# Patient Record
Sex: FEMALE | Race: WHITE | Employment: OTHER | URBAN - METROPOLITAN AREA
[De-identification: names, ages, dates, MRNs, and addresses within clinical notes are randomized per-mention and may not be internally consistent; named-entity substitution may affect disease eponyms.]

---

## 2017-01-06 ENCOUNTER — OFFICE VISIT (OUTPATIENT)
Dept: FAMILY MEDICINE CLINIC | Age: 82
End: 2017-01-06

## 2017-01-06 VITALS
RESPIRATION RATE: 16 BRPM | OXYGEN SATURATION: 95 % | HEART RATE: 55 BPM | TEMPERATURE: 98 F | SYSTOLIC BLOOD PRESSURE: 122 MMHG | DIASTOLIC BLOOD PRESSURE: 67 MMHG | WEIGHT: 176 LBS | HEIGHT: 60 IN | BODY MASS INDEX: 34.55 KG/M2

## 2017-01-06 DIAGNOSIS — M54.6 ACUTE LEFT-SIDED THORACIC BACK PAIN: ICD-10-CM

## 2017-01-06 DIAGNOSIS — R07.81 RIB PAIN: ICD-10-CM

## 2017-01-06 DIAGNOSIS — M41.9 SCOLIOSIS, UNSPECIFIED SCOLIOSIS TYPE, UNSPECIFIED SPINAL REGION: Primary | ICD-10-CM

## 2017-01-06 RX ORDER — HYDROCODONE BITARTRATE AND ACETAMINOPHEN 5; 325 MG/1; MG/1
1 TABLET ORAL
Qty: 30 TAB | Refills: 0 | Status: SHIPPED | OUTPATIENT
Start: 2017-01-06 | End: 2017-02-03 | Stop reason: ALTCHOICE

## 2017-01-06 NOTE — PROGRESS NOTES
.  Chief Complaint   Patient presents with    Medication Refill    Back Pain    Arthritis     . Britta Acuña

## 2017-01-06 NOTE — MR AVS SNAPSHOT
Visit Information Date & Time Provider Department Dept. Phone Encounter #  
 1/6/2017 11:00 AM Griselda Michele MD [address] [facility] [phone] [id number] Upcoming Health Maintenance Date Due DTaP/Tdap/Td series (1 - Tdap) 7/3/1954 ZOSTER VACCINE AGE 60> 7/3/1993 GLAUCOMA SCREENING Q2Y 7/3/1998 MEDICARE YEARLY EXAM 8/3/2017 Allergies as of 1/6/2017  Review Complete On: 11/21/2016 By: Corinne Fabry Severity Noted Reaction Type Reactions Codeine  09/29/2009    Unknown (comments) Unsure of reaction. Morphine  09/29/2009    Hives, Itching Current Immunizations  Reviewed on 10/24/2016 Name Date Influenza High Dose Vaccine PF 10/24/2016, 9/8/2015, 11/11/2014, 11/25/2013 Influenza Vaccine 1/15/2013 Influenza Vaccine Split 1/13/2011 Pneumococcal Conjugate (PCV-13) 8/2/2016 Pneumococcal Vaccine (Unspecified Type) 10/10/2010 Not reviewed this visit You Were Diagnosed With   
  
 Codes Comments Scoliosis, unspecified scoliosis type, unspecified spinal region    -  Primary ICD-10-CM: M41.9 ICD-9-CM: 737.30 Acute left-sided thoracic back pain     ICD-10-CM: M54.6 ICD-9-CM: 724.1 Rib pain     ICD-10-CM: R07.81 ICD-9-CM: 786.50 Vitals BP Pulse Temp Resp Height(growth percentile) Weight(growth percentile) 122/67 (BP 1 Location: Left arm, BP Patient Position: Sitting) (!) 55 98 °F (36.7 °C) 16 5' (1.524 m) 176 lb (79.8 kg) SpO2 BMI OB Status Smoking Status 95% 34.37 kg/m2 Hysterectomy Never Smoker Vitals History BMI and BSA Data Body Mass Index Body Surface Area  
 34.37 kg/m 2 1.84 m 2 Preferred Pharmacy Pharmacy Name Phone [facility] [address], [address] [place] [address] Guilherme Jena [phone] Your Updated Medication List  
  
   
This list is accurate as of: 1/6/17 11:58 AM.  Always use your most recent med list.  
  
  
  
  
 ALPRAZolam 0.25 mg tablet Commonly known as:  [place] Fine Take 1 Tab by mouth three (3) times daily as needed for Sleep or Anxiety. amLODIPine 2.5 mg tablet Commonly known as:  Mehran Sheron TAKE ONE TABLET BY MOUTH DAILY **MUST CALL MD FOR APPOINTMENT  
  
 aspirin 81 mg tablet Take 81 mg by mouth daily. CHARCOCAPS PO Take  by mouth. CRESTOR 40 mg tablet Generic drug:  rosuvastatin TAKE ONE TABLET BY MOUTH DAILY HYDROcodone-acetaminophen 5-325 mg per tablet Commonly known as:  March Castles Take 1 Tab by mouth every six (6) hours as needed for Pain. Max Daily Amount: 4 Tabs. levothyroxine 75 mcg tablet Commonly known as:  SYNTHROID  
TAKE ONE TABLET BY MOUTH EVERY MORNING BEFORE BREAKFAST  
  
 lisinopril 5 mg tablet Commonly known as:  PRINIVIL, ZESTRIL  
TAKE ONE TABLET BY MOUTH DAILY  
  
 metoprolol tartrate 50 mg tablet Commonly known as:  LOPRESSOR  
TAKE ONE TABLET BY MOUTH TWICE A DAY  
  
 nitroglycerin 0.3 mg SL tablet Commonly known as:  NITROSTAT  
1 tablet by SubLINGual route every five (5) minutes as needed for Chest Pain.  
  
 potassium chloride 10 mEq tablet Commonly known as:  KLOR-CON M10  
TAKE TWO TABLETS BY MOUTH DAILY  
  
 ZANTAC 150 mg tablet Generic drug:  raNITIdine Take 150 mg by mouth two (2) times a day. Prescriptions Printed Refills HYDROcodone-acetaminophen (NORCO) 5-325 mg per tablet 0 Sig: Take 1 Tab by mouth every six (6) hours as needed for Pain. Max Daily Amount: 4 Tabs. Class: Print Route: Oral  
  
We Performed the Following REFERRAL TO PHYSICAL THERAPY [NAK00 Custom] Comments:  
 Please evaluate patient for back pain, scoliosis, left rib pain. To-Do List   
 01/06/2017 Imaging:  XR SPINE THORAC 3 V Referral Information Referral ID Referred By Referred To  
  
 [id number] Magali LAWLER Not Available Visits Status Start Date End Date 1 New Request 1/6/17 1/6/18 If your referral has a status of pending review or denied, additional information will be sent to support the outcome of this decision. Introducing [facility]! [employer] introduces [employer] patient portal. Now you can access parts of your medical record, email your doctor's office, and request medication refills online. 1. In your internet browser, go to [email or web address] 2. Click on the First Time User? Click Here link in the Sign In box. You will see the New Member Sign Up page. 3. Enter your [employer] Access Code exactly as it appears below. You will not need to use this code after youve completed the sign-up process. If you do not sign up before the expiration date, you must request a new code. · [employer] Access Code: [email or web address] Expires: 1/20/2017  1:55 PM 
 
4. Enter the last four digits of your Social Security Number (xxxx) and Date of Birth (mm/dd/yyyy) as indicated and click Submit. You will be taken to the next sign-up page. 5. Create a [employer] ID. This will be your [employer] login ID and cannot be changed, so think of one that is secure and easy to remember. 6. Create a [employer] password. You can change your password at any time. 7. Enter your Password Reset Question and Answer. This can be used at a later time if you forget your password. 8. Enter your e-mail address. You will receive e-mail notification when new information is available in [address] 9. Click Sign Up. You can now view and download portions of your medical record. 10. Click the Download Summary menu link to download a portable copy of your medical information. If you have questions, please visit the Frequently Asked Questions section of the [employer] website. Remember, [employer] is NOT to be used for urgent needs. For medical emergencies, dial 911. Now available from your iPhone and Android! Please provide this summary of care documentation to your next provider. Your primary care clinician is listed as Ping Naranjo. If you have any questions after today's visit, please call [phone].

## 2017-01-06 NOTE — PROGRESS NOTES
LORENA Vizcarra Seen is a 80 y.o. female who presents to follow-up on chronic medical issues and establish care with me. Primarily talked about her back pain. Has a long history of scoliosis with spine curvature to the left. Has been having trouble with this recently. October of this past year had some left-sided lower thoracic pain that prompted a urinalysis that showed protein and blood in the urine. She will be following up with a nephrologist later today. Went and saw her orthopedist for the lower thoracic pain and was diagnosed with rib pain and given a brace. She has been wearing the brace and finds that it is very helpful. In December she had an acute bout of pain upon standing. The pain was so severe that she did not want to lift her left leg because it would make the pain worse. This pain has subsided somewhat but it is still an usual pain for her. Notices that any amount of twisting will cause this pain. The pain is located in the same area that was bothering her back in October associated with 1 of the lower left ribs. She is taking pain medication for the back pain. Was taking tramadol but was prescribed [place] by the emergency room physician back in October. Finds the Ayleen Ore is more helpful and has been taking that once a day around 10 AM.  Very rarely needs it more than once a day. She has done physical therapy in the distant past but not recently    PMHx:  Past Medical History   Diagnosis Date    CAD (coronary artery disease)      stent placed on left 1 stent,in 2007    Cancer [facility])      BCCA    [facility]     GERD (gastroesophageal reflux disease)     Hypercholesterolemia     Hypertension     [facility]     Thyroid disease        Meds:   Current Outpatient Prescriptions   Medication Sig Dispense Refill    HYDROcodone-acetaminophen (NORCO) 5-325 mg per tablet Take 1 Tab by mouth every six (6) hours as needed for Pain. Max Daily Amount: 4 Tabs.  27 Tab 0    amLODIPine (NORVASC) 2.5 mg tablet TAKE ONE TABLET BY MOUTH DAILY **MUST CALL MD FOR APPOINTMENT 30 Tab 0    potassium chloride (KLOR-CON M10) 10 mEq tablet TAKE TWO TABLETS BY MOUTH DAILY 180 Tab 3    lisinopril (PRINIVIL, ZESTRIL) 5 mg tablet TAKE ONE TABLET BY MOUTH DAILY 30 Tab 1    levothyroxine (SYNTHROID) 75 mcg tablet TAKE ONE TABLET BY MOUTH EVERY MORNING BEFORE BREAKFAST 30 Tab 9    CRESTOR 40 mg tablet TAKE ONE TABLET BY MOUTH DAILY 30 Tab 4    metoprolol tartrate (LOPRESSOR) 50 mg tablet TAKE ONE TABLET BY MOUTH TWICE A DAY 60 Tab 4    ALPRAZolam (XANAX) 0.25 mg tablet Take 1 Tab by mouth three (3) times daily as needed for Sleep or Anxiety. 30 Tab 1    ACTIVATED CHARCOAL (CHARCOCAPS PO) Take  by mouth.  nitroglycerin (NITROSTAT) 0.3 mg SL tablet 1 tablet by SubLINGual route every five (5) minutes as needed for Chest Pain. 1 Bottle 1    ranitidine (ZANTAC) 150 mg tablet Take 150 mg by mouth two (2) times a day.  aspirin 81 mg Tab Take 81 mg by mouth daily. Allergies: Allergies   Allergen Reactions    Codeine Unknown (comments)     Unsure of reaction.  Morphine Hives and Itching       Smoker:  History   Smoking Status    Never Smoker   Smokeless Tobacco    Never Used       ETOH:   History   Alcohol Use No       FH:   Family History   Problem Relation Age of Onset    Heart Disease Mother     Dementia Father     Diabetes Son     Kidney Disease Son      autoimmune       ROS:  As listed in HPI. In addition:  Constitutional:   No headache, fever, fatigue, weight loss or weight gain      Cardiac:    No chest pain      Resp:   No cough or shortness of breath      Neuro   No loss of consciousness, dizziness, seizures      Physical Exam:  Blood pressure 122/67, pulse (!) 55, temperature 98 °F (36.7 °C), resp. rate 16, height 5' (1.524 m), weight 176 lb (79.8 kg), SpO2 95 %. GEN: No apparent distress. Alert and oriented and responds to all questions appropriately. LUNGS: Respirations unlabored; clear to auscultation bilaterally. There are rales in the bases with the first few deep breaths that cleared after deep breathing  CARDIOVASCULAR: Regular rate and rhythm without murmurs   NEUROLOGIC:  No focal neurologic deficits. Strength and sensation grossly intact. Coordination and gait grossly intact. Back: Palpationscoliosis noted, no midline pain. Pain on palpation of 10th? Rib on the left side that reproduces her pain complaint  SKIN: No obvious rashes. Assessment and Plan     Scoliosis, acute on chronic back pain  The pain that she developed in December upon standing is unlike any pain she has experienced before although it is in the exact same area as the pain she developed in October. X-ray from October did not show acute fracture. The acute nature of the pain that she has been experiencing since December and her inability to move her leg for a few days are concerning to me. I would like to repeat the x-ray to check for fragility fracture. This x-ray is unremarkable given her a referral to physical therapy as this may be helpful for her rib pain    Refill Norco, taking it once daily as needed. NSAIDs not appropriate in kidney disease    Has Xanax on her medication list but is taking it once a month at most.  Warned her about the dangers of mixing benzos and opiates    Blood in urine, seeing Dr. Donna Davey today      ICD-10-CM ICD-9-CM    1. Scoliosis, unspecified scoliosis type, unspecified spinal region M41.9 737.30 HYDROcodone-acetaminophen (NORCO) 5-325 mg per tablet      REFERRAL TO PHYSICAL THERAPY   2. Acute left-sided thoracic back pain M54.6 724.1 REFERRAL TO PHYSICAL THERAPY      XR SPINE THORAC 3 V   3. Rib pain R07.81 786.50 REFERRAL TO PHYSICAL THERAPY      XR SPINE THORAC 3 V       AVS given.  Pt expressed understanding of instructions

## 2017-01-11 ENCOUNTER — HOSPITAL ENCOUNTER (OUTPATIENT)
Dept: GENERAL RADIOLOGY | Age: 82
Discharge: HOME | End: 2017-01-11
Payer: MEDICARE

## 2017-01-11 DIAGNOSIS — M54.6 ACUTE LEFT-SIDED THORACIC BACK PAIN: ICD-10-CM

## 2017-01-11 DIAGNOSIS — R07.81 RIB PAIN: ICD-10-CM

## 2017-01-11 PROCEDURE — 72072 X-RAY EXAM THORAC SPINE 3VWS: CPT

## 2017-01-12 NOTE — PROGRESS NOTES
X-ray of the T-spine obtained looking for fragility fracture. There is no spinal fracture and the x-ray is unchanged from October.   Please notify patient of these reassuring results and encouraged her to see physical therapy as we talked about

## 2017-02-03 DIAGNOSIS — R10.9 LEFT FLANK PAIN: ICD-10-CM

## 2017-02-03 DIAGNOSIS — R31.29 HEMATURIA, MICROSCOPIC: ICD-10-CM

## 2017-02-03 RX ORDER — TRAMADOL HYDROCHLORIDE 50 MG/1
50 TABLET ORAL
Qty: 30 TAB | Refills: 2 | Status: SHIPPED | OUTPATIENT
Start: 2017-02-03 | End: 2017-05-22 | Stop reason: SDUPTHER

## 2017-02-03 NOTE — TELEPHONE ENCOUNTER
Chief Complaint   Patient presents with    Medication Refill     Last refill: 10/13/16  Last Ov: 1/6/17  : 10/13/16

## 2017-02-10 ENCOUNTER — OFFICE VISIT (OUTPATIENT)
Dept: FAMILY MEDICINE CLINIC | Age: 82
End: 2017-02-10

## 2017-02-10 VITALS
RESPIRATION RATE: 18 BRPM | BODY MASS INDEX: 34.63 KG/M2 | DIASTOLIC BLOOD PRESSURE: 79 MMHG | HEIGHT: 60 IN | SYSTOLIC BLOOD PRESSURE: 125 MMHG | TEMPERATURE: 97.9 F | OXYGEN SATURATION: 96 % | WEIGHT: 176.4 LBS | HEART RATE: 60 BPM

## 2017-02-10 DIAGNOSIS — M79.89 LEFT LEG SWELLING: ICD-10-CM

## 2017-02-10 DIAGNOSIS — S93.402A SPRAIN OF LEFT ANKLE, UNSPECIFIED LIGAMENT, INITIAL ENCOUNTER: ICD-10-CM

## 2017-02-10 DIAGNOSIS — S80.212A ABRASION, KNEE, LEFT, INITIAL ENCOUNTER: Primary | ICD-10-CM

## 2017-02-10 NOTE — PROGRESS NOTES
LORENA  Pham Panda is a 80 y.o. female who presents to follow-up on her knee abrasion after a fall. She was getting out of her car and stepped on uneven ground and ended up on the ground before she knew it. Scraped her left knee and left arm on the curb. No other injuries. Able to get up and walk away from the fall. She has been treating the left knee abrasion with daily washings of hydrogen peroxide and Polysporin. Keep it covered. She would like to ask about the drainage. Is also noticed that her left leg has been swollen since the injury. Some of her calf muscles are aching    PMHx:  Past Medical History   Diagnosis Date    CAD (coronary artery disease)      stent placed on left 1 stent,in 2007    [facility]      BCCA    DVT (deep venous thrombosis) ([facility])     GERD (gastroesophageal reflux disease)     Hypercholesterolemia     Hypertension     [facility]     Thyroid disease        Meds:   Current Outpatient Prescriptions   Medication Sig Dispense Refill    traMADol (ULTRAM) 50 mg tablet Take 1 Tab by mouth every six (6) hours as needed for Pain. Max Daily Amount: 200 mg. 30 Tab 2    lisinopril (PRINIVIL, ZESTRIL) 5 mg tablet TAKE ONE TABLET BY MOUTH DAILY 30 Tab 6    amLODIPine (NORVASC) 2.5 mg tablet TAKE ONE TABLET BY MOUTH DAILY **MUST CALL MD FOR APPOINTMENT 30 Tab 0    potassium chloride (KLOR-CON M10) 10 mEq tablet TAKE TWO TABLETS BY MOUTH DAILY 180 Tab 3    levothyroxine (SYNTHROID) 75 mcg tablet TAKE ONE TABLET BY MOUTH EVERY MORNING BEFORE BREAKFAST 30 Tab 9    CRESTOR 40 mg tablet TAKE ONE TABLET BY MOUTH DAILY 30 Tab 4    metoprolol tartrate (LOPRESSOR) 50 mg tablet TAKE ONE TABLET BY MOUTH TWICE A DAY 60 Tab 4    ALPRAZolam (XANAX) 0.25 mg tablet Take 1 Tab by mouth three (3) times daily as needed for Sleep or Anxiety. 30 Tab 1    ACTIVATED CHARCOAL (CHARCOCAPS PO) Take  by mouth.       ranitidine (ZANTAC) 150 mg tablet Take 150 mg by mouth two (2) times a day.        aspirin 81 mg Tab Take 81 mg by mouth daily.  nitroglycerin (NITROSTAT) 0.3 mg SL tablet 1 tablet by SubLINGual route every five (5) minutes as needed for Chest Pain. 1 Bottle 1       Allergies: Allergies   Allergen Reactions    Codeine Unknown (comments)     Unsure of reaction.  Morphine Hives and Itching       Smoker:  History   Smoking Status    Never Smoker   Smokeless Tobacco    Never Used       ETOH:   History   Alcohol Use No       FH:   Family History   Problem Relation Age of Onset    Heart Disease Mother     Dementia Father     Diabetes Son     Kidney Disease Son      autoimmune       ROS:  As listed in HPI. In addition:  Constitutional:   No headache, fever, fatigue, weight loss or weight gain      Cardiac:    No chest pain      Resp:   No cough or shortness of breath      Neuro   No loss of consciousness, dizziness, seizures      Physical Exam:  Blood pressure 125/79, pulse 60, temperature 97.9 °F (36.6 °C), resp. rate 18, height 5' (1.524 m), weight 176 lb 6.4 oz (80 kg), SpO2 96 %. GEN: No apparent distress. Alert and oriented and responds to all questions appropriately. NEUROLOGIC:  No focal neurologic deficits. Strength and sensation grossly intact. Coordination and gait grossly intact. EXT: 1+ edema on the left leg, slightly more edema on the left than the right. The left ankle was examined and there is tenderness to the anterior talofibular ligament. Ankle joint is otherwise stable and strength is intact. Negative Homans sign  SKIN: There is a 4 cm wide by 3 cm long abrasion of the left knee with good granulation tissue and no sign of cellulitis. The drainage that she referred to this serous fluid       Assessment and Plan     Left knee abrasion  Educated on proper wound care. Stop the hydrogen peroxide, continue the Polysporin, continue covering the injury. Would expect several weeks to heal injury of the size.   No apparent infection    Left leg edema due to a left ankle sprain. The sprain is very mild. She is able to bear weight on the ankle without pain and so does not require a brace. However for the edema I recommend compression stocking. Do not cover the abrasion with compression stockings. DVT was considered on the differential but discounted because of the presence of ankle injury likely causing swelling      ICD-10-CM ICD-9-CM    1. Abrasion, knee, left, initial encounter S80.212A 916.0    2. Sprain of left ankle, unspecified ligament, initial encounter S93.402A 845.00    3. Left leg swelling M79.89 729.81        AVS given.  Pt expressed understanding of instructions

## 2017-02-10 NOTE — PROGRESS NOTES
.  Chief Complaint   Patient presents with    Wound Check     left knee     Leg Swelling     . [facility]

## 2017-02-28 RX ORDER — METOPROLOL TARTRATE 50 MG/1
TABLET ORAL
Qty: 60 TAB | Refills: 4 | Status: SHIPPED | OUTPATIENT
Start: 2017-02-28 | End: 2017-08-18 | Stop reason: SDUPTHER

## 2017-02-28 NOTE — TELEPHONE ENCOUNTER
Chief Complaint   Patient presents with    Medication Refill     Last refill:   5 months ago (9/6/2016)        metoprolol tartrate (LOPRESSOR) 50 mg tablet       TAKE ONE TABLET BY MOUTH TWICE A DAY       Dispense: 60 Tab     Refills: 4     Start: 9/6/2016     By: Karyle Fairly, MD

## 2017-03-08 RX ORDER — ROSUVASTATIN CALCIUM 40 MG/1
40 TABLET, COATED ORAL
Qty: 90 TAB | Refills: 3 | Status: SHIPPED | OUTPATIENT
Start: 2017-03-08 | End: 2018-03-20 | Stop reason: SDUPTHER

## 2017-03-08 NOTE — TELEPHONE ENCOUNTER
Chief Complaint   Patient presents with    Medication Refill     Last refill:               5 months ago (9/21/2016)       CRESTOR 40 mg tablet       TAKE ONE TABLET BY MOUTH DAILY       Dispense: 30 Tab     Refills: 4     Start: 9/21/2016     By: Rose Marie Carbone MD

## 2017-03-10 DIAGNOSIS — F41.9 ANXIETY: ICD-10-CM

## 2017-03-10 RX ORDER — ALPRAZOLAM 0.25 MG/1
0.25 TABLET ORAL
Qty: 30 TAB | Refills: 1 | Status: SHIPPED | OUTPATIENT
Start: 2017-03-10 | End: 2017-08-07

## 2017-03-10 NOTE — TELEPHONE ENCOUNTER
Chief Complaint   Patient presents with    Medication Refill     Last refill:     9 months ago (6/1/2016)       ALPRAZolam (XANAX) 0.25 mg tablet       Take 1 Tab by mouth three (3) times daily as needed for Sleep or Anxiety.       Dispense: 30 Tab     Refills: 1     Start: 6/1/2016     By: Agus Ventura MD       : 6/1/16

## 2017-04-04 ENCOUNTER — OFFICE VISIT (OUTPATIENT)
Dept: FAMILY MEDICINE CLINIC | Age: 82
End: 2017-04-04

## 2017-04-04 VITALS
DIASTOLIC BLOOD PRESSURE: 76 MMHG | RESPIRATION RATE: 18 BRPM | HEART RATE: 70 BPM | OXYGEN SATURATION: 94 % | SYSTOLIC BLOOD PRESSURE: 115 MMHG | TEMPERATURE: 98 F | HEIGHT: 60 IN | BODY MASS INDEX: 34.55 KG/M2 | WEIGHT: 176 LBS

## 2017-04-04 DIAGNOSIS — R60.0 BILATERAL EDEMA OF LOWER EXTREMITY: ICD-10-CM

## 2017-04-04 DIAGNOSIS — I10 ESSENTIAL HYPERTENSION: Primary | ICD-10-CM

## 2017-04-04 DIAGNOSIS — Z23 ENCOUNTER FOR IMMUNIZATION: ICD-10-CM

## 2017-04-04 DIAGNOSIS — G89.29 CHRONIC LEFT-SIDED THORACIC BACK PAIN: ICD-10-CM

## 2017-04-04 DIAGNOSIS — M54.6 CHRONIC LEFT-SIDED THORACIC BACK PAIN: ICD-10-CM

## 2017-04-04 NOTE — PROGRESS NOTES
LORENA  Emmy Garvey is a 80 y.o. female who presents to follow-up on blood pressure. Also following up on thoracic back pain that I sent her to physical therapy for. She feels like there has not been much improvement with physical therapy. She has some exercises to do at home which she does when she remembers them which is more or less every day. Has seen an orthopedist for the thoracic back pain and was told to wear a brace or undergo surgery. She is wearing the brace which does seem to help. Unable to stay on her feet for very long. Uses a scooter when she goes to the store. Currently taking tramadol 50 mg every day which she is not sure whether or not it is helping but takes it anyway. Has a slight concern about mild lower extremity edema. Easily resolves with compression. Has not been using compression although she owns compression stockings. PMHx:  Past Medical History:   Diagnosis Date    CAD (coronary artery disease)     stent placed on left 1 stent,in 2007    Cancer [facility])     BCCA    [facility] ([facility])     GERD (gastroesophageal reflux disease)     Hypercholesterolemia     Hypertension     Thromboembolus ([address]     Thyroid disease        Meds:   Current Outpatient Prescriptions   Medication Sig Dispense Refill    diph,pertuss,acel,,tetanus vac,PF, (ADACEL) 2 Lf-(2.5-5-3-5 mcg)-5Lf/0.5 mL syrg vaccine 0.5 mL by IntraMUSCular route once for 1 dose. 0.5 mL 0    ALPRAZolam (XANAX) 0.25 mg tablet Take 1 Tab by mouth three (3) times daily as needed for Sleep or Anxiety. 30 Tab 1    rosuvastatin (CRESTOR) 40 mg tablet Take 1 Tab by mouth nightly. 90 Tab 3    metoprolol tartrate (LOPRESSOR) 50 mg tablet TAKE ONE TABLET BY MOUTH TWICE A DAY 60 Tab 4    traMADol (ULTRAM) 50 mg tablet Take 1 Tab by mouth every six (6) hours as needed for Pain.  Max Daily Amount: 200 mg. 30 Tab 2    lisinopril (PRINIVIL, ZESTRIL) 5 mg tablet TAKE ONE TABLET BY MOUTH DAILY 30 Tab 6    potassium chloride (KLOR-CON M10) 10 mEq tablet TAKE TWO TABLETS BY MOUTH DAILY 180 Tab 3    levothyroxine (SYNTHROID) 75 mcg tablet TAKE ONE TABLET BY MOUTH EVERY MORNING BEFORE BREAKFAST 30 Tab 9    ACTIVATED CHARCOAL (CHARCOCAPS PO) Take  by mouth.  nitroglycerin (NITROSTAT) 0.3 mg SL tablet 1 tablet by SubLINGual route every five (5) minutes as needed for Chest Pain. 1 Bottle 1    ranitidine (ZANTAC) 150 mg tablet Take 150 mg by mouth two (2) times a day.  aspirin 81 mg Tab Take 81 mg by mouth daily. Allergies: Allergies   Allergen Reactions    Codeine Unknown (comments)     Unsure of reaction.  Morphine Hives and Itching       Smoker:  History   Smoking Status    Never Smoker   Smokeless Tobacco    Never Used       ETOH:   History   Alcohol Use No       FH:   Family History   Problem Relation Age of Onset    Heart Disease Mother     Dementia Father     Diabetes Son     Kidney Disease Son      autoimmune       ROS:  As listed in HPI. In addition:  Constitutional:   No headache, fever, fatigue, weight loss or weight gain       Cardiac:    No chest pain      Resp:   No cough or shortness of breath      Neuro   No loss of consciousness, dizziness, seizures      Physical Exam:  Blood pressure 115/76, pulse 70, temperature 98 °F (36.7 °C), resp. rate 18, height 5' (1.524 m), weight 176 lb (79.8 kg), SpO2 94 %. GEN: No apparent distress. Alert and oriented and responds to all questions appropriately. LUNGS: Respirations unlabored; clear to auscultation bilaterally  CARDIOVASCULAR: Regular rate and rhythm without murmurs   NEUROLOGIC:  No focal neurologic deficits. Strength and sensation grossly intact. Coordination and gait grossly intact. EXT: Well perfused. 1+ edema. No chronic skin changes  SKIN: No obvious rashes. Assessment and Plan     Hypertension, well controlled  Is taking a small dose lisinopril, small dose of Norvasc and metoprolol.   In the setting lower extremity edema there is no reason to be on Norvasc 2.5 mg. Stop this to see if it has any benefit to the swelling. Also counseled on low-salt diet    Chronic left-sided thoracic pain  Brace as needed for comfort  Do not wear the brace all the time, make sure that you are doing the exercises he learned in physical therapy. Rather than doubling the dose of tramadol recommend you back tramadol off to as needed 23 days per week. Might notice more benefit on the days you take it when you do this. Tdap prescription    August for Medicare wellness, sooner if needed  Does she need potassium? ICD-10-CM ICD-9-CM    1. Essential hypertension I10 401.9    2. Chronic left-sided thoracic back pain M54.6 724.1     G89.29 338.29    3. Bilateral edema of lower extremity R60.0 782.3    4. Encounter for immunization Z23 V03.89 diph,pertuss,acel,,tetanus vac,PF, (ADACEL) 2 Lf-(2.5-5-3-5 mcg)-5Lf/0.5 mL syrg vaccine       AVS given.  Pt expressed understanding of instructions

## 2017-04-04 NOTE — MR AVS SNAPSHOT
Visit Information Date & Time Provider Department Dept. Phone Encounter #  
 4/4/2017 10:20 AM Anderson Macias MD [facility] [phone] [id number] Upcoming Health Maintenance Date Due DTaP/Tdap/Td series (1 - Tdap) 7/3/1954 ZOSTER VACCINE AGE 60> 7/3/1993 MEDICARE YEARLY EXAM 8/3/2017 GLAUCOMA SCREENING Q2Y 5/24/2018 Allergies as of 4/4/2017  Review Complete On: 4/4/2017 By: Anderson Macias MD  
  
 Severity Noted Reaction Type Reactions Codeine  09/29/2009    Unknown (comments) Unsure of reaction. Morphine  09/29/2009    Hives, Itching Current Immunizations  Reviewed on 10/24/2016 Name Date Influenza High Dose Vaccine PF 10/24/2016, 9/8/2015, 11/11/2014, 11/25/2013 Influenza Vaccine 1/15/2013 Influenza Vaccine Split 1/13/2011 Pneumococcal Conjugate (PCV-13) 8/2/2016 Pneumococcal Vaccine (Unspecified Type) 10/10/2010 Not reviewed this visit You Were Diagnosed With   
  
 Codes Comments Essential hypertension    -  Primary ICD-10-CM: I10 
ICD-9-CM: 401.9 Chronic left-sided thoracic back pain     ICD-10-CM: M54.6, G89.29 ICD-9-CM: 724.1, 338.29 Bilateral edema of lower extremity     ICD-10-CM: R60.0 ICD-9-CM: 782.3 Encounter for immunization     ICD-10-CM: S88 ICD-9-CM: V03.89 Vitals BP Pulse Temp Resp Height(growth percentile) Weight(growth percentile) 115/76 (BP 1 Location: Left arm, BP Patient Position: Sitting) 70 98 °F (36.7 °C) 18 5' (1.524 m) 176 lb (79.8 kg) SpO2 BMI OB Status Smoking Status 94% 34.37 kg/m2 Hysterectomy Never Smoker Vitals History BMI and BSA Data Body Mass Index Body Surface Area  
 34.37 kg/m 2 1.84 m 2 Preferred Pharmacy Pharmacy Name Phone [facility] [address] , [address] [place] [address] Deneise Cones [phone] Your Updated Medication List  
  
   
 This list is accurate as of: 17 11:16 AM.  Always use your most recent med list.  
  
  
  
  
 ALPRAZolam 0.25 mg tablet Commonly known as:  Vandana Lucero Take 1 Tab by mouth three (3) times daily as needed for Sleep or Anxiety. aspirin 81 mg tablet Take 81 mg by mouth daily. CHARCOCAPS PO Take  by mouth. diph,pertuss(acel),tetanus vac(PF) 2 Lf-(2.5-5-3-5 mcg)-5Lf/0.5 mL Syrg vaccine Commonly known as:  ADACEL  
0.5 mL by IntraMUSCular route once for 1 dose. levothyroxine 75 mcg tablet Commonly known as:  SYNTHROID  
TAKE ONE TABLET BY MOUTH EVERY MORNING BEFORE BREAKFAST  
  
 lisinopril 5 mg tablet Commonly known as:  PRINIVIL, ZESTRIL  
TAKE ONE TABLET BY MOUTH DAILY  
  
 metoprolol tartrate 50 mg tablet Commonly known as:  LOPRESSOR  
TAKE ONE TABLET BY MOUTH TWICE A DAY  
  
 nitroglycerin 0.3 mg SL tablet Commonly known as:  NITROSTAT  
1 tablet by SubLINGual route every five (5) minutes as needed for Chest Pain.  
  
 potassium chloride 10 mEq tablet Commonly known as:  KLOR-CON M10  
TAKE TWO TABLETS BY MOUTH DAILY  
  
 rosuvastatin 40 mg tablet Commonly known as:  CRESTOR Take 1 Tab by mouth nightly. traMADol 50 mg tablet Commonly known as:  ULTRAM  
Take 1 Tab by mouth every six (6) hours as needed for Pain. Max Daily Amount: 200 mg. ZANTAC 150 mg tablet Generic drug:  raNITIdine Take 150 mg by mouth two (2) times a day. Prescriptions Printed Refills diph,pertuss,acel,,tetanus vac,PF, (ADACEL) 2 Lf-(2.5-5-3-5 mcg)-5Lf/0.5 mL syrg vaccine 0 Si.5 mL by IntraMUSCular route once for 1 dose. Class: Print Route: IntraMUSCular Introducing [facility]! [facility] introduces [employer] patient portal. Now you can access parts of your medical record, email your doctor's office, and request medication refills online. 1. In your internet browser, go to [email or web address] 2. Click on the First Time User? Click Here link in the Sign In box. You will see the New Member Sign Up page. 3. Enter your [employer] Access Code exactly as it appears below. You will not need to use this code after youve completed the sign-up process. If you do not sign up before the expiration date, you must request a new code. · [employer] Access Code: [email or web address] Expires: 7/3/2017 11:16 AM 
 
4. Enter the last four digits of your Social Security Number (xxxx) and Date of Birth (mm/dd/yyyy) as indicated and click Submit. You will be taken to the next sign-up page. 5. Create a [employer] ID. This will be your [employer] login ID and cannot be changed, so think of one that is secure and easy to remember. 6. Create a [employer] password. You can change your password at any time. 7. Enter your Password Reset Question and Answer. This can be used at a later time if you forget your password. 8. Enter your e-mail address. You will receive e-mail notification when new information is available in [address] 9. Click Sign Up. You can now view and download portions of your medical record. 10. Click the Download Summary menu link to download a portable copy of your medical information. If you have questions, please visit the Frequently Asked Questions section of the [employer] website. Remember, [employer] is NOT to be used for urgent needs. For medical emergencies, dial 911. Now available from your iPhone and Android! Please provide this summary of care documentation to your next provider. Your primary care clinician is listed as Vikki Ryan. If you have any questions after today's visit, please call [phone].

## 2017-05-22 ENCOUNTER — OFFICE VISIT (OUTPATIENT)
Dept: CARDIOLOGY CLINIC | Age: 82
End: 2017-05-22

## 2017-05-22 ENCOUNTER — DOCUMENTATION ONLY (OUTPATIENT)
Dept: FAMILY MEDICINE CLINIC | Age: 82
End: 2017-05-22

## 2017-05-22 VITALS
HEIGHT: 60 IN | BODY MASS INDEX: 34.36 KG/M2 | DIASTOLIC BLOOD PRESSURE: 80 MMHG | SYSTOLIC BLOOD PRESSURE: 130 MMHG | HEART RATE: 73 BPM | OXYGEN SATURATION: 98 % | RESPIRATION RATE: 18 BRPM | WEIGHT: 175 LBS

## 2017-05-22 DIAGNOSIS — I25.10 ASHD (ARTERIOSCLEROTIC HEART DISEASE): Primary | ICD-10-CM

## 2017-05-22 DIAGNOSIS — I10 ESSENTIAL HYPERTENSION: ICD-10-CM

## 2017-05-22 DIAGNOSIS — R31.29 HEMATURIA, MICROSCOPIC: ICD-10-CM

## 2017-05-22 DIAGNOSIS — N18.3 CKD (CHRONIC KIDNEY DISEASE), STAGE 3 (MODERATE): ICD-10-CM

## 2017-05-22 DIAGNOSIS — R10.9 LEFT FLANK PAIN: ICD-10-CM

## 2017-05-22 DIAGNOSIS — E78.2 MIXED HYPERLIPIDEMIA: ICD-10-CM

## 2017-05-22 RX ORDER — ALENDRONATE SODIUM 10 MG/1
10 TABLET ORAL DAILY
COMMUNITY
Start: 2017-05-19 | End: 2017-08-17 | Stop reason: SDUPTHER

## 2017-05-22 RX ORDER — DOXYCYCLINE HYCLATE 100 MG
100 TABLET ORAL DAILY
COMMUNITY
Start: 2017-05-18 | End: 2017-08-07 | Stop reason: ALTCHOICE

## 2017-05-22 RX ORDER — TETANUS TOXOID, REDUCED DIPHTHERIA TOXOID AND ACELLULAR PERTUSSIS VACCINE, ADSORBED 5; 2.5; 8; 8; 2.5 [IU]/.5ML; [IU]/.5ML; UG/.5ML; UG/.5ML; UG/.5ML
SUSPENSION INTRAMUSCULAR
COMMUNITY
Start: 2017-04-04 | End: 2017-08-07 | Stop reason: ALTCHOICE

## 2017-05-22 RX ORDER — TRAMADOL HYDROCHLORIDE 50 MG/1
TABLET ORAL
Qty: 30 TAB | Refills: 1 | Status: SHIPPED | OUTPATIENT
Start: 2017-05-22 | End: 2017-07-20 | Stop reason: SDUPTHER

## 2017-05-22 NOTE — PROGRESS NOTES
5/22/2017 11:42 AM      Subjective:     Audrey Bar is here for f/u visit. Overall feels good. Per pt sometimes she has noticed some TOMLINSON. No chest pain or palpitations. Visit Vitals    /80    Pulse 73    Resp 18    Ht 5' (1.524 m)    Wt 175 lb (79.4 kg)    SpO2 98%    BMI 34.18 kg/m2     Current Outpatient Prescriptions   Medication Sig    alendronate (FOSAMAX) 10 mg tablet Take 10 mg by mouth daily.  BOOSTRIX TDAP 2.5-8-5 Lf-mcg-Lf/0.5mL syrg     doxycycline (VIBRA-TABS) 100 mg tablet Take 100 mg by mouth daily.  ALPRAZolam (XANAX) 0.25 mg tablet Take 1 Tab by mouth three (3) times daily as needed for Sleep or Anxiety.  rosuvastatin (CRESTOR) 40 mg tablet Take 1 Tab by mouth nightly.  metoprolol tartrate (LOPRESSOR) 50 mg tablet TAKE ONE TABLET BY MOUTH TWICE A DAY    traMADol (ULTRAM) 50 mg tablet Take 1 Tab by mouth every six (6) hours as needed for Pain. Max Daily Amount: 200 mg.    lisinopril (PRINIVIL, ZESTRIL) 5 mg tablet TAKE ONE TABLET BY MOUTH DAILY    potassium chloride (KLOR-CON M10) 10 mEq tablet TAKE TWO TABLETS BY MOUTH DAILY    levothyroxine (SYNTHROID) 75 mcg tablet TAKE ONE TABLET BY MOUTH EVERY MORNING BEFORE BREAKFAST    ACTIVATED CHARCOAL (CHARCOCAPS PO) Take  by mouth.  nitroglycerin (NITROSTAT) 0.3 mg SL tablet 1 tablet by SubLINGual route every five (5) minutes as needed for Chest Pain.  aspirin 81 mg Tab Take 81 mg by mouth daily.  ranitidine (ZANTAC) 150 mg tablet Take 150 mg by mouth two (2) times a day. No current facility-administered medications for this visit.           Objective:      Visit Vitals    /80    Pulse 73    Resp 18    Ht 5' (1.524 m)    Wt 175 lb (79.4 kg)    SpO2 98%    BMI 34.18 kg/m2       Data Review:     EKG: Normal sinus rhythm, non specific T wave changes    Reviewed and/or ordered active problem list, medication list tests    Past Medical History:   Diagnosis Date    CAD (coronary artery disease)     stent placed on left 1 stent,in 2007    Cancer [facility])     BCCA    DVT (deep venous thrombosis) ([facility])     GERD (gastroesophageal reflux disease)     Hypercholesterolemia     Hypertension     Thromboembolus ([address]     Thyroid disease       Past Surgical History:   Procedure Laterality Date    HX GI      esophageal hiatal hernia - 2004    HX GYN      partial hysterectomy - 1970    HX OTHER SURGICAL      BCCAs removed    HX UROLOGICAL      bladder repair - 1999     Allergies   Allergen Reactions    Codeine Unknown (comments)     Unsure of reaction.  Morphine Hives and Itching      Family History   Problem Relation Age of Onset    Heart Disease Mother     Dementia Father     Diabetes Son     Kidney Disease Son      autoimmune      Social History     Social History    Marital status:      Spouse name: N/A    Number of children: N/A    Years of education: N/A     Occupational History    Not on file. Social History Main Topics    Smoking status: Never Smoker    Smokeless tobacco: Never Used    Alcohol use No    Drug use: No    Sexual activity: Not on file     Other Topics Concern    Not on file     Social History Narrative         Review of Systems     General: Not Present- Anorexia, Chills, Dietary Changes, Fatigue, Fever, Medication Changes, Night Sweats, Weight Gain > 10lbs. and Weight Loss > 10lbs. .  Skin: Not Present- Bruising and Excessive Sweating. HEENT: Not Present- Headache, Visual Loss and Vertigo. Respiratory: Not Present- Cough, Decreased Exercise Tolerance, Snoring and Wheezing. Cardiovascular: Not Present- Abnormal Blood Pressure, Chest Pain, Claudications, Edema, Fainting / Blacking Out, Irregular Heart Beat, Night Cramps, Orthopnea, Palpitations, Paroxysmal Nocturnal Dyspnea, Rapid Heart Rate, and Swelling of Extremities.   Gastrointestinal: Not Present- Black, Tarry Stool, Bloody Stool, Diarrhea, Hematemesis, Rectal Bleeding and Vomiting. Musculoskeletal: Not Present- Muscle Pain and Muscle Weakness. Neurological: Not Present- Dizziness. Psychiatric: Not Present- Depression. Endocrine: Not Present- Cold Intolerance, Heat Intolerance and Thyroid Problems. Hematology: Not Present- Abnormal Bleeding, Anemia, Blood Clots and Easy Bruising.       Physical Exam   The physical exam findings are as follows:       General   Mental Status - Alert. General Appearance - Not in acute distress. Chest and Lung Exam   Inspection: Accessory muscles - No use of accessory muscles in breathing. Auscultation:   Breath sounds: - Normal.      Cardiovascular   Inspection: Jugular vein - Bilateral - Inspection Normal.  Palpation/Percussion:   Apical Impulse: - Normal.  Auscultation: Rhythm - Regular. Heart Sounds - S1 WNL and S2 WNL. No S3 or S4. Murmurs & Other Heart Sounds: Auscultation of the heart reveals - No Murmurs. Carotid arteries - No Carotid bruit. Peripheral Vascular   Upper Extremity: Inspection - Bilateral - No Cyanotic nailbeds or Digital clubbing. Lower Extremity:   Palpation: Edema - Bilateral - No edema. Assessment:       ICD-10-CM ICD-9-CM    1. ASHD (arteriosclerotic heart disease) I25.10 414.00 AMB POC EKG ROUTINE W/ 12 LEADS, INTER & REP   2. Mixed hyperlipidemia E78.2 272.2    3. Essential hypertension I10 401.9    4. CKD (chronic kidney disease), stage 3 (moderate) N18.3 585. 3        Plan:     1. CAD: h/o PCI in 2007. Due to subtle TOMLINSON, stress test recommended, however she wants to hold off any cardiac tests for now. If symptoms worsen she will call back.    2. HTN: Controlled, continue current meds. Amlodipine dc by PCP. 3. High cholesterol: On statin. Last labs reviewed.    4. Diet and exercise.

## 2017-05-22 NOTE — PROGRESS NOTES
Call placed to pt and to pt son to let them know that RX for Ultram was called into [address] [place] [address] in [place].

## 2017-06-29 RX ORDER — LISINOPRIL 5 MG/1
TABLET ORAL
Qty: 30 TAB | Refills: 6 | Status: SHIPPED | OUTPATIENT
Start: 2017-06-29 | End: 2018-01-19 | Stop reason: SDUPTHER

## 2017-07-20 DIAGNOSIS — R31.29 HEMATURIA, MICROSCOPIC: ICD-10-CM

## 2017-07-20 DIAGNOSIS — R10.9 LEFT FLANK PAIN: ICD-10-CM

## 2017-07-21 DIAGNOSIS — R10.9 LEFT FLANK PAIN: ICD-10-CM

## 2017-07-21 DIAGNOSIS — R31.29 HEMATURIA, MICROSCOPIC: ICD-10-CM

## 2017-07-21 RX ORDER — TRAMADOL HYDROCHLORIDE 50 MG/1
TABLET ORAL
Qty: 30 TAB | Refills: 1 | Status: CANCELLED | OUTPATIENT
Start: 2017-07-21

## 2017-07-21 RX ORDER — TRAMADOL HYDROCHLORIDE 50 MG/1
TABLET ORAL
Qty: 30 TAB | Refills: 1 | Status: SHIPPED | OUTPATIENT
Start: 2017-07-21 | End: 2017-09-15 | Stop reason: SDUPTHER

## 2017-07-21 NOTE — TELEPHONE ENCOUNTER
Tramadol has been approved by Dr. Reymundo Pepper. Waiting for pharmacy to open at 9 for medication to be called in.

## 2017-08-07 ENCOUNTER — HOSPITAL ENCOUNTER (OUTPATIENT)
Dept: CT IMAGING | Age: 82
Discharge: HOME | End: 2017-08-07
Attending: INTERNAL MEDICINE
Payer: MEDICARE

## 2017-08-07 ENCOUNTER — HOSPITAL ENCOUNTER (OUTPATIENT)
Dept: LAB | Age: 82
Discharge: HOME | End: 2017-08-07
Payer: MEDICARE

## 2017-08-07 ENCOUNTER — OFFICE VISIT (OUTPATIENT)
Dept: FAMILY MEDICINE CLINIC | Age: 82
End: 2017-08-07

## 2017-08-07 VITALS
WEIGHT: 175.2 LBS | BODY MASS INDEX: 34.4 KG/M2 | OXYGEN SATURATION: 92 % | SYSTOLIC BLOOD PRESSURE: 144 MMHG | TEMPERATURE: 98.1 F | RESPIRATION RATE: 16 BRPM | HEART RATE: 88 BPM | DIASTOLIC BLOOD PRESSURE: 79 MMHG | HEIGHT: 60 IN

## 2017-08-07 DIAGNOSIS — E78.2 MIXED HYPERLIPIDEMIA: ICD-10-CM

## 2017-08-07 DIAGNOSIS — N18.3 CKD (CHRONIC KIDNEY DISEASE), STAGE 3 (MODERATE): ICD-10-CM

## 2017-08-07 DIAGNOSIS — R22.0 HEAD SWELLING: ICD-10-CM

## 2017-08-07 DIAGNOSIS — Z13.31 SCREENING FOR DEPRESSION: ICD-10-CM
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
## 2017-08-07 DIAGNOSIS — I10 ESSENTIAL HYPERTENSION: ICD-10-CM

## 2017-08-07 DIAGNOSIS — I25.10 ATHEROSCLEROSIS OF NATIVE CORONARY ARTERY OF NATIVE HEART WITHOUT ANGINA PECTORIS: ICD-10-CM

## 2017-08-07 DIAGNOSIS — Z00.00 GENERAL MEDICAL EXAM: Primary | ICD-10-CM

## 2017-08-07 DIAGNOSIS — Z86.711 HISTORY OF PULMONARY EMBOLUS (PE): ICD-10-CM

## 2017-08-07 DIAGNOSIS — Z13.39 SCREENING FOR ALCOHOLISM: ICD-10-CM

## 2017-08-07 PROCEDURE — 85025 COMPLETE CBC W/AUTO DIFF WBC: CPT

## 2017-08-07 PROCEDURE — 80061 LIPID PANEL: CPT

## 2017-08-07 PROCEDURE — 70450 CT HEAD/BRAIN W/O DYE: CPT

## 2017-08-07 PROCEDURE — 84443 ASSAY THYROID STIM HORMONE: CPT

## 2017-08-07 PROCEDURE — 80053 COMPREHEN METABOLIC PANEL: CPT

## 2017-08-07 PROCEDURE — 82306 VITAMIN D 25 HYDROXY: CPT

## 2017-08-07 NOTE — MR AVS SNAPSHOT
Visit Information Date & Time Provider Department Dept. Phone Encounter #  
 8/7/2017 11:30 AM Jim Blair [place]97 [phone] [id number] Your Appointments 8/8/2017  9:30 AM  
Medicare Physical with Ama Larose MD  
Ana Prakash [place] [address] ([address]) Appt Note: medicare wellness $0cp [facility] [address], [address] [address] [place] [address] [facility] [address]  
[phone]  
  
   
 [address], [place] [address] [address] Upcoming Health Maintenance Date Due DTaP/Tdap/Td series (1 - Tdap) 7/3/1954 ZOSTER VACCINE AGE 60> 5/3/1993 INFLUENZA AGE 9 TO ADULT 8/1/2017 MEDICARE YEARLY EXAM 8/3/2017 GLAUCOMA SCREENING Q2Y 5/24/2018 Allergies as of 8/7/2017  Review Complete On: 8/7/2017 By: Ama Larose MD  
  
 Severity Noted Reaction Type Reactions Codeine  09/29/2009    Unknown (comments) Unsure of reaction. Morphine  09/29/2009    Hives, Itching Current Immunizations  Reviewed on 10/24/2016 Name Date Influenza High Dose Vaccine PF 10/24/2016, 9/8/2015, 11/11/2014, 11/25/2013 Influenza Vaccine 1/15/2013 Influenza Vaccine Split 1/13/2011 Pneumococcal Conjugate (PCV-13) 8/2/2016 ZZZ-RETIRED (DO NOT USE) Pneumococcal Vaccine (Unspecified Type) 10/10/2010 Not reviewed this visit You Were Diagnosed With   
  
 Codes Comments General medical exam    -  Primary ICD-10-CM: Z00.00 ICD-9-CM: V70.9 Fall, initial encounter     ICD-10-CM: W19. Genieelvira Rand ICD-9-CM: E888.9 Head swelling     ICD-10-CM: R22.0 ICD-9-CM: 784.2 Mixed hyperlipidemia     ICD-10-CM: E78.2 ICD-9-CM: 272.2 Essential hypertension     ICD-10-CM: I10 
ICD-9-CM: 401.9 CKD (chronic kidney disease), stage 3 (moderate)     ICD-10-CM: N18.3 ICD-9-CM: 618. 3 Atherosclerosis of native coronary artery of native heart without angina pectoris     ICD-10-CM: I25.10 ICD-9-CM: 414.01   
 Screening for depression     ICD-10-CM: Z13.89 ICD-9-CM: V79.0 Screening for alcoholism     ICD-10-CM: Z13.89 ICD-9-CM: V79.1 Vitals BP Pulse Temp Resp Height(growth percentile) Weight(growth percentile) 144/79 (BP 1 Location: Left arm, BP Patient Position: Sitting) 88 98.1 °F (36.7 °C) (Oral) 16 5' (1.524 m) 175 lb 3.2 oz (79.5 kg) SpO2 BMI OB Status Smoking Status 92% 34.22 kg/m2 Hysterectomy Never Smoker BMI and BSA Data Body Mass Index Body Surface Area  
 34.22 kg/m 2 1.83 m 2 Preferred Pharmacy Pharmacy Name Phone [facility] [address], [address] [place] [address] Albert Suarezelvira [phone] Your Updated Medication List  
  
   
This list is accurate as of: 8/7/17 12:15 PM.  Always use your most recent med list.  
  
  
  
  
 alendronate 10 mg tablet Commonly known as:  FOSAMAX Take 10 mg by mouth daily. aspirin 81 mg tablet Take 81 mg by mouth daily. CHARCOCAPS PO Take  by mouth.  
  
 levothyroxine 75 mcg tablet Commonly known as:  SYNTHROID  
TAKE ONE TABLET BY MOUTH EVERY MORNING BEFORE BREAKFAST  
  
 lisinopril 5 mg tablet Commonly known as:  PRINIVIL, ZESTRIL  
TAKE ONE TABLET BY MOUTH DAILY  
  
 metoprolol tartrate 50 mg tablet Commonly known as:  LOPRESSOR  
TAKE ONE TABLET BY MOUTH TWICE A DAY  
  
 nitroglycerin 0.3 mg SL tablet Commonly known as:  NITROSTAT  
1 tablet by SubLINGual route every five (5) minutes as needed for Chest Pain.  
  
 potassium chloride 10 mEq tablet Commonly known as:  KLOR-CON M10  
TAKE TWO TABLETS BY MOUTH DAILY  
  
 rosuvastatin 40 mg tablet Commonly known as:  CRESTOR Take 1 Tab by mouth nightly. traMADol 50 mg tablet Commonly known as:  ULTRAM  
TAKE ONE TABLET BY MOUTH EVERY 6 HOURS AS NEEDED FOR PAIN - MAX OF 4 TABLETS DAILY We Performed the Following CBC WITH AUTOMATED DIFF [96671 CPT(R)] LIPID PANEL [55163 CPT(R)] METABOLIC PANEL, COMPREHENSIVE [60553 CPT(R)] [place] ANNUAL ALCOHOL SCREEN 15 MIN [id number] [facility]] [address] [facility] [employer] [ [facility]] TSH 3RD GENERATION [74614 CPT(R)] VITAMIN D, 25 HYDROXY [id number] CPT(R)] To-Do List   
 08/07/2017 Imaging:  CT HEAD WO CONT   
  
 08/07/2017 2:00 PM  
  Appointment with [facility] CT 3 at [facility] ([phone]) NON-CONTRAST STUDY: 1. Bring any non [facility] facility films/images pertaining to the area of interest with you on the day of appointment. 2. Check in at registration at least 30 minutes before appt time unless you were instructed to do otherwise. 3. If you have to drink oral contrast please pick it up any weekday prior to your appointment, if you cannot please check in 2 hrs  before appt time. Patient Instructions Head CT scheduled today at 2:00. Please arrive at 1:30 to register. [facility] MOB 1 outpatient registration. No prep, may eat/drink as desired. Medicare Wellness Visit, Female The best way to live healthy is to have a healthy lifestyle by eating a well-balanced diet, exercising regularly, limiting alcohol and stopping smoking. Regular physical exams and screening tests are another way to keep healthy. Preventive exams provided by your health care provider can find health problems before they become diseases or illnesses. Preventive services including immunizations, screening tests, monitoring and exams can help you take care of your own health. All people over age 72 should have a pneumovax  and and a prevnar shot to prevent pneumonia. These are once in a lifetime unless you and your provider decide differently. All people over 65 should have a yearly flu shot and a tetanus vaccine every 10 years. A bone mass density to screen for osteoporosis or thinning of the bones should be done every 2 years after 65. Screening for diabetes mellitus with a blood sugar test should be done every year. Glaucoma is a disease of the eye due to increased ocular pressure that can lead to blindness and it should be done every year by an eye professional. 
 
Cardiovascular screening tests that check for elevated lipids (fatty part of blood) which can lead to heart disease and strokes should be done every 5 years. Colorectal screening that evaluates for blood or polyps in your colon should be done yearly as a stool test or every five years as a flexible sigmoidoscope or every 10 years as a colonoscopy up to age 76. Breast cancer screening with a mammogram is recommended biennially  for women age 54-69. Screening for cervical cancer with a pap smear and pelvic exam is recommended for women after age 72 years every 2 years up to age 79 or when the provider and patient decide to stop. If there is a history of cervical abnormalities or other increased risk for cancer then the test is recommended yearly. Hepatitis C screening is also recommended for anyone born between 80 through [address]. A shingles vaccine is also recommended once in a lifetime after age 61. Your Medicare Wellness Exam is recommended annually. Here is a list of your current Health Maintenance items with a due date: 
Health Maintenance Due Topic Date Due  Shingles Vaccine  05/03/1993  Flu Vaccine  08/01/2017 Preventing Falls: Care Instructions Your Care Instructions Getting around your home safely can be a challenge if you have injuries or health problems that make it easy for you to fall. Loose rugs and furniture in walkways are among the dangers for many older people who have problems walking or who have poor eyesight. People who have conditions such as arthritis, osteoporosis, or dementia also have to be careful not to fall. You can make your home safer with a few simple measures. Follow-up care is a key part of your treatment and safety.  Be sure to make and go to all appointments, and call your doctor if you are having problems. It's also a good idea to know your test results and keep a list of the medicines you take. How can you care for yourself at home? Taking care of yourself · You may get dizzy if you do not drink enough water. To prevent dehydration, drink plenty of fluids, enough so that your urine is light yellow or clear like water. Choose water and other caffeine-free clear liquids. If you have kidney, heart, or liver disease and have to limit fluids, talk with your doctor before you increase the amount of fluids you drink. · Exercise regularly to improve your strength, muscle tone, and balance. Walk if you can. Swimming may be a good choice if you cannot walk easily. · Have your vision and hearing checked each year or any time you notice a change. If you have trouble seeing and hearing, you might not be able to avoid objects and could lose your balance. · Know the side effects of the medicines you take. Ask your doctor or pharmacist whether the medicines you take can affect your balance. Sleeping pills or sedatives can affect your balance. · Limit the amount of alcohol you drink. Alcohol can impair your balance and other senses. · Ask your doctor whether calluses or corns on your feet need to be removed. If you wear loose-fitting shoes because of calluses or corns, you can lose your balance and fall. · Talk to your doctor if you have numbness in your feet. Preventing falls at home · Remove raised doorway thresholds, throw rugs, and clutter. Repair loose carpet or raised areas in the floor. · Move furniture and electrical cords to keep them out of walking paths. · Use nonskid floor wax, and wipe up spills right away, especially on ceramic tile floors. · If you use a walker or cane, put rubber tips on it. If you use crutches, clean the bottoms of them regularly with an abrasive pad, such as steel wool. · Keep your house well lit, especially [place], and outside walkways. Use night-lights in areas such as hallways and bathrooms. Add extra light switches or use remote switches (such as switches that go on or off when you clap your hands) to make it easier to turn lights on if you have to get up during the night. · Install sturdy handrails on stairways. · Move items in your cabinets so that the things you use a lot are on the lower shelves (about waist level). · Keep a cordless phone and a flashlight with new batteries by your bed. If possible, put a phone in each of the main rooms of your house, or carry a cell phone in case you fall and cannot reach a phone. Or, you can wear a device around your neck or wrist. You push a button that sends a signal for help. · Wear low-heeled shoes that fit well and give your feet good support. Use footwear with nonskid soles. Check the heels and soles of your shoes for wear. Repair or replace worn heels or soles. · Do not wear socks without shoes on wood floors. · Walk on the grass when the sidewalks are slippery. If you live in an area that gets snow and ice in the winter, sprinkle salt on slippery steps and sidewalks. Preventing falls in the bath · Install grab bars and nonskid mats inside and outside your shower or tub and near the toilet and sinks. · Use shower chairs and bath benches. · Use a hand-held shower head that will allow you to sit while showering. · Get into a tub or shower by putting the weaker leg in first. Get out of a tub or shower with your strong side first. 
· Repair loose toilet seats and consider installing a raised toilet seat to make getting on and off the toilet easier. · Keep your bathroom door unlocked while you are in the shower. Where can you learn more? Go to http://christianne-nain.info/. Enter [id number] [id number] [phone] in the search box to learn more about \"Preventing Falls: Care Instructions. \" Current as of: August 4, 2016 Content Version: 11.3 © 5452-5091 [employer], [employer]. Care instructions adapted under license by [employer] (which disclaims liability or warranty for this information). If you have questions about a medical condition or this instruction, always ask your healthcare professional. [address] any warranty or liability for your use of this information. Introducing \[facility]\"" & HEALTH SERVICES! [facility] introduces [employer] patient portal. Now you can access parts of your medical record, email your doctor's office, and request medication refills online. 1. In your internet browser, go to [email or web address] 2. Click on the First Time User? Click Here link in the Sign In box. You will see the New Member Sign Up page. 3. Enter your [employer] Access Code exactly as it appears below. You will not need to use this code after youve completed the sign-up process. If you do not sign up before the expiration date, you must request a new code. · [employer] Access Code: [email or web address] Expires: 11/5/2017 11:18 AM 
 
4. Enter the last four digits of your Social Security Number (xxxx) and Date of Birth (mm/dd/yyyy) as indicated and click Submit. You will be taken to the next sign-up page. 5. Create a [employer] ID. This will be your [employer] login ID and cannot be changed, so think of one that is secure and easy to remember. 6. Create a [employer] password. You can change your password at any time. 7. Enter your Password Reset Question and Answer. This can be used at a later time if you forget your password. 8. Enter your e-mail address. You will receive e-mail notification when new information is available in [address] 9. Click Sign Up. You can now view and download portions of your medical record. 10. Click the Download Summary menu link to download a portable copy of your medical information. If you have questions, please visit the Frequently Asked Questions section of the [employer] website. Remember, [employer] is NOT to be used for urgent needs. For medical emergencies, dial 911. Now available from your iPhone and Android! Please provide this summary of care documentation to your next provider. Your primary care clinician is listed as Grace Rosado. If you have any questions after today's visit, please call [phone].

## 2017-08-07 NOTE — PROGRESS NOTES
This is a Subsequent Medicare Annual Wellness Visit providing Personalized Prevention Plan Services (PPPS) (Performed 12 months after initial AWV and PPPS )    I have reviewed the patient's medical history in detail and updated the computerized patient record. History     Past Medical History:   Diagnosis Date    CAD (coronary artery disease)     stent placed on left 1 stent,in 2007    Cancer [facility])     BCCA    DVT (deep venous thrombosis) (HCC)     GERD (gastroesophageal reflux disease)     Hypercholesterolemia     Hypertension     Thromboembolus ([address]     DVT after hernia operation, PE spontaneous 10 years later    Thyroid disease       Past Surgical History:   Procedure Laterality Date    HX GI      esophageal hiatal hernia - 2004    HX GYN      partial hysterectomy - 1970    HX OTHER SURGICAL      BCCAs removed    HX UROLOGICAL      bladder repair - 1999     Social History   Substance Use Topics    Smoking status: Never Smoker    Smokeless tobacco: Never Used    Alcohol use No     Current Outpatient Prescriptions   Medication Sig Dispense Refill    traMADol (ULTRAM) 50 mg tablet TAKE ONE TABLET BY MOUTH EVERY 6 HOURS AS NEEDED FOR PAIN - MAX OF 4 TABLETS DAILY 30 Tab 1    lisinopril (PRINIVIL, ZESTRIL) 5 mg tablet TAKE ONE TABLET BY MOUTH DAILY 30 Tab 6    alendronate (FOSAMAX) 10 mg tablet Take 10 mg by mouth daily.  rosuvastatin (CRESTOR) 40 mg tablet Take 1 Tab by mouth nightly. 90 Tab 3    metoprolol tartrate (LOPRESSOR) 50 mg tablet TAKE ONE TABLET BY MOUTH TWICE A DAY 60 Tab 4    potassium chloride (KLOR-CON M10) 10 mEq tablet TAKE TWO TABLETS BY MOUTH DAILY 180 Tab 3    ACTIVATED CHARCOAL (CHARCOCAPS PO) Take  by mouth.  nitroglycerin (NITROSTAT) 0.3 mg SL tablet 1 tablet by SubLINGual route every five (5) minutes as needed for Chest Pain. 1 Bottle 1    aspirin 81 mg Tab Take 81 mg by mouth daily.       levothyroxine (SYNTHROID) 50 mcg tablet Take 1 Tab by mouth Daily (before breakfast). 90 Tab 3    cholecalciferol, vitamin D3, (VITAMIN D3) 2,000 unit tab Take 1 Tab by mouth daily. 30 Tab 0     Allergies   Allergen Reactions    Codeine Unknown (comments)     Unsure of reaction.  Morphine Hives and Itching     Family History   Problem Relation Age of Onset    Heart Disease Mother     Dementia Father     Diabetes Son     Kidney Disease Son      autoimmune       Patient Active Problem List    Diagnosis    History of pulmonary embolus (PE)     2016 - no longer on anticoagulation.      CKD (chronic kidney disease)    S/P cardiac cath     11/8/11 patent stent to LAD, normal LCX, RCA shows minimal disease. Normal LV.  Coronary artery spasm (HCC)    ASHD (arteriosclerotic heart disease)    Hyperlipidemia - Takes medication at night as directed, no muscle aches. Tries to watch diet.  Hypertension - No home monitoring. Compliant with medication. No shortness of breath, no chest pain, no vision change, no headache, no lower extremity edema.  Hypothyroidism - No hair loss, no constipation, no dry skin or brittle nails, no palpitations. Taking medication each morning on an empty stomach.  Other and unspecified hyperlipidemia - Takes medication at night as directed, no muscle aches. Tries to watch diet.  Unspecified vitamin D deficiency       81yo with fall on Saturday, stumbled getting out of car. Hit her head against door frame with resulting \"dent\" in the back of her head. Also scattered bruises. Did not seek medical attn at the time of the fall. No vomiting, no change in pupils reported by daughter.  No vision changes    Patient Care Team:  Soniya Collado MD as PCP - General (Internal Medicine)  Bowen Molina MD as Physician (Cardiology)  Bowen Molina MD as Physician (Cardiology)    Depression Risk Factor Screening:     PHQ over the last two weeks 8/7/2017   Little interest or pleasure in doing things Not at all   Feeling down, depressed or hopeless Not at all   Total Score PHQ 2 0     Alcohol Risk Factor Screening: On any occasion during the past 3 months, have you had more than 3 drinks containing alcohol? No    Do you average more than 7 drinks per week? No      Functional Ability and Level of Safety:     Fall Risk   Fall Risk Assessment, last 12 mths 8/7/2017   Able to walk? Yes   Fall in past 12 months? Yes   Fall with injury? Yes   Number of falls in past 12 months 1   Fall Risk Score 2     Multiple falls over past year - 1 in setting of PE. More recently stumbled over obstacle. Hearing Loss   mild    Activities of Daily Living   Self-care. Uses cane for ambulation  ADL Assessment 1/6/2017   Feeding yourself No Help Needed   Getting from bed to chair No Help Needed   Getting dressed No Help Needed   Bathing or showering No Help Needed   Walk across the room (includes cane/walker) No Help Needed   Using the telphone No Help Needed   Taking your medications No Help Needed   Preparing meals No Help Needed   Managing money (expenses/bills) No Help Needed   Moderately strenuous housework (laundry) No Help Needed   Shopping for personal items (toiletries/medicines) No Help Needed   Shopping for groceries No Help Needed   Driving No Help Needed   Climbing a flight of stairs No Help Needed   Getting to places beyond walking distances No Help Needed       Abuse Screen   Patient is not abused    Social History     Social History Narrative     Lives alone  Review of Systems   A comprehensive review of systems was negative except for that written in the HPI.     Physical Examination     Evaluation of Cognitive Function:  Mood/affect:  happy  Appearance: age appropriate  Family member/caregiver input: daughter    Visit Vitals    /79 (BP 1 Location: Left arm, BP Patient Position: Sitting)    Pulse 88    Temp 98.1 °F (36.7 °C) (Oral)    Resp 16    Ht 5' (1.524 m)    Wt 175 lb 3.2 oz (79.5 kg)    SpO2 92%    BMI 34.22 kg/m2     Gen: alert, oriented, no acute distress  Head: Palpable deformity right parieto-occipital area. Ears: external auditory canals clear, TMs without erythema or effusion  Eyes: pupils equal round reactive to light, sclera clear, conjunctiva clear  Oral: moist mucus membranes, no oral lesions, no pharyngeal inflammation or exudate  Neck: thyroid symmetric and not enlarged, no carotid bruits, no jugular vein distention  Resp: no increase work of breathing, lungs clear to ausculation bilaterally, no wheezing, rales or rhonchi  CV: S1, S2 normal. No murmurs, rubs, or gallops. Abd: soft, not tender, not distended. No hepatosplenomegaly. Normal bowel sounds. Neuro: cranial nerves intact, normal strength and movement in all extremities, sensation intact and symmetric. Skin: no lesion or rash  Extremities: no cyanosis or edema    Results for orders placed or performed in visit on 08/07/17   CBC WITH AUTOMATED DIFF   Result Value Ref Range    WBC 5.6 3.4 - 10.8 x10E3/uL    RBC 4.25 3.77 - 5.28 x10E6/uL    HGB 13.5 11.1 - 15.9 g/dL    HCT 41.8 34.0 - 46.6 %    MCV 98 (H) 79 - 97 fL    MCH 31.8 26.6 - 33.0 pg    MCHC 32.3 31.5 - 35.7 g/dL    RDW 13.2 12.3 - 15.4 %    PLATELET 026 212 - 650 x10E3/uL    NEUTROPHILS 80 %    Lymphocytes 13 %    MONOCYTES 7 %    EOSINOPHILS 0 %    BASOPHILS 0 %    ABS. NEUTROPHILS 4.5 1.4 - 7.0 x10E3/uL    Abs Lymphocytes 0.8 0.7 - 3.1 x10E3/uL    ABS. MONOCYTES 0.4 0.1 - 0.9 x10E3/uL    ABS. EOSINOPHILS 0.0 0.0 - 0.4 x10E3/uL    ABS. BASOPHILS 0.0 0.0 - 0.2 x10E3/uL    IMMATURE GRANULOCYTES 0 %    ABS. IMM.  GRANS. 0.0 0.0 - 0.1 F00V3/YN   METABOLIC PANEL, COMPREHENSIVE   Result Value Ref Range    Glucose 106 (H) 65 - 99 mg/dL    BUN 18 8 - 27 mg/dL    Creatinine 1.15 (H) 0.57 - 1.00 mg/dL    GFR est non-AA 44 (L) >59 mL/min/1.73    GFR est AA 50 (L) >59 mL/min/1.73    BUN/Creatinine ratio 16 12 - 28    Sodium 141 134 - 144 mmol/L    Potassium 4.9 3.5 - 5.2 mmol/L Chloride 101 96 - 106 mmol/L    CO2 24 18 - 29 mmol/L    Calcium 10.1 8.7 - 10.3 mg/dL    Protein, total 6.7 6.0 - 8.5 g/dL    Albumin 4.2 3.5 - 4.7 g/dL    GLOBULIN, TOTAL 2.5 1.5 - 4.5 g/dL    A-G Ratio 1.7 1.2 - 2.2    Bilirubin, total 0.7 0.0 - 1.2 mg/dL    Alk. phosphatase 85 39 - 117 IU/L    AST (SGOT) 20 0 - 40 IU/L    ALT (SGPT) 7 0 - 32 IU/L   LIPID PANEL   Result Value Ref Range    Cholesterol, total 168 100 - 199 mg/dL    Triglyceride 158 (H) 0 - 149 mg/dL    HDL Cholesterol 73 >39 mg/dL    VLDL, calculated 32 5 - 40 mg/dL    LDL, calculated 63 0 - 99 mg/dL   TSH 3RD GENERATION   Result Value Ref Range    TSH 0.159 (L) 0.450 - 4.500 uIU/mL   VITAMIN D, 25 HYDROXY   Result Value Ref Range    VITAMIN D, 25-HYDROXY 25.2 (L) 30.0 - 100.0 ng/mL   CKD REPORT   Result Value Ref Range    Interpretation Note    CVD REPORT   Result Value Ref Range    INTERPRETATION NTAP     PDF IMAGE Not applicable        Advice/Referrals/Counseling   Education and counseling provided:  Are appropriate based on today's review and evaluation    Pneumococcal Vaccine  Influenza Vaccine  Colorectal cancer screening tests      Assessment/Plan     1. General medical exam  Age appropriate health maintenance measures discussed with patient and ordered. 2. Fall, initial encounter  Head CT today to rule out intracranial bleeding or skull fracture. - CT HEAD WO CONT; Future    3. Head swelling  - CT HEAD WO CONT; Future    4. Mixed hyperlipidemia  No change pending labs  - METABOLIC PANEL, COMPREHENSIVE  - LIPID PANEL    5. Essential hypertension  At goal given age and recent falls  - CBC WITH AUTOMATED DIFF  - TSH 3RD GENERATION    6. CKD (chronic kidney disease), stage 3 (moderate)  No change pending labs  - VITAMIN D, 25 HYDROXY    7. Atherosclerosis of native coronary artery of native heart without angina pectoris  Asymptomatic on ACE, ASA, Statin and Bblocker. No signs of CHF.     8. Screening for depression  - [place] DEPRESSION SCREEN ANNUAL    9. Screening for alcoholism  - Annual  Alcohol Screen 15 min ()    10. History of pulmonary embolus (PE)  No longer on anticoagulation     Recommended healthy diet low in carbohydrates, fats, sodium and cholesterol. Recommended regular cardiovascular exercise 3-6 times per week for 30-60 minutes daily. Current Outpatient Prescriptions   Medication Sig Dispense Refill    traMADol (ULTRAM) 50 mg tablet TAKE ONE TABLET BY MOUTH EVERY 6 HOURS AS NEEDED FOR PAIN - MAX OF 4 TABLETS DAILY 30 Tab 1    lisinopril (PRINIVIL, ZESTRIL) 5 mg tablet TAKE ONE TABLET BY MOUTH DAILY 30 Tab 6    alendronate (FOSAMAX) 10 mg tablet Take 10 mg by mouth daily.  rosuvastatin (CRESTOR) 40 mg tablet Take 1 Tab by mouth nightly. 90 Tab 3    metoprolol tartrate (LOPRESSOR) 50 mg tablet TAKE ONE TABLET BY MOUTH TWICE A DAY 60 Tab 4    potassium chloride (KLOR-CON M10) 10 mEq tablet TAKE TWO TABLETS BY MOUTH DAILY 180 Tab 3    ACTIVATED CHARCOAL (CHARCOCAPS PO) Take  by mouth.  nitroglycerin (NITROSTAT) 0.3 mg SL tablet 1 tablet by SubLINGual route every five (5) minutes as needed for Chest Pain. 1 Bottle 1    aspirin 81 mg Tab Take 81 mg by mouth daily.  levothyroxine (SYNTHROID) 50 mcg tablet Take 1 Tab by mouth Daily (before breakfast). 90 Tab 3    cholecalciferol, vitamin D3, (VITAMIN D3) 2,000 unit tab Take 1 Tab by mouth daily. 30 Tab 0       Verbal and written instructions (see AVS) provided. Patient expresses understanding of diagnosis and treatment plan.     Shayy Dorantes MD

## 2017-08-07 NOTE — PATIENT INSTRUCTIONS
Head CT scheduled today at 2:00. Please arrive at 1:30 to register. [facility] outpatient registration. No prep, may eat/drink as desired. Medicare Wellness Visit, Female    The best way to live healthy is to have a healthy lifestyle by eating a well-balanced diet, exercising regularly, limiting alcohol and stopping smoking. Regular physical exams and screening tests are another way to keep healthy. Preventive exams provided by your health care provider can find health problems before they become diseases or illnesses. Preventive services including immunizations, screening tests, monitoring and exams can help you take care of your own health. All people over age 72 should have a pneumovax  and and a prevnar shot to prevent pneumonia. These are once in a lifetime unless you and your provider decide differently. All people over 65 should have a yearly flu shot and a tetanus vaccine every 10 years. A bone mass density to screen for osteoporosis or thinning of the bones should be done every 2 years after 65. Screening for diabetes mellitus with a blood sugar test should be done every year. Glaucoma is a disease of the eye due to increased ocular pressure that can lead to blindness and it should be done every year by an eye professional.    Cardiovascular screening tests that check for elevated lipids (fatty part of blood) which can lead to heart disease and strokes should be done every 5 years. Colorectal screening that evaluates for blood or polyps in your colon should be done yearly as a stool test or every five years as a flexible sigmoidoscope or every 10 years as a colonoscopy up to age 76. Breast cancer screening with a mammogram is recommended biennially  for women age 54-69. Screening for cervical cancer with a pap smear and pelvic exam is recommended for women after age 72 years every 2 years up to age 79 or when the provider and patient decide to stop. If there is a history of cervical abnormalities or other increased risk for cancer then the test is recommended yearly. Hepatitis C screening is also recommended for anyone born between [birth year] through [address]. A shingles vaccine is also recommended once in a lifetime after age 61. Your Medicare Wellness Exam is recommended annually. Here is a list of your current Health Maintenance items with a due date:  Health Maintenance Due   Topic Date Due         Shingles Vaccine  05/03/1993    Flu Vaccine  08/01/2017                 Preventing Falls: Care Instructions  Your Care Instructions  Getting around your home safely can be a challenge if you have injuries or health problems that make it easy for you to fall. Loose rugs and furniture in walkways are among the dangers for many older people who have problems walking or who have poor eyesight. People who have conditions such as arthritis, osteoporosis, or dementia also have to be careful not to fall. You can make your home safer with a few simple measures. Follow-up care is a key part of your treatment and safety. Be sure to make and go to all appointments, and call your doctor if you are having problems. It's also a good idea to know your test results and keep a list of the medicines you take. How can you care for yourself at home? Taking care of yourself  · You may get dizzy if you do not drink enough water. To prevent dehydration, drink plenty of fluids, enough so that your urine is light yellow or clear like water. Choose water and other caffeine-free clear liquids. If you have kidney, heart, or liver disease and have to limit fluids, talk with your doctor before you increase the amount of fluids you drink. · Exercise regularly to improve your strength, muscle tone, and balance. Walk if you can. Swimming may be a good choice if you cannot walk easily. · Have your vision and hearing checked each year or any time you notice a change.  If you have trouble seeing and hearing, you might not be able to avoid objects and could lose your balance. · Know the side effects of the medicines you take. Ask your doctor or pharmacist whether the medicines you take can affect your balance. Sleeping pills or sedatives can affect your balance. · Limit the amount of alcohol you drink. Alcohol can impair your balance and other senses. · Ask your doctor whether calluses or corns on your feet need to be removed. If you wear loose-fitting shoes because of calluses or corns, you can lose your balance and fall. · Talk to your doctor if you have numbness in your feet. Preventing falls at home  · Remove raised doorway thresholds, throw rugs, and clutter. Repair loose carpet or raised areas in the floor. · Move furniture and electrical cords to keep them out of walking paths. · Use nonskid floor wax, and wipe up spills right away, especially on ceramic tile floors. · If you use a walker or cane, put rubber tips on it. If you use crutches, clean the bottoms of them regularly with an abrasive pad, such as steel wool. · Keep your house well lit, especially Tonnie Tian, and outside walkways. Use night-lights in areas such as hallways and bathrooms. Add extra light switches or use remote switches (such as switches that go on or off when you clap your hands) to make it easier to turn lights on if you have to get up during the night. · Install sturdy handrails on stairways. · Move items in your cabinets so that the things you use a lot are on the lower shelves (about waist level). · Keep a cordless phone and a flashlight with new batteries by your bed. If possible, put a phone in each of the main rooms of your house, or carry a cell phone in case you fall and cannot reach a phone. Or, you can wear a device around your neck or wrist. You push a button that sends a signal for help. · Wear low-heeled shoes that fit well and give your feet good support. Use footwear with nonskid soles.  Check the heels and soles of your shoes for wear. Repair or replace worn heels or soles. · Do not wear socks without shoes on wood floors. · Walk on the grass when the sidewalks are slippery. If you live in an area that gets snow and ice in the winter, sprinkle salt on slippery steps and sidewalks. Preventing falls in the bath  · Install grab bars and nonskid mats inside and outside your shower or tub and near the toilet and sinks. · Use shower chairs and bath benches. · Use a hand-held shower head that will allow you to sit while showering. · Get into a tub or shower by putting the weaker leg in first. Get out of a tub or shower with your strong side first.  · Repair loose toilet seats and consider installing a raised toilet seat to make getting on and off the toilet easier. · Keep your bathroom door unlocked while you are in the shower. Where can you learn more? Go to http://christianne-nain.info/. Enter [phone] [id number] [phone] in the search box to learn more about \"Preventing Falls: Care Instructions. \"  Current as of: August 4, 2016  Content Version: 11.3  © 5996-1534 [employer], [facility]. Care instructions adapted under license by [employer] (which disclaims liability or warranty for this information). If you have questions about a medical condition or this instruction, always ask your healthcare professional. [place] [address] any warranty or liability for your use of this information.

## 2017-08-07 NOTE — PROGRESS NOTES
Chief Complaint   Patient presents with   [employer] Fall     on Saturday     Pts daughter states pt hit head, has hip pain. 1. Have you been to the ER, urgent care clinic since your last visit? Hospitalized since your last visit? No    2. Have you seen or consulted any other health care providers outside of the [address] [place] [address] since your last visit? Include any pap smears or colon screening.  No

## 2017-08-08 ENCOUNTER — TELEPHONE (OUTPATIENT)
Dept: FAMILY MEDICINE CLINIC | Age: 82
End: 2017-08-08

## 2017-08-08 LAB
25(OH)D3+25(OH)D2 SERPL-MCNC: 25.2 NG/ML (ref 30–100)
ALBUMIN SERPL-MCNC: 4.2 G/DL (ref 3.5–4.7)
ALBUMIN/GLOB SERPL: 1.7 {RATIO} (ref 1.2–2.2)
ALP SERPL-CCNC: 85 IU/L (ref 39–117)
ALT SERPL-CCNC: 7 IU/L (ref 0–32)
AST SERPL-CCNC: 20 IU/L (ref 0–40)
BASOPHILS # BLD AUTO: 0 X10E3/UL (ref 0–0.2)
BASOPHILS NFR BLD AUTO: 0 %
BILIRUB SERPL-MCNC: 0.7 MG/DL (ref 0–1.2)
BUN SERPL-MCNC: 18 MG/DL (ref 8–27)
BUN/CREAT SERPL: 16 (ref 12–28)
CALCIUM SERPL-MCNC: 10.1 MG/DL (ref 8.7–10.3)
CHLORIDE SERPL-SCNC: 101 MMOL/L (ref 96–106)
CHOLEST SERPL-MCNC: 168 MG/DL (ref 100–199)
CO2 SERPL-SCNC: 24 MMOL/L (ref 18–29)
CREAT SERPL-MCNC: 1.15 MG/DL (ref 0.57–1)
EOSINOPHIL # BLD AUTO: 0 X10E3/UL (ref 0–0.4)
EOSINOPHIL NFR BLD AUTO: 0 %
ERYTHROCYTE [DISTWIDTH] IN BLOOD BY AUTOMATED COUNT: 13.2 % (ref 12.3–15.4)
GLOBULIN SER CALC-MCNC: 2.5 G/DL (ref 1.5–4.5)
GLUCOSE SERPL-MCNC: 106 MG/DL (ref 65–99)
HCT VFR BLD AUTO: 41.8 % (ref 34–46.6)
HDLC SERPL-MCNC: 73 MG/DL
HGB BLD-MCNC: 13.5 G/DL (ref 11.1–15.9)
IMM GRANULOCYTES # BLD: 0 X10E3/UL (ref 0–0.1)
IMM GRANULOCYTES NFR BLD: 0 %
INTERPRETATION, 910389: NORMAL
INTERPRETATION: NORMAL
LDLC SERPL CALC-MCNC: 63 MG/DL (ref 0–99)
LYMPHOCYTES # BLD AUTO: 0.8 X10E3/UL (ref 0.7–3.1)
LYMPHOCYTES NFR BLD AUTO: 13 %
MCH RBC QN AUTO: 31.8 PG (ref 26.6–33)
MCHC RBC AUTO-ENTMCNC: 32.3 G/DL (ref 31.5–35.7)
MCV RBC AUTO: 98 FL (ref 79–97)
MONOCYTES # BLD AUTO: 0.4 X10E3/UL (ref 0.1–0.9)
MONOCYTES NFR BLD AUTO: 7 %
NEUTROPHILS # BLD AUTO: 4.5 X10E3/UL (ref 1.4–7)
NEUTROPHILS NFR BLD AUTO: 80 %
PDF IMAGE, 910387: NORMAL
PLATELET # BLD AUTO: 179 X10E3/UL (ref 150–379)
POTASSIUM SERPL-SCNC: 4.9 MMOL/L (ref 3.5–5.2)
PROT SERPL-MCNC: 6.7 G/DL (ref 6–8.5)
RBC # BLD AUTO: 4.25 X10E6/UL (ref 3.77–5.28)
SODIUM SERPL-SCNC: 141 MMOL/L (ref 134–144)
TRIGL SERPL-MCNC: 158 MG/DL (ref 0–149)
TSH SERPL DL<=0.005 MIU/L-ACNC: 0.16 UIU/ML (ref 0.45–4.5)
VLDLC SERPL CALC-MCNC: 32 MG/DL (ref 5–40)
WBC # BLD AUTO: 5.6 X10E3/UL (ref 3.4–10.8)

## 2017-08-08 RX ORDER — LEVOTHYROXINE SODIUM 50 UG/1
50 TABLET ORAL
Qty: 90 TAB | Refills: 3 | Status: SHIPPED | OUTPATIENT
Start: 2017-08-08 | End: 2018-04-23 | Stop reason: SDUPTHER

## 2017-08-08 RX ORDER — CHOLECALCIFEROL (VITAMIN D3) 125 MCG
2000 CAPSULE ORAL DAILY
Qty: 30 TAB | Refills: 0
Start: 2017-08-08

## 2017-08-08 NOTE — PROGRESS NOTES
Labs look good except your thyroid dose is a little too high. Confirm that she's on 75 and if so, will drop to 50. Also please increase vitamin D supplement - how much is she currently taking?

## 2017-08-08 NOTE — TELEPHONE ENCOUNTER
Pt notified of lab results and verbalized understanding. Pt confirmed that her current dose of Levothyroxine is 75mcg. She is only taking Centrum Silver with no additional Vit D supplement. Per Dr. Zhao Vera a new script for Levothyroxine 50mcg sent to [facility] in [place]. Also pt will  OTC Vit D 2000U.

## 2017-08-08 NOTE — TELEPHONE ENCOUNTER
----- Message from Kareem Morales MD sent at 8/8/2017  7:57 AM EDT -----  Labs look good except your thyroid dose is a little too high. Confirm that she's on 75 and if so, will drop to 50. Also please increase vitamin D supplement - how much is she currently taking?

## 2017-08-17 PROBLEM — Z86.711 HISTORY OF PULMONARY EMBOLUS (PE): Status: ACTIVE | Noted: 2017-08-17

## 2017-08-20 RX ORDER — METOPROLOL TARTRATE 50 MG/1
TABLET ORAL
Qty: 180 TAB | Refills: 3 | Status: SHIPPED | OUTPATIENT
Start: 2017-08-20 | End: 2018-09-12 | Stop reason: SDUPTHER

## 2017-08-21 RX ORDER — ALENDRONATE SODIUM 10 MG/1
10 TABLET ORAL DAILY
Qty: 90 TAB | Refills: 3 | Status: SHIPPED | OUTPATIENT
Start: 2017-08-21 | End: 2018-08-19 | Stop reason: SDUPTHER

## 2017-08-21 NOTE — TELEPHONE ENCOUNTER
Patient calling to check on med that she called in on Thursday.  She would like a call when this has been filled at [phone]

## 2017-08-24 ENCOUNTER — OFFICE VISIT (OUTPATIENT)
Dept: FAMILY MEDICINE CLINIC | Age: 82
End: 2017-08-24

## 2017-08-24 VITALS
HEART RATE: 62 BPM | BODY MASS INDEX: 34.36 KG/M2 | SYSTOLIC BLOOD PRESSURE: 138 MMHG | HEIGHT: 60 IN | WEIGHT: 175 LBS | OXYGEN SATURATION: 93 % | DIASTOLIC BLOOD PRESSURE: 74 MMHG | TEMPERATURE: 98 F | RESPIRATION RATE: 16 BRPM

## 2017-08-24 DIAGNOSIS — G89.29 CHRONIC RIGHT SHOULDER PAIN: Primary | ICD-10-CM

## 2017-08-24 DIAGNOSIS — M25.511 CHRONIC RIGHT SHOULDER PAIN: Primary | ICD-10-CM

## 2017-08-24 NOTE — PROGRESS NOTES
83yo with right shoulder pain for a bit over a month. Difficulty lifting arm, difficulty turning doorknobs, difficulty getting hand above head. This pain was present before her fall 2 weeks ago, it is on the opposite side of her body from impact. Past Medical History, Surgical History, and Social History reviewed. Current Outpatient Prescriptions on File Prior to Visit   Medication Sig Dispense Refill    alendronate (FOSAMAX) 10 mg tablet Take 1 Tab by mouth daily. 90 Tab 3    metoprolol tartrate (LOPRESSOR) 50 mg tablet TAKE ONE TABLET BY MOUTH TWICE A  Tab 3    levothyroxine (SYNTHROID) 50 mcg tablet Take 1 Tab by mouth Daily (before breakfast). 90 Tab 3    cholecalciferol, vitamin D3, (VITAMIN D3) 2,000 unit tab Take 1 Tab by mouth daily. 30 Tab 0    traMADol (ULTRAM) 50 mg tablet TAKE ONE TABLET BY MOUTH EVERY 6 HOURS AS NEEDED FOR PAIN - MAX OF 4 TABLETS DAILY 30 Tab 1    lisinopril (PRINIVIL, ZESTRIL) 5 mg tablet TAKE ONE TABLET BY MOUTH DAILY 30 Tab 6    rosuvastatin (CRESTOR) 40 mg tablet Take 1 Tab by mouth nightly. 90 Tab 3    potassium chloride (KLOR-CON M10) 10 mEq tablet TAKE TWO TABLETS BY MOUTH DAILY 180 Tab 3    ACTIVATED CHARCOAL (CHARCOCAPS PO) Take  by mouth.  nitroglycerin (NITROSTAT) 0.3 mg SL tablet 1 tablet by SubLINGual route every five (5) minutes as needed for Chest Pain. 1 Bottle 1    aspirin 81 mg Tab Take 81 mg by mouth daily. No current facility-administered medications on file prior to visit. Allergies   Allergen Reactions    Codeine Unknown (comments)     Unsure of reaction.  Morphine Hives and Itching     ROS negative except as per HPI.     Visit Vitals    /74 (BP 1 Location: Left arm, BP Patient Position: Sitting)    Pulse 62    Temp 98 °F (36.7 °C) (Oral)    Resp 16    Ht 5' (1.524 m)    Wt 175 lb (79.4 kg)    SpO2 93%    BMI 34.18 kg/m2     Gen: alert, oriented, no acute distress  Lungs: no increased respiratory effort, clear to ausculation bilaterally  Heart: regular rate and rhythm, no murmurs, rubs or gallops  Abdomen: soft, nontender, not distended. Normal bowel sounds. No rebound or guarding. No hepatosplenomegaly. Extremities: cannot abduct - pain with passive abduction past 90 degrees,  pain with external rotation, cannot cross body to touch opposite shoulder. Assessment/Plan:    1. Chronic right shoulder pain  Made appt for ortho evaluation. Follow up prn.  - REFERRAL TO [address] [place] [address] reviewed - updated    There are no discontinued medications. Orders Placed This Encounter    REFERRAL TO ORTHOPEDICS     Referral Priority:   Routine     Referral Type:   Consultation     Referral Reason:   Specialty Services Required     Referral Location:   [facility]     Referred to Provider: Ligia Stewart MD       Current Outpatient Prescriptions   Medication Sig Dispense Refill    alendronate (FOSAMAX) 10 mg tablet Take 1 Tab by mouth daily. 90 Tab 3    metoprolol tartrate (LOPRESSOR) 50 mg tablet TAKE ONE TABLET BY MOUTH TWICE A  Tab 3    levothyroxine (SYNTHROID) 50 mcg tablet Take 1 Tab by mouth Daily (before breakfast). 90 Tab 3    cholecalciferol, vitamin D3, (VITAMIN D3) 2,000 unit tab Take 1 Tab by mouth daily. 30 Tab 0    traMADol (ULTRAM) 50 mg tablet TAKE ONE TABLET BY MOUTH EVERY 6 HOURS AS NEEDED FOR PAIN - MAX OF 4 TABLETS DAILY 30 Tab 1    lisinopril (PRINIVIL, ZESTRIL) 5 mg tablet TAKE ONE TABLET BY MOUTH DAILY 30 Tab 6    rosuvastatin (CRESTOR) 40 mg tablet Take 1 Tab by mouth nightly. 90 Tab 3    potassium chloride (KLOR-CON M10) 10 mEq tablet TAKE TWO TABLETS BY MOUTH DAILY 180 Tab 3    ACTIVATED CHARCOAL (CHARCOCAPS PO) Take  by mouth.  nitroglycerin (NITROSTAT) 0.3 mg SL tablet 1 tablet by SubLINGual route every five (5) minutes as needed for Chest Pain. 1 Bottle 1    aspirin 81 mg Tab Take 81 mg by mouth daily.              Verbal and written instructions (see AVS) provided. Patient expresses understanding of diagnosis and treatment plan.

## 2017-08-24 NOTE — PATIENT INSTRUCTIONS
Appt with Dr. Sarah Silva at [facility] on Thursday 8-31-17 at 10:40. Please arrive at 10:20. Shoulder Pain: Care Instructions  Your Care Instructions    You can hurt your shoulder by using it too much during an activity, such as fishing or baseball. It can also happen as part of the everyday wear and tear of getting older. Shoulder injuries can be slow to heal, but your shoulder should get better with time. Your doctor may recommend a sling to rest your shoulder. If you have injured your shoulder, you may need testing and treatment. Follow-up care is a key part of your treatment and safety. Be sure to make and go to all appointments, and call your doctor if you are having problems. It's also a good idea to know your test results and keep a list of the medicines you take. How can you care for yourself at home? · Take pain medicines exactly as directed. ¨ If the doctor gave you a prescription medicine for pain, take it as prescribed. ¨ If you are not taking a prescription pain medicine, ask your doctor if you can take an over-the-counter medicine. ¨ Do not take two or more pain medicines at the same time unless the doctor told you to. Many pain medicines contain acetaminophen, which is Tylenol. Too much acetaminophen (Tylenol) can be harmful. · If your doctor recommends that you wear a sling, use it as directed. Do not take it off before your doctor tells you to. · Put ice or a cold pack on the sore area for 10 to 20 minutes at a time. Put a thin cloth between the ice and your skin. · If there is no swelling, you can put moist heat, a heating pad, or a warm cloth on your shoulder. Some doctors suggest alternating between hot and cold. · Rest your shoulder for a few days. If your doctor recommends it, you can then begin gentle exercise of the shoulder, but do not lift anything heavy. When should you call for help? Call 911 anytime you think you may need emergency care.  For example, call if:  · You have chest pain or pressure. This may occur with:  ¨ Sweating. ¨ Shortness of breath. ¨ Nausea or vomiting. ¨ Pain that spreads from the chest to the neck, jaw, or one or both shoulders or arms. ¨ Dizziness or lightheadedness. ¨ A fast or uneven pulse. After calling 911, chew 1 adult-strength aspirin. Wait for an ambulance. Do not try to drive yourself. · Your arm or hand is cool or pale or changes color. Call your doctor now or seek immediate medical care if:  · You have signs of infection, such as:  ¨ Increased pain, swelling, warmth, or redness in your shoulder. ¨ Red streaks leading from a place on your shoulder. ¨ Pus draining from an area of your shoulder. ¨ Swollen lymph nodes in your neck, armpits, or groin. ¨ A fever. Watch closely for changes in your health, and be sure to contact your doctor if:  · You cannot use your shoulder. · Your shoulder does not get better as expected. Where can you learn more? Go to http://christianne-nain.info/. Enter N539 in the search box to learn more about \"Shoulder Pain: Care Instructions. \"  Current as of: March 21, 2017  Content Version: 11.3  © 1113-5539 [employer]. Care instructions adapted under license by [employer] (which disclaims liability or warranty for this information). If you have questions about a medical condition or this instruction, always ask your healthcare professional. [place] [address] any warranty or liability for your use of this information.

## 2017-08-24 NOTE — MR AVS SNAPSHOT
Visit Information Date & Time Provider Department Dept. Phone Encounter #  
 8/24/2017 10:00 [phone] [id number] Upcoming Health Maintenance Date Due DTaP/Tdap/Td series (1 - Tdap) 7/3/1954 ZOSTER VACCINE AGE 60> 5/3/1993 INFLUENZA AGE 9 TO ADULT 8/1/2017 GLAUCOMA SCREENING Q2Y 5/24/2018 MEDICARE YEARLY EXAM 8/8/2018 Allergies as of 8/24/2017  Review Complete On: 8/24/2017 By: Susanne Payne MD  
  
 Severity Noted Reaction Type Reactions Codeine  09/29/2009    Unknown (comments) Unsure of reaction. Morphine  09/29/2009    Hives, Itching Current Immunizations  Reviewed on 10/24/2016 Name Date Influenza High Dose Vaccine PF 10/24/2016, 9/8/2015, 11/11/2014, 11/25/2013 Influenza Vaccine 1/15/2013 Influenza Vaccine Split 1/13/2011 Pneumococcal Conjugate (PCV-13) 8/2/2016 ZZZ-RETIRED (DO NOT USE) Pneumococcal Vaccine (Unspecified Type) 10/10/2010 Not reviewed this visit You Were Diagnosed With   
  
 Codes Comments Chronic right shoulder pain    -  Primary ICD-10-CM: M25.511, G89.29 ICD-9-CM: 719.41, 338.29 Vitals BP Pulse Temp Resp Height(growth percentile) Weight(growth percentile) 138/74 (BP 1 Location: Left arm, BP Patient Position: Sitting) 62 98 °F (36.7 °C) (Oral) 16 5' (1.524 m) 175 lb (79.4 kg) SpO2 BMI OB Status Smoking Status 93% 34.18 kg/m2 Hysterectomy Never Smoker BMI and BSA Data Body Mass Index Body Surface Area  
 34.18 kg/m 2 1.83 m 2 Preferred Pharmacy Pharmacy Name Phone [facility] [address], [address] [place] [address] [facility] [place] [phone] Your Updated Medication List  
  
   
This list is accurate as of: 8/24/17 11:06 AM.  Always use your most recent med list.  
  
  
  
  
 alendronate 10 mg tablet Commonly known as:  FOSAMAX Take 1 Tab by mouth daily. aspirin 81 mg tablet Take 81 mg by mouth daily. CHARCOCAPS PO Take  by mouth. cholecalciferol (vitamin D3) 2,000 unit Tab Commonly known as:  VITAMIN D3 Take 1 Tab by mouth daily. levothyroxine 50 mcg tablet Commonly known as:  SYNTHROID Take 1 Tab by mouth Daily (before breakfast). lisinopril 5 mg tablet Commonly known as:  PRINIVIL, ZESTRIL  
TAKE ONE TABLET BY MOUTH DAILY  
  
 metoprolol tartrate 50 mg tablet Commonly known as:  LOPRESSOR  
TAKE ONE TABLET BY MOUTH TWICE A DAY  
  
 nitroglycerin 0.3 mg SL tablet Commonly known as:  NITROSTAT  
1 tablet by SubLINGual route every five (5) minutes as needed for Chest Pain.  
  
 potassium chloride 10 mEq tablet Commonly known as:  KLOR-CON M10  
TAKE TWO TABLETS BY MOUTH DAILY  
  
 rosuvastatin 40 mg tablet Commonly known as:  CRESTOR Take 1 Tab by mouth nightly. traMADol 50 mg tablet Commonly known as:  ULTRAM  
TAKE ONE TABLET BY MOUTH EVERY 6 HOURS AS NEEDED FOR PAIN - MAX OF 4 TABLETS DAILY We Performed the Following REFERRAL TO ORTHOPEDICS [KIR033 Custom] Comments:  
 Please evaluate patient for shoulder pain Referral Information Referral ID Referred By Referred To  
  
 [id number] Jaswant CARRIZALES [address] [place] [address] Phone: [phone] Visits Status Start Date End Date 1 New Request 8/24/17 8/24/18 If your referral has a status of pending review or denied, additional information will be sent to support the outcome of this decision. Patient Instructions Appt with Dr. Reed Torres at [facility] on Thursday 8-31-17 at 10:40. Please arrive at 10:20. Shoulder Pain: Care Instructions Your Care Instructions You can hurt your shoulder by using it too much during an activity, such as fishing or baseball.  It can also happen as part of the everyday wear and tear of getting older. Shoulder injuries can be slow to heal, but your shoulder should get better with time. Your doctor may recommend a sling to rest your shoulder. If you have injured your shoulder, you may need testing and treatment. Follow-up care is a key part of your treatment and safety. Be sure to make and go to all appointments, and call your doctor if you are having problems. It's also a good idea to know your test results and keep a list of the medicines you take. How can you care for yourself at home? · Take pain medicines exactly as directed. ¨ If the doctor gave you a prescription medicine for pain, take it as prescribed. ¨ If you are not taking a prescription pain medicine, ask your doctor if you can take an over-the-counter medicine. ¨ Do not take two or more pain medicines at the same time unless the doctor told you to. Many pain medicines contain acetaminophen, which is Tylenol. Too much acetaminophen (Tylenol) can be harmful. · If your doctor recommends that you wear a sling, use it as directed. Do not take it off before your doctor tells you to. · Put ice or a cold pack on the sore area for 10 to 20 minutes at a time. Put a thin cloth between the ice and your skin. · If there is no swelling, you can put moist heat, a heating pad, or a warm cloth on your shoulder. Some doctors suggest alternating between hot and cold. · Rest your shoulder for a few days. If your doctor recommends it, you can then begin gentle exercise of the shoulder, but do not lift anything heavy. When should you call for help? Call 911 anytime you think you may need emergency care. For example, call if: 
· You have chest pain or pressure. This may occur with: ¨ Sweating. ¨ Shortness of breath. ¨ Nausea or vomiting. ¨ Pain that spreads from the chest to the neck, jaw, or one or both shoulders or arms. ¨ Dizziness or lightheadedness. ¨ A fast or uneven pulse. After calling 911, chew 1 adult-strength aspirin. Wait for an ambulance. Do not try to drive yourself. · Your arm or hand is cool or pale or changes color. Call your doctor now or seek immediate medical care if: 
· You have signs of infection, such as: 
¨ Increased pain, swelling, warmth, or redness in your shoulder. ¨ Red streaks leading from a place on your shoulder. ¨ Pus draining from an area of your shoulder. ¨ Swollen lymph nodes in your neck, armpits, or groin. ¨ A fever. Watch closely for changes in your health, and be sure to contact your doctor if: 
· You cannot use your shoulder. · Your shoulder does not get better as expected. Where can you learn more? Go to http://christianne-nain.info/. Enter E217 in the search box to learn more about \"Shoulder Pain: Care Instructions. \" Current as of: March 21, 2017 Content Version: 11.3 © 4669-1178 [employer]. Care instructions adapted under license by [employer] (which disclaims liability or warranty for this information). If you have questions about a medical condition or this instruction, always ask your healthcare professional. [place] [address] any warranty or liability for your use of this information. Introducing [address]! Trevor Ko introduces [employer] patient portal. Now you can access parts of your medical record, email your doctor's office, and request medication refills online. 1. In your internet browser, go to [email or web address] 2. Click on the First Time User? Click Here link in the Sign In box. You will see the New Member Sign Up page. 3. Enter your [employer] Access Code exactly as it appears below. You will not need to use this code after youve completed the sign-up process. If you do not sign up before the expiration date, you must request a new code. · [employer] Access Code: [email or web address] Expires: 11/5/2017 11:18 AM 
 
 4. Enter the last four digits of your Social Security Number (xxxx) and Date of Birth (mm/dd/yyyy) as indicated and click Submit. You will be taken to the next sign-up page. 5. Create a [employer] ID. This will be your [employer] login ID and cannot be changed, so think of one that is secure and easy to remember. 6. Create a [employer] password. You can change your password at any time. 7. Enter your Password Reset Question and Answer. This can be used at a later time if you forget your password. 8. Enter your e-mail address. You will receive e-mail notification when new information is available in [address] 9. Click Sign Up. You can now view and download portions of your medical record. 10. Click the Download Summary menu link to download a portable copy of your medical information. If you have questions, please visit the Frequently Asked Questions section of the [employer] website. Remember, [employer] is NOT to be used for urgent needs. For medical emergencies, dial 911. Now available from your iPhone and Android! Please provide this summary of care documentation to your next provider. Your primary care clinician is listed as Ama Larose. If you have any questions after today's visit, please call [phone].

## 2017-09-15 DIAGNOSIS — R31.29 HEMATURIA, MICROSCOPIC: ICD-10-CM

## 2017-09-15 DIAGNOSIS — R10.9 LEFT FLANK PAIN: ICD-10-CM

## 2017-09-17 RX ORDER — TRAMADOL HYDROCHLORIDE 50 MG/1
TABLET ORAL
Qty: 30 TAB | Refills: 1 | OUTPATIENT
Start: 2017-09-17 | End: 2017-11-15 | Stop reason: SDUPTHER

## 2017-10-17 DIAGNOSIS — F41.9 ANXIETY: ICD-10-CM

## 2017-10-17 NOTE — TELEPHONE ENCOUNTER
Last office visit 8-24-17.  pulled today. Last refill 3-18-17. Spoke with patient. Pt states that she does take it very sparingly if she needs it. She would like a refill sent to [facility] in [place] please.

## 2017-10-18 RX ORDER — ALPRAZOLAM 0.25 MG/1
0.25 TABLET ORAL
Qty: 30 TAB | Refills: 1 | OUTPATIENT
Start: 2017-10-18 | End: 2018-10-08 | Stop reason: SDUPTHER

## 2017-11-15 DIAGNOSIS — R10.9 LEFT FLANK PAIN: ICD-10-CM

## 2017-11-15 DIAGNOSIS — R31.29 HEMATURIA, MICROSCOPIC: ICD-10-CM

## 2017-11-15 RX ORDER — TRAMADOL HYDROCHLORIDE 50 MG/1
TABLET ORAL
Qty: 30 TAB | Refills: 1 | Status: SHIPPED | OUTPATIENT
Start: 2017-11-15 | End: 2018-01-24 | Stop reason: SDUPTHER

## 2017-11-15 NOTE — TELEPHONE ENCOUNTER
Patient says that pharmacy will not give her the last refill she has for tramadol. She says that they told her she does not have any more refills.

## 2017-11-15 NOTE — TELEPHONE ENCOUNTER
Patient was prescribed Tramadol with one refill 9/18/17. Prescription filled on 9/18/17 refill filled on 10/16/17 per .

## 2018-01-19 RX ORDER — LISINOPRIL 5 MG/1
TABLET ORAL
Qty: 90 TAB | Refills: 1 | Status: SHIPPED | OUTPATIENT
Start: 2018-01-19 | End: 2018-08-03 | Stop reason: SDUPTHER

## 2018-01-24 DIAGNOSIS — R31.29 HEMATURIA, MICROSCOPIC: ICD-10-CM

## 2018-01-24 DIAGNOSIS — R10.9 LEFT FLANK PAIN: ICD-10-CM

## 2018-01-24 RX ORDER — TRAMADOL HYDROCHLORIDE 50 MG/1
TABLET ORAL
Qty: 30 TAB | Refills: 0 | Status: SHIPPED | OUTPATIENT
Start: 2018-01-24 | End: 2018-02-22 | Stop reason: SDUPTHER

## 2018-02-07 RX ORDER — POTASSIUM CHLORIDE 750 MG/1
TABLET, FILM COATED, EXTENDED RELEASE ORAL
Qty: 60 TAB | Refills: 2 | Status: SHIPPED | OUTPATIENT
Start: 2018-02-07 | End: 2018-05-14 | Stop reason: SDUPTHER

## 2018-02-12 ENCOUNTER — OFFICE VISIT (OUTPATIENT)
Dept: FAMILY MEDICINE CLINIC | Age: 83
End: 2018-02-12

## 2018-02-12 ENCOUNTER — HOSPITAL ENCOUNTER (OUTPATIENT)
Dept: LAB | Age: 83
Discharge: HOME | End: 2018-02-12
Payer: MEDICARE

## 2018-02-12 VITALS
HEIGHT: 60 IN | BODY MASS INDEX: 35.34 KG/M2 | WEIGHT: 180 LBS | OXYGEN SATURATION: 96 % | RESPIRATION RATE: 18 BRPM | TEMPERATURE: 98.4 F | DIASTOLIC BLOOD PRESSURE: 83 MMHG | HEART RATE: 68 BPM | SYSTOLIC BLOOD PRESSURE: 142 MMHG

## 2018-02-12 DIAGNOSIS — N18.30 STAGE 3 CHRONIC KIDNEY DISEASE (HCC): ICD-10-CM

## 2018-02-12 DIAGNOSIS — E03.9 HYPOTHYROIDISM, UNSPECIFIED TYPE: Primary | Chronic | ICD-10-CM

## 2018-02-12 DIAGNOSIS — I10 ESSENTIAL HYPERTENSION: ICD-10-CM

## 2018-02-12 DIAGNOSIS — M43.06 SPONDYLOLYSIS OF LUMBAR REGION: ICD-10-CM

## 2018-02-12 DIAGNOSIS — R27.0 ATAXIA: ICD-10-CM

## 2018-02-12 DIAGNOSIS — E78.2 MIXED HYPERLIPIDEMIA: ICD-10-CM

## 2018-02-12 DIAGNOSIS — G44.59 OTHER COMPLICATED HEADACHE SYNDROME: ICD-10-CM

## 2018-02-12 PROBLEM — M41.9 SCOLIOSIS: Status: ACTIVE | Noted: 2018-02-12

## 2018-02-12 PROCEDURE — 84443 ASSAY THYROID STIM HORMONE: CPT

## 2018-02-12 PROCEDURE — 36415 COLL VENOUS BLD VENIPUNCTURE: CPT

## 2018-02-12 PROCEDURE — 85025 COMPLETE CBC W/AUTO DIFF WBC: CPT

## 2018-02-12 PROCEDURE — 80053 COMPREHEN METABOLIC PANEL: CPT

## 2018-02-12 RX ORDER — BISMUTH SUBSALICYLATE 262 MG
1 TABLET,CHEWABLE ORAL DAILY
COMMUNITY
End: 2021-12-21

## 2018-02-12 RX ORDER — MELATONIN 5 MG
5 CAPSULE ORAL
COMMUNITY
End: 2021-12-21

## 2018-02-12 RX ORDER — DEXTROMETHORPHAN HYDROBROMIDE, GUAIFENESIN 5; 100 MG/5ML; MG/5ML
650 LIQUID ORAL EVERY 8 HOURS
COMMUNITY

## 2018-02-12 NOTE — PROGRESS NOTES
HPI:  80 y.o.  presents for follow up appointment. No hospital, ER or specialist visits since last primary care visit except as noted below. HAd a fall in August with normal head CT and injured shoulder. Has seen ortho since and gotten an injection and has full function in shoulder now. Recently some recurrent tenderness and altered sensation in occipitoparietal area on the right. Also complains of some left arm and neck parasthesia, as well as her left 3 toes being numb. Using a brace for lower back     Had bronchitis on Jan 1 and was treated at [facility].    5 days ago fell to the right and leaned against the wall for no apparent reason. No prodromal symptoms. No chest pain at that time, but about an hour later had chest heaviness as she fell asleep which she blamed on worrying about the fall. No more episodes of lost balance, no more chest heaviness. No increased dyspnea or shortness of breath since then    Patient Active Problem List    Diagnosis    History of pulmonary embolus (PE)     2016      CKD (chronic kidney disease) - brings follow up from Dr Dae Spann, no changes to medications.  S/P cardiac cath     11/8/11 patent stent to LAD, normal LCX, RCA shows minimal disease. Normal LV.  Coronary artery spasm (HCC)    ASHD (arteriosclerotic heart disease)    Hyperlipidemia    Hypertension - No home monitoring. Compliant with medication. No shortness of breath, no chest pain, no vision change, no headache, no lower extremity edema.  Hypothyroidism - No hair loss, no constipation, no dry skin or brittle nails, no palpitations. Taking medication each morning on an empty stomach.  Other and unspecified hyperlipidemia - Takes medication at night as directed, no muscle aches. Tries to watch diet.     Unspecified vitamin D deficiency         Past Medical History:   Diagnosis Date    CAD (coronary artery disease)     stent placed on left 1 stent,in 2007    [facility] DVT (deep venous thrombosis) (HCC)     GERD (gastroesophageal reflux disease)     Hypercholesterolemia     Hypertension     Pulmonary embolism ([facility] [address] 9/15/2015    Thromboembolus ([facility] [address]     DVT after hernia operation, PE spontaneous 10 years later    Thyroid disease        Social History   Substance Use Topics    Smoking status: Never Smoker    Smokeless tobacco: Never Used    Alcohol use No       Outpatient Prescriptions Marked as Taking for the 2/12/18 encounter (Office Visit) with Janelle Sotomayor MD   Medication Sig Dispense Refill    melatonin 5 mg cap capsule Take 5 mg by mouth nightly.  acetaminophen (TYLENOL ARTHRITIS PAIN) 650 mg TbER Take 650 mg by mouth every eight (8) hours.  multivitamin (ONE A DAY) tablet Take 1 Tab by mouth daily.  potassium chloride SR (KLOR-CON 10) 10 mEq tablet TAKE TWO TABLETS BY MOUTH DAILY 60 Tab 2    traMADol (ULTRAM) 50 mg tablet TAKE ONE TABLET BY MOUTH EVERY 6 HOURS AS NEEDED FOR PAIN - MAX OF 4 TABLETS DAILY 30 Tab 0    lisinopril (PRINIVIL, ZESTRIL) 5 mg tablet TAKE ONE TABLET BY MOUTH DAILY 90 Tab 1    ALPRAZolam (XANAX) 0.25 mg tablet Take 1 Tab by mouth three (3) times daily as needed for Sleep or Anxiety. 30 Tab 1    alendronate (FOSAMAX) 10 mg tablet Take 1 Tab by mouth daily. 90 Tab 3    metoprolol tartrate (LOPRESSOR) 50 mg tablet TAKE ONE TABLET BY MOUTH TWICE A  Tab 3    levothyroxine (SYNTHROID) 50 mcg tablet Take 1 Tab by mouth Daily (before breakfast). 90 Tab 3    cholecalciferol, vitamin D3, (VITAMIN D3) 2,000 unit tab Take 1 Tab by mouth daily. 30 Tab 0    rosuvastatin (CRESTOR) 40 mg tablet Take 1 Tab by mouth nightly. 90 Tab 3    ACTIVATED CHARCOAL (CHARCOCAPS PO) Take  by mouth.  nitroglycerin (NITROSTAT) 0.3 mg SL tablet 1 tablet by SubLINGual route every five (5) minutes as needed for Chest Pain. 1 Bottle 1    aspirin 81 mg Tab Take 81 mg by mouth daily.          Allergies   Allergen Reactions    Codeine Unknown (comments)     Unsure of reaction.  Morphine Hives and Itching       ROS:  ROS negative except as per HPI. PE:  Visit Vitals    /83 (BP 1 Location: Left arm, BP Patient Position: Sitting)    Pulse 68    Temp 98.4 °F (36.9 °C) (Oral)    Resp 18    Ht 5' (1.524 m)    Wt 180 lb (81.6 kg)    SpO2 96%    BMI 35.15 kg/m2     Gen: alert, oriented, no acute distress  Head - no step off or deformity in skull, no hematoma palpable. Some decreased sensation over the right occipitoparietal area. Ears: external auditory canals clear, TMs without erythema or effusion  Eyes: pupils equal round reactive to light, sclera clear, conjunctiva clear  Oral: moist mucus membranes, no oral lesions, no pharyngeal inflammation or exudate  Neck: symmetric normal sized thyroid, no carotid bruits, no jugular vein distention  Resp: no increase work of breathing, lungs clear to ausculation bilaterally, no wheezing, rales or rhonchi  CV: S1, S2 normal.  No murmurs, rubs, or gallops. Abd: soft, not tender, not distended. No hepatosplenomegaly. Normal bowel sounds. Neuro: cranial nerves intact, normal strength and movement in all extremities, positive romberg  Skin: no lesion or rash  Extremities: no cyanosis or edema    No results found for this visit on 02/12/18. Assessment/Plan:    1. Hypothyroidism, unspecified type  No changes in medications pending lab results.  - TSH 3RD GENERATION    2. Essential hypertension  At goal.  Continue current medications. - CBC WITH AUTOMATED DIFF  - METABOLIC PANEL, COMPREHENSIVE    3. Mixed hyperlipidemia  Lab Results   Component Value Date/Time    Cholesterol, total 168 08/07/2017 12:00 AM    HDL Cholesterol 73 08/07/2017 12:00 AM    LDL, calculated 63 08/07/2017 12:00 AM    VLDL, calculated 32 08/07/2017 12:00 AM    Triglyceride 158 (H) 08/07/2017 12:00 AM    CHOL/HDL Ratio 2.5 09/02/2015 03:20 AM     At goal.  Continue current medications.      4. Stage 3 chronic kidney disease  No changes in medications pending lab results. 5. Spondylolysis of lumbar region  Continue brace    6. Ataxia  Concerning given history of trauma a few months ago and now with radicular symptoms, unusual headache and most recently ataxia - get imaging.  - MRI CERV SPINE W WO CONT; Future  - MRI BRAIN W WO CONT; Future    7. Other complicated headache syndrome  Above. - MRI CERV SPINE W WO CONT; Future  - MRI BRAIN W WO CONT; Future      Health Maintenance reviewed - updated. Orders Placed This Encounter    CBC WITH AUTOMATED DIFF    METABOLIC PANEL, COMPREHENSIVE    TSH 3RD GENERATION    melatonin 5 mg cap capsule     Sig: Take 5 mg by mouth nightly.  acetaminophen (TYLENOL ARTHRITIS PAIN) 650 mg TbER     Sig: Take 650 mg by mouth every eight (8) hours.  multivitamin (ONE A DAY) tablet     Sig: Take 1 Tab by mouth daily. There are no discontinued medications. Current Outpatient Prescriptions   Medication Sig Dispense Refill    melatonin 5 mg cap capsule Take 5 mg by mouth nightly.  acetaminophen (TYLENOL ARTHRITIS PAIN) 650 mg TbER Take 650 mg by mouth every eight (8) hours.  multivitamin (ONE A DAY) tablet Take 1 Tab by mouth daily.  potassium chloride SR (KLOR-CON 10) 10 mEq tablet TAKE TWO TABLETS BY MOUTH DAILY 60 Tab 2    traMADol (ULTRAM) 50 mg tablet TAKE ONE TABLET BY MOUTH EVERY 6 HOURS AS NEEDED FOR PAIN - MAX OF 4 TABLETS DAILY 30 Tab 0    lisinopril (PRINIVIL, ZESTRIL) 5 mg tablet TAKE ONE TABLET BY MOUTH DAILY 90 Tab 1    ALPRAZolam (XANAX) 0.25 mg tablet Take 1 Tab by mouth three (3) times daily as needed for Sleep or Anxiety. 30 Tab 1    alendronate (FOSAMAX) 10 mg tablet Take 1 Tab by mouth daily. 90 Tab 3    metoprolol tartrate (LOPRESSOR) 50 mg tablet TAKE ONE TABLET BY MOUTH TWICE A  Tab 3    levothyroxine (SYNTHROID) 50 mcg tablet Take 1 Tab by mouth Daily (before breakfast).  90 Tab 3    cholecalciferol, vitamin D3, (VITAMIN D3) 2,000 unit tab Take 1 Tab by mouth daily. 30 Tab 0    rosuvastatin (CRESTOR) 40 mg tablet Take 1 Tab by mouth nightly. 90 Tab 3    ACTIVATED CHARCOAL (CHARCOCAPS PO) Take  by mouth.  nitroglycerin (NITROSTAT) 0.3 mg SL tablet 1 tablet by SubLINGual route every five (5) minutes as needed for Chest Pain. 1 Bottle 1    aspirin 81 mg Tab Take 81 mg by mouth daily. Verbal and written instructions (see AVS) provided. Patient expresses understanding of diagnosis and treatment plan.

## 2018-02-12 NOTE — MR AVS SNAPSHOT
[facility] 
 
 
 [address] [place] [address] 
[phone] Patient: Teddy Esposito MRN: [id number] Visit Information Date & Time Provider Department Dept. Phone Encounter #  
 2/12/2018  9:30 AM Graciela Brown [facility] [phone] [id number] Upcoming Health Maintenance Date Due DTaP/Tdap/Td series (1 - Tdap) 7/3/1954 ZOSTER VACCINE AGE 60> 5/3/1993 Influenza Age 5 to Adult 8/1/2017 GLAUCOMA SCREENING Q2Y 5/24/2018 MEDICARE YEARLY EXAM 8/8/2018 Allergies as of 2/12/2018  Review Complete On: 2/12/2018 By: Graciela Brown MD  
  
 Severity Noted Reaction Type Reactions Codeine  09/29/2009    Unknown (comments) Unsure of reaction. Morphine  09/29/2009    Hives, Itching Current Immunizations  Reviewed on 10/24/2016 Name Date Influenza High Dose Vaccine PF 10/12/2017, 10/24/2016, 9/8/2015, 11/11/2014, 11/25/2013 Influenza Vaccine 1/15/2013 Influenza Vaccine Split 1/13/2011 Pneumococcal Conjugate (PCV-13) 8/2/2016 ZZZ-RETIRED (DO NOT USE) Pneumococcal Vaccine (Unspecified Type) 10/10/2010 Not reviewed this visit You Were Diagnosed With   
  
 Codes Comments Hypothyroidism, unspecified type    -  Primary ICD-10-CM: E03.9 ICD-9-CM: 244.9 Essential hypertension     ICD-10-CM: I10 
ICD-9-CM: 401.9 Mixed hyperlipidemia     ICD-10-CM: E78.2 ICD-9-CM: 272.2 Stage 3 chronic kidney disease     ICD-10-CM: N18.3 ICD-9-CM: 117. 3 Spondylolysis of lumbar region     ICD-10-CM: M43.06 
ICD-9-CM: 738.4 Ataxia     ICD-10-CM: R27.0 ICD-9-CM: 781.3 Other complicated headache syndrome     ICD-10-CM: G44.59 
ICD-9-CM: 339.44 Vitals BP Pulse Temp Resp Height(growth percentile) Weight(growth percentile) 142/83 (BP 1 Location: Left arm, BP Patient Position: Sitting) 68 98.4 °F (36.9 °C) (Oral) 18 5' (1.524 m) 180 lb (81.6 kg) SpO2 BMI OB Status Smoking Status 96% 35.15 kg/m2 Hysterectomy Never Smoker Vitals History BMI and BSA Data Body Mass Index Body Surface Area  
 35.15 kg/m 2 1.86 m 2 Preferred Pharmacy Pharmacy Name Phone [facility] [address] Gabino Quiroz [phone] Your Updated Medication List  
  
   
This list is accurate as of: 2/12/18 10:28 AM.  Always use your most recent med list.  
  
  
  
  
 alendronate 10 mg tablet Commonly known as:  FOSAMAX Take 1 Tab by mouth daily. ALPRAZolam 0.25 mg tablet Commonly known as:  Mila Melquiades Take 1 Tab by mouth three (3) times daily as needed for Sleep or Anxiety. aspirin 81 mg tablet Take 81 mg by mouth daily. CHARCOCAPS PO Take  by mouth. cholecalciferol (vitamin D3) 2,000 unit Tab Commonly known as:  VITAMIN D3 Take 1 Tab by mouth daily. levothyroxine 50 mcg tablet Commonly known as:  SYNTHROID Take 1 Tab by mouth Daily (before breakfast). lisinopril 5 mg tablet Commonly known as:  PRINIVIL, ZESTRIL  
TAKE ONE TABLET BY MOUTH DAILY  
  
 melatonin 5 mg Cap capsule Take 5 mg by mouth nightly. metoprolol tartrate 50 mg tablet Commonly known as:  LOPRESSOR  
TAKE ONE TABLET BY MOUTH TWICE A DAY  
  
 multivitamin tablet Commonly known as:  ONE A DAY Take 1 Tab by mouth daily. nitroglycerin 0.3 mg SL tablet Commonly known as:  NITROSTAT  
1 tablet by SubLINGual route every five (5) minutes as needed for Chest Pain.  
  
 potassium chloride SR 10 mEq tablet Commonly known as:  KLOR-CON 10  
TAKE TWO TABLETS BY MOUTH DAILY  
  
 rosuvastatin 40 mg tablet Commonly known as:  CRESTOR Take 1 Tab by mouth nightly. traMADol 50 mg tablet Commonly known as:  ULTRAM  
TAKE ONE TABLET BY MOUTH EVERY 6 HOURS AS NEEDED FOR PAIN - MAX OF 4 TABLETS DAILY  
  
 TYLENOL ARTHRITIS PAIN 650 mg Tber Generic drug:  acetaminophen Take 650 mg by mouth every eight (8) hours. We Performed the Following CBC WITH AUTOMATED DIFF [55745 CPT(R)] METABOLIC PANEL, COMPREHENSIVE [20797 CPT(R)] TSH 3RD GENERATION [21797 CPT(R)] To-Do List   
 02/12/2018 Imaging:  MRI BRAIN W WO CONT   
  
 02/12/2018 Imaging:  MRI CERV SPINE W WO CONT Patient Instructions Headache: Care Instructions Your Care Instructions Headaches have many possible causes. Most headaches aren't a sign of a more serious problem, and they will get better on their own. Home treatment may help you feel better faster. The doctor has checked you carefully, but problems can develop later. If you notice any problems or new symptoms, get medical treatment right away. Follow-up care is a key part of your treatment and safety. Be sure to make and go to all appointments, and call your doctor if you are having problems. It's also a good idea to know your test results and keep a list of the medicines you take. How can you care for yourself at home? · Do not drive if you have taken a prescription pain medicine. · Rest in a quiet, dark room until your headache is gone. Close your eyes and try to relax or go to sleep. Don't watch TV or read. · Put a cold, moist cloth or cold pack on the painful area for 10 to 20 minutes at a time. Put a thin cloth between the cold pack and your skin. · Use a warm, moist towel or a heating pad set on low to relax tight shoulder and neck muscles. · Have someone gently massage your neck and shoulders. · Take pain medicines exactly as directed. ¨ If the doctor gave you a prescription medicine for pain, take it as prescribed. ¨ If you are not taking a prescription pain medicine, ask your doctor if you can take an over-the-counter medicine.  
· Be careful not to take pain medicine more often than the instructions allow, because you may get worse or more frequent headaches when the medicine wears off. · Do not ignore new symptoms that occur with a headache, such as a fever, weakness or numbness, vision changes, or confusion. These may be signs of a more serious problem. To prevent headaches · Keep a headache diary so you can figure out what triggers your headaches. Avoiding triggers may help you prevent headaches. Record when each headache began, how long it lasted, and what the pain was like (throbbing, aching, stabbing, or dull). Write down any other symptoms you had with the headache, such as nausea, flashing lights or dark spots, or sensitivity to bright light or loud noise. Note if the headache occurred near your period. List anything that might have triggered the headache, such as certain foods (chocolate, cheese, wine) or odors, smoke, bright light, stress, or lack of sleep. · Find healthy ways to deal with stress. Headaches are most common during or right after stressful times. Take time to relax before and after you do something that has caused a headache in the past. 
· Try to keep your muscles relaxed by keeping good posture. Check your jaw, face, neck, and shoulder muscles for tension, and try relaxing them. When sitting at a desk, change positions often, and stretch for 30 seconds each hour. · Get plenty of sleep and exercise. · Eat regularly and well. Long periods without food can trigger a headache. · Treat yourself to a massage. Some people find that regular massages are very helpful in relieving tension. · Limit caffeine by not drinking too much coffee, tea, or soda. But don't quit caffeine suddenly, because that can also give you headaches. · Reduce eyestrain from computers by blinking frequently and looking away from the computer screen every so often. Make sure you have proper eyewear and that your monitor is set up properly, about an arm's length away. · Seek help if you have depression or anxiety. Your headaches may be linked to these conditions. Treatment can both prevent headaches and help with symptoms of anxiety or depression. When should you call for help? Call 911 anytime you think you may need emergency care. For example, call if: 
? · You have signs of a stroke. These may include: 
¨ Sudden numbness, paralysis, or weakness in your face, arm, or leg, especially on only one side of your body. ¨ Sudden vision changes. ¨ Sudden trouble speaking. ¨ Sudden confusion or trouble understanding simple statements. ¨ Sudden problems with walking or balance. ¨ A sudden, severe headache that is different from past headaches. ?Call your doctor now or seek immediate medical care if: 
? · You have a new or worse headache. ? · Your headache gets much worse. Where can you learn more? Go to http://christianne-nain.info/. Enter M271 in the search box to learn more about \"Headache: Care Instructions. \" Current as of: October 14, 2016 Content Version: 11.4 © 9781-1352 [employer] (T/A). Care instructions adapted under license by [employer] (which disclaims liability or warranty for this information). If you have questions about a medical condition or this instruction, always ask your healthcare professional. [address] any warranty or liability for your use of this information. Introducing \[facility]\"" & HEALTH SERVICES! Cody Wilkerson introduces MDdatacor patient portal. Now you can access parts of your medical record, email your doctor's office, and request medication refills online. 1. In your internet browser, go to [email or web address] 2. Click on the First Time User? Click Here link in the Sign In box. You will see the New Member Sign Up page. 3. Enter your MDdatacor Access Code exactly as it appears below.  You will not need to use this code after youve completed the sign-up process. If you do not sign up before the expiration date, you must request a new code. · SueEasy Access Code: [email or web address] Expires: 5/13/2018 10:28 AM 
 
4. Enter the last four digits of your Social Security Number (xxxx) and Date of Birth (mm/dd/yyyy) as indicated and click Submit. You will be taken to the next sign-up page. 5. Create a SueEasy ID. This will be your SueEasy login ID and cannot be changed, so think of one that is secure and easy to remember. 6. Create a SueEasy password. You can change your password at any time. 7. Enter your Password Reset Question and Answer. This can be used at a later time if you forget your password. 8. Enter your e-mail address. You will receive e-mail notification when new information is available in [address]. 9. Click Sign Up. You can now view and download portions of your medical record. 10. Click the Download Summary menu link to download a portable copy of your medical information. If you have questions, please visit the Frequently Asked Questions section of the SueEasy website. Remember, SueEasy is NOT to be used for urgent needs. For medical emergencies, dial 911. Now available from your iPhone and Android! Please provide this summary of care documentation to your next provider. Your primary care clinician is listed as Opal Loco. If you have any questions after today's visit, please call [phone].

## 2018-02-12 NOTE — PATIENT INSTRUCTIONS

## 2018-02-13 LAB
ALBUMIN SERPL-MCNC: 4.1 G/DL (ref 3.5–4.7)
ALBUMIN/GLOB SERPL: 2 {RATIO} (ref 1.2–2.2)
ALP SERPL-CCNC: 64 IU/L (ref 39–117)
ALT SERPL-CCNC: 6 IU/L (ref 0–32)
AST SERPL-CCNC: 19 IU/L (ref 0–40)
BASOPHILS # BLD AUTO: 0 X10E3/UL (ref 0–0.2)
BASOPHILS NFR BLD AUTO: 1 %
BILIRUB SERPL-MCNC: 0.6 MG/DL (ref 0–1.2)
BUN SERPL-MCNC: 20 MG/DL (ref 8–27)
BUN/CREAT SERPL: 18 (ref 12–28)
CALCIUM SERPL-MCNC: 9.8 MG/DL (ref 8.7–10.3)
CHLORIDE SERPL-SCNC: 104 MMOL/L (ref 96–106)
CO2 SERPL-SCNC: 26 MMOL/L (ref 18–29)
CREAT SERPL-MCNC: 1.13 MG/DL (ref 0.57–1)
EOSINOPHIL # BLD AUTO: 0.1 X10E3/UL (ref 0–0.4)
EOSINOPHIL NFR BLD AUTO: 1 %
ERYTHROCYTE [DISTWIDTH] IN BLOOD BY AUTOMATED COUNT: 13 % (ref 12.3–15.4)
GFR SERPLBLD CREATININE-BSD FMLA CKD-EPI: 45 ML/MIN/1.73
GFR SERPLBLD CREATININE-BSD FMLA CKD-EPI: 52 ML/MIN/1.73
GLOBULIN SER CALC-MCNC: 2.1 G/DL (ref 1.5–4.5)
GLUCOSE SERPL-MCNC: 102 MG/DL (ref 65–99)
HCT VFR BLD AUTO: 40.8 % (ref 34–46.6)
HGB BLD-MCNC: 13.2 G/DL (ref 11.1–15.9)
IMM GRANULOCYTES # BLD: 0 X10E3/UL (ref 0–0.1)
IMM GRANULOCYTES NFR BLD: 0 %
INTERPRETATION: NORMAL
LYMPHOCYTES # BLD AUTO: 0.7 X10E3/UL (ref 0.7–3.1)
LYMPHOCYTES NFR BLD AUTO: 15 %
MCH RBC QN AUTO: 32.8 PG (ref 26.6–33)
MCHC RBC AUTO-ENTMCNC: 32.4 G/DL (ref 31.5–35.7)
MCV RBC AUTO: 102 FL (ref 79–97)
MONOCYTES # BLD AUTO: 0.4 X10E3/UL (ref 0.1–0.9)
MONOCYTES NFR BLD AUTO: 8 %
NEUTROPHILS # BLD AUTO: 3.5 X10E3/UL (ref 1.4–7)
NEUTROPHILS NFR BLD AUTO: 75 %
PLATELET # BLD AUTO: 162 X10E3/UL (ref 150–379)
POTASSIUM SERPL-SCNC: 4.6 MMOL/L (ref 3.5–5.2)
PROT SERPL-MCNC: 6.2 G/DL (ref 6–8.5)
RBC # BLD AUTO: 4.02 X10E6/UL (ref 3.77–5.28)
SODIUM SERPL-SCNC: 144 MMOL/L (ref 134–144)
TSH SERPL DL<=0.005 MIU/L-ACNC: 6.83 UIU/ML (ref 0.45–4.5)
WBC # BLD AUTO: 4.7 X10E3/UL (ref 3.4–10.8)

## 2018-02-22 ENCOUNTER — HOSPITAL ENCOUNTER (OUTPATIENT)
Dept: MRI IMAGING | Age: 83
Discharge: HOME | End: 2018-02-22
Attending: INTERNAL MEDICINE
Payer: MEDICARE

## 2018-02-22 DIAGNOSIS — R10.9 LEFT FLANK PAIN: ICD-10-CM

## 2018-02-22 DIAGNOSIS — R31.29 HEMATURIA, MICROSCOPIC: ICD-10-CM

## 2018-02-22 DIAGNOSIS — G44.59 OTHER COMPLICATED HEADACHE SYNDROME: ICD-10-CM

## 2018-02-22 DIAGNOSIS — R27.0 ATAXIA: ICD-10-CM

## 2018-02-22 PROCEDURE — 70553 MRI BRAIN STEM W/O & W/DYE: CPT

## 2018-02-22 PROCEDURE — 72156 MRI NECK SPINE W/O & W/DYE: CPT

## 2018-02-22 PROCEDURE — 74011250636 HC RX REV CODE- 250/636: Performed by: INTERNAL MEDICINE

## 2018-02-22 PROCEDURE — A9576 INJ PROHANCE MULTIPACK: HCPCS | Performed by: INTERNAL MEDICINE

## 2018-02-22 RX ADMIN — GADOTERIDOL 20 ML: 279.3 INJECTION, SOLUTION INTRAVENOUS at 17:34

## 2018-02-23 RX ORDER — TRAMADOL HYDROCHLORIDE 50 MG/1
TABLET ORAL
Qty: 30 TAB | Refills: 0 | Status: SHIPPED | OUTPATIENT
Start: 2018-02-23 | End: 2018-03-26 | Stop reason: SDUPTHER

## 2018-02-27 ENCOUNTER — TELEPHONE (OUTPATIENT)
Dept: FAMILY MEDICINE CLINIC | Age: 83
End: 2018-02-27

## 2018-02-28 ENCOUNTER — TELEPHONE (OUTPATIENT)
Dept: FAMILY MEDICINE CLINIC | Age: 83
End: 2018-02-28

## 2018-02-28 NOTE — TELEPHONE ENCOUNTER
Talked to Ms. Preet Simental and let her know we will call her with results when Dr. Florian Kline gets back Friday

## 2018-03-21 RX ORDER — ROSUVASTATIN CALCIUM 40 MG/1
40 TABLET, COATED ORAL
Qty: 90 TAB | Refills: 3 | Status: SHIPPED | OUTPATIENT
Start: 2018-03-21 | End: 2019-04-16 | Stop reason: SDUPTHER

## 2018-03-26 DIAGNOSIS — R10.9 LEFT FLANK PAIN: ICD-10-CM

## 2018-03-26 DIAGNOSIS — R31.29 HEMATURIA, MICROSCOPIC: ICD-10-CM

## 2018-03-26 RX ORDER — TRAMADOL HYDROCHLORIDE 50 MG/1
TABLET ORAL
Qty: 30 TAB | Refills: 0 | Status: SHIPPED | OUTPATIENT
Start: 2018-03-26 | End: 2018-04-23 | Stop reason: SDUPTHER

## 2018-03-26 NOTE — TELEPHONE ENCOUNTER
Chief Complaint   Patient presents with    Medication Refill     Last refill:   4 weeks ago (2/23/2018)        traMADol (ULTRAM) 50 mg tablet       TAKE ONE TABLET BY MOUTH EVERY 6 HOURS AS NEEDED FOR PAIN - MAX OF 4 TABLETS DAILY       Dispense: 30 Tab     Refills: 0     Start: 2/23/2018     By: Viola Bean MD       Last Appointment With Me:  3/2/2018     : reviewed last filled on 2/23/18

## 2018-03-26 NOTE — TELEPHONE ENCOUNTER
Pt is requesting a refill on her med    Requested Prescriptions     Pending Prescriptions Disp Refills    traMADol (ULTRAM) 50 mg tablet 30 Tab 0     Sig: TAKE ONE TABLET BY MOUTH EVERY 6 HOURS AS NEEDED FOR PAIN - MAX OF 4 TABLETS DAILY

## 2018-04-23 DIAGNOSIS — R31.29 HEMATURIA, MICROSCOPIC: ICD-10-CM

## 2018-04-23 DIAGNOSIS — R10.9 LEFT FLANK PAIN: ICD-10-CM

## 2018-04-23 RX ORDER — TRAMADOL HYDROCHLORIDE 50 MG/1
TABLET ORAL
Qty: 30 TAB | Refills: 0 | Status: SHIPPED | OUTPATIENT
Start: 2018-04-23 | End: 2018-05-21 | Stop reason: SDUPTHER

## 2018-04-23 RX ORDER — LEVOTHYROXINE SODIUM 75 UG/1
75 TABLET ORAL
Qty: 90 TAB | Refills: 0 | Status: SHIPPED | OUTPATIENT
Start: 2018-04-23 | End: 2018-07-24 | Stop reason: SDUPTHER

## 2018-04-23 RX ORDER — LEVOTHYROXINE SODIUM 75 UG/1
TABLET ORAL
Qty: 90 TAB | Refills: 0 | Status: SHIPPED | OUTPATIENT
Start: 2018-04-23 | End: 2018-04-23 | Stop reason: SDUPTHER

## 2018-04-23 NOTE — TELEPHONE ENCOUNTER
Rx for Tramadol  called in to [facility] as ordered and paper Rx destroyed

## 2018-04-23 NOTE — TELEPHONE ENCOUNTER
Pt is calling requesting a refill on med with increase dosage on Levothyroxine    Requested Prescriptions     Pending Prescriptions Disp Refills    levothyroxine (SYNTHROID) 50 mcg tablet 90 Tab 3     Sig: Take 1 Tab by mouth Daily (before breakfast).     traMADol (ULTRAM) 50 mg tablet 30 Tab 0     Sig: TAKE ONE TABLET BY MOUTH EVERY 6 HOURS AS NEEDED FOR PAIN - MAX OF 4 TABLETS DAILY

## 2018-05-14 RX ORDER — POTASSIUM CHLORIDE 750 MG/1
TABLET, FILM COATED, EXTENDED RELEASE ORAL
Qty: 60 TAB | Refills: 1 | Status: SHIPPED | OUTPATIENT
Start: 2018-05-14 | End: 2018-07-09 | Stop reason: SDUPTHER

## 2018-05-21 DIAGNOSIS — R10.9 LEFT FLANK PAIN: ICD-10-CM

## 2018-05-21 DIAGNOSIS — R31.29 HEMATURIA, MICROSCOPIC: ICD-10-CM

## 2018-05-23 RX ORDER — TRAMADOL HYDROCHLORIDE 50 MG/1
TABLET ORAL
Qty: 30 TAB | Refills: 0 | Status: SHIPPED | OUTPATIENT
Start: 2018-05-23 | End: 2018-06-19 | Stop reason: SDUPTHER

## 2018-05-23 NOTE — TELEPHONE ENCOUNTER
Rx for Tramadol 50mg called in to [facility] as ordered by Amalia Allison NP and paper Rx destroyed. For Training Rx called in by Bharat Duron RN and I witnessed.   Ainsley Alonso LPN

## 2018-05-23 NOTE — TELEPHONE ENCOUNTER
Pt is calling again needing her med can this been done by another provider    Requested Prescriptions     Pending Prescriptions Disp Refills    traMADol (ULTRAM) 50 mg tablet 30 Tab 0     Sig: TAKE ONE TABLET BY MOUTH EVERY 6 HOURS AS NEEDED FOR PAIN - MAX OF 4 TABLETS DAILY

## 2018-06-19 DIAGNOSIS — R31.29 HEMATURIA, MICROSCOPIC: ICD-10-CM

## 2018-06-19 DIAGNOSIS — R10.9 LEFT FLANK PAIN: ICD-10-CM

## 2018-06-19 RX ORDER — TRAMADOL HYDROCHLORIDE 50 MG/1
TABLET ORAL
Qty: 30 TAB | Refills: 0 | Status: SHIPPED | OUTPATIENT
Start: 2018-06-19 | End: 2018-07-19 | Stop reason: SDUPTHER

## 2018-06-20 ENCOUNTER — DOCUMENTATION ONLY (OUTPATIENT)
Dept: FAMILY MEDICINE CLINIC | Age: 83
End: 2018-06-20

## 2018-07-07 ENCOUNTER — TELEPHONE (OUTPATIENT)
Dept: INTERNAL MEDICINE CLINIC | Age: 83
End: 2018-07-07

## 2018-07-07 NOTE — TELEPHONE ENCOUNTER
Pt called noting red blood when she urinated. Told her to schedule an appt with you for further eval. No dysuria or flank pain or fever.

## 2018-07-09 ENCOUNTER — OFFICE VISIT (OUTPATIENT)
Dept: FAMILY MEDICINE CLINIC | Age: 83
End: 2018-07-09

## 2018-07-09 ENCOUNTER — HOSPITAL ENCOUNTER (OUTPATIENT)
Dept: LAB | Age: 83
Discharge: HOME | End: 2018-07-09
Payer: MEDICARE

## 2018-07-09 VITALS
HEIGHT: 60 IN | OXYGEN SATURATION: 98 % | BODY MASS INDEX: 36.44 KG/M2 | HEART RATE: 66 BPM | DIASTOLIC BLOOD PRESSURE: 65 MMHG | RESPIRATION RATE: 20 BRPM | SYSTOLIC BLOOD PRESSURE: 135 MMHG | TEMPERATURE: 98 F | WEIGHT: 185.6 LBS

## 2018-07-09 DIAGNOSIS — E03.9 HYPOTHYROIDISM, UNSPECIFIED TYPE: Chronic | ICD-10-CM

## 2018-07-09 DIAGNOSIS — R31.9 HEMATURIA, UNSPECIFIED TYPE: Primary | ICD-10-CM

## 2018-07-09 PROBLEM — E66.01 SEVERE OBESITY (BMI 35.0-39.9): Status: ACTIVE | Noted: 2018-07-09

## 2018-07-09 LAB
BILIRUB UR QL STRIP: NEGATIVE
GLUCOSE UR-MCNC: NEGATIVE MG/DL
KETONES P FAST UR STRIP-MCNC: NEGATIVE MG/DL
PH UR STRIP: 6.5 [PH] (ref 4.6–8)
PROT UR QL STRIP: NORMAL
SP GR UR STRIP: 1.01 (ref 1–1.03)
UA UROBILINOGEN AMB POC: NORMAL (ref 0.2–1)
URINALYSIS CLARITY POC: CLEAR
URINALYSIS COLOR POC: YELLOW
URINE BLOOD POC: NORMAL
URINE LEUKOCYTES POC: NEGATIVE
URINE NITRITES POC: NEGATIVE

## 2018-07-09 PROCEDURE — 81001 URINALYSIS AUTO W/SCOPE: CPT

## 2018-07-09 PROCEDURE — 84443 ASSAY THYROID STIM HORMONE: CPT

## 2018-07-09 PROCEDURE — 36415 COLL VENOUS BLD VENIPUNCTURE: CPT

## 2018-07-09 PROCEDURE — 85025 COMPLETE CBC W/AUTO DIFF WBC: CPT

## 2018-07-09 PROCEDURE — 80053 COMPREHEN METABOLIC PANEL: CPT

## 2018-07-09 PROCEDURE — 87086 URINE CULTURE/COLONY COUNT: CPT

## 2018-07-09 RX ORDER — POTASSIUM CHLORIDE 750 MG/1
TABLET, FILM COATED, EXTENDED RELEASE ORAL
Qty: 60 TAB | Refills: 5 | Status: SHIPPED | OUTPATIENT
Start: 2018-07-09 | End: 2019-01-21 | Stop reason: SDUPTHER

## 2018-07-09 RX ORDER — CEPHALEXIN 500 MG/1
500 CAPSULE ORAL 2 TIMES DAILY
Qty: 10 CAP | Refills: 0 | Status: SHIPPED | OUTPATIENT
Start: 2018-07-09 | End: 2018-07-14

## 2018-07-09 NOTE — PROGRESS NOTES
Subjective:   Amanda Ochoa is a 80 y.o. female who complains of bleeding FRiday evening. Urinated and got up to wipe and noted there was blood in the toilet. Denies any rectal bleeding. No vaginal bleeding. Was not having a bowel movement. No pain with urination. Did note a slight amount of blood on an incontinence pad later that evening. Past Medical History:   Diagnosis Date    CAD (coronary artery disease)     stent placed on left 1 stent,in 2007    Cancer [facility])     BCCA    DVT (deep venous thrombosis) (HCC)     GERD (gastroesophageal reflux disease)     Hypercholesterolemia     Hypertension     [facility]monary embolism ([facility] [address] 9/15/2015    Thromboembolus ([facility] [address]     DVT after hernia operation, PE spontaneous 10 years later    Thyroid disease      Social History   Substance Use Topics    Smoking status: Never Smoker    Smokeless tobacco: Never Used    Alcohol use No     Outpatient Prescriptions Marked as Taking for the 7/9/18 encounter (Office Visit) with Cleopatra Welch MD   Medication Sig Dispense Refill    potassium chloride SR (KLOR-CON 10) 10 mEq tablet TAKE TWO TABLETS BY MOUTH DAILY 60 Tab 5    cephALEXin (KEFLEX) 500 mg capsule Take 1 Cap by mouth two (2) times a day for 5 days. 10 Cap 0    traMADol (ULTRAM) 50 mg tablet TAKE ONE TABLET BY MOUTH EVERY 6 HOURS AS NEEDED FOR PAIN - MAX OF 4 TABLETS DAILY 30 Tab 0    levothyroxine (SYNTHROID) 75 mcg tablet Take 1 Tab by mouth Daily (before breakfast). 90 Tab 0    rosuvastatin (CRESTOR) 40 mg tablet Take 1 Tab by mouth nightly. 90 Tab 3    melatonin 5 mg cap capsule Take 5 mg by mouth nightly.  acetaminophen (TYLENOL ARTHRITIS PAIN) 650 mg TbER Take 650 mg by mouth every eight (8) hours.  multivitamin (ONE A DAY) tablet Take 1 Tab by mouth daily.       lisinopril (PRINIVIL, ZESTRIL) 5 mg tablet TAKE ONE TABLET BY MOUTH DAILY 90 Tab 1    ALPRAZolam (XANAX) 0.25 mg tablet Take 1 Tab by mouth three (3) times daily as needed for Sleep or Anxiety. 30 Tab 1    alendronate (FOSAMAX) 10 mg tablet Take 1 Tab by mouth daily. 90 Tab 3    metoprolol tartrate (LOPRESSOR) 50 mg tablet TAKE ONE TABLET BY MOUTH TWICE A  Tab 3    cholecalciferol, vitamin D3, (VITAMIN D3) 2,000 unit tab Take 1 Tab by mouth daily. 30 Tab 0    ACTIVATED CHARCOAL (CHARCOCAPS PO) Take  by mouth.  nitroglycerin (NITROSTAT) 0.3 mg SL tablet 1 tablet by SubLINGual route every five (5) minutes as needed for Chest Pain. 1 Bottle 1    aspirin 81 mg Tab Take 81 mg by mouth daily. Allergies   Allergen Reactions    Codeine Unknown (comments)     Unsure of reaction.  Morphine Hives and Itching        Review of Systems  A comprehensive review of systems was negative except for that written in the HPI. Objective:     Visit Vitals    /65 (BP 1 Location: Left arm, BP Patient Position: Sitting)    Pulse (!) 57    Temp 98 °F (36.7 °C) (Oral)    Resp 20    Ht 5' (1.524 m)    Wt 185 lb 9.6 oz (84.2 kg)    SpO2 98%    BMI 36.25 kg/m2     General:   alert, cooperative   Eyes: conjunctivae/scleras clear. PERRL, EOM's intact       back No CVA tenderness   Mouth:  No oral lesions, mild pharyngeal erythema, no exudates   Neck: Supple, trachea midline   Heart: S1 and S2 normal,no murmurs noted    Lungs: Coarse to auscultation bilaterally, no increased work of breathing   Abdomen: Soft, nontender.   Normal bowel sounds   Extremities: No edema or cyanosis          Results for orders placed or performed in visit on 07/09/18   AMB POC URINALYSIS DIP STICK MANUAL W/O MICRO   Result Value Ref Range    Color (UA POC) Yellow     Clarity (UA POC) Clear     Glucose (UA POC) Negative Negative    Bilirubin (UA POC) Negative Negative    Ketones (UA POC) Negative Negative    Specific gravity (UA POC) 1.010 1.001 - 1.035    Blood (UA POC) Trace Negative    pH (UA POC) 6.5 4.6 - 8.0    Protein (UA POC) 1+ Negative    Urobilinogen (UA POC) 0.2 mg/dL 0.2 - 1    Nitrites (UA POC) Negative Negative    Leukocyte esterase (UA POC) Negative Negative       Assessment/Plan:     1. Hematuria, unspecified type    2. Hypothyroidism, unspecified type      Hematuria - may be a polyp or cyst, will check labs and may need further workup for kidney source if no infection. Cover with keflex pending lab results. No signs of kidney stone. Follow up pending labs in 1 week. Orders Placed This Encounter    CULTURE, URINE    CBC WITH AUTOMATED DIFF    METABOLIC PANEL, COMPREHENSIVE    TSH 3RD GENERATION    URINALYSIS W/MICROSCOPIC    AMB POC URINALYSIS DIP STICK MANUAL W/O MICRO    potassium chloride SR (KLOR-CON 10) 10 mEq tablet     Sig: TAKE TWO TABLETS BY MOUTH DAILY     Dispense:  60 Tab     Refill:  5    cephALEXin (KEFLEX) 500 mg capsule     Sig: Take 1 Cap by mouth two (2) times a day for 5 days. Dispense:  10 Cap     Refill:  0       Verbal and written instructions (see AVS) provided. Patient expresses understanding of diagnosis and treatment plan.

## 2018-07-09 NOTE — PATIENT INSTRUCTIONS

## 2018-07-09 NOTE — PROGRESS NOTES
Chief Complaint   Patient presents with    Vaginal Bleeding     1. Have you been to the ER, urgent care clinic since your last visit? Hospitalized since your last visit? No    2. Have you seen or consulted any other health care providers outside of the [address] [place] [address] since your last visit? Include any pap smears or colon screening.  No    Health Maintenance reviewed

## 2018-07-09 NOTE — MR AVS SNAPSHOT
[place] 
 
 
 [address] 
[phone] Patient: Fátima Parsons MRN: [id number] WWK:2/9/9835 Visit Information Date & Time Provider Department Dept. Phone Encounter #  
 7/9/2018 11:30 AM Jim Pierre [facility] [phone] [id number] Your Appointments 8/2/2018  1:15 PM  
ESTABLISHED PATIENT with Sasha Light MD  
[facility] [address] [place] [address]) Appt Note: f  
 [phone] [facility]  
[phone] 937 [address] Upcoming Health Maintenance Date Due DTaP/Tdap/Td series (1 - Tdap) 7/3/1954 ZOSTER VACCINE AGE 60> 5/3/1993 GLAUCOMA SCREENING Q2Y 5/24/2018 Influenza Age 5 to Adult 8/1/2018 MEDICARE YEARLY EXAM 8/8/2018 Allergies as of 7/9/2018  Review Complete On: 7/9/2018 By: Amie Alvarado MD  
  
 Severity Noted Reaction Type Reactions Codeine  09/29/2009    Unknown (comments) Unsure of reaction. Morphine  09/29/2009    Hives, Itching Current Immunizations  Reviewed on 10/24/2016 Name Date Influenza High Dose Vaccine PF 10/12/2017, 10/24/2016, 9/8/2015, 11/11/2014, 11/25/2013 Influenza Vaccine 1/15/2013 Influenza Vaccine Split 1/13/2011 Pneumococcal Conjugate (PCV-13) 8/2/2016 ZZZ-RETIRED (DO NOT USE) Pneumococcal Vaccine (Unspecified Type) 10/10/2010 Not reviewed this visit You Were Diagnosed With   
  
 Codes Comments Hematuria, unspecified type    -  Primary ICD-10-CM: R31.9 ICD-9-CM: 599.70 Hypothyroidism, unspecified type     ICD-10-CM: E03.9 ICD-9-CM: 236. 9 Vitals BP Pulse Temp Resp Height(growth percentile) Weight(growth percentile) 135/65 (BP 1 Location: Left arm, BP Patient Position: Sitting) 66 98 °F (36.7 °C) (Oral) 20 5' (1.524 m) 185 lb 9.6 oz (84.2 kg) SpO2 BMI OB Status Smoking Status 98% 36.25 kg/m2 Hysterectomy Never Smoker Vitals History BMI and BSA Data Body Mass Index Body Surface Area  
 36.25 kg/m 2 1.89 m 2 Preferred Pharmacy Pharmacy Name Phone [facility] [address], [address] [place] [address] Claribel Boxer [phone] Your Updated Medication List  
  
   
This list is accurate as of 7/9/18 12:49 PM.  Always use your most recent med list.  
  
  
  
  
 alendronate 10 mg tablet Commonly known as:  FOSAMAX Take 1 Tab by mouth daily. ALPRAZolam 0.25 mg tablet Commonly known as:  Volanda Pronto Take 1 Tab by mouth three (3) times daily as needed for Sleep or Anxiety. aspirin 81 mg tablet Take 81 mg by mouth daily. cephALEXin 500 mg capsule Commonly known as:  Larayne Savannah Take 1 Cap by mouth two (2) times a day for 5 days. CHARCOCAPS PO Take  by mouth. cholecalciferol (vitamin D3) 2,000 unit Tab Commonly known as:  VITAMIN D3 Take 1 Tab by mouth daily. levothyroxine 75 mcg tablet Commonly known as:  SYNTHROID Take 1 Tab by mouth Daily (before breakfast). lisinopril 5 mg tablet Commonly known as:  PRINIVIL, ZESTRIL  
TAKE ONE TABLET BY MOUTH DAILY  
  
 melatonin 5 mg Cap capsule Take 5 mg by mouth nightly. metoprolol tartrate 50 mg tablet Commonly known as:  LOPRESSOR  
TAKE ONE TABLET BY MOUTH TWICE A DAY  
  
 multivitamin tablet Commonly known as:  ONE A DAY Take 1 Tab by mouth daily. nitroglycerin 0.3 mg SL tablet Commonly known as:  NITROSTAT  
1 tablet by SubLINGual route every five (5) minutes as needed for Chest Pain.  
  
 potassium chloride SR 10 mEq tablet Commonly known as:  KLOR-CON 10  
TAKE TWO TABLETS BY MOUTH DAILY  
  
 rosuvastatin 40 mg tablet Commonly known as:  CRESTOR Take 1 Tab by mouth nightly. traMADol 50 mg tablet Commonly known as:  ULTRAM  
TAKE ONE TABLET BY MOUTH EVERY 6 HOURS AS NEEDED FOR PAIN - MAX OF 4 TABLETS DAILY TYLENOL ARTHRITIS PAIN 650 mg [facility] Generic drug:  acetaminophen Take 650 mg by mouth every eight (8) hours. Prescriptions Sent to Pharmacy Refills  
 potassium chloride SR (KLOR-CON 10) 10 mEq tablet 5 Sig: TAKE TWO TABLETS BY MOUTH DAILY Class: Normal  
 Pharmacy: [facility] [address] [place] [address], [address] [place] [address] Ph #: [phone]  
 cephALEXin (KEFLEX) 500 mg capsule 0 Sig: Take 1 Cap by mouth two (2) times a day for 5 days. Class: Normal  
 Pharmacy: [facility] [address] [place] [address], [address] [place] [address] Serita Dubin Ph #: [phone] Route: Oral  
  
We Performed the Following AMB POC URINALYSIS DIP STICK MANUAL W/O MICRO [62094 CPT(R)] CBC WITH AUTOMATED DIFF [96795 CPT(R)] CULTURE, URINE [id number] CPT(R)] METABOLIC PANEL, COMPREHENSIVE [66819 CPT(R)] TSH 3RD GENERATION [32214 CPT(R)] URINALYSIS W/MICROSCOPIC [26732 CPT(R)] Patient Instructions Blood in the Urine: Care Instructions Your Care Instructions Blood in the urine, or hematuria, may make the urine look red, brown, or pink. There may be blood every time you urinate or just from time to time. You cannot always see blood in the urine, but it will show up in a urine test. 
Blood in the urine may be serious. It should always be checked by a doctor. Your doctor may recommend more tests, including an X-ray, a CT scan, or a cystoscopy (which lets a doctor look inside the urethra and bladder). Blood in the urine can be a sign of another problem. Common causes are bladder infections and kidney stones. An injury to your groin or your genital area can also cause bleeding in the urinary tract. Very hard exercise-such as running a marathon-can cause blood in the urine. Blood in the urine can also be a sign of kidney disease or cancer in the bladder or kidney.  Many cases of blood in the urine are caused by a harmless condition that runs in families. This is called benign familial hematuria. It does not need any treatment. Sometimes your urine may look red or brown even though it does not contain blood. For example, not getting enough fluids (dehydration), taking certain medicines, or having a liver problem can change the color of your urine. Eating foods such as beets, rhubarb, or blackberries or foods with red food coloring can make your urine look red or pink. Follow-up care is a key part of your treatment and safety. Be sure to make and go to all appointments, and call your doctor if you are having problems. It's also a good idea to know your test results and keep a list of the medicines you take. When should you call for help? Call your doctor now or seek immediate medical care if: 
· You have symptoms of a urinary infection. For example: ¨ You have pus in your urine. ¨ You have pain in your back just below your rib cage. This is called flank pain. ¨ You have a fever, chills, or body aches. ¨ It hurts to urinate. ¨ You have groin or belly pain. · You have more blood in your urine. Watch closely for changes in your health, and be sure to contact your doctor if: 
· You have new urination problems. · You do not get better as expected. Where can you learn more? Go to http://christianne-nain.info/. Enter K447 in the search box to learn more about \"Blood in the Urine: Care Instructions. \" Current as of: May 12, 2017 Content Version: 11.4 © 1157-3577 Healthwise, Incorporated. Care instructions adapted under license by [employer] (which disclaims liability or warranty for this information). If you have questions about a medical condition or this instruction, always ask your healthcare professional. [place] [address] any warranty or liability for your use of this information. Introducing [facility] & HEALTH SERVICES! Ita Luna introduces Sling patient portal. Now you can access parts of your medical record, email your doctor's office, and request medication refills online. 1. In your internet browser, go to [email or web address] 2. Click on the First Time User? Click Here link in the Sign In box. You will see the New Member Sign Up page. 3. Enter your Sling Access Code exactly as it appears below. You will not need to use this code after youve completed the sign-up process. If you do not sign up before the expiration date, you must request a new code. · Sling Access Code: [email or web address] Expires: 10/7/2018 12:49 PM 
 
4. Enter the last four digits of your Social Security Number (xxxx) and Date of Birth (mm/dd/yyyy) as indicated and click Submit. You will be taken to the next sign-up page. 5. Create a Sling ID. This will be your Sling login ID and cannot be changed, so think of one that is secure and easy to remember. 6. Create a Sling password. You can change your password at any time. 7. Enter your Password Reset Question and Answer. This can be used at a later time if you forget your password. 8. Enter your e-mail address. You will receive e-mail notification when new information is available in [address] 9. Click Sign Up. You can now view and download portions of your medical record. 10. Click the Download Summary menu link to download a portable copy of your medical information. If you have questions, please visit the Frequently Asked Questions section of the Sling website. Remember, Sling is NOT to be used for urgent needs. For medical emergencies, dial 911. Now available from your iPhone and Android! Please provide this summary of care documentation to your next provider. Your primary care clinician is listed as Julius Garcia. If you have any questions after today's visit, please call [phone].

## 2018-07-10 LAB
ALBUMIN SERPL-MCNC: 4.4 G/DL (ref 3.5–4.7)
ALBUMIN/GLOB SERPL: 2.4 {RATIO} (ref 1.2–2.2)
ALP SERPL-CCNC: 80 IU/L (ref 39–117)
ALT SERPL-CCNC: 9 IU/L (ref 0–32)
APPEARANCE UR: CLEAR
AST SERPL-CCNC: 23 IU/L (ref 0–40)
BACTERIA #/AREA URNS HPF: NORMAL /[HPF]
BACTERIA UR CULT: NORMAL
BASOPHILS # BLD AUTO: 0 X10E3/UL (ref 0–0.2)
BASOPHILS NFR BLD AUTO: 0 %
BILIRUB SERPL-MCNC: 0.4 MG/DL (ref 0–1.2)
BILIRUB UR QL STRIP: NEGATIVE
BUN SERPL-MCNC: 22 MG/DL (ref 8–27)
BUN/CREAT SERPL: 21 (ref 12–28)
CALCIUM SERPL-MCNC: 9.8 MG/DL (ref 8.7–10.3)
CASTS URNS QL MICRO: NORMAL /LPF
CHLORIDE SERPL-SCNC: 105 MMOL/L (ref 96–106)
CO2 SERPL-SCNC: 23 MMOL/L (ref 20–29)
COLOR UR: YELLOW
CREAT SERPL-MCNC: 1.04 MG/DL (ref 0.57–1)
EOSINOPHIL # BLD AUTO: 0 X10E3/UL (ref 0–0.4)
EOSINOPHIL NFR BLD AUTO: 1 %
EPI CELLS #/AREA URNS HPF: NORMAL /HPF
ERYTHROCYTE [DISTWIDTH] IN BLOOD BY AUTOMATED COUNT: 12.9 % (ref 12.3–15.4)
GLOBULIN SER CALC-MCNC: 1.8 G/DL (ref 1.5–4.5)
GLUCOSE SERPL-MCNC: 92 MG/DL (ref 65–99)
GLUCOSE UR QL: NEGATIVE
HCT VFR BLD AUTO: 40.9 % (ref 34–46.6)
HGB BLD-MCNC: 13.4 G/DL (ref 11.1–15.9)
HGB UR QL STRIP: NEGATIVE
IMM GRANULOCYTES # BLD: 0 X10E3/UL (ref 0–0.1)
IMM GRANULOCYTES NFR BLD: 0 %
INTERPRETATION: NORMAL
KETONES UR QL STRIP: NEGATIVE
LEUKOCYTE ESTERASE UR QL STRIP: NEGATIVE
LYMPHOCYTES # BLD AUTO: 0.7 X10E3/UL (ref 0.7–3.1)
LYMPHOCYTES NFR BLD AUTO: 16 %
MCH RBC QN AUTO: 32.4 PG (ref 26.6–33)
MCHC RBC AUTO-ENTMCNC: 32.8 G/DL (ref 31.5–35.7)
MCV RBC AUTO: 99 FL (ref 79–97)
MICRO URNS: ABNORMAL
MONOCYTES # BLD AUTO: 0.6 X10E3/UL (ref 0.1–0.9)
MONOCYTES NFR BLD AUTO: 13 %
MUCOUS THREADS URNS QL MICRO: PRESENT
NEUTROPHILS # BLD AUTO: 3.3 X10E3/UL (ref 1.4–7)
NEUTROPHILS NFR BLD AUTO: 70 %
NITRITE UR QL STRIP: NEGATIVE
PH UR STRIP: 6 [PH] (ref 5–7.5)
PLATELET # BLD AUTO: 197 X10E3/UL (ref 150–379)
POTASSIUM SERPL-SCNC: 5.1 MMOL/L (ref 3.5–5.2)
PROT SERPL-MCNC: 6.2 G/DL (ref 6–8.5)
PROT UR QL STRIP: ABNORMAL
RBC # BLD AUTO: 4.14 X10E6/UL (ref 3.77–5.28)
RBC #/AREA URNS HPF: NORMAL /HPF
SODIUM SERPL-SCNC: 141 MMOL/L (ref 134–144)
SP GR UR: 1.01 (ref 1–1.03)
TSH SERPL DL<=0.005 MIU/L-ACNC: 0.47 UIU/ML (ref 0.45–4.5)
UROBILINOGEN UR STRIP-MCNC: 0.2 MG/DL (ref 0.2–1)
WBC # BLD AUTO: 4.7 X10E3/UL (ref 3.4–10.8)
WBC #/AREA URNS HPF: NORMAL /HPF

## 2018-07-10 NOTE — PROGRESS NOTES
Labs look good. Liver, kidneys, blood counts, electrolytes, cholesterol, thyroid, and blood sugar all look normal. Follow up as planned during your appointment.

## 2018-07-16 ENCOUNTER — OFFICE VISIT (OUTPATIENT)
Dept: FAMILY MEDICINE CLINIC | Age: 83
End: 2018-07-16

## 2018-07-16 VITALS
TEMPERATURE: 98.2 F | WEIGHT: 185 LBS | DIASTOLIC BLOOD PRESSURE: 74 MMHG | SYSTOLIC BLOOD PRESSURE: 113 MMHG | RESPIRATION RATE: 18 BRPM | HEIGHT: 60 IN | HEART RATE: 64 BPM | BODY MASS INDEX: 36.32 KG/M2 | OXYGEN SATURATION: 96 %

## 2018-07-16 DIAGNOSIS — R10.30 LOWER ABDOMINAL PAIN: ICD-10-CM

## 2018-07-16 DIAGNOSIS — R31.9 HEMATURIA, UNSPECIFIED TYPE: Primary | ICD-10-CM

## 2018-07-16 LAB
BILIRUB UR QL STRIP: NEGATIVE
GLUCOSE UR-MCNC: NEGATIVE MG/DL
KETONES P FAST UR STRIP-MCNC: NEGATIVE MG/DL
PH UR STRIP: 5 [PH] (ref 4.6–8)
PROT UR QL STRIP: NORMAL
SP GR UR STRIP: 1.02 (ref 1–1.03)
UA UROBILINOGEN AMB POC: NORMAL (ref 0.2–1)
URINALYSIS CLARITY POC: NORMAL
URINALYSIS COLOR POC: YELLOW
URINE BLOOD POC: NORMAL
URINE LEUKOCYTES POC: NEGATIVE
URINE NITRITES POC: NEGATIVE

## 2018-07-16 NOTE — PROGRESS NOTES
Chief Complaint   Patient presents with    Bladder Infection     1 week follow-up     Health Maintenance reviewed     Subjective:      Patient : This patient is a 80 y.o. female is here to follow up from UTI with hematuria last week. She took all of her antibiotic, culture was negative. She has had a heavy feeling in her lower abdomen for a few days and some lower back pain that is unusual for her. No fevers or sweats. No constipation or diarrhea. Past Medical History:   Diagnosis Date    CAD (coronary artery disease)     stent placed on left 1 stent,in 2007    Cancer [facility])     [facility]    DVT (deep venous thrombosis) (HCC)     GERD (gastroesophageal reflux disease)     Hypercholesterolemia     Hypertension     Pulmonary embolism ([facility] 9/15/2015    Thromboembolus ([facility]     DVT after hernia operation, PE spontaneous 10 years later    Thyroid disease      Current Outpatient Prescriptions   Medication Sig    potassium chloride SR (KLOR-CON 10) 10 mEq tablet TAKE TWO TABLETS BY MOUTH DAILY    traMADol (ULTRAM) 50 mg tablet TAKE ONE TABLET BY MOUTH EVERY 6 HOURS AS NEEDED FOR PAIN - MAX OF 4 TABLETS DAILY    levothyroxine (SYNTHROID) 75 mcg tablet Take 1 Tab by mouth Daily (before breakfast).  rosuvastatin (CRESTOR) 40 mg tablet Take 1 Tab by mouth nightly.  melatonin 5 mg cap capsule Take 5 mg by mouth nightly.  acetaminophen (TYLENOL ARTHRITIS PAIN) 650 mg TbER Take 650 mg by mouth every eight (8) hours.  multivitamin (ONE A DAY) tablet Take 1 Tab by mouth daily.  lisinopril (PRINIVIL, ZESTRIL) 5 mg tablet TAKE ONE TABLET BY MOUTH DAILY    ALPRAZolam (XANAX) 0.25 mg tablet Take 1 Tab by mouth three (3) times daily as needed for Sleep or Anxiety.  alendronate (FOSAMAX) 10 mg tablet Take 1 Tab by mouth daily.  metoprolol tartrate (LOPRESSOR) 50 mg tablet TAKE ONE TABLET BY MOUTH TWICE A DAY    cholecalciferol, vitamin D3, (VITAMIN D3) 2,000 unit tab Take 1 Tab by mouth daily.     ACTIVATED CHARCOAL (CHARCOCAPS PO) Take  by mouth.  nitroglycerin (NITROSTAT) 0.3 mg SL tablet 1 tablet by SubLINGual route every five (5) minutes as needed for Chest Pain.  aspirin 81 mg Tab Take 81 mg by mouth daily. No current facility-administered medications for this visit. Allergies   Allergen Reactions    Codeine Unknown (comments)     Unsure of reaction.  Morphine Hives and Itching     Social History   Substance Use Topics    Smoking status: Never Smoker    Smokeless tobacco: Never Used    Alcohol use No       ROS per HPI. Objective:     Visit Vitals    /74 (BP 1 Location: Left arm, BP Patient Position: Sitting)    Pulse 64    Temp 98.2 °F (36.8 °C) (Oral)    Resp 18    Ht 5' (1.524 m)    Wt 185 lb (83.9 kg)    SpO2 96%    BMI 36.13 kg/m2       Skin: no pallor, normal turgor  HEENT:  Normocephalic, no conjunctival inflammation, pupils equal round and reactive to light, MMM, no oral lesions  Heart: regular rate and rhythm, no murmurs, rubs or gallops  Lungs: no increased respiratory effort, clear to ausculation bilaterally  Abdomen: soft, mild suprapubic tenderness, not distended. Normal bowel sounds. No rebound or guarding.   Back: no costovertebral angle tenderness  Extremities:no edema or cyanosis  Neuro awake and oriented    Results for orders placed or performed in visit on 07/16/18   AMB POC URINALYSIS DIP STICK AUTO W/O MICRO     Status: None   Result Value Ref Range Status    Color (UA POC) Yellow  Final    Clarity (UA POC) Slightly Cloudy  Final    Glucose (UA POC) Negative Negative Final    Bilirubin (UA POC) Negative Negative Final    Ketones (UA POC) Negative Negative Final    Specific gravity (UA POC) 1.025 1.001 - 1.035 Final    Blood (UA POC) 1+ Negative Final    pH (UA POC) 5.0 4.6 - 8.0 Final    Protein (UA POC) 1+ Negative Final    Urobilinogen (UA POC) 0.2 mg/dL 0.2 - 1 Final    Nitrites (UA POC) Negative Negative Final    Leukocyte esterase (UA POC) Negative Negative Final   Results for orders placed or performed in visit on 07/09/18   AMB POC URINALYSIS DIP STICK MANUAL W/O MICRO     Status: None   Result Value Ref Range Status    Color (UA POC) Yellow  Final    Clarity (UA POC) Clear  Final    Glucose (UA POC) Negative Negative Final    Bilirubin (UA POC) Negative Negative Final    Ketones (UA POC) Negative Negative Final    Specific gravity (UA POC) 1.010 1.001 - 1.035 Final    Blood (UA POC) Trace Negative Final    pH (UA POC) 6.5 4.6 - 8.0 Final    Protein (UA POC) 1+ Negative Final    Urobilinogen (UA POC) 0.2 mg/dL 0.2 - 1 Final    Nitrites (UA POC) Negative Negative Final    Leukocyte esterase (UA POC) Negative Negative Final         Assessment:       ICD-10-CM ICD-9-CM    1. Hematuria, unspecified type R31.9 599.70 AMB POC URINALYSIS DIP STICK AUTO W/O MICRO   2. Lower abdominal pain R10.30 789.09 US ABD COMP     Persistant hematuria, start with abd US, will likely need urology referral.      Plan:     Orders Placed This Encounter    US ABD COMP     Standing Status:   Future     Standing Expiration Date:   8/16/2019    AMB POC URINALYSIS DIP STICK AUTO W/O MICRO     Recommended healthy diet low in carbohydrates, fats, sodium and cholesterol. Recommended regular cardiovascular exercise 3-6 times per week for 30-60 minutes daily. Verbal and written instructions (see AVS) provided. Patient expresses understanding of diagnosis and treatment plan.

## 2018-07-16 NOTE — PATIENT INSTRUCTIONS

## 2018-07-16 NOTE — MR AVS SNAPSHOT
[facility] 
 
 
 [address] 
[phone] Patient: Ryan Betancourt MRN: [id number] BQQ:[birth year] Visit Information Date & Time Provider Department Dept. Phone Encounter #  
 7/16/2018 11:15 [facility] [phone] [id number] Your Appointments 8/2/2018  1:15 PM  
ESTABLISHED PATIENT with Selvin Hernandez MD  
[facility] Appt Note: f  
 [phone] [facility] [address]  
[phone] 9[address] [facility] [address] Upcoming Health Maintenance Date Due DTaP/Tdap/Td series (1 - Tdap) 7/3/1954 ZOSTER VACCINE AGE 60> 5/3/1993 GLAUCOMA SCREENING Q2Y 5/24/2018 Influenza Age 5 to Adult 8/1/2018 MEDICARE YEARLY EXAM 8/8/2018 Allergies as of 7/16/2018  Review Complete On: 7/16/2018 By: Ishmael Carpenter MD  
  
 Severity Noted Reaction Type Reactions Codeine  09/29/2009    Unknown (comments) Unsure of reaction. Morphine  09/29/2009    Hives, Itching Current Immunizations  Reviewed on 10/24/2016 Name Date Influenza High Dose Vaccine PF 10/12/2017, 10/24/2016, 9/8/2015, 11/11/2014, 11/25/2013 Influenza Vaccine 1/15/2013 Influenza Vaccine Split 1/13/2011 Pneumococcal Conjugate (PCV-13) 8/2/2016 ZZZ-RETIRED (DO NOT USE) Pneumococcal Vaccine (Unspecified Type) 10/10/2010 Not reviewed this visit You Were Diagnosed With   
  
 Codes Comments Hematuria, unspecified type    -  Primary ICD-10-CM: R31.9 ICD-9-CM: 599.70 Lower abdominal pain     ICD-10-CM: R10.30 ICD-9-CM: 789.09 Vitals BP Pulse Temp Resp Height(growth percentile) Weight(growth percentile) 113/74 (BP 1 Location: Left arm, BP Patient Position: Sitting) 64 98.2 °F (36.8 °C) (Oral) 18 5' (1.524 m) 185 lb (83.9 kg) SpO2 BMI OB Status Smoking Status 96% 36.13 kg/m2 Hysterectomy Never Smoker BMI and BSA Data Body Mass Index Body Surface Area  
 36.13 kg/m 2 1.88 m 2 Preferred Pharmacy Pharmacy Name Phone [facility] [address], [address] [place] [address] Yong Schaeffer [phone] Your Updated Medication List  
  
   
This list is accurate as of 7/16/18 11:59 AM.  Always use your most recent med list.  
  
  
  
  
 alendronate 10 mg tablet Commonly known as:  FOSAMAX Take 1 Tab by mouth daily. ALPRAZolam 0.25 mg tablet Commonly known as:  Willaim Morganton Take 1 Tab by mouth three (3) times daily as needed for Sleep or Anxiety. aspirin 81 mg tablet Take 81 mg by mouth daily. CHARCOCAPS PO Take  by mouth. cholecalciferol (vitamin D3) 2,000 unit Tab Commonly known as:  VITAMIN D3 Take 1 Tab by mouth daily. levothyroxine 75 mcg tablet Commonly known as:  SYNTHROID Take 1 Tab by mouth Daily (before breakfast). lisinopril 5 mg tablet Commonly known as:  PRINIVIL, ZESTRIL  
TAKE ONE TABLET BY MOUTH DAILY  
  
 melatonin 5 mg Cap capsule Take 5 mg by mouth nightly. metoprolol tartrate 50 mg tablet Commonly known as:  LOPRESSOR  
TAKE ONE TABLET BY MOUTH TWICE A DAY  
  
 multivitamin tablet Commonly known as:  ONE A DAY Take 1 Tab by mouth daily. nitroglycerin 0.3 mg SL tablet Commonly known as:  NITROSTAT  
1 tablet by SubLINGual route every five (5) minutes as needed for Chest Pain.  
  
 potassium chloride SR 10 mEq tablet Commonly known as:  KLOR-CON 10  
TAKE TWO TABLETS BY MOUTH DAILY  
  
 rosuvastatin 40 mg tablet Commonly known as:  CRESTOR Take 1 Tab by mouth nightly. traMADol 50 mg tablet Commonly known as:  ULTRAM  
TAKE ONE TABLET BY MOUTH EVERY 6 HOURS AS NEEDED FOR PAIN - MAX OF 4 TABLETS DAILY  
  
 TYLENOL ARTHRITIS PAIN 650 mg [facility] Generic drug:  acetaminophen Take 650 mg by mouth every eight (8) hours. We Performed the Following AMB POC URINALYSIS DIP STICK AUTO W/O MICRO [55166 CPT(R)] To-Do List   
 07/16/2018 Imaging:  US ABD COMP Patient Instructions Blood in the Urine: Care Instructions Your Care Instructions Blood in the urine, or hematuria, may make the urine look red, brown, or pink. There may be blood every time you urinate or just from time to time. You cannot always see blood in the urine, but it will show up in a urine test. 
Blood in the urine may be serious. It should always be checked by a doctor. Your doctor may recommend more tests, including an X-ray, a CT scan, or a cystoscopy (which lets a doctor look inside the urethra and bladder). Blood in the urine can be a sign of another problem. Common causes are bladder infections and kidney stones. An injury to your groin or your genital area can also cause bleeding in the urinary tract. Very hard exercise-such as running a marathon-can cause blood in the urine. Blood in the urine can also be a sign of kidney disease or cancer in the bladder or kidney. Many cases of blood in the urine are caused by a harmless condition that runs in families. This is called benign familial hematuria. It does not need any treatment. Sometimes your urine may look red or brown even though it does not contain blood. For example, not getting enough fluids (dehydration), taking certain medicines, or having a liver problem can change the color of your urine. Eating foods such as beets, rhubarb, or blackberries or foods with red food coloring can make your urine look red or pink. Follow-up care is a key part of your treatment and safety. Be sure to make and go to all appointments, and call your doctor if you are having problems. It's also a good idea to know your test results and keep a list of the medicines you take. When should you call for help? Call your doctor now or seek immediate medical care if:   · You have symptoms of a urinary infection. For example: ¨ You have pus in your urine. ¨ You have pain in your back just below your rib cage. This is called flank pain. ¨ You have a fever, chills, or body aches. ¨ It hurts to urinate. ¨ You have groin or belly pain.  
  · You have more blood in your urine.  
 Watch closely for changes in your health, and be sure to contact your doctor if: 
  · You have new urination problems.  
  · You do not get better as expected. Where can you learn more? Go to http://christianne-nain.info/. Enter F266 in the search box to learn more about \"Blood in the Urine: Care Instructions. \" Current as of: May 12, 2017 Content Version: 11.7 © 2383-6733 [employer]. Care instructions adapted under license by [employer] (which disclaims liability or warranty for this information). If you have questions about a medical condition or this instruction, always ask your healthcare professional. [place] [address] any warranty or liability for your use of this information. Introducing [facility] & HEALTH SERVICES! Ernie Mcmullen introduces AirDroids patient portal. Now you can access parts of your medical record, email your doctor's office, and request medication refills online. 1. In your internet browser, go to [email or web address] 2. Click on the First Time User? Click Here link in the Sign In box. You will see the New Member Sign Up page. 3. Enter your AirDroids Access Code exactly as it appears below. You will not need to use this code after youve completed the sign-up process. If you do not sign up before the expiration date, you must request a new code. · AirDroids Access Code: [email or web address] Expires: 10/7/2018 12:49 PM 
 
4. Enter the last four digits of your Social Security Number (xxxx) and Date of Birth (mm/dd/yyyy) as indicated and click Submit. You will be taken to the next sign-up page. 5. Create a Triage ID. This will be your Triage login ID and cannot be changed, so think of one that is secure and easy to remember. 6. Create a Triage password. You can change your password at any time. 7. Enter your Password Reset Question and Answer. This can be used at a later time if you forget your password. 8. Enter your e-mail address. You will receive e-mail notification when new information is available in [address] 9. Click Sign Up. You can now view and download portions of your medical record. 10. Click the Download Summary menu link to download a portable copy of your medical information. If you have questions, please visit the Frequently Asked Questions section of the Triage website. Remember, Triage is NOT to be used for urgent needs. For medical emergencies, dial 911. Now available from your iPhone and Android! Please provide this summary of care documentation to your next provider. Your primary care clinician is listed as Harriett Bernstein. If you have any questions after today's visit, please call [phone].

## 2018-07-18 ENCOUNTER — HOSPITAL ENCOUNTER (OUTPATIENT)
Dept: ULTRASOUND IMAGING | Age: 83
Discharge: HOME | End: 2018-07-18
Attending: INTERNAL MEDICINE
Payer: MEDICARE

## 2018-07-18 DIAGNOSIS — R10.30 LOWER ABDOMINAL PAIN: ICD-10-CM

## 2018-07-18 PROCEDURE — 76700 US EXAM ABDOM COMPLETE: CPT

## 2018-07-19 DIAGNOSIS — R31.29 HEMATURIA, MICROSCOPIC: ICD-10-CM

## 2018-07-19 DIAGNOSIS — R10.9 LEFT FLANK PAIN: ICD-10-CM

## 2018-07-20 ENCOUNTER — DOCUMENTATION ONLY (OUTPATIENT)
Dept: FAMILY MEDICINE CLINIC | Age: 83
End: 2018-07-20

## 2018-07-20 RX ORDER — TRAMADOL HYDROCHLORIDE 50 MG/1
TABLET ORAL
Qty: 30 TAB | Refills: 0 | Status: SHIPPED | OUTPATIENT
Start: 2018-07-20 | End: 2018-08-19 | Stop reason: SDUPTHER

## 2018-07-21 NOTE — PROGRESS NOTES
No kidney abnormalities seen on ultrasound, needs to see urology for persistent hematuria, will likely need a cystoscopy.

## 2018-07-23 DIAGNOSIS — R31.9 HEMATURIA, UNSPECIFIED TYPE: Primary | ICD-10-CM

## 2018-07-27 RX ORDER — LEVOTHYROXINE SODIUM 75 UG/1
TABLET ORAL
Qty: 90 TAB | Refills: 0 | Status: SHIPPED | OUTPATIENT
Start: 2018-07-27 | End: 2018-10-29 | Stop reason: SDUPTHER

## 2018-07-31 ENCOUNTER — HOSPITAL ENCOUNTER (OUTPATIENT)
Dept: CT IMAGING | Age: 83
Discharge: HOME | End: 2018-07-31
Attending: OBSTETRICS & GYNECOLOGY
Payer: MEDICARE

## 2018-07-31 DIAGNOSIS — R31.21 ASYMPTOMATIC MICROSCOPIC HEMATURIA: ICD-10-CM

## 2018-07-31 LAB — CREAT BLD-MCNC: 1.1 MG/DL (ref 0.6–1.3)

## 2018-07-31 PROCEDURE — 82565 ASSAY OF CREATININE: CPT

## 2018-07-31 PROCEDURE — 74011636320 HC RX REV CODE- 636/320: Performed by: OBSTETRICS & GYNECOLOGY

## 2018-07-31 PROCEDURE — 74178 CT ABD&PLV WO CNTR FLWD CNTR: CPT

## 2018-07-31 PROCEDURE — 74011250636 HC RX REV CODE- 250/636: Performed by: OBSTETRICS & GYNECOLOGY

## 2018-07-31 RX ORDER — SODIUM CHLORIDE 0.9 % (FLUSH) 0.9 %
10 SYRINGE (ML) INJECTION
Status: COMPLETED | OUTPATIENT
Start: 2018-07-31 | End: 2018-07-31

## 2018-07-31 RX ORDER — SODIUM CHLORIDE 9 MG/ML
50 INJECTION, SOLUTION INTRAVENOUS
Status: COMPLETED | OUTPATIENT
Start: 2018-07-31 | End: 2018-07-31

## 2018-07-31 RX ADMIN — IOPAMIDOL 100 ML: 755 INJECTION, SOLUTION INTRAVENOUS at 15:03

## 2018-07-31 RX ADMIN — Medication 10 ML: at 15:03

## 2018-07-31 RX ADMIN — SODIUM CHLORIDE 50 ML/HR: 900 INJECTION, SOLUTION INTRAVENOUS at 15:03

## 2018-08-03 RX ORDER — LISINOPRIL 5 MG/1
TABLET ORAL
Qty: 90 TAB | Refills: 0 | Status: SHIPPED | OUTPATIENT
Start: 2018-08-03 | End: 2018-11-05 | Stop reason: SDUPTHER

## 2018-08-19 DIAGNOSIS — R10.9 LEFT FLANK PAIN: ICD-10-CM

## 2018-08-19 DIAGNOSIS — R31.29 HEMATURIA, MICROSCOPIC: ICD-10-CM

## 2018-08-20 ENCOUNTER — OFFICE VISIT (OUTPATIENT)
Dept: CARDIOLOGY CLINIC | Age: 83
End: 2018-08-20

## 2018-08-20 VITALS
WEIGHT: 186 LBS | SYSTOLIC BLOOD PRESSURE: 142 MMHG | DIASTOLIC BLOOD PRESSURE: 78 MMHG | RESPIRATION RATE: 16 BRPM | OXYGEN SATURATION: 96 % | HEART RATE: 66 BPM | HEIGHT: 61 IN | BODY MASS INDEX: 35.12 KG/M2

## 2018-08-20 DIAGNOSIS — I10 ESSENTIAL HYPERTENSION: ICD-10-CM

## 2018-08-20 DIAGNOSIS — E78.5 HYPERLIPIDEMIA, UNSPECIFIED HYPERLIPIDEMIA TYPE: ICD-10-CM

## 2018-08-20 DIAGNOSIS — I20.1 CORONARY ARTERY SPASM (HCC): ICD-10-CM

## 2018-08-20 DIAGNOSIS — I25.10 ASHD (ARTERIOSCLEROTIC HEART DISEASE): Primary | ICD-10-CM

## 2018-08-20 RX ORDER — CIPROFLOXACIN 500 MG/1
TABLET ORAL
COMMUNITY
Start: 2018-07-26 | End: 2018-08-20 | Stop reason: ALTCHOICE

## 2018-08-20 RX ORDER — CHLORPHENIRAMINE MALEATE 4 MG
TABLET ORAL
COMMUNITY
Start: 2018-08-13 | End: 2019-04-05

## 2018-08-20 NOTE — MR AVS SNAPSHOT
[address] [facility] [address] 
[phone] Patient: Kalin Oneill MRN: [id number] YUP:[birth year] Visit Information Date & Time Provider Department Dept. Phone Encounter #  
 8/20/2018 11:30 AM Hernandez Olivares [facility] [phone] [id number] Follow-up Instructions Return in about 1 year (around 8/20/2019). Your Appointments 8/20/2019 11:30 AM  
ESTABLISHED PATIENT with Hernandez Olivares MD  
[facility] [address] [facility]) Appt Note: 1yr f/up  ekr [id number] [facility] [address]  
[phone] [id number] [facility] [address] Upcoming Health Maintenance Date Due DTaP/Tdap/Td series (1 - Tdap) 7/3/1954 ZOSTER VACCINE AGE 60> 5/3/1993 GLAUCOMA SCREENING Q2Y 5/24/2018 Influenza Age 5 to Adult 8/1/2018 MEDICARE YEARLY EXAM 8/8/2018 Allergies as of 8/20/2018  Review Complete On: 8/20/2018 By: Hernandez Olivares MD  
  
 Severity Noted Reaction Type Reactions Codeine  09/29/2009    Unknown (comments) Unsure of reaction. Morphine  09/29/2009    Hives, Itching Current Immunizations  Reviewed on 10/24/2016 Name Date Influenza High Dose Vaccine PF 10/12/2017, 10/24/2016, 9/8/2015, 11/11/2014, 11/25/2013 Influenza Vaccine 1/15/2013 Influenza Vaccine Split 1/13/2011 Pneumococcal Conjugate (PCV-13) 8/2/2016 ZZZ-RETIRED (DO NOT USE) Pneumococcal Vaccine (Unspecified Type) 10/10/2010 Not reviewed this visit You Were Diagnosed With   
  
 Codes Comments ASHD (arteriosclerotic heart disease)    -  Primary ICD-10-CM: I25.10 ICD-9-CM: 414.00 Hyperlipidemia, unspecified hyperlipidemia type     ICD-10-CM: E78.5 ICD-9-CM: 272.4 Essential hypertension     ICD-10-CM: I10 
ICD-9-CM: 401.9 Coronary artery spasm (HCC)     ICD-10-CM: I20.1 ICD-9-CM: 413.9 Vitals BP Pulse Resp Height(growth percentile) Weight(growth percentile) SpO2  
 142/78 66 16 5' 1\" (1.549 m) 186 lb (84.4 kg) 96% BMI OB Status Smoking Status 35.14 kg/m2 Hysterectomy Never Smoker Vitals History BMI and BSA Data Body Mass Index Body Surface Area  
 35.14 kg/m 2 1.91 m 2 Preferred Pharmacy Pharmacy Name Phone [facility] [address], [address] [place] [address] Fernandez Cuevas [phone] Your Updated Medication List  
  
   
This list is accurate as of 8/20/18 12:10 PM.  Always use your most recent med list.  
  
  
  
  
 alendronate 10 mg tablet Commonly known as:  FOSAMAX Take 1 Tab by mouth daily. ALPRAZolam 0.25 mg tablet Commonly known as:  [place] Amend Take 1 Tab by mouth three (3) times daily as needed for Sleep or Anxiety. aspirin 81 mg tablet Take 81 mg by mouth daily. CHARCOCAPS PO Take  by mouth. cholecalciferol (vitamin D3) 2,000 unit Tab Commonly known as:  VITAMIN D3 Take 1 Tab by mouth daily. clotrimazole 1 % topical cream  
Commonly known as:  LOTRIMIN  
  
 levothyroxine 75 mcg tablet Commonly known as:  SYNTHROID  
TAKE ONE TABLET BY MOUTH DAILY BEFORE BREAKFAST  
  
 lisinopril 5 mg tablet Commonly known as:  PRINIVIL, ZESTRIL  
TAKE ONE TABLET BY MOUTH DAILY  
  
 melatonin 5 mg Cap capsule Take 5 mg by mouth nightly. metoprolol tartrate 50 mg tablet Commonly known as:  LOPRESSOR  
TAKE ONE TABLET BY MOUTH TWICE A DAY  
  
 multivitamin tablet Commonly known as:  ONE A DAY Take 1 Tab by mouth daily. nitroglycerin 0.3 mg SL tablet Commonly known as:  NITROSTAT  
1 tablet by SubLINGual route every five (5) minutes as needed for Chest Pain.  
  
 potassium chloride SR 10 mEq tablet Commonly known as:  KLOR-CON 10  
TAKE TWO TABLETS BY MOUTH DAILY  
  
 rosuvastatin 40 mg tablet Commonly known as:  CRESTOR  
 Take 1 Tab by mouth nightly. traMADol 50 mg tablet Commonly known as:  ULTRAM  
TAKE ONE TABLET BY MOUTH EVERY 6 HOURS AS NEEDED FOR PAIN - MAX OF 4 TABLETS DAILY  
  
 TYLENOL ARTHRITIS PAIN 650 mg Tber Generic drug:  acetaminophen Take 650 mg by mouth every eight (8) hours. We Performed the Following AMB POC EKG ROUTINE W/ 12 LEADS, INTER & REP [71100 CPT(R)] CK [id number] CPT(R)] LIPID PANEL [82023 CPT(R)] METABOLIC PANEL, COMPREHENSIVE [66148 CPT(R)] Follow-up Instructions Return in about 1 year (around 8/20/2019). Introducing [facility]! [employer] introduces [employer] patient portal. Now you can access parts of your medical record, email your doctor's office, and request medication refills online. 1. In your internet browser, go to [email or web address]. [facility]/[employer] 2. Click on the First Time User? Click Here link in the Sign In box. You will see the New Member Sign Up page. 3. Enter your [employer] Access Code exactly as it appears below. You will not need to use this code after youve completed the sign-up process. If you do not sign up before the expiration date, you must request a new code. · [employer] Access Code: [email or web address] Expires: 10/7/2018 12:49 PM 
 
4. Enter the last four digits of your Social Security Number (xxxx) and Date of Birth (mm/dd/yyyy) as indicated and click Submit. You will be taken to the next sign-up page. 5. Create a [employer] ID. This will be your [employer] login ID and cannot be changed, so think of one that is secure and easy to remember. 6. Create a [employer] password. You can change your password at any time. 7. Enter your Password Reset Question and Answer. This can be used at a later time if you forget your password. 8. Enter your e-mail address. You will receive e-mail notification when new information is available in [address] 9. Click Sign Up.  You can now view and download portions of your medical record. 10. Click the Download Summary menu link to download a portable copy of your medical information. If you have questions, please visit the Frequently Asked Questions section of the [employer] website. Remember, [employer] is NOT to be used for urgent needs. For medical emergencies, dial 911. Now available from your iPhone and Android! Please provide this summary of care documentation to your next provider. Your primary care clinician is listed as Julius Garcia. If you have any questions after today's visit, please call [phone].

## 2018-08-20 NOTE — PROGRESS NOTES
1. Have you been to the ER, urgent care clinic since your last visit? Hospitalized since your last visit? No    2. Have you seen or consulted any other health care providers outside of the [address] [place] [address] since your last visit? Include any pap smears or colon screening.  No    Chief Complaint   Patient presents with    Cholesterol Problem     follow up    Hypertension     \"    Palpitations    Shortness of Breath    Foot Swelling     bilateral

## 2018-08-20 NOTE — PROGRESS NOTES
Jesusita Aldridge DNP, ANP-BC  Subjective/HPI:     Herber Martínez is a 80 y.o. female is here for routine f/u. The patient denies chest pain/ resting shortness of breath, orthopnea, PND, LE edema, palpitations, syncope, presyncope or fatigue. Pt reports feeling well in her usual state of health     PCP Provider  Nelly Ewing MD  Past Medical History:   Diagnosis Date    CAD (coronary artery disease)     stent placed on left 1 stent,in 2007    Cancer [facility])     BCCA    DVT (deep venous thrombosis) ([address]     GERD (gastroesophageal reflux disease)     Hypercholesterolemia     Hypertension     Pulmonary embolism ([address] 9/15/2015    [facility]     DVT after hernia operation, PE spontaneous 10 years later    Thyroid disease       Past Surgical History:   Procedure Laterality Date    HX GI      esophageal hiatal hernia - 2004    HX GYN      partial hysterectomy - 1970    HX OTHER SURGICAL      BCCAs removed    HX UROLOGICAL      bladder repair - 1999     Allergies   Allergen Reactions    Codeine Unknown (comments)     Unsure of reaction.  Morphine Hives and Itching      Family History   Problem Relation Age of Onset    Heart Disease Mother     Dementia Father     Diabetes Son     Kidney Disease Son      autoimmune      Current Outpatient Prescriptions   Medication Sig    lisinopril (PRINIVIL, ZESTRIL) 5 mg tablet TAKE ONE TABLET BY MOUTH DAILY    levothyroxine (SYNTHROID) 75 mcg tablet TAKE ONE TABLET BY MOUTH DAILY BEFORE BREAKFAST    traMADol (ULTRAM) 50 mg tablet TAKE ONE TABLET BY MOUTH EVERY 6 HOURS AS NEEDED FOR PAIN - MAX OF 4 TABLETS DAILY    potassium chloride SR (KLOR-CON 10) 10 mEq tablet TAKE TWO TABLETS BY MOUTH DAILY    rosuvastatin (CRESTOR) 40 mg tablet Take 1 Tab by mouth nightly.  melatonin 5 mg cap capsule Take 5 mg by mouth nightly.  acetaminophen (TYLENOL ARTHRITIS PAIN) 650 mg TbER Take 650 mg by mouth every eight (8) hours.     multivitamin (ONE A DAY) tablet Take 1 Tab by mouth daily.  ALPRAZolam (XANAX) 0.25 mg tablet Take 1 Tab by mouth three (3) times daily as needed for Sleep or Anxiety.  alendronate (FOSAMAX) 10 mg tablet Take 1 Tab by mouth daily.  metoprolol tartrate (LOPRESSOR) 50 mg tablet TAKE ONE TABLET BY MOUTH TWICE A DAY    cholecalciferol, vitamin D3, (VITAMIN D3) 2,000 unit tab Take 1 Tab by mouth daily.  ACTIVATED CHARCOAL (CHARCOCAPS PO) Take  by mouth.  nitroglycerin (NITROSTAT) 0.3 mg SL tablet 1 tablet by SubLINGual route every five (5) minutes as needed for Chest Pain.  aspirin 81 mg Tab Take 81 mg by mouth daily.  clotrimazole (LOTRIMIN) 1 % topical cream      No current facility-administered medications for this visit. Vitals:    08/20/18 1125 08/20/18 1138 08/20/18 1201   BP: 170/80 160/80 142/78   Pulse: 66     Resp: 16     SpO2: 96%     Weight: 186 lb (84.4 kg)     Height: 5' 1\" (1.549 m)       Social History     Social History    Marital status:      Spouse name: N/A    Number of children: N/A    Years of education: N/A     Occupational History    Not on file. Social History Main Topics    Smoking status: Never Smoker    Smokeless tobacco: Never Used    Alcohol use No    Drug use: No    Sexual activity: Not on file     Other Topics Concern    Not on file     Social History Narrative       I have reviewed the nurses notes, vitals, problem list, allergy list, medical history, family, social history and medications. Review of Symptoms:    General: Pt denies excessive weight gain or loss. Pt is able to conduct ADL's  HEENT: Denies blurred vision, headaches, epistaxis and difficulty swallowing. Respiratory: Denies shortness of breath, TOMLINSON, wheezing or stridor.   Cardiovascular: Denies precordial pain, palpitations, edema or PND  Gastrointestinal: Denies poor appetite, indigestion, abdominal pain or blood in stool  Musculoskeletal: Denies pain or swelling from muscles or joints  Neurologic: Denies tremor, paresthesias, or sensory motor disturbance  Skin: Denies rash, itching or texture change. Physical Exam:      General: Well developed, in no acute distress, cooperative and alert  HEENT: No carotid bruits, no JVD, trach is midline. Neck Supple, PEERL, EOM intact. Heart:  Normal S1/S2 negative S3 or S4. Regular, no murmur, gallop or rub.   Respiratory: Clear bilaterally x 4, no wheezing or rales  Abdomen:   Soft, non-tender, no masses, bowel sounds are active.   Extremities:  No edema, normal cap refill, no cyanosis, atraumatic. Neuro: A&Ox3, speech clear, gait stable. Skin: Skin color is normal. No rashes or lesions. Non diaphoretic  Vascular: 2+ pulses symmetric in all extremities    Cardiographics    ECG: NSR  Results for orders placed or performed during the hospital encounter of 10/22/16   EKG, 12 LEAD, INITIAL   Result Value Ref Range    Ventricular Rate 60 BPM    Atrial Rate 60 BPM    P-R Interval 164 ms    QRS Duration 90 ms    Q-T Interval 410 ms    QTC Calculation (Bezet) 410 ms    Calculated P Axis 28 degrees    Calculated R Axis 4 degrees    Calculated T Axis 3 degrees    Diagnosis       Normal sinus rhythm  Inferior infarct (cited on or before 08-NOV-2011)    When compared with ECG of 31-AUG-2015 21:26,  Vent. rate has decreased BY  29 BPM  Confirmed by Dilan Eckert ([id number]) on 10/23/2016 10:00:26 AM     Results for orders placed or performed in visit on 12/23/14   CARDIAC HOLTER MONITOR, 24 HOURS    Narrative    ECG Monitor/24 hours, Complete    Reason for Holter Monitor   PALPITATIONS    Heartbeat    Slowest 52  Average 64  Fastest  115          Results:   Underlying Rhythm: Normal sinus rhythm      Atrial Arrhythmias: premature atrial contractions; rare ( 31  isolated beats )           AV Conduction: normal    Ventricular Arrhythmias: none     ST Segment Analysis:normal     Symptom Correlation:  None. Comment:   No significant dysrhythmias.       Massimo Jose Justino Kinsey MD                 Results for orders placed or performed in visit on 11/08/11   AMB POC EKG ROUTINE W/ 12 LEADS, INTER & REP    Narrative    Normal sinus rhythm. Q Waves leads aVF and 3. ST Segment  depression and flattened T waves V1-V3. Cardiology Labs:  Lab Results   Component Value Date/Time    Cholesterol, total 168 08/07/2017 12:00 AM    HDL Cholesterol 73 08/07/2017 12:00 AM    LDL, calculated 63 08/07/2017 12:00 AM    Triglyceride 158 (H) 08/07/2017 12:00 AM    CHOL/HDL Ratio 2.5 09/02/2015 03:20 AM       Lab Results   Component Value Date/Time    Sodium 141 07/09/2018 12:48 PM    Potassium 5.1 07/09/2018 12:48 PM    Chloride 105 07/09/2018 12:48 PM    CO2 23 07/09/2018 12:48 PM    Anion gap 7 10/22/2016 12:21 PM    Glucose 92 07/09/2018 12:48 PM    BUN 22 07/09/2018 12:48 PM    Creatinine 1.04 (H) 07/09/2018 12:48 PM    BUN/Creatinine ratio 21 07/09/2018 12:48 PM    GFR est AA 57 (L) 07/09/2018 12:48 PM    GFR est non-AA 49 (L) 07/09/2018 12:48 PM    Calcium 9.8 07/09/2018 12:48 PM    Bilirubin, total 0.4 07/09/2018 12:48 PM    AST (SGOT) 23 07/09/2018 12:48 PM    Alk. phosphatase 80 07/09/2018 12:48 PM    Protein, total 6.2 07/09/2018 12:48 PM    Albumin 4.4 07/09/2018 12:48 PM    Globulin 3.6 10/22/2016 12:21 PM    A-G Ratio 2.4 (H) 07/09/2018 12:48 PM    ALT (SGPT) 9 07/09/2018 12:48 PM           Assessment:     Assessment:     Diagnoses and all orders for this visit:    1. ASHD (arteriosclerotic heart disease)    2. Hyperlipidemia, unspecified hyperlipidemia type  -     AMB POC EKG ROUTINE W/ 12 LEADS, INTER & REP    3. Essential hypertension    4. Coronary artery spasm ([facility]        ICD-10-CM ICD-9-CM    1. ASHD (arteriosclerotic heart disease) I25.10 414.00    2. Hyperlipidemia, unspecified hyperlipidemia type E78.5 272.4 AMB POC EKG ROUTINE W/ 12 LEADS, INTER & REP   3. Essential hypertension I10 401.9    4.  Coronary artery spasm (HCC) I20.1 413.9      Orders Placed This Encounter  AMB POC EKG ROUTINE W/ 12 LEADS, INTER & REP     Order Specific Question:   Reason for Exam:     Answer:   Routine    clotrimazole (LOTRIMIN) 1 % topical cream    DISCONTD: ciprofloxacin HCl (CIPRO) 500 mg tablet        Plan:     1. ASHD: Hx PCI 2007, normal cors 2011 cath. Continue ASA  2. High Cholesterol: Due for repeat labs, continue statin therapy  3. HTN: Mildly elevated, recommended home BP monitoring, she has a device and will begin maintaining a log. Follow up 1 year. Raman Fairbanks NP    This note was created using voice recognition software. Despite editing, there may be syntax errors. Pt seen and examined in details. Agree with NP A&P. D/w pt.      Oliver Yancey MD

## 2018-08-21 ENCOUNTER — OFFICE VISIT (OUTPATIENT)
Dept: FAMILY MEDICINE CLINIC | Age: 83
End: 2018-08-21

## 2018-08-21 ENCOUNTER — TELEPHONE (OUTPATIENT)
Dept: FAMILY MEDICINE CLINIC | Age: 83
End: 2018-08-21

## 2018-08-21 VITALS
SYSTOLIC BLOOD PRESSURE: 134 MMHG | DIASTOLIC BLOOD PRESSURE: 79 MMHG | BODY MASS INDEX: 35.12 KG/M2 | WEIGHT: 186 LBS | HEART RATE: 73 BPM | HEIGHT: 61 IN | RESPIRATION RATE: 19 BRPM | OXYGEN SATURATION: 93 % | TEMPERATURE: 98 F

## 2018-08-21 DIAGNOSIS — E78.5 HYPERLIPIDEMIA, UNSPECIFIED HYPERLIPIDEMIA TYPE: Primary | ICD-10-CM

## 2018-08-21 DIAGNOSIS — K62.5 RECTAL BLEED: ICD-10-CM

## 2018-08-21 DIAGNOSIS — I10 ESSENTIAL HYPERTENSION: ICD-10-CM

## 2018-08-21 NOTE — PROGRESS NOTES
Subjective:   Sydney Al is a 80 y.o. female who complains of bright red blood per rectum last night. WEnt to urinate and noted BRBPR spontaneously from rectum. HAd a bulky stool earlier in the day. No lightheadedness or dizziness. Feeling well. PMH diverticulosis, never diverticulitis. Has chronic constipation, unless she gets upset which causes her to have large volume stool. No pain in rectum. Reviewed colonoscopy 7-9-13 report. Past Medical History:   Diagnosis Date    CAD (coronary artery disease)     stent placed on left 1 stent,in 2007    [facility])     BCCA    [facility]     GERD (gastroesophageal reflux disease)     Hypercholesterolemia     Hypertension     Pulmonary embolism ([facility] 9/15/2015    Thromboembolus ([facility] [facility]     DVT after hernia operation, PE spontaneous 10 years later    Thyroid disease      Social History   Substance Use Topics    Smoking status: Never Smoker    Smokeless tobacco: Never Used    Alcohol use No       Allergies   Allergen Reactions    Codeine Unknown (comments)     Unsure of reaction.  Morphine Hives and Itching        Review of Systems  A comprehensive review of systems was negative except for that written in the HPI. Objective:     Visit Vitals    /79 (BP 1 Location: Left arm, BP Patient Position: Sitting)    Pulse 73    Temp 98 °F (36.7 °C) (Oral)    Resp 19    Ht 5' 1\" (1.549 m)    Wt 186 lb (84.4 kg)  Comment: pt unable to weight today    SpO2 93%    BMI 35.14 kg/m2     General:   alert, cooperative   Eyes: conjunctivae/scleras clear. PERRL, EOM's intact       Sinuses/Nose: No maxillary or frontal tenderness. clear rhinorrhea present. Heart: S1 and S2 normal,no murmurs noted    Lungs: Coarse to auscultation bilaterally, no increased work of breathing   abdomen: Soft, nontender. Normal bowel sounds   rectal: Small soft hemorrhoid 6 oclock. Stool heme negative              Assessment/Plan:     1. Hyperlipidemia, unspecified hyperlipidemia type - No changes in medications pending lab results. 2. Essential hypertension  - At goal.  Continue current medications. 3. Rectal bleed - likely hemorrhoidal         Orders Placed This Encounter    METABOLIC PANEL, COMPREHENSIVE    CBC WITH AUTOMATED DIFF    CK    LIPID PANEL         Verbal and written instructions (see AVS) provided. Patient expresses understanding of diagnosis and treatment plan.

## 2018-08-21 NOTE — MR AVS SNAPSHOT
Chrissie Rossi 
 
 
 [address] [facility] [address] [place] [address] 
[phone] Patient: Olga Degroot MRN: [id number] HEB:[id number] Visit Information Date & Time Provider Department Dept. Phone Encounter #  
 8/21/2018  9:45 AM Jim Pierson [facility] [phone] [id number] Your Appointments 8/20/2019 11:30 AM  
ESTABLISHED PATIENT with Connor Kaminski MD  
[facility] Appt Note: 1yr f/up  ekr [address]  
[phone] [address] Upcoming Health Maintenance Date Due DTaP/Tdap/Td series (1 - Tdap) 7/3/1954 ZOSTER VACCINE AGE 60> 5/3/1993 GLAUCOMA SCREENING Q2Y 5/24/2018 Influenza Age 5 to Adult 8/1/2018 MEDICARE YEARLY EXAM 8/8/2018 Allergies as of 8/21/2018  Review Complete On: 8/21/2018 By: Edita Boykin Severity Noted Reaction Type Reactions Codeine  09/29/2009    Unknown (comments) Unsure of reaction. Morphine  09/29/2009    Hives, Itching Current Immunizations  Reviewed on 10/24/2016 Name Date Influenza High Dose Vaccine PF 10/12/2017, 10/24/2016, 9/8/2015, 11/11/2014, 11/25/2013 Influenza Vaccine 1/15/2013 Influenza Vaccine Split 1/13/2011 Pneumococcal Conjugate (PCV-13) 8/2/2016 ZZZ-RETIRED (DO NOT USE) Pneumococcal Vaccine (Unspecified Type) 10/10/2010 Not reviewed this visit You Were Diagnosed With   
  
 Codes Comments Hyperlipidemia, unspecified hyperlipidemia type    -  Primary ICD-10-CM: E78.5 ICD-9-CM: 272.4 Essential hypertension     ICD-10-CM: I10 
ICD-9-CM: 401.9 Rectal bleed     ICD-10-CM: K62.5 ICD-9-CM: 569.3 Vitals BP Pulse Temp Resp Height(growth percentile) Weight(growth percentile)  134/79 (BP 1 Location: Left arm, BP Patient Position: Sitting) 73 98 °F (36.7 °C) (Oral) 19 5' 1\" (1.549 m) 186 lb (84.4 kg) SpO2 BMI OB Status Smoking Status 93% 35.14 kg/m2 Hysterectomy Never Smoker BMI and BSA Data Body Mass Index Body Surface Area  
 35.14 kg/m 2 1.91 m 2 Preferred Pharmacy Pharmacy Name Phone [facility] [place] [facility] Yonathan Dyson [phone] Your Updated Medication List  
  
   
This list is accurate as of 8/21/18 10:12 AM.  Always use your most recent med list.  
  
  
  
  
 alendronate 10 mg tablet Commonly known as:  FOSAMAX Take 1 Tab by mouth daily. ALPRAZolam 0.25 mg tablet Commonly known as:  Star Carp Take 1 Tab by mouth three (3) times daily as needed for Sleep or Anxiety. aspirin 81 mg tablet Take 81 mg by mouth daily. CHARCOCAPS PO Take  by mouth. cholecalciferol (vitamin D3) 2,000 unit Tab Commonly known as:  VITAMIN D3 Take 1 Tab by mouth daily. clotrimazole 1 % topical cream  
Commonly known as:  LOTRIMIN  
  
 levothyroxine 75 mcg tablet Commonly known as:  SYNTHROID  
TAKE ONE TABLET BY MOUTH DAILY BEFORE BREAKFAST  
  
 lisinopril 5 mg tablet Commonly known as:  PRINIVIL, ZESTRIL  
TAKE ONE TABLET BY MOUTH DAILY  
  
 melatonin 5 mg Cap capsule Take 5 mg by mouth nightly. metoprolol tartrate 50 mg tablet Commonly known as:  LOPRESSOR  
TAKE ONE TABLET BY MOUTH TWICE A DAY  
  
 multivitamin tablet Commonly known as:  ONE A DAY Take 1 Tab by mouth daily. nitroglycerin 0.3 mg SL tablet Commonly known as:  NITROSTAT  
1 tablet by SubLINGual route every five (5) minutes as needed for Chest Pain.  
  
 potassium chloride SR 10 mEq tablet Commonly known as:  KLOR-CON 10  
TAKE TWO TABLETS BY MOUTH DAILY  
  
 rosuvastatin 40 mg tablet Commonly known as:  CRESTOR Take 1 Tab by mouth nightly. traMADol 50 mg tablet Commonly known as:  Nick Shells  
 TAKE ONE TABLET BY MOUTH EVERY 6 HOURS AS NEEDED FOR PAIN - MAX OF 4 TABLETS DAILY  
  
 TYLENOL ARTHRITIS PAIN 650 mg Tber Generic drug:  acetaminophen Take 650 mg by mouth every eight (8) hours. We Performed the Following CBC WITH AUTOMATED DIFF [70350 CPT(R)] CK [id number] CPT(R)] LIPID PANEL [89929 CPT(R)] METABOLIC PANEL, COMPREHENSIVE [06160 CPT(R)] Patient Instructions Hemorrhoids: Care Instructions Your Care Instructions Hemorrhoids are enlarged veins that develop in the anal canal. Bleeding during bowel movements, itching, swelling, and rectal pain are the most common symptoms. They can be uncomfortable at times, but hemorrhoids rarely are a serious problem. You can treat most hemorrhoids with simple changes to your diet and bowel habits. These changes include eating more fiber and not straining to pass stools. Most hemorrhoids do not need surgery or other treatment unless they are very large and painful or bleed a lot. Follow-up care is a key part of your treatment and safety. Be sure to make and go to all appointments, and call your doctor if you are having problems. It's also a good idea to know your test results and keep a list of the medicines you take. How can you care for yourself at home? · Sit in a few inches of warm water (sitz bath) 3 times a day and after bowel movements. The warm water helps with pain and itching. · Put ice on your anal area several times a day for 10 minutes at a time. Put a thin cloth between the ice and your skin. Follow this by placing a warm, wet towel on the area for another 10 to 20 minutes. · Take pain medicines exactly as directed. ¨ If the doctor gave you a prescription medicine for pain, take it as prescribed. ¨ If you are not taking a prescription pain medicine, ask your doctor if you can take an over-the-counter medicine. · Keep the anal area clean, but be gentle.  Use water and a fragrance-free soap, such as [place], or use baby wipes or medicated pads, such as Tucks. · Wear cotton underwear and loose clothing to decrease moisture in the anal area. · Eat more fiber. Include foods such as whole-grain breads and cereals, raw vegetables, raw and dried fruits, and beans. · Drink plenty of fluids, enough so that your urine is light yellow or clear like water. If you have kidney, heart, or liver disease and have to limit fluids, talk with your doctor before you increase the amount of fluids you drink. · Use a stool softener that contains bran or psyllium. You can save money by buying bran or psyllium (available in bulk at most health food stores) and sprinkling it on foods or stirring it into fruit juice. Or you can use a product such as Metamucil or Hydrocil. · Practice healthy bowel habits. ¨ Go to the bathroom as soon as you have the urge. ¨ Avoid straining to pass stools. Relax and give yourself time to let things happen naturally. ¨ Do not hold your breath while passing stools. ¨ Do not read while sitting on the toilet. Get off the toilet as soon as you have finished. · Take your medicines exactly as prescribed. Call your doctor if you think you are having a problem with your medicine. When should you call for help? Call 911 anytime you think you may need emergency care. For example, call if: 
  · You pass maroon or very bloody stools.  
 Call your doctor now or seek immediate medical care if: 
  · You have increased pain.  
  · You have increased bleeding.  
 Watch closely for changes in your health, and be sure to contact your doctor if: 
  · Your symptoms have not improved after 3 or 4 days. Where can you learn more? Go to http://christianne-nain.info/. Enter F228 in the search box to learn more about \"Hemorrhoids: Care Instructions. \" Current as of: May 12, 2017 Content Version: 11.7 © 7702-5725 "[employer].  Care instructions adapted under license by [address] (which disclaims liability or warranty for this information). If you have questions about a medical condition or this instruction, always ask your healthcare professional. [address] any warranty or liability for your use of this information. Introducing [facility]! Hema Martines introduces OneID patient portal. Now you can access parts of your medical record, email your doctor's office, and request medication refills online. 1. In your internet browser, go to [email or web address] 2. Click on the First Time User? Click Here link in the Sign In box. You will see the New Member Sign Up page. 3. Enter your OneID Access Code exactly as it appears below. You will not need to use this code after youve completed the sign-up process. If you do not sign up before the expiration date, you must request a new code. · OneID Access Code: [email or web address] Expires: 10/7/2018 12:49 PM 
 
4. Enter the last four digits of your Social Security Number (xxxx) and Date of Birth (mm/dd/yyyy) as indicated and click Submit. You will be taken to the next sign-up page. 5. Create a OneID ID. This will be your OneID login ID and cannot be changed, so think of one that is secure and easy to remember. 6. Create a OneID password. You can change your password at any time. 7. Enter your Password Reset Question and Answer. This can be used at a later time if you forget your password. 8. Enter your e-mail address. You will receive e-mail notification when new information is available in [address] 9. Click Sign Up. You can now view and download portions of your medical record. 10. Click the Download Summary menu link to download a portable copy of your medical information. If you have questions, please visit the Frequently Asked Questions section of the OneID website.  Remember, OneID is NOT to be used for urgent needs. For medical emergencies, dial 911. Now available from your iPhone and Android! Please provide this summary of care documentation to your next provider. Your primary care clinician is listed as Reynold Schneider. If you have any questions after today's visit, please call [phone].

## 2018-08-21 NOTE — PATIENT INSTRUCTIONS

## 2018-08-21 NOTE — TELEPHONE ENCOUNTER
Patient states she is bleeding from the rectum.  Patient scheduled to see Dr. Rajeev Gerardo today at 9:45 am.

## 2018-08-22 ENCOUNTER — DOCUMENTATION ONLY (OUTPATIENT)
Dept: FAMILY MEDICINE CLINIC | Age: 83
End: 2018-08-22

## 2018-08-22 LAB
ALBUMIN SERPL-MCNC: 3.9 G/DL (ref 3.5–4.7)
ALBUMIN/GLOB SERPL: 1.9 {RATIO} (ref 1.2–2.2)
ALP SERPL-CCNC: 66 IU/L (ref 39–117)
ALT SERPL-CCNC: 7 IU/L (ref 0–32)
AST SERPL-CCNC: 21 IU/L (ref 0–40)
BASOPHILS # BLD AUTO: 0 X10E3/UL (ref 0–0.2)
BASOPHILS NFR BLD AUTO: 1 %
BILIRUB SERPL-MCNC: 0.5 MG/DL (ref 0–1.2)
BUN SERPL-MCNC: 19 MG/DL (ref 8–27)
BUN/CREAT SERPL: 19 (ref 12–28)
CALCIUM SERPL-MCNC: 9.8 MG/DL (ref 8.7–10.3)
CHLORIDE SERPL-SCNC: 106 MMOL/L (ref 96–106)
CHOLEST SERPL-MCNC: 150 MG/DL (ref 100–199)
CK SERPL-CCNC: 66 U/L (ref 24–173)
CO2 SERPL-SCNC: 23 MMOL/L (ref 20–29)
CREAT SERPL-MCNC: 1.02 MG/DL (ref 0.57–1)
EOSINOPHIL # BLD AUTO: 0.1 X10E3/UL (ref 0–0.4)
EOSINOPHIL NFR BLD AUTO: 3 %
ERYTHROCYTE [DISTWIDTH] IN BLOOD BY AUTOMATED COUNT: 13.3 % (ref 12.3–15.4)
GLOBULIN SER CALC-MCNC: 2.1 G/DL (ref 1.5–4.5)
GLUCOSE SERPL-MCNC: 112 MG/DL (ref 65–99)
HCT VFR BLD AUTO: 39.8 % (ref 34–46.6)
HDLC SERPL-MCNC: 70 MG/DL
HGB BLD-MCNC: 13 G/DL (ref 11.1–15.9)
IMM GRANULOCYTES # BLD: 0 X10E3/UL (ref 0–0.1)
IMM GRANULOCYTES NFR BLD: 0 %
INTERPRETATION, 910389: NORMAL
INTERPRETATION: NORMAL
LDLC SERPL CALC-MCNC: 51 MG/DL (ref 0–99)
LYMPHOCYTES # BLD AUTO: 0.7 X10E3/UL (ref 0.7–3.1)
LYMPHOCYTES NFR BLD AUTO: 25 %
MCH RBC QN AUTO: 32.4 PG (ref 26.6–33)
MCHC RBC AUTO-ENTMCNC: 32.7 G/DL (ref 31.5–35.7)
MCV RBC AUTO: 99 FL (ref 79–97)
MONOCYTES # BLD AUTO: 0.4 X10E3/UL (ref 0.1–0.9)
MONOCYTES NFR BLD AUTO: 12 %
NEUTROPHILS # BLD AUTO: 1.7 X10E3/UL (ref 1.4–7)
NEUTROPHILS NFR BLD AUTO: 59 %
PDF IMAGE, 910387: NORMAL
PLATELET # BLD AUTO: 178 X10E3/UL (ref 150–379)
POTASSIUM SERPL-SCNC: 4.4 MMOL/L (ref 3.5–5.2)
PROT SERPL-MCNC: 6 G/DL (ref 6–8.5)
RBC # BLD AUTO: 4.01 X10E6/UL (ref 3.77–5.28)
SODIUM SERPL-SCNC: 141 MMOL/L (ref 134–144)
TRIGL SERPL-MCNC: 146 MG/DL (ref 0–149)
VLDLC SERPL CALC-MCNC: 29 MG/DL (ref 5–40)
WBC # BLD AUTO: 2.8 X10E3/UL (ref 3.4–10.8)

## 2018-08-22 RX ORDER — ALENDRONATE SODIUM 10 MG/1
TABLET ORAL
Qty: 90 TAB | Refills: 2 | Status: SHIPPED | OUTPATIENT
Start: 2018-08-22 | End: 2019-05-24 | Stop reason: SDUPTHER

## 2018-08-22 RX ORDER — TRAMADOL HYDROCHLORIDE 50 MG/1
TABLET ORAL
Qty: 30 TAB | Refills: 0 | Status: SHIPPED | OUTPATIENT
Start: 2018-08-22 | End: 2018-09-19 | Stop reason: SDUPTHER

## 2018-08-22 NOTE — PROGRESS NOTES
Rx called to [facility] for tramadol 50mg as ordered by Dr. Barrett Needle

## 2018-08-22 NOTE — TELEPHONE ENCOUNTER
Pt is calling requesting med states she mentioned this yesterday to Dr Morgan Mendoza she needs this done today    Requested Prescriptions     Pending Prescriptions Disp Refills    alendronate (FOSAMAX) 10 mg tablet [Pharmacy Med Name: ALENDRONATE SODIUM 10 MG TAB] 90 Tab 2     Sig: TAKE ONE TABLET BY MOUTH EVERY MORNING BEFORE BREAKFAST    traMADol (ULTRAM) 50 mg tablet [Pharmacy Med Name: traMADol HCL 50MG TABLET] 30 Tab 0     Sig: TAKE ONE TABLET BY MOUTH EVERY 6 HOURS AS NEEDED FOR PAIN, MAX OF 4 TABLETS PER DAY

## 2018-09-11 ENCOUNTER — HOSPITAL ENCOUNTER (OUTPATIENT)
Dept: LAB | Age: 83
Discharge: HOME | End: 2018-09-11
Payer: MEDICARE

## 2018-09-11 ENCOUNTER — OFFICE VISIT (OUTPATIENT)
Dept: FAMILY MEDICINE CLINIC | Age: 83
End: 2018-09-11

## 2018-09-11 VITALS
HEART RATE: 67 BPM | SYSTOLIC BLOOD PRESSURE: 131 MMHG | RESPIRATION RATE: 18 BRPM | HEIGHT: 61 IN | TEMPERATURE: 98.6 F | DIASTOLIC BLOOD PRESSURE: 80 MMHG | WEIGHT: 183 LBS | OXYGEN SATURATION: 96 % | BODY MASS INDEX: 34.55 KG/M2

## 2018-09-11 DIAGNOSIS — I10 ESSENTIAL HYPERTENSION: Primary | ICD-10-CM

## 2018-09-11 DIAGNOSIS — K62.5 RECTAL BLEEDING: ICD-10-CM

## 2018-09-11 PROCEDURE — 36415 COLL VENOUS BLD VENIPUNCTURE: CPT

## 2018-09-11 PROCEDURE — 85025 COMPLETE CBC W/AUTO DIFF WBC: CPT

## 2018-09-11 PROCEDURE — 80053 COMPREHEN METABOLIC PANEL: CPT

## 2018-09-11 NOTE — PROGRESS NOTES
Chief Complaint   Patient presents with    Rectal Bleeding     Health Maintenance reviewed     1. Have you been to the ER, urgent care clinic since your last visit? Hospitalized since your last visit?no     2. Have you seen or consulted any other health care providers outside of the [facility] since your last visit? Include any pap smears or colon screening.  No

## 2018-09-11 NOTE — LETTER
9/11/2018 2:23 PM 
 
Ms. Susan Calderon [address] [place] [phone] To whom it may concern: The above patient is under my care. He is to return to work on Monday 9/17/18. Please call if you have any questions [phone] . Sincerely, 
 
 
Melvin Aguilera MD

## 2018-09-11 NOTE — PROGRESS NOTES
Subjective:     Herber Martínez is a 80 y.o. female who presents today with the following:  Chief Complaint   Patient presents with    Rectal Bleeding     Patient here today with reports of rectal bleeding. Patient states she previously thought it was secondary to vaginal bleeding, but recently realized it is occurring from rectum. It had been happening every 3-4 weeks, but yesterday she had blood per rectum every 3-4 hours. Patient states that she feels like it is progressing and is worried about what her next steps are. Patient states it was filling the toilet bowl, noted to be bright red. No stool noted. Patient states she is having some \"raw\" feeling on the inside of her lower abdomen. Patient had a normal bowel movement this morning. No blood in the stool, normal color and appearance as usual.      Patient notes that yesterday she had a difficult time having a bowel movement yesterday and had to strain a lot. Has been told she has diverticulosis in the past.      Colonoscopy: 5 years ago. ROS:  Gen: denies fever, chills, fatigue, weight loss, weight gain  HEENT:denies blurry vision, nasal congestion, sore throat  Resp: denies dypsnea, cough, wheezing  CV: denies chest pain, palpitations, lower extremity edema  Abd: denies nausea, vomiting, diarrhea, constipation  Neuro: denies numbness/tingling  Endo: denies polyuria, polydipsia, heat/cold intolerance  Heme: no lymphadenopathy    Allergies   Allergen Reactions    Codeine Unknown (comments)     Unsure of reaction.     Morphine Hives and Itching         Current Outpatient Prescriptions:     alendronate (FOSAMAX) 10 mg tablet, TAKE ONE TABLET BY MOUTH EVERY MORNING BEFORE BREAKFAST, Disp: 90 Tab, Rfl: 2    traMADol (ULTRAM) 50 mg tablet, TAKE ONE TABLET BY MOUTH EVERY 6 HOURS AS NEEDED FOR PAIN, MAX OF 4 TABLETS PER DAY, Disp: 30 Tab, Rfl: 0    clotrimazole (LOTRIMIN) 1 % topical cream, , Disp: , Rfl:     lisinopril (PRINIVIL, ZESTRIL) 5 mg tablet, TAKE ONE TABLET BY MOUTH DAILY, Disp: 90 Tab, Rfl: 0    levothyroxine (SYNTHROID) 75 mcg tablet, TAKE ONE TABLET BY MOUTH DAILY BEFORE BREAKFAST, Disp: 90 Tab, Rfl: 0    potassium chloride SR (KLOR-CON 10) 10 mEq tablet, TAKE TWO TABLETS BY MOUTH DAILY, Disp: 60 Tab, Rfl: 5    rosuvastatin (CRESTOR) 40 mg tablet, Take 1 Tab by mouth nightly., Disp: 90 Tab, Rfl: 3    melatonin 5 mg cap capsule, Take 5 mg by mouth nightly., Disp: , Rfl:     acetaminophen (TYLENOL ARTHRITIS PAIN) 650 mg TbER, Take 650 mg by mouth every eight (8) hours. , Disp: , Rfl:     multivitamin (ONE A DAY) tablet, Take 1 Tab by mouth daily. , Disp: , Rfl:     ALPRAZolam (XANAX) 0.25 mg tablet, Take 1 Tab by mouth three (3) times daily as needed for Sleep or Anxiety. , Disp: 30 Tab, Rfl: 1    metoprolol tartrate (LOPRESSOR) 50 mg tablet, TAKE ONE TABLET BY MOUTH TWICE A DAY, Disp: 180 Tab, Rfl: 3    cholecalciferol, vitamin D3, (VITAMIN D3) 2,000 unit tab, Take 1 Tab by mouth daily. , Disp: 30 Tab, Rfl: 0    ACTIVATED CHARCOAL (CHARCOCAPS PO), Take  by mouth., Disp: , Rfl:     nitroglycerin (NITROSTAT) 0.3 mg SL tablet, 1 tablet by SubLINGual route every five (5) minutes as needed for Chest Pain., Disp: 1 Bottle, Rfl: 1    aspirin 81 mg Tab, Take 81 mg by mouth daily. , Disp: , Rfl:     Past Medical History:   Diagnosis Date    CAD (coronary artery disease)     stent placed on left 1 stent,in 2007    Cancer [facility])     BCCA    DVT (deep venous thrombosis) (HCC)     GERD (gastroesophageal reflux disease)     Hypercholesterolemia     Hypertension     Pulmonary embolism ([address] 9/15/2015    Th[address]boemb[address] [address]     DVT after hernia operation, PE spontaneous 10 years later    Thyroid disease        Past Surgical History:   Procedure Laterality Date    HX GI      esophageal hiatal hernia - 2004    HX GYN      partial hysterectomy - 1970    HX OTHER SURGICAL      BCCAs removed    HX UROLOGICAL      bladder repair - 1999 History   Smoking Status    Never Smoker   Smokeless Tobacco    Never Used       Social History     Social History    Marital status:      Spouse name: N/A    Number of children: N/A    Years of education: N/A     Social History Main Topics    Smoking status: Never Smoker    Smokeless tobacco: Never Used    Alcohol use No    Drug use: No    Sexual activity: Not Asked     Other Topics Concern    None     Social History Narrative       Family History   Problem Relation Age of Onset    Heart Disease Mother     Dementia Father     Diabetes Son     Kidney Disease Son      autoimmune         Objective:     Visit Vitals    /80 (BP 1 Location: Left arm, BP Patient Position: Sitting)    Pulse 67    Temp 98.6 °F (37 °C) (Oral)    Resp 18    Ht 5' 1\" (1.549 m)    Wt 183 lb (83 kg)    SpO2 96%    BMI 34.58 kg/m2     Gen: alert, oriented, no acute distress  Head: normocephalic, atraumatic  Eyes:sclera clear, conjunctiva clear  Oral: moist mucus membranes, no oral lesions, no pharyngeal exudate, no pharyngeal erythema  Neck: symmetric normal sized thyroid, no carotid bruits, no JVD  Resp: Normal work of breathing, lungs CTAB, no w/r/r  CV: S1, S2 normal.  No murmurs, rubs, or gallops. Abd:  Normal bowel sounds. Soft, not tender, not distended. Rectal: multiple small external hemorrhoids with are flat noted at 3 and 6 pm. No fissures noted. Normal rectal tone. Stool hemeoccult negative. Skin: no rash               No results found for this visit on 09/11/18. Assessment/ Plan:   Diagnoses and all orders for this visit:    1. Rectal bleeding  -     CBC WITH AUTOMATED DIFF  -     METABOLIC PANEL, COMPREHENSIVE     I think given the history this bleeding event was most likely secondary to bleeding hemorrhoids after straining to have a BM. Patient is hemodynamically stable with no evidence of ongoing bleed at this time.      Advised patient to follow her bowel regimen and drink plenty of water. Check labs today. Also d/w patient that this may have been a diverticular bleed, and the safest thing to do if it occurs again is to go directly to the ER. She expressed understanding. Verbal and written instructions (see AVS) provided.  Patient expresses understanding of diagnosis and treatment plan. Maris Gonzalez.  Valerie Lu MD

## 2018-09-12 RX ORDER — METOPROLOL TARTRATE 50 MG/1
TABLET ORAL
Qty: 180 TAB | Refills: 3 | Status: SHIPPED | OUTPATIENT
Start: 2018-09-12 | End: 2019-10-14 | Stop reason: SDUPTHER

## 2018-09-17 LAB
ALBUMIN SERPL-MCNC: 4.1 G/DL (ref 3.5–4.7)
ALBUMIN/GLOB SERPL: 2 {RATIO} (ref 1.2–2.2)
ALP SERPL-CCNC: 65 IU/L (ref 39–117)
ALT SERPL-CCNC: 7 IU/L (ref 0–32)
AST SERPL-CCNC: 21 IU/L (ref 0–40)
BASOPHILS # BLD AUTO: 0 X10E3/UL (ref 0–0.2)
BASOPHILS NFR BLD AUTO: 0 %
BILIRUB SERPL-MCNC: 0.4 MG/DL (ref 0–1.2)
BUN SERPL-MCNC: 20 MG/DL (ref 8–27)
BUN/CREAT SERPL: 19 (ref 12–28)
CALCIUM SERPL-MCNC: 10.2 MG/DL (ref 8.7–10.3)
CHLORIDE SERPL-SCNC: 104 MMOL/L (ref 96–106)
CO2 SERPL-SCNC: 24 MMOL/L (ref 20–29)
CREAT SERPL-MCNC: 1.04 MG/DL (ref 0.57–1)
EOSINOPHIL # BLD AUTO: 0 X10E3/UL (ref 0–0.4)
EOSINOPHIL NFR BLD AUTO: 1 %
ERYTHROCYTE [DISTWIDTH] IN BLOOD BY AUTOMATED COUNT: 13.1 % (ref 12.3–15.4)
GLOBULIN SER CALC-MCNC: 2.1 G/DL (ref 1.5–4.5)
GLUCOSE SERPL-MCNC: 110 MG/DL (ref 65–99)
HCT VFR BLD AUTO: 38.9 % (ref 34–46.6)
HGB BLD-MCNC: 12.7 G/DL (ref 11.1–15.9)
IMM GRANULOCYTES # BLD: 0 X10E3/UL (ref 0–0.1)
IMM GRANULOCYTES NFR BLD: 0 %
INTERPRETATION: NORMAL
LYMPHOCYTES # BLD AUTO: 0.9 X10E3/UL (ref 0.7–3.1)
LYMPHOCYTES NFR BLD AUTO: 21 %
MCH RBC QN AUTO: 32.6 PG (ref 26.6–33)
MCHC RBC AUTO-ENTMCNC: 32.6 G/DL (ref 31.5–35.7)
MCV RBC AUTO: 100 FL (ref 79–97)
MONOCYTES # BLD AUTO: 0.4 X10E3/UL (ref 0.1–0.9)
MONOCYTES NFR BLD AUTO: 9 %
NEUTROPHILS # BLD AUTO: 3 X10E3/UL (ref 1.4–7)
NEUTROPHILS NFR BLD AUTO: 69 %
PLATELET # BLD AUTO: 192 X10E3/UL (ref 150–379)
POTASSIUM SERPL-SCNC: 4.8 MMOL/L (ref 3.5–5.2)
PROT SERPL-MCNC: 6.2 G/DL (ref 6–8.5)
RBC # BLD AUTO: 3.9 X10E6/UL (ref 3.77–5.28)
SODIUM SERPL-SCNC: 142 MMOL/L (ref 134–144)
WBC # BLD AUTO: 4.4 X10E3/UL (ref 3.4–10.8)

## 2018-09-19 DIAGNOSIS — R10.9 LEFT FLANK PAIN: ICD-10-CM

## 2018-09-19 DIAGNOSIS — R31.29 HEMATURIA, MICROSCOPIC: ICD-10-CM

## 2018-09-19 DIAGNOSIS — M48.00 SPINAL STENOSIS, UNSPECIFIED SPINAL REGION: Primary | ICD-10-CM

## 2018-09-19 NOTE — TELEPHONE ENCOUNTER
Pt is calling requesting a refill on her med she would like this done today because she only has one pill left      Requested Prescriptions     Pending Prescriptions Disp Refills    traMADol (ULTRAM) 50 mg tablet 30 Tab 0

## 2018-09-21 ENCOUNTER — DOCUMENTATION ONLY (OUTPATIENT)
Dept: FAMILY MEDICINE CLINIC | Age: 83
End: 2018-09-21

## 2018-09-21 RX ORDER — TRAMADOL HYDROCHLORIDE 50 MG/1
TABLET ORAL
Qty: 30 TAB | Refills: 2 | Status: SHIPPED | OUTPATIENT
Start: 2018-09-21 | End: 2018-12-11 | Stop reason: SDUPTHER

## 2018-09-21 NOTE — PROGRESS NOTES
Tramadol RX called into [address] [place] [address] as ordered. Ms. Ross Cullen notified and voiced understanding.

## 2018-10-08 DIAGNOSIS — F41.9 ANXIETY: ICD-10-CM

## 2018-10-08 NOTE — TELEPHONE ENCOUNTER
Pt is calling requesting a refill on her med needs tomorrow    Requested Prescriptions     Pending Prescriptions Disp Refills    ALPRAZolam (XANAX) 0.25 mg tablet 30 Tab 1     Sig: Take 1 Tab by mouth three (3) times daily as needed for Sleep or Anxiety.

## 2018-10-10 NOTE — TELEPHONE ENCOUNTER
Pt is calling again for her med states she is out completely    Requested Prescriptions     Pending Prescriptions Disp Refills    ALPRAZolam (XANAX) 0.25 mg tablet 30 Tab 1     Sig: Take 1 Tab by mouth three (3) times daily as needed for Sleep or Anxiety.

## 2018-10-11 ENCOUNTER — DOCUMENTATION ONLY (OUTPATIENT)
Dept: FAMILY MEDICINE CLINIC | Age: 83
End: 2018-10-11

## 2018-10-11 RX ORDER — ALPRAZOLAM 0.25 MG/1
0.25 TABLET ORAL
Qty: 30 TAB | Refills: 2 | Status: SHIPPED | OUTPATIENT
Start: 2018-10-11 | End: 2020-04-21 | Stop reason: SDUPTHER

## 2018-10-29 RX ORDER — LEVOTHYROXINE SODIUM 75 UG/1
TABLET ORAL
Qty: 90 TAB | Refills: 0 | Status: SHIPPED | OUTPATIENT
Start: 2018-10-29 | End: 2019-01-29 | Stop reason: SDUPTHER

## 2018-11-07 RX ORDER — LISINOPRIL 5 MG/1
TABLET ORAL
Qty: 90 TAB | Refills: 2 | Status: SHIPPED | OUTPATIENT
Start: 2018-11-07 | End: 2019-08-12 | Stop reason: SDUPTHER

## 2018-11-07 NOTE — TELEPHONE ENCOUNTER
Pt wanting to have lisinopril refill sent in to pharmacy-multiple refill requests have been sent by pharmacy.   Thanks, Gurwinder

## 2018-12-11 DIAGNOSIS — M48.00 SPINAL STENOSIS, UNSPECIFIED SPINAL REGION: ICD-10-CM

## 2018-12-11 NOTE — TELEPHONE ENCOUNTER
Requested Prescriptions     Pending Prescriptions Disp Refills    traMADol (ULTRAM) 50 mg tablet 30 Tab 2     Sig: TAKE ONE TABLET BY MOUTH EVERY 6 HOURS AS NEEDED FOR PAIN, MAX OF 4 TABLETS PER DAY     Future Appointments:  None scheduled.   Last Appointment With Me:  8/21/2018   Last Filled:  09.21.2018 =2 refills  Changes Made to Medication on Last Visit:  None

## 2018-12-12 ENCOUNTER — DOCUMENTATION ONLY (OUTPATIENT)
Dept: FAMILY MEDICINE CLINIC | Age: 83
End: 2018-12-12

## 2018-12-12 RX ORDER — TRAMADOL HYDROCHLORIDE 50 MG/1
TABLET ORAL
Qty: 30 TAB | Refills: 0 | Status: SHIPPED | OUTPATIENT
Start: 2018-12-12 | End: 2019-01-07 | Stop reason: SDUPTHER

## 2018-12-12 NOTE — PROGRESS NOTES
Chief Complaint   Patient presents with    Medication Refill     TRAMADOL     Telephone order per Dr. Chevy Keith received by Arleth Thomas (pharmacist).

## 2018-12-17 ENCOUNTER — OFFICE VISIT (OUTPATIENT)
Dept: FAMILY MEDICINE CLINIC | Age: 83
End: 2018-12-17

## 2018-12-17 ENCOUNTER — HOSPITAL ENCOUNTER (OUTPATIENT)
Dept: LAB | Age: 83
Discharge: HOME | End: 2018-12-17
Payer: MEDICARE

## 2018-12-17 VITALS
RESPIRATION RATE: 16 BRPM | BODY MASS INDEX: 35.18 KG/M2 | DIASTOLIC BLOOD PRESSURE: 64 MMHG | WEIGHT: 186.2 LBS | OXYGEN SATURATION: 94 % | TEMPERATURE: 98.6 F | HEART RATE: 85 BPM | SYSTOLIC BLOOD PRESSURE: 116 MMHG

## 2018-12-17 DIAGNOSIS — R39.15 URGENCY OF URINATION: Primary | ICD-10-CM

## 2018-12-17 DIAGNOSIS — R68.83 CHILLS: ICD-10-CM

## 2018-12-17 LAB
BILIRUB UR QL STRIP: NEGATIVE
GLUCOSE UR-MCNC: NEGATIVE MG/DL
KETONES P FAST UR STRIP-MCNC: NEGATIVE MG/DL
PH UR STRIP: 5.5 [PH] (ref 4.6–8)
PROT UR QL STRIP: NORMAL
SP GR UR STRIP: 1.03 (ref 1–1.03)
UA UROBILINOGEN AMB POC: NORMAL (ref 0.2–1)
URINALYSIS CLARITY POC: CLEAR
URINALYSIS COLOR POC: YELLOW
URINE BLOOD POC: NORMAL
URINE LEUKOCYTES POC: NEGATIVE
URINE NITRITES POC: NEGATIVE

## 2018-12-17 PROCEDURE — 80053 COMPREHEN METABOLIC PANEL: CPT

## 2018-12-17 PROCEDURE — 85025 COMPLETE CBC W/AUTO DIFF WBC: CPT

## 2018-12-17 RX ORDER — SULFAMETHOXAZOLE AND TRIMETHOPRIM 800; 160 MG/1; MG/1
1 TABLET ORAL 2 TIMES DAILY
COMMUNITY
End: 2019-04-05

## 2018-12-17 NOTE — PROGRESS NOTES
Subjective:      Patient : This patient is a 80 y.o. female who had dysuria 3 days ago, she went to [facility] 2 days ago and was diagnosed with UTI and started on bactrim. She's completed 5 doses, and the urinating feels better but she's feeling flushed and shaky starting today. She has some lower back pain and lower abdominal pain that is different than her usual low back pain. No bowel habit changes - had a BM yesterday, formed nonbloody stool. No change in appetite. Some cough last week, nonproductive. Was seen by urology in July 2018 for hematuria - had negative cystoscopy and CT IVP per consult notes in media section of charge - benign asymptomatic hematuria, grade 1 cystocele    Past Medical History:   Diagnosis Date    CAD (coronary artery disease)     stent placed on left 1 stent,in 2007    [facility] ([facility]     BCCA    DVT (deep venous thrombosis) ([facility]     GERD (gastroesophageal reflux disease)     Hypercholesterolemia     Hypertension     Pulmonary embolism ([facility] 9/15/2015    Thromboembolus ([facility]     DVT after hernia operation, PE spontaneous 10 years later    Thyroid disease      Current Outpatient Medications   Medication Sig    trimethoprim-sulfamethoxazole (BACTRIM DS) 160-800 mg per tablet Take 1 Tab by mouth two (2) times a day.  traMADol (ULTRAM) 50 mg tablet TAKE ONE TABLET BY MOUTH EVERY 6 HOURS AS NEEDED FOR PAIN, MAX OF 4 TABLETS PER DAY    lisinopril (PRINIVIL, ZESTRIL) 5 mg tablet TAKE ONE TABLET BY MOUTH DAILY    levothyroxine (SYNTHROID) 75 mcg tablet TAKE ONE TABLET BY MOUTH DAILY BEFORE BREAKFAST    ALPRAZolam (XANAX) 0.25 mg tablet Take 1 Tab by mouth three (3) times daily as needed for Sleep or Anxiety.     metoprolol tartrate (LOPRESSOR) 50 mg tablet TAKE ONE TABLET BY MOUTH TWICE A DAY    alendronate (FOSAMAX) 10 mg tablet TAKE ONE TABLET BY MOUTH EVERY MORNING BEFORE BREAKFAST    potassium chloride SR (KLOR-CON 10) 10 mEq tablet TAKE TWO TABLETS BY MOUTH DAILY  rosuvastatin (CRESTOR) 40 mg tablet Take 1 Tab by mouth nightly.  melatonin 5 mg cap capsule Take 5 mg by mouth nightly.  acetaminophen (TYLENOL ARTHRITIS PAIN) 650 mg TbER Take 650 mg by mouth every eight (8) hours.  multivitamin (ONE A DAY) tablet Take 1 Tab by mouth daily.  cholecalciferol, vitamin D3, (VITAMIN D3) 2,000 unit tab Take 1 Tab by mouth daily.  ACTIVATED CHARCOAL (CHARCOCAPS PO) Take  by mouth.  nitroglycerin (NITROSTAT) 0.3 mg SL tablet 1 tablet by SubLINGual route every five (5) minutes as needed for Chest Pain.  aspirin 81 mg Tab Take 81 mg by mouth daily.  clotrimazole (LOTRIMIN) 1 % topical cream      No current facility-administered medications for this visit. Allergies   Allergen Reactions    Codeine Unknown (comments)     Unsure of reaction.  Morphine Hives and Itching     Social History     Tobacco Use    Smoking status: Never Smoker    Smokeless tobacco: Never Used   Substance Use Topics    Alcohol use: No    Drug use: No       ROS per HPI. Objective:     Visit Vitals  /64 (BP 1 Location: Left arm, BP Patient Position: Sitting)   Pulse 85   Temp 98.6 °F (37 °C) (Oral)   Resp 16   Wt 186 lb 3.2 oz (84.5 kg)   SpO2 94%   BMI 35.18 kg/m²       Skin: no pallor, normal turgor  HEENT:  Normocephalic, no conjunctival inflammation, pupils equal round and reactive to light, MMM, no oral lesions  Heart: regular rate and rhythm, no murmurs, rubs or gallops  Lungs: no increased respiratory effort, clear to ausculation bilaterally  Abdomen: soft, mild suprapubic tenderness, not distended. Normal bowel sounds. No rebound or guarding. No bruits.   Back: no costovertebral angle tenderness  Extremities:no edema or cyanosis  Neuro awake and oriented    Results for orders placed or performed in visit on 12/17/18   AMB POC URINALYSIS DIP STICK AUTO W/O MICRO     Status: None   Result Value Ref Range Status    Color (UA POC) Yellow  Final    Clarity (UA POC) Clear  Final    Glucose (UA POC) Negative Negative Final    Bilirubin (UA POC) Negative Negative Final    Ketones (UA POC) Negative Negative Final    Specific gravity (UA POC) 1.030 1.001 - 1.035 Final    Blood (UA POC) 1+ Negative Final    pH (UA POC) 5.5 4.6 - 8.0 Final    Protein (UA POC) Trace Negative Final    Urobilinogen (UA POC) 0.2 mg/dL 0.2 - 1 Final    Nitrites (UA POC) Negative Negative Final    Leukocyte esterase (UA POC) Negative Negative Final     Reviewed labs from [facility], culture results not available, some leukocyties and blood in UA. Assessment/ Plan:       ICD-10-CM ICD-9-CM    1. Urgency of urination R39.15 788.63 AMB POC URINALYSIS DIP STICK AUTO W/O MICRO   2. Chills R68.83 780.64 CBC WITH AUTOMATED DIFF      METABOLIC PANEL, COMPREHENSIVE     Unclear whiy she's feeling weak today, the symptoms of her UTI are resolving. I suspect she's feeling poorly on the bactrim. She has a longstanding history of benign hematuria. Advised to finish bactrim today, will check labs today to look for other source of chills and malaise. PAtient does not look septic at this time. Orders Placed This Encounter    trimethoprim-sulfamethoxazole (BACTRIM DS) 160-800 mg per tablet     Sig: Take 1 Tab by mouth two (2) times a day. Verbal and written instructions (see AVS) provided. Patient expresses understanding of diagnosis and treatment plan.

## 2018-12-17 NOTE — PATIENT INSTRUCTIONS
Finish last dose of bactrim today. This may be the reason you feel woozy, your UTI symptoms are nearly gone and your urine test looks improved today. We will call you with bloodwork results.

## 2018-12-18 LAB
ALBUMIN SERPL-MCNC: 4.1 G/DL (ref 3.5–4.7)
ALBUMIN/GLOB SERPL: 1.9 {RATIO} (ref 1.2–2.2)
ALP SERPL-CCNC: 72 IU/L (ref 39–117)
ALT SERPL-CCNC: 7 IU/L (ref 0–32)
AST SERPL-CCNC: 20 IU/L (ref 0–40)
BASOPHILS # BLD AUTO: 0 X10E3/UL (ref 0–0.2)
BASOPHILS NFR BLD AUTO: 0 %
BILIRUB SERPL-MCNC: 0.4 MG/DL (ref 0–1.2)
BUN SERPL-MCNC: 19 MG/DL (ref 8–27)
BUN/CREAT SERPL: 14 (ref 12–28)
CALCIUM SERPL-MCNC: 9.8 MG/DL (ref 8.7–10.3)
CHLORIDE SERPL-SCNC: 103 MMOL/L (ref 96–106)
CO2 SERPL-SCNC: 21 MMOL/L (ref 20–29)
CREAT SERPL-MCNC: 1.38 MG/DL (ref 0.57–1)
EOSINOPHIL # BLD AUTO: 0 X10E3/UL (ref 0–0.4)
EOSINOPHIL NFR BLD AUTO: 0 %
ERYTHROCYTE [DISTWIDTH] IN BLOOD BY AUTOMATED COUNT: 13.4 % (ref 12.3–15.4)
GLOBULIN SER CALC-MCNC: 2.2 G/DL (ref 1.5–4.5)
GLUCOSE SERPL-MCNC: 105 MG/DL (ref 65–99)
HCT VFR BLD AUTO: 38.1 % (ref 34–46.6)
HGB BLD-MCNC: 12.9 G/DL (ref 11.1–15.9)
IMM GRANULOCYTES # BLD: 0 X10E3/UL (ref 0–0.1)
IMM GRANULOCYTES NFR BLD: 0 %
INTERPRETATION: NORMAL
LYMPHOCYTES # BLD AUTO: 0.6 X10E3/UL (ref 0.7–3.1)
LYMPHOCYTES NFR BLD AUTO: 13 %
MCH RBC QN AUTO: 33.8 PG (ref 26.6–33)
MCHC RBC AUTO-ENTMCNC: 33.9 G/DL (ref 31.5–35.7)
MCV RBC AUTO: 100 FL (ref 79–97)
MONOCYTES # BLD AUTO: 0.5 X10E3/UL (ref 0.1–0.9)
MONOCYTES NFR BLD AUTO: 10 %
NEUTROPHILS # BLD AUTO: 3.6 X10E3/UL (ref 1.4–7)
NEUTROPHILS NFR BLD AUTO: 77 %
PLATELET # BLD AUTO: 199 X10E3/UL (ref 150–379)
POTASSIUM SERPL-SCNC: 4.6 MMOL/L (ref 3.5–5.2)
PROT SERPL-MCNC: 6.3 G/DL (ref 6–8.5)
RBC # BLD AUTO: 3.82 X10E6/UL (ref 3.77–5.28)
SODIUM SERPL-SCNC: 140 MMOL/L (ref 134–144)
WBC # BLD AUTO: 4.7 X10E3/UL (ref 3.4–10.8)

## 2019-01-07 DIAGNOSIS — M48.00 SPINAL STENOSIS, UNSPECIFIED SPINAL REGION: ICD-10-CM

## 2019-01-08 ENCOUNTER — DOCUMENTATION ONLY (OUTPATIENT)
Dept: FAMILY MEDICINE CLINIC | Age: 84
End: 2019-01-08

## 2019-01-08 RX ORDER — TRAMADOL HYDROCHLORIDE 50 MG/1
TABLET ORAL
Qty: 30 TAB | Refills: 0 | Status: SHIPPED | OUTPATIENT
Start: 2019-01-08 | End: 2019-02-07 | Stop reason: SDUPTHER

## 2019-01-08 NOTE — TELEPHONE ENCOUNTER
Pt is calling requesting a refill on her med she is going out of town and needs this med before she leaves    Requested Prescriptions     Pending Prescriptions Disp Refills    traMADol (ULTRAM) 50 mg tablet 30 Tab 0     Sig: TAKE ONE TABLET BY MOUTH EVERY 6 HOURS AS NEEDED FOR PAIN, MAX OF 4 TABLETS PER DAY

## 2019-01-21 RX ORDER — POTASSIUM CHLORIDE 750 MG/1
TABLET, FILM COATED, EXTENDED RELEASE ORAL
Qty: 60 TAB | Refills: 5 | Status: ON HOLD | OUTPATIENT
Start: 2019-01-21 | End: 2019-09-01

## 2019-01-21 NOTE — TELEPHONE ENCOUNTER
[address] is [facility] a refill on med    Requested Prescriptions     Pending Prescriptions Disp Refills    potassium chloride SR (KLOR-CON 10) 10 mEq tablet 60 Tab 5     Sig: TAKE TWO TABLETS BY MOUTH DAILY

## 2019-01-21 NOTE — TELEPHONE ENCOUNTER
Chief Complaint   Patient presents with    Medication Refill     Last refill:   6 months ago (7/9/2018)   potassium chloride SR (KLOR-CON 10) 10 mEq tablet   TAKE TWO TABLETS BY MOUTH DAILY   Dispense: 60 Tab     Refills: 5     Start: 7/9/2018     By: Renetta Jones MD      Last Ov: 12/17/18    Potassium 4.6 on 12/18/18

## 2019-01-29 RX ORDER — LEVOTHYROXINE SODIUM 75 UG/1
TABLET ORAL
Qty: 90 TAB | Refills: 3 | Status: SHIPPED | OUTPATIENT
Start: 2019-01-29 | End: 2020-02-25 | Stop reason: SDUPTHER

## 2019-01-29 NOTE — TELEPHONE ENCOUNTER
Chief Complaint   Patient presents with    Medication Refill     Last refill:   3 months ago (10/29/2018)   levothyroxine (SYNTHROID) 75 mcg tablet   TAKE ONE TABLET BY MOUTH DAILY BEFORE BREAKFAST   Dispense: 90 Tab     Refills: 0     Start: 10/29/2018     By: Paxton Wharton MD

## 2019-01-30 RX ORDER — LEVOTHYROXINE SODIUM 75 UG/1
TABLET ORAL
Qty: 90 TAB | Refills: 3 | Status: CANCELLED | OUTPATIENT
Start: 2019-01-30

## 2019-01-30 NOTE — TELEPHONE ENCOUNTER
Message from [facility]    Pt requesting a refill for Rx\"L-Thyroxine 75 mg\" to be called into [address] [place] [address] at [phone]. Pt stated completely out of Rx.  Best contact:[phone]     Requested Prescriptions     Pending Prescriptions Disp Refills    levothyroxine (SYNTHROID) 75 mcg tablet 90 Tab 3     Sig: TAKE ONE TABLET BY MOUTH DAILY BEFORE BREAKFAST

## 2019-02-07 ENCOUNTER — DOCUMENTATION ONLY (OUTPATIENT)
Dept: FAMILY MEDICINE CLINIC | Age: 84
End: 2019-02-07

## 2019-02-07 DIAGNOSIS — M48.00 SPINAL STENOSIS, UNSPECIFIED SPINAL REGION: ICD-10-CM

## 2019-02-07 RX ORDER — TRAMADOL HYDROCHLORIDE 50 MG/1
TABLET ORAL
Qty: 30 TAB | Refills: 0 | Status: SHIPPED | OUTPATIENT
Start: 2019-02-07 | End: 2019-03-06 | Stop reason: SDUPTHER

## 2019-03-06 DIAGNOSIS — M54.50 CHRONIC BILATERAL LOW BACK PAIN WITHOUT SCIATICA: Primary | ICD-10-CM

## 2019-03-06 DIAGNOSIS — G89.29 CHRONIC BILATERAL LOW BACK PAIN WITHOUT SCIATICA: Primary | ICD-10-CM

## 2019-03-06 DIAGNOSIS — M48.00 SPINAL STENOSIS, UNSPECIFIED SPINAL REGION: ICD-10-CM

## 2019-03-06 RX ORDER — TRAMADOL HYDROCHLORIDE 50 MG/1
50 TABLET ORAL
Qty: 30 TAB | Refills: 0 | Status: SHIPPED | OUTPATIENT
Start: 2019-03-06 | End: 2019-04-05 | Stop reason: SDUPTHER

## 2019-03-06 NOTE — TELEPHONE ENCOUNTER
Chief Complaint   Patient presents with    Medication Refill     Last refill: 2/7/19  Last ov: 12/17/18  : last filled on 2/12/19. Appropriate.

## 2019-03-06 NOTE — TELEPHONE ENCOUNTER
Requested Prescriptions     Pending Prescriptions Disp Refills    traMADol (ULTRAM) 50 mg tablet 30 Tab 0     Sig: TAKE ONE TABLET BY MOUTH EVERY 6 HOURS AS NEEDED FOR PAIN, MAX OF 4 TABLETS PER DAY       Requested by patient.

## 2019-03-08 ENCOUNTER — TELEPHONE (OUTPATIENT)
Dept: FAMILY MEDICINE CLINIC | Age: 84
End: 2019-03-08

## 2019-03-08 NOTE — TELEPHONE ENCOUNTER
Called pt and verified name and . Pt was upset because she had to pay double for her tramadol rx. Pt called the insurance company and they told her it was due to her rx sig saying \"for up to 30 days. \" Pt voiced that in the future the rx has to be written without that or else the insurance company will not cover it like it has in the past. Apologized for the inconvenience, she voiced understanding. Routing to Dr. Shashank Duran to make him aware.

## 2019-04-05 ENCOUNTER — HOSPITAL ENCOUNTER (OUTPATIENT)
Dept: LAB | Age: 84
Discharge: HOME | End: 2019-04-05
Payer: MEDICARE

## 2019-04-05 ENCOUNTER — OFFICE VISIT (OUTPATIENT)
Dept: INTERNAL MEDICINE CLINIC | Age: 84
End: 2019-04-05

## 2019-04-05 VITALS
TEMPERATURE: 97.8 F | SYSTOLIC BLOOD PRESSURE: 145 MMHG | HEIGHT: 61 IN | RESPIRATION RATE: 18 BRPM | DIASTOLIC BLOOD PRESSURE: 79 MMHG | OXYGEN SATURATION: 99 % | WEIGHT: 186 LBS | HEART RATE: 67 BPM | BODY MASS INDEX: 35.12 KG/M2

## 2019-04-05 DIAGNOSIS — N18.30 STAGE 3 CHRONIC KIDNEY DISEASE (HCC): ICD-10-CM

## 2019-04-05 DIAGNOSIS — R10.30 LOWER ABDOMINAL PAIN: Primary | ICD-10-CM

## 2019-04-05 DIAGNOSIS — R79.89 LOW VITAMIN D LEVEL: ICD-10-CM

## 2019-04-05 DIAGNOSIS — M54.50 CHRONIC BILATERAL LOW BACK PAIN WITHOUT SCIATICA: ICD-10-CM

## 2019-04-05 DIAGNOSIS — G89.29 CHRONIC BILATERAL LOW BACK PAIN WITHOUT SCIATICA: ICD-10-CM

## 2019-04-05 DIAGNOSIS — I10 ESSENTIAL HYPERTENSION: ICD-10-CM

## 2019-04-05 DIAGNOSIS — R10.30 INGUINAL PAIN, UNSPECIFIED LATERALITY: Primary | ICD-10-CM

## 2019-04-05 DIAGNOSIS — M48.00 SPINAL STENOSIS, UNSPECIFIED SPINAL REGION: ICD-10-CM

## 2019-04-05 DIAGNOSIS — E03.9 HYPOTHYROIDISM, UNSPECIFIED TYPE: Chronic | ICD-10-CM

## 2019-04-05 PROCEDURE — 85025 COMPLETE CBC W/AUTO DIFF WBC: CPT

## 2019-04-05 PROCEDURE — 84443 ASSAY THYROID STIM HORMONE: CPT

## 2019-04-05 PROCEDURE — 80053 COMPREHEN METABOLIC PANEL: CPT

## 2019-04-05 PROCEDURE — 36415 COLL VENOUS BLD VENIPUNCTURE: CPT

## 2019-04-05 PROCEDURE — 82570 ASSAY OF URINE CREATININE: CPT

## 2019-04-05 PROCEDURE — 82306 VITAMIN D 25 HYDROXY: CPT

## 2019-04-05 RX ORDER — AMMONIUM LACTATE 12 G/100G
LOTION TOPICAL
Refills: 2 | COMMUNITY
Start: 2019-03-30 | End: 2021-12-21

## 2019-04-05 RX ORDER — TRAMADOL HYDROCHLORIDE 50 MG/1
50 TABLET ORAL
Qty: 30 TAB | Refills: 2 | Status: SHIPPED | OUTPATIENT
Start: 2019-04-05 | End: 2019-05-05

## 2019-04-05 NOTE — LETTER
AGREEMENT for controlled medication treatment Orvan Bars, have agreed to a course of treatment that includes taking controlled medication. For this treatment I designate _____________________________________ as the [facility] Provider.  The purpose of this agreement is to prevent misunderstandings about controlled medications I may be taking for treatment, and to comply with applicable laws. I understand this agreement is essential to the trust and confidence necessary in a provider-patient relationship and that the designated provider will treat me in accordance with the statements below. As part of my treatment I agree to the followin.  USE 
a. I will take the controlled medication as instructed by the designated provider and avoid improper use of controlled medications. b.   I will not share, sell or trade controlled medication with anyone as this is a criminal offense.  
c.   I will not use any illegal controlled substance, including marijuana, cocaine, etc. as this is a criminal offense. 2.  PROVIDERS 
a. I will only obtain controlled medication from the designated provider. b.   I will not attempt to obtain the same or similar controlled medications, such as opioid pain medication, stimulants, anti-anxiety or hypnotics from any other provider as this is a criminal offense. 
c.   I will inform the designated provider about all other licensed professionals providing medical care to me and authorize communication between all providers to coordinate care, particularly prescribing or dispensing of controlled medications. 3.  PHARMACY 
a.   I will only fill controlled medication prescriptions at the approved pharmacy as listed below. 4.  OFFICE VISITS 
a. I agree to attend scheduled office visit appointments. b.   I am aware my office visits may be monthly or as determined necessary by the designated provider. c.   I will communicate fully with the designated provider about the character and intensity of my symptoms, the effect of the symptoms on my daily life, and how well the controlled medication is helping to relieve the cause of my symptoms. 5.  REFILLS OR CALL-IN PRESCRIPTIONS OF CONTROLLED MEDICATIONS 
a. I agree that refills of my prescriptions for controlled medications will be made at the time of an office visit during regular office hours. No refills will be available during evenings or on weekends. Abusive or inappropriate behavior related to medication refills will not be tolerated. b.   I will not call the office repeatedly to inquire about my controlled medication. I understand that medications will be written on the due date and not before. Calling the office repeatedly will be considered harassment, and I may be discharged from the practice. c.   Controlled medications may not be called in for refill, but doing so is at the discretion of the designated provider. d.   I am aware that only I must  prescriptions for controlled medications at the office but the designated provider may allow a designee to  the prescription from the office under very specific circumstance that may develop. 6.  TOXICOLOGY SCREENING 
a. I agree to random urine toxicology screenings in order to comply with government and [address] [place] [address] regulations. I understand that I will be financially responsible for any charges incurred by this office, [facility] Powers [facility] laboratory, [employer], or [facility] for the urine toxicology screening, which may not be covered by my insurance. Failure to do so will be considered non-compliance and I may be discharged. b. In some cases an oral swab or hair sample may be substituted for a urine screen. This is at the discretion of the designated provider. I understand that I will be financially responsible for any charges incurred as well. c.   I understand that if the urine toxicology does not show my medications prescribed to me by the designated provider, or it shows any illegal substance or any other medications NOT prescribed by the designated providers, I may be discharged from this practice at the discretion of the designated provider and no further controlled medications will be prescribed or follow-up appointments scheduled. Also, if any illegal substances are detected on the urine toxicology, this information may be provided to local law enforcement. 7. PILL COUNTS 
a. I am aware I may be called at any time to come into the office for a count of all my remaining controlled medications in order to help the designated provider understand the rate at which I use my controlled medications and to more effectively adjust dosage. b. I agree that I will use my medication at a rate NO greater than the prescribed rate and that use of my medication at a greater rate will result in my being without medication for the period of time until next expected due date. c. I will bring in the containers with the medication prescribed by all providers, including the designated provider, to each office visit even if there is no medication remaining. All controlled medication will be in the original containers from the pharmacy for each medication. Failure to do so will be considered non-compliance and I may be discharged from the practice. 8.  LOSS OR THEFT OF MEDICATION 
a. I will safeguard my controlled medication from loss or theft. b.   Lost or stolen controlled medication may not be replaced. This includes a prescription that has not yet been filled at the pharmacy. c.   In the event my controlled medications are stolen or lost, I will notify the designated providers office immediately.   If such event occurs during the night, weekend or holiday, I will leave a detailed message on the answering machine or answering service at the number listed above. 
d.   I will file and produce an official police report for any effort to replace controlled medications prescribed. 9.  AGENCY COLLABORATION I authorize the designated provider and the authorized pharmacy/pharmacist to cooperate fully with any city, state, or federal law enforcement agency in the investigation of any possible misuse, sale or other diversion of my controlled medication. 10.  TREATMENT I understand that if I violate any of the conditions, my controlled medication and/or treatment will be terminated. If the violation involves obtaining controlled substances and/or dangerous drugs from another source, the incident may be reported to other medical facilities and authorities, including law enforcement. In this case, the designated provider may taper off the medication over a period of several days, as necessary, to avoid withdrawal symptoms or will suggest alternate treatment facilities. Also, a drug dependence treatment program may be recommended. 11.  AGREEMENT I agree to follow these guidelines that have been fully explained to me. All of my questions and concerns regarding treatment have been adequately answered. A copy of this document has been given to me. I agree to use ______________________________ Authorized Pharmacy located at _________________________________________________________________ Telephone ________________________________for filling ALL controlled medication prescriptions. This agreement is entered into on 4/5/2019 Patient signature__________________________________________________________ Legal Guardian signature___________________________________________________ Provider signature_________________________________________________________ Witness signature_________________________________________________________

## 2019-04-05 NOTE — PROGRESS NOTES
Ms. Pham Panda is a new patient who is here to establish care. CC: 
Establish Care and Abdominal Pain HPI: 
 
79 yo woman presenting to establish care Patient reports 2 days ago had severe pain in lower abdomen and had difficulty having a BM. Patient then took miralax and bisacodyl suppository with improvement She had mild pain in lower abdomen this AM which is gone Feels well if has BM once a day Last colonoscopy 2013 Had bleeding  In the stool June July 2018 saw GI and advised against colonoscopy. Thought to be hemorrhoid possibly related to straining versus diverticulosis No bleeding this year Hx of CAD: had stent back in 2008 and takes crestor, metoprolol, lisinopril and aspirin CKD stage III: sees Dr Jaxson Delgadillo once a year at [facility] Chronic back pain: tramadol takes one daily Review of systems: 
Constitutional: negative for fever, chills, weight loss, night sweats Eyes : negative for vision changes, eye pain and discharge Nose and Throat: negative for tinnitus, sore throat Cardiovascular: negative for chest pain, palpitations and shortness of breath Respiratory: negative for shortness of breath, cough and wheezing Gastroinstestinal: negative for abdominal pain, nausea, vomiting, diarrhea, constipation, and blood in the stool Musculoskeletal: has chronic back pain and wears a brace Genitourinary: negative for dysuria, nocturia, polyuria and hematuria Neurologic: Negative for focal weakness, numbness or incoordination Skin: negative for rash, pruritus Hematologic: negative for easy bruising Past Medical History:  
Diagnosis Date  CAD (coronary artery disease) stent placed on left 1 stent,in 2007  Cancer ([facility] BCCA  DVT (deep venous thrombosis) ([facility]  GERD (gastroesophageal reflux disease)  Hypercholesterolemia  Hypertension  Pulmonary embolism ([address] 9/15/2015  Thromboembolus ([facility] DVT after hernia operation, PE spontaneous 10 years later  Thyroid disease Past Surgical History:  
Procedure Laterality Date  HX GI    
 esophageal hiatal hernia - 2004  HX GYN    
 partial hysterectomy - [id number]  HX OTHER SURGICAL    
 BCCAs removed  HX UROLOGICAL    
 bladder repair - 1999 Allergies Allergen Reactions  Codeine Unknown (comments) Unsure of reaction.  Morphine Hives and Itching Current Outpatient Medications on File Prior to Visit Medication Sig Dispense Refill  traMADol (ULTRAM) 50 mg tablet Take 1 Tab by mouth every eight (8) hours as needed for Pain for up to 30 days. Max Daily Amount: 150 mg. 30 Tab 0  
 levothyroxine (SYNTHROID) 75 mcg tablet TAKE ONE TABLET BY MOUTH DAILY BEFORE BREAKFAST 90 Tab 3  potassium chloride SR (KLOR-CON 10) 10 mEq tablet TAKE TWO TABLETS BY MOUTH DAILY 60 Tab 5  
 lisinopril (PRINIVIL, ZESTRIL) 5 mg tablet TAKE ONE TABLET BY MOUTH DAILY 90 Tab 2  ALPRAZolam (XANAX) 0.25 mg tablet Take 1 Tab by mouth three (3) times daily as needed for Sleep or Anxiety. 30 Tab 2  
 metoprolol tartrate (LOPRESSOR) 50 mg tablet TAKE ONE TABLET BY MOUTH TWICE A  Tab 3  
 alendronate (FOSAMAX) 10 mg tablet TAKE ONE TABLET BY MOUTH EVERY MORNING BEFORE BREAKFAST 90 Tab 2  
 rosuvastatin (CRESTOR) 40 mg tablet Take 1 Tab by mouth nightly. 90 Tab 3  
 melatonin 5 mg cap capsule Take 5 mg by mouth nightly.  acetaminophen (TYLENOL ARTHRITIS PAIN) 650 mg TbER Take 650 mg by mouth every eight (8) hours.  multivitamin (ONE A DAY) tablet Take 1 Tab by mouth daily.  cholecalciferol, vitamin D3, (VITAMIN D3) 2,000 unit tab Take 1 Tab by mouth daily. 30 Tab 0  ACTIVATED CHARCOAL (CHARCOCAPS PO) Take  by mouth.  nitroglycerin (NITROSTAT) 0.3 mg SL tablet 1 tablet by SubLINGual route every five (5) minutes as needed for Chest Pain. 1 Bottle 1  
 aspirin 81 mg Tab Take 81 mg by mouth daily.  ammonium lactate (LAC-HYDRIN) 12 % lotion APPLY TO AFFECTED AREA(S) TWO TIMES A DAY  GENERIC FOR LAC-HYDRIN  2 No current facility-administered medications on file prior to visit. family history includes Dementia in her father; Diabetes in her son; Heart Disease in her mother; Kidney Disease in her son. Social History Socioeconomic History  Marital status:  Spouse name: Not on file  Number of children: Not on file  Years of education: Not on file  Highest education level: Not on file Occupational History  Not on file Social Needs  Financial resource strain: Not on file  Food insecurity:  
  Worry: Not on file Inability: Not on file  Transportation needs:  
  Medical: Not on file Non-medical: Not on file Tobacco Use  Smoking status: Never Smoker  Smokeless tobacco: Never Used Substance and Sexual Activity  Alcohol use: No  
 Drug use: No  
  Types: Sedatives/Hypnotics, Prescription  Sexual activity: Not on file Lifestyle  Physical activity:  
  Days per week: Not on file Minutes per session: Not on file  Stress: Not on file Relationships  Social connections:  
  Talks on phone: Not on file Gets together: Not on file Attends Muslim service: Not on file Active member of club or organization: Not on file Attends meetings of clubs or organizations: Not on file Relationship status: Not on file  Intimate partner violence:  
  Fear of current or ex partner: Not on file Emotionally abused: Not on file Physically abused: Not on file Forced sexual activity: Not on file Other Topics Concern  Not on file Social History Narrative  Not on file Visit Vitals /79 (BP 1 Location: Right arm, BP Patient Position: Sitting) Pulse 67 Temp 97.8 °F (36.6 °C) (Oral) Resp 18 Ht 5' 1\" (1.549 m) Wt 186 lb (84.4 kg) SpO2 99% BMI 35.14 kg/m² General:  Well appearing female no acute distress HEENT:   PERRL,normal conjunctiva. External ear and canals normal, TMs normal.  Hearing normal to voice. Nose without edema or discharge, normal septum. Lips, teeth, gums normal.  Oropharynx: no erythema, no exudates, no lesions, normal tongue. Neck:  Supple. Thyroid normal size, nontender, without nodules. No carotid bruit. No masses or lymphadenopathy Respiratory: no respiratory distress,  no wheezing, no rhonchi, no rales. No chest wall tenderness. Cardiovascular:  RRR, normal S1S2, no murmur. Gastrointestinal: normal bowel sounds, soft, nontender, without masses. No hepatosplenomegaly. Extremities +2 pulses, no edema, normal sensation Musculoskeletal:  Normal gait. Normal digits and nails. Normal strength and tone, no atrophy, and no abnormal movement. Skin:  No rash, no lesions, no ulcers. Skin warm, normal turgor, without induration or nodules. Neuro:  A and OX4, fluent speech, cranial nerves normal 2-12. Sensation normal to light touch. DTR symmetrical 
Psych:  Normal affect Lab Results Component Value Date/Time WBC 4.7 12/17/2018 12:15 PM  
 HGB 12.9 12/17/2018 12:15 PM  
 HCT 38.1 12/17/2018 12:15 PM  
 PLATELET 253 55/96/3932 12:15 PM  
  (H) 12/17/2018 12:15 PM  
 
Lab Results Component Value Date/Time Sodium 140 12/17/2018 12:15 PM  
 Potassium 4.6 12/17/2018 12:15 PM  
 Chloride 103 12/17/2018 12:15 PM  
 CO2 21 12/17/2018 12:15 PM  
 Anion gap 7 10/22/2016 12:21 PM  
 Glucose 105 (H) 12/17/2018 12:15 PM  
 BUN 19 12/17/2018 12:15 PM  
 Creatinine 1.38 (H) 12/17/2018 12:15 PM  
 BUN/Creatinine ratio 14 12/17/2018 12:15 PM  
 GFR est AA 40 (L) 12/17/2018 12:15 PM  
 GFR est non-AA 35 (L) 12/17/2018 12:15 PM  
 Calcium 9.8 12/17/2018 12:15 PM  
 
Lab Results Component Value Date/Time  Cholesterol, total 150 08/21/2018 09:42 AM  
 HDL Cholesterol 70 08/21/2018 09:42 AM  
 LDL, calculated 51 08/21/2018 09:42 AM  
 VLDL, calculated 29 08/21/2018 09:42 AM  
 Triglyceride 146 08/21/2018 09:42 AM  
 CHOL/HDL Ratio 2.5 09/02/2015 03:20 AM  
 
Lab Results Component Value Date/Time TSH 0.474 07/09/2018 12:48 PM  
 
Lab Results Component Value Date/Time Hemoglobin A1c 5.7 05/07/2009 01:40 PM  
 
Lab Results Component Value Date/Time Vitamin D 25-Hydroxy 31.8 (L) 07/28/2011 08:00 AM  
 VITAMIN D, 25-HYDROXY 25.2 (L) 08/07/2017 12:00 AM  
   
 
 
 
 
 
 
Assessment and Plan:  
 
81 yo woman with a hx of CAD, HTN, hypothyroidism, CKD stage III presenting to establish care 1. Lower abdominal pain: improved after taking laxatives ( miralax and docusate) / normal exam today. - suspect constipation - XR ABD (KUB); Future 2. Essential hypertension 
- blood pressure is at goal for age - METABOLIC PANEL, COMPREHENSIVE 
- CBC WITH AUTOMATED DIFF 3. Hypothyroidism, unspecified type - euthyroid on exam 
- on synthroid 75 mcg  
- TSH AND FREE T4 
 
4. Stage 3 chronic kidney disease (HCC) 
- followed by Dr Matt Greenberg at [address] [place] [address] 5. Low vitamin D level/ osteoporosis 
- has been on fosamax 10mg daily / patient does not want BMD at this time due to pain with lying on table. I could not locate her last BMD 
Takes 2000 units of vitamin D 3 daily 
- VITAMIN D, 25 HYDROXY 6. Hx of CAD/ hx of stent : asymptomatic. On aspirin and lisinopril and crestor. Last LDL less than 70 
  
7. Chronic back pain: wears brace. - has been on tramadol one a day 50mg for pain . Pain agreement done. Checking UDS 
- tramadol re ordered today  Tramadol #30 Takes 2000 units of vitamin D 3 daily Piedad Bullard MD

## 2019-04-05 NOTE — PROGRESS NOTES
Reviewed record in preparation for visit and have obtained necessary documentation. Identified pt with two pt identifiers(name and ). Chief Complaint Patient presents with [facility] Establish Care  Abdominal Pain Health Maintenance Due Topic Date Due  
 DTaP/Tdap/Td  (1 - Tdap) 1954  Shingles Vaccine (1 of 2) 1983  Pneumococcal Vaccine (2 of 2 - PPSV23) 2017  Glaucoma Screening   2018 [facility] Annual Well Visit  2018 Ms. Chaparro has a reminder for a \"due or due soon\" health maintenance. I have asked that she discuss this further with her primary care provider for follow-up on this health maintenance. Coordination of Care Questionnaire: 
:  
 
1) Have you been to an emergency room, urgent care clinic since your last visit? no  
Hospitalized since your last visit? no          
 
2) Have you seen or consulted any other health care providers outside of [address] [place] [address] since your last visit? no  (Include any pap smears or colon screenings in this section.) 3) In the event something were to happen to you and you were unable to speak on your behalf, do you have an Advance Directive/ Living Will in place stating your wishes? NO Do you have an Advance Directive on file? no 
 
4) Are you interested in receiving information on Advance Directives? NO Patient is accompanied by self I have received verbal consent from Devan Guerra to discuss any/all medical information while they are present in the room.

## 2019-04-06 LAB
25(OH)D3+25(OH)D2 SERPL-MCNC: 39.8 NG/ML (ref 30–100)
ALBUMIN SERPL-MCNC: 4.2 G/DL (ref 3.5–4.7)
ALBUMIN/GLOB SERPL: 1.8 {RATIO} (ref 1.2–2.2)
ALP SERPL-CCNC: 66 IU/L (ref 39–117)
ALT SERPL-CCNC: 7 IU/L (ref 0–32)
AST SERPL-CCNC: 24 IU/L (ref 0–40)
BASOPHILS # BLD AUTO: 0 X10E3/UL (ref 0–0.2)
BASOPHILS NFR BLD AUTO: 0 %
BILIRUB SERPL-MCNC: 0.5 MG/DL (ref 0–1.2)
BUN SERPL-MCNC: 23 MG/DL (ref 8–27)
BUN/CREAT SERPL: 19 (ref 12–28)
CALCIUM SERPL-MCNC: 10.3 MG/DL (ref 8.7–10.3)
CHLORIDE SERPL-SCNC: 104 MMOL/L (ref 96–106)
CO2 SERPL-SCNC: 23 MMOL/L (ref 20–29)
CREAT SERPL-MCNC: 1.18 MG/DL (ref 0.57–1)
EOSINOPHIL # BLD AUTO: 0 X10E3/UL (ref 0–0.4)
EOSINOPHIL NFR BLD AUTO: 1 %
ERYTHROCYTE [DISTWIDTH] IN BLOOD BY AUTOMATED COUNT: 12.8 % (ref 12.3–15.4)
GLOBULIN SER CALC-MCNC: 2.4 G/DL (ref 1.5–4.5)
GLUCOSE SERPL-MCNC: 101 MG/DL (ref 65–99)
HCT VFR BLD AUTO: 41.4 % (ref 34–46.6)
HGB BLD-MCNC: 14.2 G/DL (ref 11.1–15.9)
IMM GRANULOCYTES # BLD AUTO: 0 X10E3/UL (ref 0–0.1)
IMM GRANULOCYTES NFR BLD AUTO: 0 %
LYMPHOCYTES # BLD AUTO: 0.9 X10E3/UL (ref 0.7–3.1)
LYMPHOCYTES NFR BLD AUTO: 17 %
MCH RBC QN AUTO: 33 PG (ref 26.6–33)
MCHC RBC AUTO-ENTMCNC: 34.3 G/DL (ref 31.5–35.7)
MCV RBC AUTO: 96 FL (ref 79–97)
MONOCYTES # BLD AUTO: 0.6 X10E3/UL (ref 0.1–0.9)
MONOCYTES NFR BLD AUTO: 13 %
NEUTROPHILS # BLD AUTO: 3.5 X10E3/UL (ref 1.4–7)
NEUTROPHILS NFR BLD AUTO: 69 %
PLATELET # BLD AUTO: 200 X10E3/UL (ref 150–379)
POTASSIUM SERPL-SCNC: 5.1 MMOL/L (ref 3.5–5.2)
PROT SERPL-MCNC: 6.6 G/DL (ref 6–8.5)
RBC # BLD AUTO: 4.3 X10E6/UL (ref 3.77–5.28)
SODIUM SERPL-SCNC: 142 MMOL/L (ref 134–144)
T4 FREE SERPL-MCNC: 1.54 NG/DL (ref 0.82–1.77)
TSH SERPL DL<=0.005 MIU/L-ACNC: 0.52 UIU/ML (ref 0.45–4.5)
WBC # BLD AUTO: 5 X10E3/UL (ref 3.4–10.8)

## 2019-04-11 LAB — DRUGS UR: NORMAL

## 2019-04-14 NOTE — PROGRESS NOTES
Labs look great Kidney function slightly improved from previous Thyroid at goal 
Vitamin D at goal

## 2019-04-16 RX ORDER — ROSUVASTATIN CALCIUM 40 MG/1
40 TABLET, COATED ORAL
Qty: 90 TAB | Refills: 3 | Status: SHIPPED | OUTPATIENT
Start: 2019-04-16 | End: 2020-04-15 | Stop reason: SDUPTHER

## 2019-04-16 NOTE — TELEPHONE ENCOUNTER
PCP: Moises Rutledge MD    Last appt: 4/5/2019  Future Appointments   Date Time Provider Jazmyne Cristina   7/1/2019  2:30 PM Appa Tj Peña MD [address]   8/20/2019 11:30 AM Reema Robertson MD [address] [facility]       Requested Prescriptions     Pending Prescriptions Disp Refills    rosuvastatin (CRESTOR) 40 mg tablet 90 Tab 3     Sig: Take 1 Tab by mouth nightly.

## 2019-05-24 RX ORDER — ALENDRONATE SODIUM 10 MG/1
TABLET ORAL
Qty: 90 TAB | Refills: 2 | Status: SHIPPED | OUTPATIENT
Start: 2019-05-24 | End: 2020-02-25 | Stop reason: SDUPTHER

## 2019-05-24 NOTE — TELEPHONE ENCOUNTER
Requested Prescriptions     Pending Prescriptions Disp Refills    alendronate (FOSAMAX) 10 mg tablet 90 Tab 2       Requested by pharmacy.

## 2019-06-25 ENCOUNTER — TELEPHONE (OUTPATIENT)
Dept: INTERNAL MEDICINE CLINIC | Age: 84
End: 2019-06-25

## 2019-06-25 DIAGNOSIS — M54.50 CHRONIC BILATERAL LOW BACK PAIN WITHOUT SCIATICA: ICD-10-CM

## 2019-06-25 DIAGNOSIS — M43.06 SPONDYLOLYSIS OF LUMBAR REGION: ICD-10-CM

## 2019-06-25 DIAGNOSIS — G89.29 CHRONIC BILATERAL LOW BACK PAIN WITHOUT SCIATICA: ICD-10-CM

## 2019-06-25 DIAGNOSIS — M48.00 SPINAL STENOSIS, UNSPECIFIED SPINAL REGION: Primary | ICD-10-CM

## 2019-06-25 NOTE — TELEPHONE ENCOUNTER
#[phone] pt states she needs a transport (light weight wheelchair) for an upcoming trip the middle of next week. Please fax an order to [facility]  (in [place])  and they will get it to her. Please put diagnosis code and why she needs so this speeds up the process. Fax #[phone]         Pt would like a call when this has been done as she really needs this for her trip.

## 2019-06-27 ENCOUNTER — TELEPHONE (OUTPATIENT)
Dept: INTERNAL MEDICINE CLINIC | Age: 84
End: 2019-06-27

## 2019-06-27 NOTE — TELEPHONE ENCOUNTER
Called, spoke with Pt. Two pt identifiers confirmed. Pt informed information for transport chair was sent to [facility] and someone from there will be in touch with her. Pt verbalized understanding of information discussed w/ no further questions at this time.

## 2019-06-27 NOTE — TELEPHONE ENCOUNTER
----- Message from Nicanor Cooler sent at 6/27/2019 10:25 AM EDT -----  Regarding: Dr. Domenic Guo / Telephone   Pt requesting call back to f/up on request for transport chair from [employer] [facility] (Fax: [phone]). Pt initially put in this request on 6/24 but so far has heard nothing back.  Pt best contact [phone]    Copy/paste envera

## 2019-07-01 ENCOUNTER — OFFICE VISIT (OUTPATIENT)
Dept: INTERNAL MEDICINE CLINIC | Age: 84
End: 2019-07-01

## 2019-07-01 ENCOUNTER — HOSPITAL ENCOUNTER (OUTPATIENT)
Dept: GENERAL RADIOLOGY | Age: 84
Discharge: HOME | End: 2019-07-01
Payer: MEDICARE

## 2019-07-01 ENCOUNTER — HOSPITAL ENCOUNTER (OUTPATIENT)
Dept: LAB | Age: 84
Discharge: HOME | End: 2019-07-01
Payer: MEDICARE

## 2019-07-01 VITALS
SYSTOLIC BLOOD PRESSURE: 125 MMHG | BODY MASS INDEX: 35.12 KG/M2 | WEIGHT: 186 LBS | HEART RATE: 66 BPM | OXYGEN SATURATION: 99 % | HEIGHT: 61 IN | DIASTOLIC BLOOD PRESSURE: 76 MMHG | RESPIRATION RATE: 18 BRPM | TEMPERATURE: 97.8 F

## 2019-07-01 DIAGNOSIS — R06.00 DYSPNEA, UNSPECIFIED TYPE: ICD-10-CM

## 2019-07-01 DIAGNOSIS — N18.30 STAGE 3 CHRONIC KIDNEY DISEASE (HCC): ICD-10-CM

## 2019-07-01 DIAGNOSIS — M54.50 CHRONIC BILATERAL LOW BACK PAIN WITHOUT SCIATICA: ICD-10-CM

## 2019-07-01 DIAGNOSIS — E03.9 HYPOTHYROIDISM, UNSPECIFIED TYPE: ICD-10-CM

## 2019-07-01 DIAGNOSIS — I10 ESSENTIAL HYPERTENSION: ICD-10-CM

## 2019-07-01 DIAGNOSIS — G89.29 CHRONIC BILATERAL LOW BACK PAIN WITHOUT SCIATICA: ICD-10-CM

## 2019-07-01 DIAGNOSIS — M43.06 SPONDYLOLYSIS OF LUMBAR REGION: Primary | ICD-10-CM

## 2019-07-01 DIAGNOSIS — Z00.00 MEDICARE ANNUAL WELLNESS VISIT, SUBSEQUENT: ICD-10-CM

## 2019-07-01 DIAGNOSIS — E66.01 SEVERE OBESITY WITH BODY MASS INDEX (BMI) OF 35.0 TO 39.9 WITH SERIOUS COMORBIDITY (HCC): ICD-10-CM

## 2019-07-01 PROCEDURE — 71046 X-RAY EXAM CHEST 2 VIEWS: CPT

## 2019-07-01 PROCEDURE — 82570 ASSAY OF URINE CREATININE: CPT

## 2019-07-01 RX ORDER — TRAMADOL HYDROCHLORIDE 50 MG/1
50 TABLET ORAL EVERY 12 HOURS
Qty: 60 TAB | Refills: 2 | Status: SHIPPED | OUTPATIENT
Start: 2019-07-01 | End: 2019-07-31

## 2019-07-01 NOTE — PROGRESS NOTES
CC: Back Pain      HPI:    She is a 80 y.o. female who presents for evaluation of back pain     Patient has long history of back pain -Severe degenerative scoliosis thoracolumbar spine    Patient is has a large wheelchair but it is too heavy to manage and daugther cant carry it or put it in car. Patient would benefit from a lightweight chair so she can do activities with daughter and travel  [facility] and [facility] at all times  She feels her back pain is progressing. She is taking tramadol once a day but feels the need to increase it to twice a day for better pain control and quality of life. She also complains of  6 months of dyspnea on exertion / denies chest pain  She is scheduled to see Dr Devang Garcia next month   She states she was previously offered a stress test but did not proceed with testing \" concerned with the way the stress test makes her feel\"    ROS:  Constitutional: negative for fevers, chills, anorexia and weight loss  10 systems reviewed and negative other than HPI    Past Medical History:   Diagnosis Date    CAD (coronary artery disease)     stent placed on left 1 stent,in 2007    Cancer ([facility]     BCCA    DVT (deep venous thrombosis) ([facility]     GERD (gastroesophageal reflux disease)     Hypercholesterolemia     Hypertension     Pulmonary embolism ([facility] [address] 9/15/2015    Thromboembolus ([facility] [address] [facility]     DVT after hernia operation, PE spontaneous 10 years later    Thyroid disease        Current Outpatient Medications on File Prior to Visit   Medication Sig Dispense Refill    alendronate (FOSAMAX) 10 mg tablet TAKE ONE TABLET BY MOUTH EVERY MORNING BEFORE BREAKFAST 90 Tab 2    rosuvastatin (CRESTOR) 40 mg tablet Take 1 Tab by mouth nightly.  90 Tab 3    ammonium lactate (LAC-HYDRIN) 12 % lotion APPLY TO AFFECTED AREA(S) TWO TIMES A DAY  GENERIC FOR LAC-HYDRIN  2    levothyroxine (SYNTHROID) 75 mcg tablet TAKE ONE TABLET BY MOUTH DAILY BEFORE BREAKFAST 90 Tab 3    potassium chloride SR (KLOR-CON 10) 10 mEq tablet TAKE TWO TABLETS BY MOUTH DAILY 60 Tab 5    lisinopril (PRINIVIL, ZESTRIL) 5 mg tablet TAKE ONE TABLET BY MOUTH DAILY 90 Tab 2    ALPRAZolam (XANAX) 0.25 mg tablet Take 1 Tab by mouth three (3) times daily as needed for Sleep or Anxiety. 30 Tab 2    metoprolol tartrate (LOPRESSOR) 50 mg tablet TAKE ONE TABLET BY MOUTH TWICE A  Tab 3    melatonin 5 mg cap capsule Take 5 mg by mouth nightly.  acetaminophen (TYLENOL ARTHRITIS PAIN) 650 mg TbER Take 650 mg by mouth every eight (8) hours.  multivitamin (ONE A DAY) tablet Take 1 Tab by mouth daily.  cholecalciferol, vitamin D3, (VITAMIN D3) 2,000 unit tab Take 1 Tab by mouth daily. 30 Tab 0    ACTIVATED CHARCOAL (CHARCOCAPS PO) Take  by mouth.  nitroglycerin (NITROSTAT) 0.3 mg SL tablet 1 tablet by SubLINGual route every five (5) minutes as needed for Chest Pain. 1 Bottle 1    aspirin 81 mg Tab Take 81 mg by mouth daily. No current facility-administered medications on file prior to visit.         Past Surgical History:   Procedure Laterality Date    HX GI      esophageal hiatal hernia - 2004    HX GYN      partial hysterectomy - 1970    HX OTHER SURGICAL      BCCAs removed    HX UROLOGICAL      bladder repair - 1999       Family History   Problem Relation Age of Onset    Heart Disease Mother     Dementia Father     Diabetes Son     Kidney Disease Son         autoimmune     Reviewed and no changes     Social History     Socioeconomic History    Marital status:      Spouse name: Not on file    Number of children: Not on file    Years of education: Not on file    Highest education level: Not on file   Occupational History    Not on file   Social Needs    Financial resource strain: Not on file    Food insecurity:     Worry: Not on file     Inability: Not on file    Transportation needs:     Medical: Not on file     Non-medical: Not on file   Tobacco Use    Smoking status: Never Smoker    Smokeless tobacco: Never Used   Substance and Sexual Activity    Alcohol use: No    Drug use: No     Types: Sedatives/Hypnotics, Prescription    Sexual activity: Not on file   Lifestyle    Physical activity:     Days per week: Not on file     Minutes per session: Not on file    Stress: Not on file   Relationships    Social connections:     Talks on phone: Not on file     Gets together: Not on file     Attends Shinto service: Not on file     Active member of club or organization: Not on file     Attends meetings of clubs or organizations: Not on file     Relationship status: Not on file    Intimate partner violence:     Fear of current or ex partner: Not on file     Emotionally abused: Not on file     Physically abused: Not on file     Forced sexual activity: Not on file   Other Topics Concern    Not on file   Social History Narrative    Not on file            Visit Vitals  /76 (BP 1 Location: Right arm, BP Patient Position: Sitting)   Pulse 66   Temp 97.8 °F (36.6 °C) (Oral)   Resp 18   Ht 5' 1\" (1.549 m)   Wt 186 lb (84.4 kg)   SpO2 99%   BMI 35.14 kg/m²       Physical Examination:   General - Well appearing female  HEENT - PERRL, TM no erythema/opacification, normal nasal turbinates, oropharynx no erythema or exudate, MMM  Neck - supple, no bruits, no TMG, no LAD  Pulm - clear to auscultation bilaterally  Cardio - RRR, normal S1 S2, no murmur gallops or rubs  Abd - soft, nontender, no masses, no HSM  Extrem - no edema, +2 distal pulses  Psych - normal affect, appropriate mood  She has severe scoliosis and she is wearing a back brace  Lab Results   Component Value Date/Time    WBC 5.0 04/05/2019 02:25 PM    HGB 14.2 04/05/2019 02:25 PM    HCT 41.4 04/05/2019 02:25 PM    PLATELET 933 59/43/4487 02:25 PM    MCV 96 04/05/2019 02:25 PM     Lab Results   Component Value Date/Time    Sodium 142 04/05/2019 02:25 PM    Potassium 5.1 04/05/2019 02:25 PM    Chloride 104 04/05/2019 02:25 PM CO2 23 04/05/2019 02:25 PM    Anion gap 7 10/22/2016 12:21 PM    Glucose 101 (H) 04/05/2019 02:25 PM    BUN 23 04/05/2019 02:25 PM    Creatinine 1.18 (H) 04/05/2019 02:25 PM    BUN/Creatinine ratio 19 04/05/2019 02:25 PM    GFR est AA 49 (L) 04/05/2019 02:25 PM    GFR est non-AA 42 (L) 04/05/2019 02:25 PM    Calcium 10.3 04/05/2019 02:25 PM     Lab Results   Component Value Date/Time    Cholesterol, total 150 08/21/2018 09:42 AM    HDL Cholesterol 70 08/21/2018 09:42 AM    LDL, calculated 51 08/21/2018 09:42 AM    VLDL, calculated 29 08/21/2018 09:42 AM    Triglyceride 146 08/21/2018 09:42 AM    CHOL/HDL Ratio 2.5 09/02/2015 03:20 AM     Lab Results   Component Value Date/Time    TSH 0.515 04/05/2019 02:25 PM     No results found for: PSA, PSA2, PSAR1, Saeid Slates, [id number], [id number], PSALT  Lab Results   Component Value Date/Time    Hemoglobin A1c 5.7 05/07/2009 01:40 PM     Lab Results   Component Value Date/Time    Vitamin D 25-Hydroxy 31.8 (L) 07/28/2011 08:00 AM    VITAMIN D, 25-HYDROXY 39.8 04/05/2019 02:25 PM       Lab Results   Component Value Date/Time    ALT (SGPT) 7 04/05/2019 02:25 PM    AST (SGOT) 24 04/05/2019 02:25 PM    Alk. phosphatase 66 04/05/2019 02:25 PM    Bilirubin, total 0.5 04/05/2019 02:25 PM           Assessment/Plan:    Spondylolysis of lumbar region  Chronic bilateral low back pain without sciatica  Pain has been worsening. I am going to increase her tramadol to twice a day.  is appropriate. She would greatly benefit from a light weight wheelchair so she can still enjoy activities with her family outside of the home. - traMADol (ULTRAM) 50 mg tablet; Take 1 Tab by mouth every twelve (12) hours for 30 days. Max Daily Amount: 100 mg. Dispense: 60 Tab;  Refill: 2  - AMB SUPPLY ORDER  - COMPLIANCE DRUG SCREEN/PRESCRIPTION MONITORING      Dyspnea on exertion for 6 months / hx of coronary artery disease / stents  - on Crestor, metoprolol and aspirin  - XR CHEST PA LAT; Future  -Discussed with patient given her cardiac history she will follow-up with her cardiologist.  I have messaged Dr. Monalisa Hurtado. Up     Medicare annual wellness visit, subsequent  - diph,pertuss,acel,,tetanus vac,PF, (ADACEL) 2 Lf-(2.5-5-3-5 mcg)-5Lf/0.5 mL syrg vaccine; 0.5 mL by IntraMUSCular route once for 1 dose. Dispense: 0.5 mL; Refill: 0  - pneumococcal 23-diogenes ps vaccine (PNEUMOVAX 23) 25 mcg/0.5 mL injection; 0.5 mL by IntraMUSCular route once for 1 dose. Dispense: 0.5 mL; Refill: 0       Hypothyroidism, unspecified type - euthyroid on exam  - on synthroid 75 mcg   - TSH AND FREE T4     Stage 3 chronic kidney disease ([facility] [address]  - followed by Dr Brandon Morales [address]/ osteoporosis  - has been on fosamax 10mg daily / patient does not want BMD at this time due to pain with lying on table. I could not locate her last BMD  Takes 2000 units of vitamin D 3 daily  - VITAMIN D, 25 HYDROXY           Follow-up and Dispositions    · Return in about 3 months (around 10/1/2019) for chronic pain. Mary Ricardo MD  This is the Subsequent Medicare Annual Wellness Exam, performed 12 months or more after the Initial AWV or the last Subsequent AWV    I have reviewed the patient's medical history in detail and updated the computerized patient record. Depression Risk Factor Screening:     3 most recent PHQ Screens 7/1/2019   Little interest or pleasure in doing things Not at all   Feeling down, depressed, irritable, or hopeless Not at all   Total Score PHQ 2 0     Alcohol Risk Factor Screening: You do not drink alcohol or very rarely. Functional Ability and Level of Safety:   Hearing Loss  Hearing is good.     Activities of Daily Living  The home contains:walk in shower and chair  On first floor, lives with daughter  Niyah Herrmann do much cooking  Can feed herself, bath herself   Needs help with cleaning and cooking shopping   Still driving    Fall Risk  Fall Risk Assessment, last 12 mths 7/1/2019 Able to walk? Yes   Fall in past 12 months? No   Fall with injury? -   Number of falls in past 12 months -   Fall Risk Score -       Abuse Screen  Patient is not abused    Cognitive Screening   Evaluation of Cognitive Function:  Has your family/caregiver stated any concerns about your memory: no  Normal    Patient Care Team   Patient Care Team:  Betty Alvarez MD as PCP - General (Internal Medicine)  Sam Proctor MD as Physician (Cardiology)  Sam Proctor MD as Physician (Cardiology)  Manav Hsu MD (Nephrology)    Assessment/Plan   Education and counseling provided:  Are appropriate based on today's review and evaluation       Spondylolysis of lumbar region  Chronic bilateral low back pain without sciatica  Pain has been worsening. I am going to increase her tramadol to twice a day.  is appropriate. She would greatly benefit from a light weight wheelchair so she can still enjoy activities with her family outside of the home. - traMADol (ULTRAM) 50 mg tablet; Take 1 Tab by mouth every twelve (12) hours for 30 days. Max Daily Amount: 100 mg. Dispense: 60 Tab; Refill: 2  - AMB SUPPLY ORDER  - COMPLIANCE DRUG SCREEN/PRESCRIPTION MONITORING      Dyspnea on exertion for 6 months / hx of coronary artery disease / stents  - on Crestor, metoprolol and aspirin  - XR CHEST PA LAT; Future  -Discussed with patient given her cardiac history she will follow-up with her cardiologist.  I have messaged Dr. Andersen CoKelechi Up       Hypothyroidism, unspecified type - euthyroid on exam  - on synthroid 75 mcg   - TSH AND FREE T4     Stage 3 chronic kidney disease ([facility] [address]  - followed by Dr April Hawkins at [address] [facility] D level/ osteoporosis  - has been on fosamax 10mg daily / patient does not want BMD at this time due to pain with lying on table.  I could not locate her last BMD  Takes 2000 units of vitamin D 3 daily  - VITAMIN D, 25 HYDROXY     Medicare annual wellness visit, subsequent  - diph,pertuss,acel,,tetanus vac,PF, (ADACEL) 2 Lf-(2.5-5-3-5 mcg)-5Lf/0.5 mL syrg vaccine; 0.5 mL by IntraMUSCular route once for 1 dose. -     pneumococcal 23-diogenes ps vaccine (PNEUMOVAX 23) 25 mcg/0.5 mL injection; 0.5 mL by IntraMUSCular route once for 1 dose.       Health Maintenance Due   Topic Date Due    DTaP/Tdap/Td  (1 - Tdap) Given prescription         Pneumococcal Vaccine (2 of 2 - PPSV23) Given prescription    Glaucoma Screening   In August   

## 2019-07-01 NOTE — PATIENT INSTRUCTIONS
Office Policies Phone calls/patient messages: Please allow up to 24 hours for someone in the office to contact you about your call or message. Be mindful your provider may be out of the office or your message may require further review. We encourage you to use [employer] for your messages as this is a faster, more efficient way to communicate with our office Medication Refills: 
         
Prescription medications require 48-72 business hours to process. We encourage you to use [employer] for your refills. For controlled medications: Please allow 72 business hours to process. Certain medications may require you to  a written prescription at our office. NO narcotic/controlled medications will be prescribed after 4pm Monday through Friday or on weekends Form/Paperwork Completion: 
         
Please note a $25 fee may incur for all paperwork for completed by our providers. We ask that you allow 7-10 business days. Pre-payment is due prior to picking up/faxing the completed form. You may also download your forms to [employer] to have your doctor print off. Medicare Wellness Visit, Female The best way to live healthy is to have a lifestyle where you eat a well-balanced diet, exercise regularly, limit alcohol use, and quit all forms of tobacco/nicotine, if applicable. Regular preventive services are another way to keep healthy. Preventive services (vaccines, screening tests, monitoring & exams) can help personalize your care plan, which helps you manage your own care. Screening tests can find health problems at the earliest stages, when they are easiest to treat. [facility] follows the current, evidence-based guidelines published by the [facility] [place] [place] (USPSTF) when recommending preventive services for our patients.  Because we follow these guidelines, sometimes recommendations change over time as research supports it. (For example, mammograms used to be recommended annually. Even though Medicare will still pay for an annual mammogram, the newer guidelines recommend a mammogram every two years for women of average risk.) Of course, you and your doctor may decide to screen more often for some diseases, based on your risk and your health status. Preventive services for you include: - Medicare offers their members a free annual wellness visit, which is time for you and your primary care provider to discuss and plan for your preventive service needs. Take advantage of this benefit every year! 
-All adults over the age of 72 should receive the recommended pneumonia vaccines. Current USPSTF guidelines recommend a series of two vaccines for the best pneumonia protection.  
-All adults should have a flu vaccine yearly and a tetanus vaccine every 10 years. All adults age 61 and older should receive a shingles vaccine once in their lifetime.   
-A bone mass density test is recommended when a woman turns 65 to screen for osteoporosis. This test is only recommended one time, as a screening. Some providers will use this same test as a disease monitoring tool if you already have osteoporosis. -All adults age 38-68 who are overweight should have a diabetes screening test once every three years.  
-Other screening tests and preventive services for persons with diabetes include: an eye exam to screen for diabetic retinopathy, a kidney function test, a foot exam, and stricter control over your cholesterol.  
-Cardiovascular screening for adults with routine risk involves an electrocardiogram (ECG) at intervals determined by your doctor.  
-Colorectal cancer screenings should be done for adults age 54-65 with no increased risk factors for colorectal cancer. There are a number of acceptable methods of screening for this type of cancer. Each test has its own benefits and drawbacks.  Discuss with your doctor what is most appropriate for you during your annual wellness visit. The different tests include: colonoscopy (considered the best screening method), a fecal occult blood test, a fecal DNA test, and sigmoidoscopy. -Breast cancer screenings are recommended every other year for women of normal risk, age 54-69. 
-Cervical cancer screenings for women over age 72 are only recommended with certain risk factors.  
-All adults born between [facility][1] should be screened once for Hepatitis C. Here is a list of your current Health Maintenance items (your personalized list of preventive services) with a due date: 
Health Maintenance Due Topic Date Due  
 DTaP/Tdap/Td  (1 - Tdap) Given prescription  Pneumococcal Vaccine (2 of 2 - PPSV23) Given prescription  Glaucoma Screening   In August  


## 2019-07-01 NOTE — PROGRESS NOTES
Reviewed record in preparation for visit and have obtained necessary documentation. Identified pt with two pt identifiers(name and ). Chief Complaint   Patient presents with    Back Pain       Health Maintenance Due   Topic Date Due    DTaP/Tdap/Td  (1 - Tdap) 1954    Shingles Vaccine (1 of 2) 1983    Pneumococcal Vaccine (2 of 2 - PPSV23) 2017    Glaucoma Screening   2018    Annual Well Visit  2018       Ms. Chaparro has a reminder for a \"due or due soon\" health maintenance. I have asked that she discuss this further with her primary care provider for follow-up on this health maintenance. Coordination of Care Questionnaire:  :     1) Have you been to an emergency room, urgent care clinic since your last visit? no   Hospitalized since your last visit? no             2) Have you seen or consulted any other health care providers outside of [address] [place] [address] since your last visit? no  (Include any pap smears or colon screenings in this section.)    3) In the event something were to happen to you and you were unable to speak on your behalf, do you have an Advance Directive/ Living Will in place stating your wishes? NO    Do you have an Advance Directive on file? Yes   4) Are you interested in receiving information on Advance Directives? NO    Patient is accompanied by self I have received verbal consent from Jevon Stanley to discuss any/all medical information while they are present in the room.

## 2019-07-02 DIAGNOSIS — I10 ESSENTIAL HYPERTENSION: ICD-10-CM

## 2019-07-02 DIAGNOSIS — R06.02 SOB (SHORTNESS OF BREATH): ICD-10-CM

## 2019-07-02 DIAGNOSIS — I25.10 ASHD (ARTERIOSCLEROTIC HEART DISEASE): Primary | ICD-10-CM

## 2019-07-02 DIAGNOSIS — E78.5 HYPERLIPIDEMIA, UNSPECIFIED HYPERLIPIDEMIA TYPE: ICD-10-CM

## 2019-07-05 LAB — DRUGS UR: NORMAL

## 2019-07-19 ENCOUNTER — TELEPHONE (OUTPATIENT)
Dept: CARDIOLOGY CLINIC | Age: 84
End: 2019-07-19

## 2019-07-19 NOTE — TELEPHONE ENCOUNTER
Called patient to confirm Nuclear stress test for 07/22. Reviewed with patient the need to hold all caffeine containing food, beverages and medicines for 24 hours. Pt verbalized understanding.

## 2019-07-29 ENCOUNTER — TELEPHONE (OUTPATIENT)
Dept: INTERNAL MEDICINE CLINIC | Age: 84
End: 2019-07-29

## 2019-07-29 NOTE — TELEPHONE ENCOUNTER
Spoke with patient. Two pt identifiers confirmed. Patient advised that the order for her chair has been refaxed to [facility] and confirmation has been received. Pt verbalized understanding of information discussed w/ no further questions at this time.

## 2019-07-29 NOTE — TELEPHONE ENCOUNTER
Patient states her 2nd Order/Medicare approved order for her Transport Chair to [facility] was never received & would like to get this re-sent please. Please call when this has been done.  Thank you

## 2019-07-30 ENCOUNTER — TELEPHONE (OUTPATIENT)
Dept: INTERNAL MEDICINE CLINIC | Age: 84
End: 2019-07-30

## 2019-07-30 NOTE — TELEPHONE ENCOUNTER
Requested Prescriptions     Pending Prescriptions Disp Refills    potassium chloride SR (KLOR-CON 10) 10 mEq tablet 60 Tab 5     Sig: TAKE TWO TABLETS BY MOUTH DAILY       Requested by pharmacy.

## 2019-07-30 NOTE — TELEPHONE ENCOUNTER
Pt would like someone to contact her about getting approval for her light wheelchair. She would like the nurse to contact her. She said the prescription for the chair doesn't meet medicare requirements. Please call [facility].  Best contact number is [phone]     Message received & copied from [facility]

## 2019-07-30 NOTE — TELEPHONE ENCOUNTER
Spoke with patient. Two pt identifiers confirmed. Patient advised that I spoke with [facility].  Wheelchair order is processing with Medicare. Advised patient that Nato Thorne is to call me if any additional issues come up. Pt verbalized understanding of information discussed w/ no further questions at this time.

## 2019-07-31 RX ORDER — POTASSIUM CHLORIDE 750 MG/1
TABLET, FILM COATED, EXTENDED RELEASE ORAL
Qty: 60 TAB | Refills: 5 | OUTPATIENT
Start: 2019-07-31

## 2019-08-01 RX ORDER — POTASSIUM CHLORIDE 750 MG/1
TABLET, FILM COATED, EXTENDED RELEASE ORAL
Qty: 60 TAB | Refills: 5 | OUTPATIENT
Start: 2019-08-01

## 2019-08-01 NOTE — TELEPHONE ENCOUNTER
----- Message from Adan Jackson sent at 8/1/2019 12:25 PM EDT -----  Regarding: Robert Cos / refill   Medication Refill      Best contact number(s):[phone]      Name of medication and dosage if known: Potassium      Is patient out of this medication (yes/no):yes       Pharmacy name:[facility]    Pharmacy listed in chart? (yes/no): yes    Pharmacy phone number: [phone]      Details to clarify the request: rep from [facility] pharmacy is attempting to reach out to office regarding patient being in pharmacy and is trying to get an over the phone approval for patients Potassium prescription     Copy/paste envera

## 2019-08-02 NOTE — TELEPHONE ENCOUNTER
Unable to reach patient. LVM to return our call. Advised [facility] pharmacist to d/c K+ due to levels being on upper end of normal. She will also attempt to contact patient.

## 2019-08-05 ENCOUNTER — TELEPHONE (OUTPATIENT)
Dept: INTERNAL MEDICINE CLINIC | Age: 84
End: 2019-08-05

## 2019-08-05 NOTE — TELEPHONE ENCOUNTER
Spoke with patient. Two pt identifiers confirmed. Patient advised per Dr. Sydney Carbajal that her potassium was on the upper end of normal so she no longer needs to take the potassium supplement. Pt verbalized understanding of information discussed w/ no further questions at this time.

## 2019-08-05 NOTE — TELEPHONE ENCOUNTER
Patient states she's returning your call in reference to her Potassium Refill request. Please call to discuss.  Thank you

## 2019-08-13 RX ORDER — LISINOPRIL 5 MG/1
TABLET ORAL
Qty: 90 TAB | Refills: 1 | Status: SHIPPED | OUTPATIENT
Start: 2019-08-13 | End: 2020-02-24

## 2019-08-22 ENCOUNTER — OFFICE VISIT (OUTPATIENT)
Dept: CARDIOLOGY CLINIC | Age: 84
End: 2019-08-22

## 2019-08-22 VITALS
HEIGHT: 60 IN | SYSTOLIC BLOOD PRESSURE: 150 MMHG | WEIGHT: 190.1 LBS | DIASTOLIC BLOOD PRESSURE: 82 MMHG | BODY MASS INDEX: 37.32 KG/M2

## 2019-08-22 DIAGNOSIS — I25.10 ASHD (ARTERIOSCLEROTIC HEART DISEASE): ICD-10-CM

## 2019-08-22 DIAGNOSIS — Z98.890 S/P CARDIAC CATH: ICD-10-CM

## 2019-08-22 DIAGNOSIS — I20.1 CORONARY ARTERY SPASM (HCC): ICD-10-CM

## 2019-08-22 DIAGNOSIS — E78.5 HYPERLIPIDEMIA, UNSPECIFIED HYPERLIPIDEMIA TYPE: Primary | ICD-10-CM

## 2019-08-22 DIAGNOSIS — I10 ESSENTIAL HYPERTENSION: ICD-10-CM

## 2019-08-22 DIAGNOSIS — R94.39 ABNORMAL STRESS TEST: ICD-10-CM

## 2019-08-22 RX ORDER — ISOSORBIDE MONONITRATE 30 MG/1
30 TABLET, EXTENDED RELEASE ORAL DAILY
Qty: 30 TAB | Refills: 3 | Status: SHIPPED | OUTPATIENT
Start: 2019-08-22 | End: 2019-10-14

## 2019-08-22 RX ORDER — SULFAMETHOXAZOLE AND TRIMETHOPRIM 800; 160 MG/1; MG/1
TABLET ORAL
Status: ON HOLD | COMMUNITY
End: 2019-09-01

## 2019-08-22 RX ORDER — CHLORPHENIRAMINE MALEATE 4 MG
TABLET ORAL
COMMUNITY
End: 2021-12-21

## 2019-08-22 RX ORDER — CEPHALEXIN 500 MG/1
CAPSULE ORAL
Status: ON HOLD | COMMUNITY
End: 2019-09-01

## 2019-08-22 RX ORDER — DOXYCYCLINE 100 MG/1
CAPSULE ORAL
Status: ON HOLD | COMMUNITY
End: 2019-09-01

## 2019-08-22 RX ORDER — TRAMADOL HYDROCHLORIDE 50 MG/1
TABLET ORAL
COMMUNITY
Start: 2017-08-19 | End: 2019-10-04 | Stop reason: SDUPTHER

## 2019-08-22 RX ORDER — CIPROFLOXACIN 500 MG/1
TABLET ORAL
Status: ON HOLD | COMMUNITY
End: 2019-09-01

## 2019-08-22 NOTE — PROGRESS NOTES
8/22/2019 4:21 PM      Subjective:     Lilly Gonzales is seen in office today to discuss stress test results. For last couple of months has been c/o SOB and tiredness. She denies chest pain, chest pressure/discomfort, palpitations, irregular heart beats, near-syncope, syncope, orthopnea, paroxysmal nocturnal dyspnea, claudication, lower extremity edema. Visit Vitals  /82 (BP 1 Location: Right arm, BP Patient Position: Sitting)   Ht 5' (1.524 m)   Wt 190 lb 1.6 oz (86.2 kg)   BMI 37.13 kg/m²     Current Outpatient Medications   Medication Sig    traMADol (ULTRAM) 50 mg tablet tramadol 50 mg tablet    clotrimazole (LOTRIMIN) 1 % topical cream clotrimazole 1 % topical cream    isosorbide mononitrate ER (IMDUR) 30 mg tablet Take 1 Tab by mouth daily.  lisinopril (PRINIVIL, ZESTRIL) 5 mg tablet TAKE ONE TABLET BY MOUTH ONE TIME DAILY    alendronate (FOSAMAX) 10 mg tablet TAKE ONE TABLET BY MOUTH EVERY MORNING BEFORE BREAKFAST    rosuvastatin (CRESTOR) 40 mg tablet Take 1 Tab by mouth nightly.  ammonium lactate (LAC-HYDRIN) 12 % lotion APPLY TO AFFECTED AREA(S) TWO TIMES A DAY  GENERIC FOR LAC-HYDRIN    levothyroxine (SYNTHROID) 75 mcg tablet TAKE ONE TABLET BY MOUTH DAILY BEFORE BREAKFAST    ALPRAZolam (XANAX) 0.25 mg tablet Take 1 Tab by mouth three (3) times daily as needed for Sleep or Anxiety. (Patient taking differently: Take 0.25 mg by mouth as needed for Anxiety or Sleep.)    metoprolol tartrate (LOPRESSOR) 50 mg tablet TAKE ONE TABLET BY MOUTH TWICE A DAY    melatonin 5 mg cap capsule Take 5 mg by mouth nightly.  acetaminophen (TYLENOL ARTHRITIS PAIN) 650 mg TbER Take 650 mg by mouth every eight (8) hours.  multivitamin (ONE A DAY) tablet Take 1 Tab by mouth daily.  cholecalciferol, vitamin D3, (VITAMIN D3) 2,000 unit tab Take 1 Tab by mouth daily.  ACTIVATED CHARCOAL (CHARCOCAPS PO) Take  by mouth.     nitroglycerin (NITROSTAT) 0.3 mg SL tablet 1 tablet by SubLINGual route every five (5) minutes as needed for Chest Pain.  aspirin 81 mg Tab Take 81 mg by mouth daily.  trimethoprim-sulfamethoxazole (BACTRIM DS, SEPTRA DS) 160-800 mg per tablet sulfamethoxazole 800 mg-trimethoprim 160 mg tablet    cephALEXin (KEFLEX) 500 mg capsule cephalexin 500 mg capsule    ciprofloxacin HCl (CIPRO) 500 mg tablet ciprofloxacin 500 mg tablet    doxycycline (VIBRAMYCIN) 100 mg capsule doxycycline hyclate 100 mg capsule    potassium chloride SR (KLOR-CON 10) 10 mEq tablet TAKE TWO TABLETS BY MOUTH DAILY     No current facility-administered medications for this visit. Objective:      Visit Vitals  /82 (BP 1 Location: Right arm, BP Patient Position: Sitting)   Ht 5' (1.524 m)   Wt 190 lb 1.6 oz (86.2 kg)   BMI 37.13 kg/m²       Data Review:     Reviewed and/or ordered active problem list, medication list tests    Past Medical History:   Diagnosis Date    CAD (coronary artery disease)     stent placed on left 1 stent,in 2007    Cancer ([facility]     BCCA    DVT (deep venous thrombosis) ([address]     GERD (gastroesophageal reflux disease)     Hypercholesterolemia     Hypertension     Pulmonary embolism ([address] 9/15/2015    Thromboembolus ([address]     DVT after hernia operation, PE spontaneous 10 years later    Thyroid disease       Past Surgical History:   Procedure Laterality Date    HX GI      esophageal hiatal hernia - 2004    HX GYN      partial hysterectomy - 1970    HX OTHER SURGICAL      BCCAs removed    HX UROLOGICAL      bladder repair - 1999     Allergies   Allergen Reactions    Codeine Unknown (comments)     Unsure of reaction.     Morphine Hives and Itching      Family History   Problem Relation Age of Onset    Heart Disease Mother     Dementia Father     Diabetes Son     Kidney Disease Son         autoimmune      Social History     Socioeconomic History    Marital status:      Spouse name: Not on file    Number of children: Not on file  Years of education: Not on file    Highest education level: Not on file   Occupational History    Not on file   Social Needs    Financial resource strain: Not on file    Food insecurity:     Worry: Not on file     Inability: Not on file    Transportation needs:     Medical: Not on file     Non-medical: Not on file   Tobacco Use    Smoking status: Never Smoker    Smokeless tobacco: Never Used   Substance and Sexual Activity    Alcohol use: No    Drug use: No     Types: Sedatives/Hypnotics, Prescription    Sexual activity: Not on file   Lifestyle    Physical activity:     Days per week: Not on file     Minutes per session: Not on file    Stress: Not on file   Relationships    Social connections:     Talks on phone: Not on file     Gets together: Not on file     Attends Yazidism service: Not on file     Active member of club or organization: Not on file     Attends meetings of clubs or organizations: Not on file     Relationship status: Not on file    Intimate partner violence:     Fear of current or ex partner: Not on file     Emotionally abused: Not on file     Physically abused: Not on file     Forced sexual activity: Not on file   Other Topics Concern    Not on file   Social History Narrative    Not on file         Review of Systems     General: Not Present- Anorexia, Chills, Dietary Changes, Fatigue, Fever, Medication Changes, Night Sweats, Weight Gain > 10lbs. and Weight Loss > 10lbs. .  Skin: Not Present- Bruising and Excessive Sweating. HEENT: Not Present- Headache, Visual Loss and Vertigo. Respiratory: Not Present- Cough, Decreased Exercise Tolerance, Snoring and Wheezing. Cardiovascular: Not Present- Abnormal Blood Pressure, Chest Pain, Claudications, Edema, Fainting / Blacking Out, Irregular Heart Beat, Night Cramps, Orthopnea, Palpitations, Paroxysmal Nocturnal Dyspnea, Rapid Heart Rate, and Swelling of Extremities.   Gastrointestinal: Not Present- Black, Tarry Stool, Bloody Stool, Diarrhea, Hematemesis, Rectal Bleeding and Vomiting. Musculoskeletal: Not Present- Muscle Pain and Muscle Weakness. Neurological: Not Present- Dizziness. Psychiatric: Not Present- Depression. Endocrine: Not Present- Cold Intolerance, Heat Intolerance and Thyroid Problems. Hematology: Not Present- Abnormal Bleeding, Anemia, Blood Clots and Easy Bruising.       Physical Exam   The physical exam findings are as follows:       General   Mental Status - Alert. General Appearance - Not in acute distress. Chest and Lung Exam   Inspection: Accessory muscles - No use of accessory muscles in breathing. Auscultation:   Breath sounds: - Normal.      Cardiovascular   Inspection: Jugular vein - Bilateral - Inspection Normal.  Palpation/Percussion:   Apical Impulse: - Normal.  Auscultation: Rhythm - Regular. Heart Sounds - S1 WNL and S2 WNL. No S3 or S4. Murmurs & Other Heart Sounds: Auscultation of the heart reveals - No Murmurs. Carotid arteries - No Carotid bruit. Peripheral Vascular   Upper Extremity: Inspection - Bilateral - No Cyanotic nailbeds or Digital clubbing. Lower Extremity:   Palpation: Edema - Bilateral - No edema. Assessment:       ICD-10-CM ICD-9-CM    1. Hyperlipidemia, unspecified hyperlipidemia type E78.5 272.4 AMB POC EKG ROUTINE W/ 12 LEADS, INTER & REP      LIPID PANEL      METABOLIC PANEL, COMPREHENSIVE      CBC W/O DIFF   2. ASHD (arteriosclerotic heart disease) I25.10 414.00 LIPID PANEL      METABOLIC PANEL, COMPREHENSIVE      CBC W/O DIFF   3. Essential hypertension I10 401.9 LIPID PANEL      METABOLIC PANEL, COMPREHENSIVE      CBC W/O DIFF   4. Coronary artery spasm (HCC) I20.1 413.9 LIPID PANEL      METABOLIC PANEL, COMPREHENSIVE      CBC W/O DIFF   5. S/P cardiac cath Z98.890 V45.89 LIPID PANEL      METABOLIC PANEL, COMPREHENSIVE      CBC W/O DIFF   6. Abnormal stress test R94.39 794.39 LIPID PANEL      METABOLIC PANEL, COMPREHENSIVE      CBC W/O DIFF       Plan:     1.  ASHD: now with SOB and an abnormal stress test. Hx PCI 2007, normal cors 2011 cath. Option of cardiac cath vs adding second anti-ischemic med d/w pt. Will add imdur. Continue other meds. If no improvement then cardiac cath. 2. High Cholesterol: Due for repeat labs, continue statin therapy  3. HTN: continue current meds.

## 2019-08-22 NOTE — PROGRESS NOTES
1. Have you been to the ER, urgent care clinic since your last visit? Hospitalized since your last visit? No    2. Have you seen or consulted any other health care providers outside of the [address] [place] [address] since your last visit? Include any pap smears or colon screening.  No    Chief Complaint   Patient presents with    Cholesterol Problem     annual f/u    Hypertension     annual f/u    Coronary Artery Disease     annual f/u    Shortness of Breath    Palpitations    Foot Swelling     bilateral

## 2019-08-26 ENCOUNTER — HOSPITAL ENCOUNTER (OUTPATIENT)
Dept: LAB | Age: 84
Discharge: HOME | End: 2019-08-26
Payer: MEDICARE

## 2019-08-26 PROCEDURE — 80053 COMPREHEN METABOLIC PANEL: CPT

## 2019-08-26 PROCEDURE — 80061 LIPID PANEL: CPT

## 2019-08-26 PROCEDURE — 85027 COMPLETE CBC AUTOMATED: CPT

## 2019-08-26 PROCEDURE — 36415 COLL VENOUS BLD VENIPUNCTURE: CPT

## 2019-08-28 LAB
ALBUMIN SERPL-MCNC: 3.9 G/DL (ref 3.5–4.7)
ALBUMIN/GLOB SERPL: 1.7 {RATIO} (ref 1.2–2.2)
ALP SERPL-CCNC: 77 IU/L (ref 39–117)
ALT SERPL-CCNC: 6 IU/L (ref 0–32)
AST SERPL-CCNC: 20 IU/L (ref 0–40)
BILIRUB SERPL-MCNC: 0.5 MG/DL (ref 0–1.2)
BUN SERPL-MCNC: 21 MG/DL (ref 8–27)
BUN/CREAT SERPL: 16 (ref 12–28)
CALCIUM SERPL-MCNC: 10.4 MG/DL (ref 8.7–10.3)
CHLORIDE SERPL-SCNC: 105 MMOL/L (ref 96–106)
CHOLEST SERPL-MCNC: 147 MG/DL (ref 100–199)
CO2 SERPL-SCNC: 27 MMOL/L (ref 20–29)
CREAT SERPL-MCNC: 1.33 MG/DL (ref 0.57–1)
ERYTHROCYTE [DISTWIDTH] IN BLOOD BY AUTOMATED COUNT: 13.1 % (ref 12.3–15.4)
GLOBULIN SER CALC-MCNC: 2.3 G/DL (ref 1.5–4.5)
GLUCOSE SERPL-MCNC: 110 MG/DL (ref 65–99)
HCT VFR BLD AUTO: 42.6 % (ref 34–46.6)
HDLC SERPL-MCNC: 62 MG/DL
HGB BLD-MCNC: 13.6 G/DL (ref 11.1–15.9)
INTERPRETATION, 910389: NORMAL
INTERPRETATION: NORMAL
LDLC SERPL CALC-MCNC: 60 MG/DL (ref 0–99)
MCH RBC QN AUTO: 32.9 PG (ref 26.6–33)
MCHC RBC AUTO-ENTMCNC: 31.9 G/DL (ref 31.5–35.7)
MCV RBC AUTO: 103 FL (ref 79–97)
PDF IMAGE, 910387: NORMAL
PLATELET # BLD AUTO: 178 X10E3/UL (ref 150–450)
POTASSIUM SERPL-SCNC: 4.2 MMOL/L (ref 3.5–5.2)
PROT SERPL-MCNC: 6.2 G/DL (ref 6–8.5)
RBC # BLD AUTO: 4.14 X10E6/UL (ref 3.77–5.28)
SODIUM SERPL-SCNC: 146 MMOL/L (ref 134–144)
TRIGL SERPL-MCNC: 125 MG/DL (ref 0–149)
VLDLC SERPL CALC-MCNC: 25 MG/DL (ref 5–40)
WBC # BLD AUTO: 4.1 X10E3/UL (ref 3.4–10.8)

## 2019-09-01 ENCOUNTER — HOSPITAL ENCOUNTER (OUTPATIENT)
Age: 84
Setting detail: OBSERVATION
Discharge: HOME | End: 2019-09-03
Attending: EMERGENCY MEDICINE | Admitting: INTERNAL MEDICINE
Payer: MEDICARE

## 2019-09-01 ENCOUNTER — APPOINTMENT (OUTPATIENT)
Dept: GENERAL RADIOLOGY | Age: 84
End: 2019-09-01
Attending: EMERGENCY MEDICINE
Payer: MEDICARE

## 2019-09-01 DIAGNOSIS — I20.0 UNSTABLE ANGINA (HCC): Primary | ICD-10-CM

## 2019-09-01 DIAGNOSIS — R94.39 ABNORMAL STRESS TEST: ICD-10-CM

## 2019-09-01 DIAGNOSIS — I24.9 ACS (ACUTE CORONARY SYNDROME) (HCC): ICD-10-CM

## 2019-09-01 PROBLEM — I25.110 CORONARY ARTERY DISEASE INVOLVING NATIVE CORONARY ARTERY OF NATIVE HEART WITH UNSTABLE ANGINA PECTORIS (HCC): Status: ACTIVE | Noted: 2019-09-01

## 2019-09-01 LAB
ALBUMIN SERPL-MCNC: 3.6 G/DL (ref 3.5–5)
ALBUMIN/GLOB SERPL: 1.1 {RATIO} (ref 1.1–2.2)
ALP SERPL-CCNC: 79 U/L (ref 45–117)
ALT SERPL-CCNC: 13 U/L (ref 12–78)
ANION GAP SERPL CALC-SCNC: 6 MMOL/L (ref 5–15)
AST SERPL-CCNC: 20 U/L (ref 15–37)
BASOPHILS # BLD: 0 K/UL (ref 0–0.1)
BASOPHILS NFR BLD: 1 % (ref 0–1)
BILIRUB SERPL-MCNC: 0.7 MG/DL (ref 0.2–1)
BUN SERPL-MCNC: 18 MG/DL (ref 6–20)
BUN/CREAT SERPL: 13 (ref 12–20)
CALCIUM SERPL-MCNC: 9.6 MG/DL (ref 8.5–10.1)
CHLORIDE SERPL-SCNC: 108 MMOL/L (ref 97–108)
CK SERPL-CCNC: 55 U/L (ref 26–192)
CO2 SERPL-SCNC: 27 MMOL/L (ref 21–32)
CREAT SERPL-MCNC: 1.41 MG/DL (ref 0.55–1.02)
DIFFERENTIAL METHOD BLD: ABNORMAL
EOSINOPHIL # BLD: 0.1 K/UL (ref 0–0.4)
EOSINOPHIL NFR BLD: 2 % (ref 0–7)
ERYTHROCYTE [DISTWIDTH] IN BLOOD BY AUTOMATED COUNT: 11.9 % (ref 11.5–14.5)
GLOBULIN SER CALC-MCNC: 3.3 G/DL (ref 2–4)
GLUCOSE SERPL-MCNC: 117 MG/DL (ref 65–100)
HCT VFR BLD AUTO: 42.6 % (ref 35–47)
HGB BLD-MCNC: 14.3 G/DL (ref 11.5–16)
IMM GRANULOCYTES # BLD AUTO: 0 K/UL (ref 0–0.04)
IMM GRANULOCYTES NFR BLD AUTO: 0 % (ref 0–0.5)
LYMPHOCYTES # BLD: 0.6 K/UL (ref 0.8–3.5)
LYMPHOCYTES NFR BLD: 15 % (ref 12–49)
MCH RBC QN AUTO: 33.3 PG (ref 26–34)
MCHC RBC AUTO-ENTMCNC: 33.6 G/DL (ref 30–36.5)
MCV RBC AUTO: 99.1 FL (ref 80–99)
MONOCYTES # BLD: 0.5 K/UL (ref 0–1)
MONOCYTES NFR BLD: 11 % (ref 5–13)
NEUTS SEG # BLD: 2.9 K/UL (ref 1.8–8)
NEUTS SEG NFR BLD: 71 % (ref 32–75)
NRBC # BLD: 0 K/UL (ref 0–0.01)
NRBC BLD-RTO: 0 PER 100 WBC
PLATELET # BLD AUTO: 189 K/UL (ref 150–400)
PMV BLD AUTO: 10 FL (ref 8.9–12.9)
POTASSIUM SERPL-SCNC: 4.4 MMOL/L (ref 3.5–5.1)
PROT SERPL-MCNC: 6.9 G/DL (ref 6.4–8.2)
RBC # BLD AUTO: 4.3 M/UL (ref 3.8–5.2)
RBC MORPH BLD: ABNORMAL
SODIUM SERPL-SCNC: 141 MMOL/L (ref 136–145)
TROPONIN I SERPL-MCNC: <0.05 NG/ML
TROPONIN I SERPL-MCNC: <0.05 NG/ML
WBC # BLD AUTO: 4.1 K/UL (ref 3.6–11)

## 2019-09-01 PROCEDURE — 99218 HC RM OBSERVATION: CPT

## 2019-09-01 PROCEDURE — 93005 ELECTROCARDIOGRAM TRACING: CPT

## 2019-09-01 PROCEDURE — 84484 ASSAY OF TROPONIN QUANT: CPT

## 2019-09-01 PROCEDURE — 36415 COLL VENOUS BLD VENIPUNCTURE: CPT

## 2019-09-01 PROCEDURE — 80053 COMPREHEN METABOLIC PANEL: CPT

## 2019-09-01 PROCEDURE — 82550 ASSAY OF CK (CPK): CPT

## 2019-09-01 PROCEDURE — 74011250637 HC RX REV CODE- 250/637: Performed by: INTERNAL MEDICINE

## 2019-09-01 PROCEDURE — 71046 X-RAY EXAM CHEST 2 VIEWS: CPT

## 2019-09-01 PROCEDURE — 85025 COMPLETE CBC W/AUTO DIFF WBC: CPT

## 2019-09-01 PROCEDURE — 74011250636 HC RX REV CODE- 250/636: Performed by: INTERNAL MEDICINE

## 2019-09-01 PROCEDURE — 99285 EMERGENCY DEPT VISIT HI MDM: CPT

## 2019-09-01 RX ORDER — LISINOPRIL 5 MG/1
5 TABLET ORAL DAILY
Status: DISCONTINUED | OUTPATIENT
Start: 2019-09-02 | End: 2019-09-03 | Stop reason: HOSPADM

## 2019-09-01 RX ORDER — ACETAMINOPHEN 500 MG/1
500 CAPSULE, LIQUID FILLED ORAL
Status: DISCONTINUED | OUTPATIENT
Start: 2019-09-01 | End: 2019-09-02 | Stop reason: CLARIF

## 2019-09-01 RX ORDER — ASPIRIN 81 MG/1
81 TABLET ORAL DAILY
Status: DISCONTINUED | OUTPATIENT
Start: 2019-09-02 | End: 2019-09-03 | Stop reason: HOSPADM

## 2019-09-01 RX ORDER — ALPRAZOLAM 0.25 MG/1
0.25 TABLET ORAL
Status: DISCONTINUED | OUTPATIENT
Start: 2019-09-01 | End: 2019-09-03 | Stop reason: HOSPADM

## 2019-09-01 RX ORDER — MELATONIN
2000 DAILY
Status: DISCONTINUED | OUTPATIENT
Start: 2019-09-02 | End: 2019-09-03 | Stop reason: HOSPADM

## 2019-09-01 RX ORDER — LEVOTHYROXINE SODIUM 75 UG/1
75 TABLET ORAL
Status: DISCONTINUED | OUTPATIENT
Start: 2019-09-02 | End: 2019-09-03 | Stop reason: HOSPADM

## 2019-09-01 RX ORDER — NITROGLYCERIN 0.4 MG/1
0.4 TABLET SUBLINGUAL
Status: DISCONTINUED | OUTPATIENT
Start: 2019-09-01 | End: 2019-09-03 | Stop reason: HOSPADM

## 2019-09-01 RX ORDER — SODIUM CHLORIDE 9 MG/ML
100 INJECTION, SOLUTION INTRAVENOUS CONTINUOUS
Status: DISCONTINUED | OUTPATIENT
Start: 2019-09-01 | End: 2019-09-01

## 2019-09-01 RX ORDER — LANOLIN ALCOHOL/MO/W.PET/CERES
5 CREAM (GRAM) TOPICAL
Status: DISCONTINUED | OUTPATIENT
Start: 2019-09-01 | End: 2019-09-03 | Stop reason: HOSPADM

## 2019-09-01 RX ORDER — SODIUM CHLORIDE 0.9 % (FLUSH) 0.9 %
5-40 SYRINGE (ML) INJECTION AS NEEDED
Status: DISCONTINUED | OUTPATIENT
Start: 2019-09-01 | End: 2019-09-03 | Stop reason: HOSPADM

## 2019-09-01 RX ORDER — ALENDRONATE SODIUM 5 MG/1
10 TABLET ORAL
Status: DISCONTINUED | OUTPATIENT
Start: 2019-09-02 | End: 2019-09-01

## 2019-09-01 RX ORDER — ROSUVASTATIN CALCIUM 40 MG/1
40 TABLET, COATED ORAL
Status: DISCONTINUED | OUTPATIENT
Start: 2019-09-01 | End: 2019-09-03 | Stop reason: CLARIF

## 2019-09-01 RX ORDER — SODIUM CHLORIDE 0.9 % (FLUSH) 0.9 %
5-40 SYRINGE (ML) INJECTION EVERY 8 HOURS
Status: DISCONTINUED | OUTPATIENT
Start: 2019-09-01 | End: 2019-09-03 | Stop reason: HOSPADM

## 2019-09-01 RX ORDER — METOPROLOL TARTRATE 50 MG/1
50 TABLET ORAL 2 TIMES DAILY
Status: DISCONTINUED | OUTPATIENT
Start: 2019-09-01 | End: 2019-09-03 | Stop reason: HOSPADM

## 2019-09-01 RX ADMIN — Medication 10 ML: at 14:49

## 2019-09-01 RX ADMIN — ACETAMINOPHEN 500 MG: 500 TABLET ORAL at 22:50

## 2019-09-01 RX ADMIN — Medication 10 ML: at 22:52

## 2019-09-01 RX ADMIN — METOPROLOL TARTRATE 50 MG: 50 TABLET ORAL at 17:09

## 2019-09-01 RX ADMIN — SODIUM CHLORIDE 100 ML/HR: 900 INJECTION, SOLUTION INTRAVENOUS at 12:06

## 2019-09-01 RX ADMIN — ROSUVASTATIN CALCIUM 40 MG: 40 TABLET, FILM COATED ORAL at 22:50

## 2019-09-01 RX ADMIN — NITROGLYCERIN 1 INCH: 20 OINTMENT TOPICAL at 14:49

## 2019-09-01 RX ADMIN — MELATONIN 4.5 MG: at 22:51

## 2019-09-01 NOTE — ED PROVIDER NOTES
EMERGENCY DEPARTMENT HISTORY AND PHYSICAL EXAM      Date: 9/1/2019  Patient Name: Helder Silva  Patient Age and Sex: 80 y.o. female     History of Presenting Illness     Chief Complaint   Patient presents with    Chest Pain     pt reports chest pain beginning aroudn 8am today with nausea    Nausea       History Provided By: Patient    HPI: Helder Silva female with a history of CAD presenting with chest pain. Patient states that around 8 AM this morning woke up with left-sided chest pain and shortness of breath. Patient states that the pain is more twinges of pain that come and go. Patient states she just does not feel right in her chest and also has been having a little bit of aching in her left neck and jaw. It is associated with shortness of breath that is worse with exertion. States that she also has been having mild leg swelling but has actually improved today. Denies any cough, fevers. States that her cardiologist is Dr. Omar Lopez and he said that the left side of her heart had poor blood flow. If it started to cause any pain or issues she would need a cardiac catheterization. There are no other complaints, changes, or physical findings at this time. PCP: Viji Segovia MD    No current facility-administered medications on file prior to encounter. Current Outpatient Medications on File Prior to Encounter   Medication Sig Dispense Refill    traMADol (ULTRAM) 50 mg tablet tramadol 50 mg tablet      clotrimazole (LOTRIMIN) 1 % topical cream clotrimazole 1 % topical cream      isosorbide mononitrate ER (IMDUR) 30 mg tablet Take 1 Tab by mouth daily. 30 Tab 3    lisinopril (PRINIVIL, ZESTRIL) 5 mg tablet TAKE ONE TABLET BY MOUTH ONE TIME DAILY 90 Tab 1    alendronate (FOSAMAX) 10 mg tablet TAKE ONE TABLET BY MOUTH EVERY MORNING BEFORE BREAKFAST 90 Tab 2    rosuvastatin (CRESTOR) 40 mg tablet Take 1 Tab by mouth nightly.  90 Tab 3    ammonium lactate (LAC-HYDRIN) 12 % lotion APPLY TO AFFECTED AREA(S) TWO TIMES A DAY  GENERIC FOR LAC-HYDRIN  2    levothyroxine (SYNTHROID) 75 mcg tablet TAKE ONE TABLET BY MOUTH DAILY BEFORE BREAKFAST 90 Tab 3    ALPRAZolam (XANAX) 0.25 mg tablet Take 1 Tab by mouth three (3) times daily as needed for Sleep or Anxiety. (Patient taking differently: Take 0.25 mg by mouth as needed for Anxiety or Sleep.) 30 Tab 2    metoprolol tartrate (LOPRESSOR) 50 mg tablet TAKE ONE TABLET BY MOUTH TWICE A  Tab 3    melatonin 5 mg cap capsule Take 5 mg by mouth nightly.  acetaminophen (TYLENOL ARTHRITIS PAIN) 650 mg TbER Take 650 mg by mouth every eight (8) hours.  multivitamin (ONE A DAY) tablet Take 1 Tab by mouth daily.  cholecalciferol, vitamin D3, (VITAMIN D3) 2,000 unit tab Take 1 Tab by mouth daily. 30 Tab 0    ACTIVATED CHARCOAL (CHARCOCAPS PO) Take  by mouth.  aspirin 81 mg Tab Take 81 mg by mouth daily.  nitroglycerin (NITROSTAT) 0.3 mg SL tablet 1 tablet by SubLINGual route every five (5) minutes as needed for Chest Pain.  1 Bottle 1       Past History     Past Medical History:  Past Medical History:   Diagnosis Date    CAD (coronary artery disease)     stent placed on left 1 stent,in 2007    Cancer ([facility] [address]     BCCA    DVT (deep venous thrombosis) (HCC)     GERD (gastroesophageal reflux disease)     Hypercholesterolemia     Hypertension     Pulmonary embolism ([address] 9/15/2015    Thromboembolus ([facility] [address]     DVT after hernia operation, PE spontaneous 10 years later    Thyroid disease        Past Surgical History:  Past Surgical History:   Procedure Laterality Date    HX GI      esophageal hiatal hernia - 2004    HX GYN      partial hysterectomy - 1970    HX OTHER SURGICAL      BCCAs removed    HX UROLOGICAL      bladder repair - 1999       Family History:  Family History   Problem Relation Age of Onset    Heart Disease Mother     Dementia Father     Diabetes Son     Kidney Disease Son         autoimmune       Social History:  Social History     Tobacco Use    Smoking status: Never Smoker    Smokeless tobacco: Never Used   Substance Use Topics    Alcohol use: No    Drug use: No     Types: Sedatives/Hypnotics, Prescription       Allergies: Allergies   Allergen Reactions    Codeine Unknown (comments)     Unsure of reaction.  Morphine Hives and Itching         Review of Systems   Review of Systems   Constitutional: Positive for fatigue. Negative for chills and fever. Respiratory: Positive for shortness of breath. Negative for cough. Cardiovascular: Positive for chest pain and leg swelling. Gastrointestinal: Positive for nausea. Negative for abdominal pain, constipation, diarrhea and vomiting. Neurological: Negative for weakness and numbness. All other systems reviewed and are negative. Physical Exam   Physical Exam   Constitutional: She is oriented to person, place, and time. She appears well-developed and well-nourished. HENT:   Head: Normocephalic and atraumatic. Eyes: Conjunctivae and EOM are normal.   Neck: Normal range of motion. Neck supple. Cardiovascular: Normal rate and regular rhythm. Pulmonary/Chest: Effort normal and breath sounds normal. No respiratory distress. She has no wheezes. She has no rales. She exhibits no tenderness. Abdominal: Soft. She exhibits no distension. There is no tenderness. Musculoskeletal: Normal range of motion. She exhibits no edema. Neurological: She is alert and oriented to person, place, and time. Skin: Skin is warm and dry. Psychiatric: She has a normal mood and affect. Nursing note and vitals reviewed.        Diagnostic Study Results     Labs -     Recent Results (from the past 12 hour(s))   EKG, 12 LEAD, INITIAL    Collection Time: 09/01/19  9:25 AM   Result Value Ref Range    Ventricular Rate 72 BPM    Atrial Rate 72 BPM    P-R Interval 160 ms    QRS Duration 92 ms    Q-T Interval 392 ms    QTC Calculation (Bezet) 429 ms    Calculated P Axis 27 degrees    Calculated R Axis -6 degrees    Calculated T Axis -2 degrees    Diagnosis       Normal sinus rhythm  Inferior infarct (cited on or before 08-NOV-2011)  When compared with ECG of 22-OCT-2016 12:21,  No significant change was found     CBC WITH AUTOMATED DIFF    Collection Time: 09/01/19  9:56 AM   Result Value Ref Range    WBC 4.1 3.6 - 11.0 K/uL    RBC 4.30 3.80 - 5.20 M/uL    HGB 14.3 11.5 - 16.0 g/dL    HCT 42.6 35.0 - 47.0 %    MCV 99.1 (H) 80.0 - 99.0 FL    MCH 33.3 26.0 - 34.0 PG    MCHC 33.6 30.0 - 36.5 g/dL    RDW 11.9 11.5 - 14.5 %    PLATELET 719 848 - 644 K/uL    MPV 10.0 8.9 - 12.9 FL    NRBC 0.0 0  WBC    ABSOLUTE NRBC 0.00 0.00 - 0.01 K/uL    NEUTROPHILS 71 32 - 75 %    LYMPHOCYTES 15 12 - 49 %    MONOCYTES 11 5 - 13 %    EOSINOPHILS 2 0 - 7 %    BASOPHILS 1 0 - 1 %    IMMATURE GRANULOCYTES 0 0.0 - 0.5 %    ABS. NEUTROPHILS 2.9 1.8 - 8.0 K/UL    ABS. LYMPHOCYTES 0.6 (L) 0.8 - 3.5 K/UL    ABS. MONOCYTES 0.5 0.0 - 1.0 K/UL    ABS. EOSINOPHILS 0.1 0.0 - 0.4 K/UL    ABS. BASOPHILS 0.0 0.0 - 0.1 K/UL    ABS. IMM. GRANS. 0.0 0.00 - 0.04 K/UL    DF SMEAR SCANNED      RBC COMMENTS NORMOCYTIC, NORMOCHROMIC     METABOLIC PANEL, COMPREHENSIVE    Collection Time: 09/01/19  9:56 AM   Result Value Ref Range    Sodium 141 136 - 145 mmol/L    Potassium 4.4 3.5 - 5.1 mmol/L    Chloride 108 97 - 108 mmol/L    CO2 27 21 - 32 mmol/L    Anion gap 6 5 - 15 mmol/L    Glucose 117 (H) 65 - 100 mg/dL    BUN 18 6 - 20 MG/DL    Creatinine 1.41 (H) 0.55 - 1.02 MG/DL    BUN/Creatinine ratio 13 12 - 20      GFR est AA 43 (L) >60 ml/min/1.73m2    GFR est non-AA 35 (L) >60 ml/min/1.73m2    Calcium 9.6 8.5 - 10.1 MG/DL    Bilirubin, total 0.7 0.2 - 1.0 MG/DL    ALT (SGPT) 13 12 - 78 U/L    AST (SGOT) 20 15 - 37 U/L    Alk.  phosphatase 79 45 - 117 U/L    Protein, total 6.9 6.4 - 8.2 g/dL    Albumin 3.6 3.5 - 5.0 g/dL    Globulin 3.3 2.0 - 4.0 g/dL    A-G Ratio 1.1 1.1 - 2.2     TROPONIN I    Collection Time: 09/01/19 9:56 AM   Result Value Ref Range    Troponin-I, Qt. <0.05 <0.05 ng/mL   CK W/ REFLX CKMB    Collection Time: 09/01/19  9:56 AM   Result Value Ref Range    CK 55 26 - 192 U/L   TROPONIN I    Collection Time: 09/01/19  2:30 PM   Result Value Ref Range    Troponin-I, Qt. <0.05 <0.05 ng/mL       Radiologic Studies -   XR CHEST PA LAT   Final Result   IMPRESSION: No acute cardiopulmonary process. CT Results  (Last 48 hours)    None        CXR Results  (Last 48 hours)               09/01/19 1020  XR CHEST PA LAT Final result    Impression:  IMPRESSION: No acute cardiopulmonary process. Narrative:  EXAM:  XR CHEST PA LAT       INDICATION:   chest pain       COMPARISON: Chest radiograph 7/1/2019. FINDINGS: PA and lateral radiographs of the chest were obtained. No evidence of focal consolidation. Bilateral lower lobe subsegmental   atelectasis with mild elevation of the right hemidiaphragm. Stable biapical   pleural parenchymal scarring. No pleural effusion or pneumothorax. Heart, michael,   mediastinum are within normal limits. Calcifications of the thoracic aorta. No   acute osseous abnormalities. Unchanged levoscoliosis of the lower thoracic   spine. Medical Decision Making   I am the first provider for this patient. I reviewed the vital signs, available nursing notes, past medical history, past surgical history, family history and social history. Vital Signs-Reviewed the patient's vital signs.   Patient Vitals for the past 12 hrs:   Temp Pulse Resp BP SpO2   09/01/19 1237 97.5 °F (36.4 °C) 65 18 154/71 95 %   09/01/19 1213  65 19 148/83 94 %   09/01/19 1212 97.9 °F (36.6 °C) 64 18 148/83 94 %   09/01/19 1000  65 16 149/78 95 %   09/01/19 0945  67 23 156/73 96 %   09/01/19 0940    150/63    09/01/19 0929 98.2 °F (36.8 °C) 70 16 161/76 97 %       Records Reviewed: Nursing Notes and Old Medical Records    Provider Notes (Medical Decision Making):   Patient presenting with twinges of chest pain as well as shortness of breath radiating to her jaw. Given her history with concerns for blood flow to left side of heart, will get EKG, lab work including troponin x2, chest x-ray and consult cardiology. Differential includes pulmonary edema, pneumonia, acute bronchitis. ED Course:   Initial assessment performed. The patients presenting problems have been discussed, and they are in agreement with the care plan formulated and outlined with them. I have encouraged them to ask questions as they arise throughout their visit. ED Course as of Sep 01 1522   Eleanor  Sep 01, 2019   1051 Dr. Elvin Hanley, cardiology who will come and evaluate the patient. [JS]      ED Course User Index  [JS] Saman Escobedo MD     Disposition:    Admission Note:  Patient is being admitted to the hospital by Dr. Elvin Hanley, Service: Cardiology. The results of their tests and reasons for their admission have been discussed with them and available family. They convey agreement and understanding for the need to be admitted and for their admission diagnosis. Diagnosis     Clinical Impression:   1. Unstable angina ([address]    2. Abnormal stress test        Attestations:  Aicha Nguyen M.D. Please note that this dictation was completed with Printi, the computer voice recognition software. Quite often unanticipated grammatical, syntax, homophones, and other interpretive errors are inadvertently transcribed by the computer software. Please disregard these errors. Please excuse any errors that have escaped final proofreading. Thank you.

## 2019-09-01 NOTE — PROGRESS NOTES
1915-  [facility] [address]  Pt sitting up in recliner. Denies any c/o. Assisted pt in to bed.    2250-  Resting quietly in bed, watching tv. Vital signs taken. No c/o voiced. Instructed pt on NPO status for possible cardiac cath tomorrow. Pt stated that her day shift nurse mentioned her possibly having cardiac cath on Monday but she had not spoken to the doctor yet. Will continue to monitor. 0435-  Resting quietly with eyes closed. Awakens when name is called. Reassessment done with vital signs. Denies pain at this time. Pt remains NPO.    0600-  Pt up to bathroom to bath. Pt requested to use her personal soap.      0650-  Pt back in bed. C/o back pain. Message sent to Pharmacy for the Tylenol 500 mg po that she has ordered PRN. 2098-  Report given to [facility], RN. She will assume care of pt.

## 2019-09-01 NOTE — ED NOTES
TRANSFER - OUT REPORT:    Verbal report given to David(name) on Jose Guadalupe Angel  being transferred to [facility](unit) for routine progression of care       Report consisted of patients Situation, Background, Assessment and   Recommendations(SBAR). Information from the following report(s) SBAR, Kardex, ED Summary and Recent Results was reviewed with the receiving nurse. Lines:   Peripheral IV 09/01/19 Left Antecubital (Active)   Site Assessment Clean, dry, & intact 9/1/2019  9:59 AM   Phlebitis Assessment 0 9/1/2019  9:59 AM   Infiltration Assessment 0 9/1/2019  9:59 AM   Dressing Status Clean, dry, & intact 9/1/2019  9:59 AM        Opportunity for questions and clarification was provided.       Patient transported with:   Monitor  Registered Nurse

## 2019-09-01 NOTE — ED NOTES
Spoke to DR Sam Marcelo who is considering doing a heart cathertization today and would like patient to remain NPO, ice chips ok

## 2019-09-01 NOTE — Clinical Note
TRANSFER - OUT REPORT:  
 
Verbal report given to: Bein Yepez RN. Report consisted of patient's Situation, Background, Assessment and  
Recommendations(SBAR). Opportunity for questions and clarification was provided. Patient transported with a Registered Nurse and [address] [place] [address] / [facility]. Patient transported to: [facility].

## 2019-09-01 NOTE — H&P
History & Physical     Subjective:      Date of  Admission: 9/1/2019  9:30 AM     Admission type:Emergency    Sorin Palafox is a 80 y.o. female admitted for [place]. She presents with several days h/o left sided chest pain with radiation to jaw, arm. She had an out patient stress test which was highly abnormal. Imdur added to therapy a week ago. Symptoms have progressively gotten worse. Plan was to consider cath if medications did not improve her symptoms. She has previous CAD and stents. EKG, initial markers are negative.      Patient Active Problem List    Diagnosis Date Noted    Coronary artery disease involving native coronary artery of native heart with unstable angina pectoris ([address] 09/01/2019    Abnormal stress test 08/22/2019    Severe obesity (BMI 35.0-39.9) 07/09/2018    Scoliosis 02/12/2018    Spondylolysis of lumbar region 02/12/2018    History of pulmonary embolus (PE) 08/17/2017    CKD (chronic kidney disease) 07/25/2012    S/P cardiac cath 11/10/2011    Coronary artery spasm ([address] 11/09/2011    ASHD (arteriosclerotic heart disease) 11/08/2011    Hyperlipidemia 11/08/2011    Hypertension 11/08/2011    Hypothyroidism 02/08/2010    Other and unspecified hyperlipidemia 09/29/2009    Unspecified vitamin D deficiency 09/29/2009      Orlin Caldera MD  Past Medical History:   Diagnosis Date    CAD (coronary artery disease)     stent placed on left 1 stent,in 2007    Cancer ([address]     BCCA    DVT (deep venous thrombosis) ([address]     GERD (gastroesophageal reflux disease)     Hypercholesterolemia     Hypertension     Pulmonary embolism ([address] 9/15/2015    Thromboembolus ([address]     DVT after hernia operation, PE spontaneous 10 years later    Thyroid disease       Past Surgical History:   Procedure Laterality Date    HX GI      esophageal hiatal hernia - 2004    HX GYN      partial hysterectomy - 1970    HX OTHER SURGICAL      BCCAs removed    HX UROLOGICAL      bladder repair - 1999 Allergies   Allergen Reactions    Codeine Unknown (comments)     Unsure of reaction.  Morphine Hives and Itching      Family History   Problem Relation Age of Onset    Heart Disease Mother     Dementia Father     Diabetes Son     Kidney Disease Son         autoimmune      No current facility-administered medications for this encounter. Current Outpatient Medications   Medication Sig    trimethoprim-sulfamethoxazole (BACTRIM DS, SEPTRA DS) 160-800 mg per tablet sulfamethoxazole 800 mg-trimethoprim 160 mg tablet    cephALEXin (KEFLEX) 500 mg capsule cephalexin 500 mg capsule    traMADol (ULTRAM) 50 mg tablet tramadol 50 mg tablet    ciprofloxacin HCl (CIPRO) 500 mg tablet ciprofloxacin 500 mg tablet    doxycycline (VIBRAMYCIN) 100 mg capsule doxycycline hyclate 100 mg capsule    clotrimazole (LOTRIMIN) 1 % topical cream clotrimazole 1 % topical cream    isosorbide mononitrate ER (IMDUR) 30 mg tablet Take 1 Tab by mouth daily.  lisinopril (PRINIVIL, ZESTRIL) 5 mg tablet TAKE ONE TABLET BY MOUTH ONE TIME DAILY    alendronate (FOSAMAX) 10 mg tablet TAKE ONE TABLET BY MOUTH EVERY MORNING BEFORE BREAKFAST    rosuvastatin (CRESTOR) 40 mg tablet Take 1 Tab by mouth nightly.  ammonium lactate (LAC-HYDRIN) 12 % lotion APPLY TO AFFECTED AREA(S) TWO TIMES A DAY  GENERIC FOR LAC-HYDRIN    levothyroxine (SYNTHROID) 75 mcg tablet TAKE ONE TABLET BY MOUTH DAILY BEFORE BREAKFAST    potassium chloride SR (KLOR-CON 10) 10 mEq tablet TAKE TWO TABLETS BY MOUTH DAILY    ALPRAZolam (XANAX) 0.25 mg tablet Take 1 Tab by mouth three (3) times daily as needed for Sleep or Anxiety. (Patient taking differently: Take 0.25 mg by mouth as needed for Anxiety or Sleep.)    metoprolol tartrate (LOPRESSOR) 50 mg tablet TAKE ONE TABLET BY MOUTH TWICE A DAY    melatonin 5 mg cap capsule Take 5 mg by mouth nightly.  acetaminophen (TYLENOL ARTHRITIS PAIN) 650 mg TbER Take 650 mg by mouth every eight (8) hours.     multivitamin (ONE A DAY) tablet Take 1 Tab by mouth daily.  cholecalciferol, vitamin D3, (VITAMIN D3) 2,000 unit tab Take 1 Tab by mouth daily.  ACTIVATED CHARCOAL (CHARCOCAPS PO) Take  by mouth.  nitroglycerin (NITROSTAT) 0.3 mg SL tablet 1 tablet by SubLINGual route every five (5) minutes as needed for Chest Pain.  aspirin 81 mg Tab Take 81 mg by mouth daily. Review of Symptoms:  A comprehensive review of systems was negative except for that written in the HPI. Subjective:      Physical Exam    Visit Vitals  /78   Pulse 65   Temp 98.2 °F (36.8 °C)   Resp 16   Ht 5' (1.524 m)   Wt 186 lb (84.4 kg)   SpO2 95%   BMI 36.33 kg/m²     General Appearance:  Well developed, well nourished,alert and oriented x 3, and individual in no acute distress. Ears/Nose/Mouth/Throat:   Hearing grossly normal.         Neck: Supple. Chest:   Lungs clear to auscultation bilaterally. Cardiovascular:  Regular rate and rhythm, S1, S2 normal, no murmur. Abdomen:   Soft, non-tender, bowel sounds are active. Extremities: No edema bilaterally. Skin: Warm and dry.                Cardiographics    Telemetry: normal sinus rhythm  ECG: normal EKG, normal sinus rhythm, unchanged from previous tracings, nonspecific ST and T waves changes  Echocardiogram: Not done    Labs:   Recent Results (from the past 24 hour(s))   EKG, 12 LEAD, INITIAL    Collection Time: 09/01/19  9:25 AM   Result Value Ref Range    Ventricular Rate 72 BPM    Atrial Rate 72 BPM    P-R Interval 160 ms    QRS Duration 92 ms    Q-T Interval 392 ms    QTC Calculation (Bezet) 429 ms    Calculated P Axis 27 degrees    Calculated R Axis -6 degrees    Calculated T Axis -2 degrees    Diagnosis       Normal sinus rhythm  Inferior infarct (cited on or before 08-NOV-2011)  When compared with ECG of 22-OCT-2016 12:21,  No significant change was found     CBC WITH AUTOMATED DIFF    Collection Time: 09/01/19  9:56 AM   Result Value Ref Range    WBC 4.1 3.6 - 11.0 K/uL    RBC 4.30 3.80 - 5.20 M/uL    HGB 14.3 11.5 - 16.0 g/dL    HCT 42.6 35.0 - 47.0 %    MCV 99.1 (H) 80.0 - 99.0 FL    MCH 33.3 26.0 - 34.0 PG    MCHC 33.6 30.0 - 36.5 g/dL    RDW 11.9 11.5 - 14.5 %    PLATELET 443 935 - 921 K/uL    MPV 10.0 8.9 - 12.9 FL    NRBC 0.0 0  WBC    ABSOLUTE NRBC 0.00 0.00 - 0.01 K/uL    NEUTROPHILS 71 32 - 75 %    LYMPHOCYTES 15 12 - 49 %    MONOCYTES 11 5 - 13 %    EOSINOPHILS 2 0 - 7 %    BASOPHILS 1 0 - 1 %    IMMATURE GRANULOCYTES 0 0.0 - 0.5 %    ABS. NEUTROPHILS 2.9 1.8 - 8.0 K/UL    ABS. LYMPHOCYTES 0.6 (L) 0.8 - 3.5 K/UL    ABS. MONOCYTES 0.5 0.0 - 1.0 K/UL    ABS. EOSINOPHILS 0.1 0.0 - 0.4 K/UL    ABS. BASOPHILS 0.0 0.0 - 0.1 K/UL    ABS. IMM. GRANS. 0.0 0.00 - 0.04 K/UL    DF SMEAR SCANNED      RBC COMMENTS NORMOCYTIC, NORMOCHROMIC     METABOLIC PANEL, COMPREHENSIVE    Collection Time: 09/01/19  9:56 AM   Result Value Ref Range    Sodium 141 136 - 145 mmol/L    Potassium 4.4 3.5 - 5.1 mmol/L    Chloride 108 97 - 108 mmol/L    CO2 27 21 - 32 mmol/L    Anion gap 6 5 - 15 mmol/L    Glucose 117 (H) 65 - 100 mg/dL    BUN 18 6 - 20 MG/DL    Creatinine 1.41 (H) 0.55 - 1.02 MG/DL    BUN/Creatinine ratio 13 12 - 20      GFR est AA 43 (L) >60 ml/min/1.73m2    GFR est non-AA 35 (L) >60 ml/min/1.73m2    Calcium 9.6 8.5 - 10.1 MG/DL    Bilirubin, total 0.7 0.2 - 1.0 MG/DL    ALT (SGPT) 13 12 - 78 U/L    AST (SGOT) 20 15 - 37 U/L    Alk.  phosphatase 79 45 - 117 U/L    Protein, total 6.9 6.4 - 8.2 g/dL    Albumin 3.6 3.5 - 5.0 g/dL    Globulin 3.3 2.0 - 4.0 g/dL    A-G Ratio 1.1 1.1 - 2.2     TROPONIN I    Collection Time: 09/01/19  9:56 AM   Result Value Ref Range    Troponin-I, Qt. <0.05 <0.05 ng/mL   CK W/ REFLX CKMB    Collection Time: 09/01/19  9:56 AM   Result Value Ref Range    CK 55 26 - 192 U/L        Assessment:     Assessment:       Active Problems:    Hyperlipidemia (11/8/2011)      Hypertension (11/8/2011)      Coronary artery disease involving native coronary artery of native heart with unstable angina pectoris ([facility] (9/1/2019)         Plan: Active Problems:    Hyperlipidemia (11/8/2011)      Hypertension (11/8/2011)      Coronary artery disease involving native coronary artery of native heart with unstable angina pectoris ([facility] (9/1/2019)- will admit due to her progressive symptoms, high risk and abnormal stress test. Cath/PCI discussed with patient and family.       Rigoberto Andino MD

## 2019-09-02 PROBLEM — I24.9 ACS (ACUTE CORONARY SYNDROME) (HCC): Status: ACTIVE | Noted: 2019-09-01

## 2019-09-02 LAB
ATRIAL RATE: 72 BPM
CALCULATED P AXIS, ECG09: 27 DEGREES
CALCULATED R AXIS, ECG10: -6 DEGREES
CALCULATED T AXIS, ECG11: -2 DEGREES
DIAGNOSIS, 93000: NORMAL
P-R INTERVAL, ECG05: 160 MS
Q-T INTERVAL, ECG07: 392 MS
QRS DURATION, ECG06: 92 MS
QTC CALCULATION (BEZET), ECG08: 429 MS
VENTRICULAR RATE, ECG03: 72 BPM

## 2019-09-02 PROCEDURE — 74011250637 HC RX REV CODE- 250/637: Performed by: INTERNAL MEDICINE

## 2019-09-02 PROCEDURE — 99218 HC RM OBSERVATION: CPT

## 2019-09-02 RX ORDER — ACETAMINOPHEN 500 MG
500 TABLET ORAL
Status: DISCONTINUED | OUTPATIENT
Start: 2019-09-02 | End: 2019-09-02

## 2019-09-02 RX ORDER — SODIUM CHLORIDE 9 MG/ML
75 INJECTION, SOLUTION INTRAVENOUS CONTINUOUS
Status: DISCONTINUED | OUTPATIENT
Start: 2019-09-03 | End: 2019-09-03 | Stop reason: HOSPADM

## 2019-09-02 RX ORDER — TRAMADOL HYDROCHLORIDE 50 MG/1
50 TABLET ORAL
Status: DISCONTINUED | OUTPATIENT
Start: 2019-09-02 | End: 2019-09-03 | Stop reason: HOSPADM

## 2019-09-02 RX ORDER — ACETAMINOPHEN 500 MG
1000 TABLET ORAL
Status: DISCONTINUED | OUTPATIENT
Start: 2019-09-02 | End: 2019-09-03 | Stop reason: HOSPADM

## 2019-09-02 RX ORDER — ISOSORBIDE MONONITRATE 30 MG/1
30 TABLET, EXTENDED RELEASE ORAL DAILY
Status: DISCONTINUED | OUTPATIENT
Start: 2019-09-03 | End: 2019-09-03 | Stop reason: HOSPADM

## 2019-09-02 RX ADMIN — ACETAMINOPHEN 1000 MG: 500 TABLET ORAL at 15:14

## 2019-09-02 RX ADMIN — Medication 10 ML: at 22:14

## 2019-09-02 RX ADMIN — LEVOTHYROXINE SODIUM 75 MCG: 75 TABLET ORAL at 07:12

## 2019-09-02 RX ADMIN — Medication 10 ML: at 07:11

## 2019-09-02 RX ADMIN — TRAMADOL HYDROCHLORIDE 50 MG: 50 TABLET, FILM COATED ORAL at 08:29

## 2019-09-02 RX ADMIN — METOPROLOL TARTRATE 50 MG: 50 TABLET ORAL at 08:29

## 2019-09-02 RX ADMIN — METOPROLOL TARTRATE 50 MG: 50 TABLET ORAL at 17:21

## 2019-09-02 RX ADMIN — VITAMIN D, TAB 1000IU (100/BT) 2000 UNITS: 25 TAB at 08:29

## 2019-09-02 RX ADMIN — ASPIRIN 81 MG: 81 TABLET, COATED ORAL at 08:28

## 2019-09-02 RX ADMIN — ROSUVASTATIN CALCIUM 40 MG: 40 TABLET, FILM COATED ORAL at 22:13

## 2019-09-02 RX ADMIN — LISINOPRIL 5 MG: 5 TABLET ORAL at 08:28

## 2019-09-02 RX ADMIN — ACETAMINOPHEN 1000 MG: 500 TABLET ORAL at 22:13

## 2019-09-02 RX ADMIN — Medication 10 ML: at 15:14

## 2019-09-02 RX ADMIN — NITROGLYCERIN 1 INCH: 20 OINTMENT TOPICAL at 07:09

## 2019-09-02 RX ADMIN — MELATONIN 4.5 MG: at 22:13

## 2019-09-02 NOTE — PROGRESS NOTES
Problem: Falls - Risk of  Goal: *Absence of Falls  Description  Document Baldo Vargas Fall Risk and appropriate interventions in the flowsheet.   Outcome: Progressing Towards Goal  Note:   Fall Risk Interventions:

## 2019-09-02 NOTE — PROGRESS NOTES
8: 62 AM- Holiday CM noted that previous CM provided pt with MOON (9/1/19). Holiday CM provided pt with state observation letter. CM will continue to follow pt for discharge planning and assist as needed.      Brenda Mccabe, STONEY, [address] [facility]   [phone]

## 2019-09-02 NOTE — PROGRESS NOTES
[address] [place] [address]  [phone]      Cardiology Progress Note      9/2/2019 12:24 PM    Admit Date: 9/1/2019    Admit Diagnosis:   Unstable angina ([facility] [I20.0]    Interval History/Subjective:     Zuly Mcgill is a 80 y.o. female admitted for Unstable angina ([facility] [I20.0]    -VSS  -No labs  -Ms. Chaparro is feeling well. She states she felt side effects from the nitro patch, but it improved when it was removed. She denies current chest pain, SOB, palpitations. Visit Vitals  /80 (BP 1 Location: Right arm, BP Patient Position: Sitting)   Pulse 63   Temp 97.8 °F (36.6 °C)   Resp 16   Ht 5' (1.524 m)   Wt 84.4 kg (186 lb)   SpO2 97%   BMI 36.33 kg/m²       Current Facility-Administered Medications   Medication Dose Route Frequency    acetaminophen (TYLENOL) tablet 1,000 mg  1,000 mg Oral Q6H PRN    traMADol (ULTRAM) tablet 50 mg  50 mg Oral Q6H PRN    sodium chloride (NS) flush 5-40 mL  5-40 mL IntraVENous Q8H    sodium chloride (NS) flush 5-40 mL  5-40 mL IntraVENous PRN    nitroglycerin (NITROBID) 2 % ointment 1 Inch  1 Inch Topical Q6H    ALPRAZolam (XANAX) tablet 0.25 mg  0.25 mg Oral TID PRN    cholecalciferol (VITAMIN D3) tablet 2,000 Units  2,000 Units Oral DAILY    levothyroxine (SYNTHROID) tablet 75 mcg  75 mcg Oral ACB    lisinopril (PRINIVIL, ZESTRIL) tablet 5 mg  5 mg Oral DAILY    melatonin tablet 4.5 mg  4.5 mg Oral QHS    metoprolol tartrate (LOPRESSOR) tablet 50 mg  50 mg Oral BID    rosuvastatin (CRESTOR) tablet 40 mg  40 mg Oral QHS    nitroglycerin (NITROSTAT) tablet 0.4 mg  0.4 mg SubLINGual Q5MIN PRN    aspirin delayed-release tablet 81 mg  81 mg Oral DAILY       Objective:      Physical Exam:  General Appearance:  pleasant, elderly  female sitting in chair in NAD  Chest:   Clear  Cardiovascular:  Regular rate and rhythm, no murmur. Abdomen:   Soft, non-tender, bowel sounds are active.    Extremities: slightly weak distal pulses; No edema   Skin:  Warm and dry. Data Review:   Recent Labs     09/01/19  0956   WBC 4.1   HGB 14.3   HCT 42.6        Recent Labs     09/01/19  0956      K 4.4      CO2 27   *   BUN 18   CREA 1.41*   CA 9.6   ALB 3.6   TBILI 0.7   SGOT 20   ALT 13       Recent Labs     09/01/19  1430 09/01/19  0956   TROIQ <0.05 <0.05         Intake/Output Summary (Last 24 hours) at 9/2/2019 1224  Last data filed at 9/2/2019 1105  Gross per 24 hour   Intake 1040 ml   Output 400 ml   Net 640 ml        Telemetry: SR  EKG:NSR  Cxray: no acute process    Assessment:     Principal Problem:    ACS (acute coronary syndrome) ([facility] (9/1/2019)      Overview: Added automatically from request for surgery [id number]    Active Problems:    Hyperlipidemia (11/8/2011)      Hypertension (11/8/2011)      Coronary artery disease involving native coronary artery of native heart with unstable angina pectoris ([facility] (9/1/2019)      Unstable angina ([facility] (9/1/2019)        Plan:       Unstable angina:  Symptoms currently controlled. Abnormal stress test.  History of CAD with stenting   · Plan for cardiac cath tomorrow  I discussed the risks/benefits/alternatives of cardiac catheterization +/- PCI with the patient. Risks include (but are not limited to) bleeding, infection, cva/mi/tamponade/death. The patient understands and wishes to proceed. ASA, statin, BB, imdur  · Discontinue nitropaste due to patient side effects    HTN:  Stable  · Continue BB and lisinopril     CKD:  Steady  · Check labs in AM  · Michaela-procedural hydration    Samuel Shipley NP  DNP, RN, AGACNP-BC      Patient seen and examined by me with nurse practitioner. I personally performed all components of the history, physical, and medical decision making and agree with the assessment and plan as noted.     Elaina Christy MD

## 2019-09-02 NOTE — PROGRESS NOTES
Bedside report received from  Britta Khalil RN                 Assessment, Background, Procedure summary, Intake/Output, MAR, and recent results discussed. Care assumed.

## 2019-09-03 ENCOUNTER — TELEPHONE (OUTPATIENT)
Dept: CARDIOLOGY CLINIC | Age: 84
End: 2019-09-03

## 2019-09-03 VITALS
HEART RATE: 63 BPM | OXYGEN SATURATION: 97 % | DIASTOLIC BLOOD PRESSURE: 70 MMHG | RESPIRATION RATE: 18 BRPM | TEMPERATURE: 97.4 F | WEIGHT: 185.63 LBS | SYSTOLIC BLOOD PRESSURE: 127 MMHG | HEIGHT: 60 IN | BODY MASS INDEX: 36.44 KG/M2

## 2019-09-03 LAB
ANION GAP SERPL CALC-SCNC: 7 MMOL/L (ref 5–15)
BUN SERPL-MCNC: 28 MG/DL (ref 6–20)
BUN/CREAT SERPL: 19 (ref 12–20)
CALCIUM SERPL-MCNC: 9.8 MG/DL (ref 8.5–10.1)
CHLORIDE SERPL-SCNC: 109 MMOL/L (ref 97–108)
CO2 SERPL-SCNC: 24 MMOL/L (ref 21–32)
CREAT SERPL-MCNC: 1.48 MG/DL (ref 0.55–1.02)
GLUCOSE SERPL-MCNC: 102 MG/DL (ref 65–100)
POTASSIUM SERPL-SCNC: 4.3 MMOL/L (ref 3.5–5.1)
SODIUM SERPL-SCNC: 140 MMOL/L (ref 136–145)

## 2019-09-03 PROCEDURE — 74011250636 HC RX REV CODE- 250/636

## 2019-09-03 PROCEDURE — 74011000258 HC RX REV CODE- 258: Performed by: INTERNAL MEDICINE

## 2019-09-03 PROCEDURE — 77030019698 HC SYR ANGI MDLON MRTM -A: Performed by: INTERNAL MEDICINE

## 2019-09-03 PROCEDURE — 74011250636 HC RX REV CODE- 250/636: Performed by: INTERNAL MEDICINE

## 2019-09-03 PROCEDURE — 74011636320 HC RX REV CODE- 636/320: Performed by: INTERNAL MEDICINE

## 2019-09-03 PROCEDURE — 99218 HC RM OBSERVATION: CPT

## 2019-09-03 PROCEDURE — 77030030195 HC CATH ANGI DX PRF4 MRTM -A: Performed by: INTERNAL MEDICINE

## 2019-09-03 PROCEDURE — 74011250636 HC RX REV CODE- 250/636: Performed by: NURSE PRACTITIONER

## 2019-09-03 PROCEDURE — 74011000250 HC RX REV CODE- 250: Performed by: INTERNAL MEDICINE

## 2019-09-03 PROCEDURE — 77030010221 HC SPLNT WR POS TELE -B: Performed by: INTERNAL MEDICINE

## 2019-09-03 PROCEDURE — 77030004521 HC CATH ANGI DX COOK -B: Performed by: INTERNAL MEDICINE

## 2019-09-03 PROCEDURE — C1769 GUIDE WIRE: HCPCS | Performed by: INTERNAL MEDICINE

## 2019-09-03 PROCEDURE — 77030019697 HC SYR ANGI INFL MRTM -B: Performed by: INTERNAL MEDICINE

## 2019-09-03 PROCEDURE — C1887 CATHETER, GUIDING: HCPCS | Performed by: INTERNAL MEDICINE

## 2019-09-03 PROCEDURE — 36415 COLL VENOUS BLD VENIPUNCTURE: CPT

## 2019-09-03 PROCEDURE — 77030004549 HC CATH ANGI DX PRF MRTM -A: Performed by: INTERNAL MEDICINE

## 2019-09-03 PROCEDURE — 99153 MOD SED SAME PHYS/QHP EA: CPT | Performed by: INTERNAL MEDICINE

## 2019-09-03 PROCEDURE — 77030008543 HC TBNG MON PRSS MRTM -A: Performed by: INTERNAL MEDICINE

## 2019-09-03 PROCEDURE — 77030019569 HC BND COMPR RAD TERU -B: Performed by: INTERNAL MEDICINE

## 2019-09-03 PROCEDURE — 93571 IV DOP VEL&/PRESS C FLO 1ST: CPT | Performed by: INTERNAL MEDICINE

## 2019-09-03 PROCEDURE — 80048 BASIC METABOLIC PNL TOTAL CA: CPT

## 2019-09-03 PROCEDURE — 77030015766: Performed by: INTERNAL MEDICINE

## 2019-09-03 PROCEDURE — C1894 INTRO/SHEATH, NON-LASER: HCPCS | Performed by: INTERNAL MEDICINE

## 2019-09-03 PROCEDURE — 99152 MOD SED SAME PHYS/QHP 5/>YRS: CPT | Performed by: INTERNAL MEDICINE

## 2019-09-03 PROCEDURE — 93458 L HRT ARTERY/VENTRICLE ANGIO: CPT | Performed by: INTERNAL MEDICINE

## 2019-09-03 PROCEDURE — 74011250637 HC RX REV CODE- 250/637: Performed by: INTERNAL MEDICINE

## 2019-09-03 RX ORDER — HEPARIN SODIUM 200 [USP'U]/100ML
INJECTION, SOLUTION INTRAVENOUS
Status: DISCONTINUED | OUTPATIENT
Start: 2019-09-03 | End: 2019-09-03

## 2019-09-03 RX ORDER — FENTANYL CITRATE 50 UG/ML
INJECTION, SOLUTION INTRAMUSCULAR; INTRAVENOUS AS NEEDED
Status: DISCONTINUED | OUTPATIENT
Start: 2019-09-03 | End: 2019-09-03

## 2019-09-03 RX ORDER — HEPARIN SODIUM 1000 [USP'U]/ML
INJECTION, SOLUTION INTRAVENOUS; SUBCUTANEOUS AS NEEDED
Status: DISCONTINUED | OUTPATIENT
Start: 2019-09-03 | End: 2019-09-03

## 2019-09-03 RX ORDER — VERAPAMIL HYDROCHLORIDE 2.5 MG/ML
INJECTION, SOLUTION INTRAVENOUS AS NEEDED
Status: DISCONTINUED | OUTPATIENT
Start: 2019-09-03 | End: 2019-09-03

## 2019-09-03 RX ORDER — ATORVASTATIN CALCIUM 40 MG/1
80 TABLET, FILM COATED ORAL
Status: DISCONTINUED | OUTPATIENT
Start: 2019-09-03 | End: 2019-09-03 | Stop reason: HOSPADM

## 2019-09-03 RX ORDER — LIDOCAINE HYDROCHLORIDE 10 MG/ML
INJECTION, SOLUTION EPIDURAL; INFILTRATION; INTRACAUDAL; PERINEURAL AS NEEDED
Status: DISCONTINUED | OUTPATIENT
Start: 2019-09-03 | End: 2019-09-03

## 2019-09-03 RX ORDER — MIDAZOLAM HYDROCHLORIDE 1 MG/ML
INJECTION, SOLUTION INTRAMUSCULAR; INTRAVENOUS AS NEEDED
Status: DISCONTINUED | OUTPATIENT
Start: 2019-09-03 | End: 2019-09-03

## 2019-09-03 RX ADMIN — Medication 10 ML: at 06:11

## 2019-09-03 RX ADMIN — SODIUM CHLORIDE 75 ML/HR: 900 INJECTION, SOLUTION INTRAVENOUS at 06:10

## 2019-09-03 RX ADMIN — LISINOPRIL 5 MG: 5 TABLET ORAL at 10:37

## 2019-09-03 RX ADMIN — METOPROLOL TARTRATE 50 MG: 50 TABLET ORAL at 06:11

## 2019-09-03 RX ADMIN — VITAMIN D, TAB 1000IU (100/BT) 2000 UNITS: 25 TAB at 10:37

## 2019-09-03 RX ADMIN — LEVOTHYROXINE SODIUM 75 MCG: 75 TABLET ORAL at 10:37

## 2019-09-03 RX ADMIN — ALPRAZOLAM 0.25 MG: 0.25 TABLET ORAL at 03:28

## 2019-09-03 RX ADMIN — ASPIRIN 81 MG: 81 TABLET, COATED ORAL at 06:11

## 2019-09-03 NOTE — DISCHARGE SUMMARY
Cardiology Discharge Summary     Patient ID:  Sejal Lu  [id number]  80 y.o.  7/3/1933    Admit Date: 9/1/2019    Discharge Date: 9/3/2019     Admitting Physician: Judy Kwon MD     Discharge Physician: Omar Ruth MD    Admission Diagnoses:   Unstable angina [facility]) [I20.0]    Discharge Diagnoses:   Principal Problem:    ACS (acute coronary syndrome) ([facility] (9/1/2019)      Overview: Added automatically from request for surgery [id number]    Active Problems:    Hyperlipidemia (11/8/2011)      Hypertension (11/8/2011)      Coronary artery disease involving native coronary artery of native heart with unstable angina pectoris ([facility] (9/1/2019)      Unstable angina ([facility] (9/1/2019)        Discharge Condition: Good    Cardiology Procedures this Admission:  Diagnostic left heart catheterization    Hospital Course: admitted with UA and an abnormal stress test. No significant CAD noted on cath. Patent LAD stent and normal FFR of RCA. Consults: None    Visit Vitals  /45 (BP 1 Location: Right arm, BP Patient Position: At rest;Supine)   Pulse 60   Temp 97.4 °F (36.3 °C)   Resp 16   Ht 5' (1.524 m)   Wt 185 lb 10 oz (84.2 kg)   SpO2 96%   BMI 36.25 kg/m²       Physical Exam  Abdomen: soft, non-tender.  Bowel sounds normal.   Extremities: no cyanosis or edema  Heart: regular rate and rhythm, S1, S2 normal, no murmur, click, rub or gallop  Lungs: clear to auscultation bilaterally  Neurologic: Grossly normal  Pulses: 2+ and symmetrical    Labs:   Recent Labs     09/01/19  0956   WBC 4.1   HGB 14.3   HCT 42.6        Recent Labs     09/03/19  0330 09/01/19  0956    141   K 4.3 4.4   * 108   CO2 24 27   * 117*   BUN 28* 18   CREA 1.48* 1.41*   CA 9.8 9.6   ALB  --  3.6   TBILI  --  0.7   SGOT  --  20   ALT  --  13       Recent Labs     09/01/19  1430 09/01/19  0956   TROIQ <0.05 <0.05       Disposition: home    Patient Instructions:   Current Discharge Medication List          Referenced discharge instructions provided by nursing for diet and activity. Follow-up with me in 6 wks. Signed:   Osmar Monique MD  9/3/2019  8:38 AM

## 2019-09-03 NOTE — PROGRESS NOTES
1245: texted MD regarding stopping Imdur ER due to headahces pt was having. MD said it was ok to stop the med.     Patient ambulated in hallway without difficulty. Dressing CDI. No complaints.             Discharge instructions reviewed with patient; to be discharged to home with family. Site care instructions reviewed; site(s) CDI. Patient instructed on which medications to continue, which to start, and which to stop. Prescriptions given to patient. Medication info provided and reviewed with patient. Follow-up appointment information given; follow-up appointment to be made by patient. IV and tele box removed. Opportunity for questions provided; all questions answered.   All belongings returned.      Patient wheeled to front door via wheelchair by nurse; to be transported home by wife.

## 2019-09-03 NOTE — PROGRESS NOTES
Bedside shift change report given to Ada Aguillon RN (oncoming nurse) by Vaishali Chong RN (offgoing nurse). Report included the following information SBAR, Kardex, Procedure Summary, Intake/Output, MAR, Accordion, Recent Results, Med Rec Status, Cardiac Rhythm NSR, Alarm Parameters , Pre Procedure Checklist, Procedure Verification, Quality Measures and Dual Neuro Assessment.

## 2019-09-03 NOTE — PROGRESS NOTES
Received bedside report from night shift, Waree. Cath lab team taking pt to scheduled heart cath procedure.

## 2019-09-03 NOTE — TELEPHONE ENCOUNTER
----- Message from Annie Yepez MD sent at 8/29/2019  9:43 AM EDT -----  Inform her labs are k      Left message to call me back. Called pt and left 2nd message to call me back. 9/5/19      Pt returned my call,verified pt with two pt identifiers, advised pt that her recent labs were okay. Pt verbalized understanding.

## 2019-09-03 NOTE — PROGRESS NOTES
Problem: Cath Lab Procedures: Pre-Procedure  Goal: Off Pathway (Use only if patient is Off Pathway)  Outcome: Progressing Towards Goal  Goal: Activity/Safety  Outcome: Progressing Towards Goal  Goal: Consults, if ordered  Outcome: Progressing Towards Goal  Goal: Diagnostic Test/Procedures  Outcome: Progressing Towards Goal  Goal: Nutrition/Diet  Outcome: Progressing Towards Goal  Goal: Discharge Planning  Outcome: Progressing Towards Goal  Goal: Medications  Outcome: Progressing Towards Goal  Goal: Respiratory  Outcome: Progressing Towards Goal  Goal: Treatments/Interventions/Procedures  Outcome: Progressing Towards Goal  Goal: Psychosocial  Outcome: Progressing Towards Goal  Goal: *Verbalize description of procedure  Outcome: Progressing Towards Goal  Goal: *Consent signed  Outcome: Progressing Towards Goal     Problem: Falls - Risk of  Goal: *Absence of Falls  Description  Document Ximena Lerma Fall Risk and appropriate interventions in the flowsheet. Outcome: Progressing Towards Goal  Note:   Fall Risk Interventions:            Medication Interventions: Assess postural VS orthostatic hypotension, Evaluate medications/consider consulting pharmacy, Patient to call before getting OOB, Teach patient to arise slowly                   Problem: Falls - Risk of  Goal: *Absence of Falls  Description  Document Ximena Sharper Fall Risk and appropriate interventions in the flowsheet.   Outcome: Progressing Towards Goal  Note:   Fall Risk Interventions:            Medication Interventions: Assess postural VS orthostatic hypotension, Evaluate medications/consider consulting pharmacy, Patient to call before getting OOB, Teach patient to arise slowly                   Problem: Patient Education: Go to Patient Education Activity  Goal: Patient/Family Education  Outcome: Progressing Towards Goal     Problem: Pain  Goal: *Control of Pain  Outcome: Progressing Towards Goal  Goal: *PALLIATIVE CARE:  Alleviation of Pain  Outcome: Progressing Towards Goal     Problem: Patient Education: Go to Patient Education Activity  Goal: Patient/Family Education  Outcome: Progressing Towards Goal     Problem: Pressure Injury - Risk of  Goal: *Prevention of pressure injury  Description  Document Kayden Scale and appropriate interventions in the flowsheet. Outcome: Progressing Towards Goal  Note:   Pressure Injury Interventions:                 Mobility Interventions: Pressure redistribution bed/mattress (bed type), Turn and reposition approx.  every two hours(pillow and wedges), PT/OT evaluation                          Problem: Patient Education: Go to Patient Education Activity  Goal: Patient/Family Education  Outcome: Progressing Towards Goal     Problem: Patient Education: Go to Patient Education Activity  Goal: Patient/Family Education  Outcome: Progressing Towards Goal     Problem: Unstable angina/NSTEMI: Day 2  Goal: Off Pathway (Use only if patient is Off Pathway)  Outcome: Progressing Towards Goal  Goal: Activity/Safety  Outcome: Progressing Towards Goal  Goal: Consults, if ordered  Outcome: Progressing Towards Goal  Goal: Diagnostic Test/Procedures  Outcome: Progressing Towards Goal  Goal: Nutrition/Diet  Outcome: Progressing Towards Goal  Goal: Discharge Planning  Outcome: Progressing Towards Goal  Goal: Medications  Outcome: Progressing Towards Goal  Goal: Respiratory  Outcome: Progressing Towards Goal  Goal: Treatments/Interventions/Procedures  Outcome: Progressing Towards Goal  Goal: Psychosocial  Outcome: Progressing Towards Goal  Goal: *Hemodynamically stable  Outcome: Progressing Towards Goal  Goal: *Optimal pain control at patient's stated goal  Outcome: Progressing Towards Goal  Goal: *Lungs clear or at baseline  Outcome: Progressing Towards Goal     Problem: Patient Education: Go to Patient Education Activity  Goal: Patient/Family Education  Outcome: Progressing Towards Goal

## 2019-09-03 NOTE — PROGRESS NOTES
CM acknowledged discharge order. Chart review. OBS letter received.  informed of need for PCP appt.   No qualifying dx for [address] Zaldivar RN CM  Ext [phone]

## 2019-09-03 NOTE — DISCHARGE INSTRUCTIONS
[address], [place], [address] [facility]  [phone]        Patient ID:  Anna Shabazz  [id number]  20 y.o.  7/3/1933    Admit Date: 9/1/2019    Discharge Date: 9/3/2019     Admitting Physician: Jeanne Ocasio MD     Discharge Physician: Akshat Hampton MD    Admission Diagnoses:   Unstable angina [facility]) [I20.0]    Discharge Diagnoses:   Principal Problem:    ACS (acute coronary syndrome) ([facility] [address] (9/1/2019)      Overview: Added automatically from request for surgery [id number]    Active Problems:    Hyperlipidemia (11/8/2011)      Hypertension (11/8/2011)      Coronary artery disease involving native coronary artery of native heart with unstable angina pectoris ([facility] [address] (9/1/2019)      Unstable angina ([facility] [address] (9/1/2019)        Discharge Condition: Good    Cardiology Procedures this Admission:  Diagnostic left heart catheterization    Disposition: home    Reference discharge instructions provided by nursing for diet and activity. Signed: Akshat Hampton MD  9/3/2019  8:39 AM      Radial Cardiac Catheterization/Angiography Discharge Instructions    It is normal to feel tired the first couple days. Take it easy and follow the physicians instructions. CHECK THE CATHETER INSERTION SITE DAILY:    Remove the wrist dressing 24 hours after the procedure. You may shower 24 hours after the procedure. Wash with soap and water and pat dry. Gentle cleaning of the site with soap and water is sufficient, cover with a dry clean dressing or bandage. Do not apply creams or powders to the area. No soaking the wrist for 3 days. Leave the puncture site open to air after 24 hours post-procedure. CALL THE PHYSICIANS:     If the site becomes red, swollen or feels warm to the touch  If there is bleeding or drainage or if there is unusual pain at the radial site. If there is any minor oozing, you may apply a band-aid and remove after 12 hours.    If the bleeding continues, hold pressure with the middle finger against the puncture site and the thumb against the back of the wrist,call 911 to be transported to the hospital.  DO NOT DRIVE YOURSELF, [address]. ACTIVITY:   For the first 24 hours do not manipulate the wrist.  No lifting, pushing or pulling over 3-5 pounds with the affected wrist for 7 daysand no straining the insertion site. Do not life grocery bags or the garbage can, do not run the vacuum  or  for 7 days. Start with short walks as in the hospital and gradually increase as tolerated each day. It is recommended to walk 30 minutes 5-7 days per week. Follow your physicians instructions on activity. Avoid walking outside in extremes of heat or cold. Walk inside when it is cold and windy or hot and humid. Things to keep in mind:  No driving for at least 24 hours, or as designated by your physician. Limit the number of times you go up and down the stairs  Take rests and pace yourself with activity. Be careful and do not strain with bowel movements. MEDICATIONS:    Take all medications as prescribed  Call your physician if you have any questions  Keep an updated list of your medications with you at all times and give a list to your physician and pharmacist    SIGNS AND SYMPTOMS:   Be cautious of symptoms of angina or recurrent symptoms such as chest discomfort, unusual shortness of breath or fatigue. These could be symptoms of restenosis, a new blockage or a heart attack. If your symptoms are relieved with rest it is still recommended that you notify your physician of recurrent chest pain or discomfort. For CHEST PAIN or symptoms of angina not relieved with rest:  If the discomfort is not relieved with rest, and you have been prescribed Nitroglycerin, take as directed (taken under the tongue, one at a time 5 minutes apart for a total of 3 doses).  If the discomfort is not relieved after the 3rd nitroglycerin, call 911. If you have not been prescribed Nitroglycerin  and your chest discomfort is not relieved with rest, call 911. AFTER CARE:   Follow up with your physician as instructed. Follow a heart healthy diet with proper portion control, daily stress management, daily exercise, blood pressure and cholesterol control , and smoking cessation.

## 2019-09-03 NOTE — PROGRESS NOTES
Pt. Wishes to leave 3 rings, 1 watch, 1 tablet, 1 cell phone and other valuables listed on ticket with security. Security staff came and collected all valuables.

## 2019-09-03 NOTE — PROGRESS NOTES
Bedside shift change report given to Cindy Moreira RN (oncoming nurse) by Olinda Mckinney RN (offgoing nurse). Report included the following information SBAR, Kardex, Procedure Summary, Intake/Output, MAR, Accordion, Recent Results, Med Rec Status, Cardiac Rhythm NSR, Alarm Parameters , Pre Procedure Checklist, Procedure Verification, Quality Measures and Dual Neuro Assessment.

## 2019-09-19 ENCOUNTER — TELEPHONE (OUTPATIENT)
Dept: INTERNAL MEDICINE CLINIC | Age: 84
End: 2019-09-19

## 2019-09-19 NOTE — TELEPHONE ENCOUNTER
MD Leela Cabezas, JHON   Caller: Unspecified (Today, 10:28 AM)             If it reduces her pain and is not too sedating, yes, can continue gabapentin      Spoke with patient. Two pt identifiers confirmed. Patient advised of the above information from Dr. Harjit Howard. Pt verbalized understanding of information discussed w/ no further questions at this time.

## 2019-09-19 NOTE — TELEPHONE ENCOUNTER
Patient states she needs a call back to discuss Gabapentin that was prescribed by Dr. Jose G Torres is Dr. Juana Laws thinks this is ok for her to be taking. Please call to discuss.  Thank you

## 2019-10-04 ENCOUNTER — HOSPITAL ENCOUNTER (OUTPATIENT)
Dept: LAB | Age: 84
Discharge: HOME | End: 2019-10-04
Payer: MEDICARE

## 2019-10-04 ENCOUNTER — OFFICE VISIT (OUTPATIENT)
Dept: INTERNAL MEDICINE CLINIC | Age: 84
End: 2019-10-04

## 2019-10-04 VITALS
HEIGHT: 60 IN | HEART RATE: 83 BPM | RESPIRATION RATE: 18 BRPM | BODY MASS INDEX: 36.32 KG/M2 | SYSTOLIC BLOOD PRESSURE: 152 MMHG | WEIGHT: 185 LBS | DIASTOLIC BLOOD PRESSURE: 86 MMHG | TEMPERATURE: 97.9 F | OXYGEN SATURATION: 99 %

## 2019-10-04 DIAGNOSIS — I10 ESSENTIAL HYPERTENSION: ICD-10-CM

## 2019-10-04 DIAGNOSIS — E03.9 HYPOTHYROIDISM, UNSPECIFIED TYPE: ICD-10-CM

## 2019-10-04 DIAGNOSIS — M54.50 CHRONIC BILATERAL LOW BACK PAIN WITHOUT SCIATICA: ICD-10-CM

## 2019-10-04 DIAGNOSIS — G89.29 CHRONIC BILATERAL LOW BACK PAIN WITHOUT SCIATICA: ICD-10-CM

## 2019-10-04 DIAGNOSIS — N18.30 STAGE 3 CHRONIC KIDNEY DISEASE (HCC): ICD-10-CM

## 2019-10-04 DIAGNOSIS — M43.06 SPONDYLOLYSIS OF LUMBAR REGION: Primary | ICD-10-CM

## 2019-10-04 PROCEDURE — 36415 COLL VENOUS BLD VENIPUNCTURE: CPT

## 2019-10-04 PROCEDURE — 84443 ASSAY THYROID STIM HORMONE: CPT

## 2019-10-04 RX ORDER — TRAMADOL HYDROCHLORIDE 50 MG/1
50 TABLET ORAL EVERY 12 HOURS
Qty: 60 TAB | Refills: 1 | Status: SHIPPED | OUTPATIENT
Start: 2019-10-04 | End: 2019-11-03

## 2019-10-04 RX ORDER — NALOXONE HYDROCHLORIDE 4 MG/.1ML
SPRAY NASAL
Qty: 1 EACH | Refills: 0 | Status: SHIPPED | OUTPATIENT
Start: 2019-10-04 | End: 2020-10-01 | Stop reason: SDUPTHER

## 2019-10-04 RX ORDER — GABAPENTIN 100 MG/1
CAPSULE ORAL 3 TIMES DAILY
COMMUNITY
End: 2020-01-07

## 2019-10-04 NOTE — PROGRESS NOTES
Reviewed record in preparation for visit and have obtained necessary documentation. Identified pt with two pt identifiers(name and ). Chief Complaint   Patient presents with    Pain (Chronic)       Health Maintenance Due   Topic Date Due    DTaP/Tdap/Td  (1 - Tdap) 1954    Shingles Vaccine (1 of 2) 1983    Pneumococcal Vaccine (2 of 2 - PPSV23) 2017    Glaucoma Screening   2018    Flu Vaccine  2019       Ms. Chaparro has a reminder for a \"due or due soon\" health maintenance. I have asked that she discuss this further with her primary care provider for follow-up on this health maintenance. Coordination of Care Questionnaire:  :     1) Have you been to an emergency room, urgent care clinic since your last visit? no   Hospitalized since your last visit? no             2) Have you seen or consulted any other health care providers outside of [address] [place] [address] since your last visit? no  (Include any pap smears or colon screenings in this section.)    3) In the event something were to happen to you and you were unable to speak on your behalf, do you have an Advance Directive/ Living Will in place stating your wishes? NO    Do you have an Advance Directive on file? yes    4) Are you interested in receiving information on Advance Directives? NO    Patient is accompanied by self I have received verbal consent from Francesco Hodges to discuss any/all medical information while they are present in the room.

## 2019-10-04 NOTE — PROGRESS NOTES
CC: Pain (Chronic)  thyroid    HPI:    She is a 80 y.o. female who presents for chronic medical issues follow up     In the interval patient had abnormal stress test followed by heart catherization which showed patent LAD stent and  mid RCA lesion 50 % stenosed  No significant CAD   What triggered this was concern with shortness of breath with exertion   Patient will followed with Dr Shelbie Conner next week    Chest x ray was normal     Patient has long history of back pain  I suspect that the back pain is causing shallow breathing.    -Severe degenerative scoliosis thoracolumbar spine    Patient is has a large wheelchair but it is too heavy to manage and daugther cant carry it or put it in car. Patient would benefit from a lightweight chair so she can do activities with daughter and travel  [facility] and [facility] at all times   New wheelchair request was denied    She continues to have back pain. Taking 1 tramadol a day even though she is prescribed 2 a day. Advised to take 2 a day with a tylenol    ROS:  Constitutional: negative for fevers, chills, anorexia and weight loss  10 systems reviewed and negative other than HPI    Past Medical History:   Diagnosis Date    CAD (coronary artery disease)     stent placed on left 1 stent,in 2007    Cancer ([facility] [address]     BCCA    DVT (deep venous thrombosis) (HCC)     GERD (gastroesophageal reflux disease)     Hypercholesterolemia     Hypertension     Pulmonary embolism ([address] 9/15/2015    Thromboembolus ([facility] [address]     DVT after hernia operation, PE spontaneous 10 years later    Thyroid disease        Current Outpatient Medications on File Prior to Visit   Medication Sig Dispense Refill    gabapentin (NEURONTIN) 100 mg capsule Take  by mouth three (3) times daily.  clotrimazole (LOTRIMIN) 1 % topical cream clotrimazole 1 % topical cream      isosorbide mononitrate ER (IMDUR) 30 mg tablet Take 1 Tab by mouth daily.  30 Tab 3    lisinopril (PRINIVIL, ZESTRIL) 5 mg tablet TAKE ONE TABLET BY MOUTH ONE TIME DAILY 90 Tab 1    alendronate (FOSAMAX) 10 mg tablet TAKE ONE TABLET BY MOUTH EVERY MORNING BEFORE BREAKFAST 90 Tab 2    rosuvastatin (CRESTOR) 40 mg tablet Take 1 Tab by mouth nightly. 90 Tab 3    ammonium lactate (LAC-HYDRIN) 12 % lotion APPLY TO AFFECTED AREA(S) TWO TIMES A DAY  GENERIC FOR LAC-HYDRIN  2    levothyroxine (SYNTHROID) 75 mcg tablet TAKE ONE TABLET BY MOUTH DAILY BEFORE BREAKFAST 90 Tab 3    ALPRAZolam (XANAX) 0.25 mg tablet Take 1 Tab by mouth three (3) times daily as needed for Sleep or Anxiety. (Patient taking differently: Take 0.25 mg by mouth as needed for Anxiety or Sleep.) 30 Tab 2    metoprolol tartrate (LOPRESSOR) 50 mg tablet TAKE ONE TABLET BY MOUTH TWICE A  Tab 3    melatonin 5 mg cap capsule Take 5 mg by mouth nightly.  acetaminophen (TYLENOL ARTHRITIS PAIN) 650 mg TbER Take 650 mg by mouth every eight (8) hours.  multivitamin (ONE A DAY) tablet Take 1 Tab by mouth daily.  cholecalciferol, vitamin D3, (VITAMIN D3) 2,000 unit tab Take 1 Tab by mouth daily. 30 Tab 0    ACTIVATED CHARCOAL (CHARCOCAPS PO) Take  by mouth.  nitroglycerin (NITROSTAT) 0.3 mg SL tablet 1 tablet by SubLINGual route every five (5) minutes as needed for Chest Pain. 1 Bottle 1    aspirin 81 mg Tab Take 81 mg by mouth daily. No current facility-administered medications on file prior to visit.         Past Surgical History:   Procedure Laterality Date    HX GI      esophageal hiatal hernia - 2004    HX GYN      partial hysterectomy - 1970    HX OTHER SURGICAL      BCCAs removed    HX UROLOGICAL      bladder repair - 1999       Family History   Problem Relation Age of Onset    Heart Disease Mother     Dementia Father     Diabetes Son     Kidney Disease Son         autoimmune     Reviewed and no changes     Social History     Socioeconomic History    Marital status:      Spouse name: Not on file    Number of children: Not on file    Years of education: Not on file    Highest education level: Not on file   Occupational History    Not on file   Social Needs    Financial resource strain: Not on file    Food insecurity:     Worry: Not on file     Inability: Not on file    Transportation needs:     Medical: Not on file     Non-medical: Not on file   Tobacco Use    Smoking status: Never Smoker    Smokeless tobacco: Never Used   Substance and Sexual Activity    Alcohol use: No    Drug use: No     Types: Sedatives/Hypnotics, Prescription    Sexual activity: Not on file   Lifestyle    Physical activity:     Days per week: Not on file     Minutes per session: Not on file    Stress: Not on file   Relationships    Social connections:     Talks on phone: Not on file     Gets together: Not on file     Attends Zoroastrianism service: Not on file     Active member of club or organization: Not on file     Attends meetings of clubs or organizations: Not on file     Relationship status: Not on file    Intimate partner violence:     Fear of current or ex partner: Not on file     Emotionally abused: Not on file     Physically abused: Not on file     Forced sexual activity: Not on file   Other Topics Concern    Not on file   Social History Narrative    Not on file            Visit Vitals  /86 (BP 1 Location: Right arm, BP Patient Position: Sitting)   Pulse 83   Temp 97.9 °F (36.6 °C) (Oral)   Resp 18   Ht 5' (1.524 m)   Wt 185 lb (83.9 kg)   SpO2 99%   BMI 36.13 kg/m²       Physical Examination:   General - Well appearing female  Neck - supple, no bruits, no TMG, no LAD  Pulm - clear to auscultation bilaterally  Cardio - RRR, normal S1 S2, no murmur gallops or rubs  Abd - soft, nontender, no masses, no HSM  Extrem - no edema, +2 distal pulses  Psych - normal affect, appropriate mood  She has severe scoliosis and she is wearing a back brace  Lab Results   Component Value Date/Time    WBC 4.1 09/01/2019 09:56 AM    HGB 14.3 09/01/2019 09:56 AM    HCT 42.6 09/01/2019 09:56 AM    PLATELET 187 50/82/0073 09:56 AM    MCV 99.1 (H) 09/01/2019 09:56 AM     Lab Results   Component Value Date/Time    Sodium 140 09/03/2019 03:30 AM    Potassium 4.3 09/03/2019 03:30 AM    Chloride 109 (H) 09/03/2019 03:30 AM    CO2 24 09/03/2019 03:30 AM    Anion gap 7 09/03/2019 03:30 AM    Glucose 102 (H) 09/03/2019 03:30 AM    BUN 28 (H) 09/03/2019 03:30 AM    Creatinine 1.48 (H) 09/03/2019 03:30 AM    BUN/Creatinine ratio 19 09/03/2019 03:30 AM    GFR est AA 41 (L) 09/03/2019 03:30 AM    GFR est non-AA 33 (L) 09/03/2019 03:30 AM    Calcium 9.8 09/03/2019 03:30 AM     Lab Results   Component Value Date/Time    Cholesterol, total 147 08/26/2019 08:12 AM    HDL Cholesterol 62 08/26/2019 08:12 AM    LDL, calculated 60 08/26/2019 08:12 AM    VLDL, calculated 25 08/26/2019 08:12 AM    Triglyceride 125 08/26/2019 08:12 AM    CHOL/HDL Ratio 2.5 09/02/2015 03:20 AM     Lab Results   Component Value Date/Time    TSH 0.515 04/05/2019 02:25 PM     No results found for: PSA, PSA2, PSAR1, Efraín Red, [id number], [id number], PSALT  Lab Results   Component Value Date/Time    Hemoglobin A1c 5.7 05/07/2009 01:40 PM     Lab Results   Component Value Date/Time    Vitamin D 25-Hydroxy 31.8 (L) 07/28/2011 08:00 AM    VITAMIN D, 25-HYDROXY 39.8 04/05/2019 02:25 PM       Lab Results   Component Value Date/Time    ALT (SGPT) 13 09/01/2019 09:56 AM    AST (SGOT) 20 09/01/2019 09:56 AM    Alk. phosphatase 79 09/01/2019 09:56 AM    Bilirubin, total 0.7 09/01/2019 09:56 AM           Assessment/Plan:        Spondylolysis of lumbar region  Chronic bilateral low back pain without sciatica  Pain is chronic  Advised to take tramadol BID. She tolerates it well, no drowsiness.    -  is appropriate  - UDS is appropiate  - Dr Janusz Tony started her on gabapentin and taking it BID   - prescribed tramadol 50mg  #60 with one refill    Dyspnea on exertion for 6 months / hx of coronary artery disease / stents  - on Crestor, metoprolol and aspirin  - she had a stress test that was abnormal followed by a catherization which showed patent LAD ( stented) and non obstructive CAD  She had normal chest x ray  - suspect the back pain is causing more shallow breathing      Hypothyroidism, unspecified type - euthyroid on exam  - on synthroid 75 mcg   - TSH AND FREE T4     Stage 3 chronic kidney disease ([facility] [address]  - followed by Dr Kaur Alcantara at [address] level/ osteoporosis  - has been on fosamax 10mg daily / patient does not want BMD at this time due to pain with lying on table.  I could not locate her last BMD  Takes 2000 units of vitamin D 3 daily      [facility] to get [facility]

## 2019-10-05 LAB
T4 FREE SERPL-MCNC: 1.37 NG/DL (ref 0.82–1.77)
TSH SERPL DL<=0.005 MIU/L-ACNC: 0.44 UIU/ML (ref 0.45–4.5)

## 2019-10-11 DIAGNOSIS — I10 ESSENTIAL HYPERTENSION: ICD-10-CM

## 2019-10-11 NOTE — TELEPHONE ENCOUNTER
----- Message from Najma Florez sent at 10/11/2019  3:31 PM EDT -----  Regarding: Dr. Coco Gonsales first and last name:  Kenny Falk     Reason for call:  Pt is requesting a call back in regards to \"Matropinoolo 50mg states that she has requested on wed and out of medication.  Would like a call back to know if she will get the medication this evening     Callback required yes/no and why:    Yes   Best contact number(s):    [phone]  Details to clarify the request:      Najma Florez

## 2019-10-14 ENCOUNTER — OFFICE VISIT (OUTPATIENT)
Dept: CARDIOLOGY CLINIC | Age: 84
End: 2019-10-14

## 2019-10-14 VITALS
HEART RATE: 60 BPM | RESPIRATION RATE: 16 BRPM | HEIGHT: 60 IN | WEIGHT: 193.3 LBS | SYSTOLIC BLOOD PRESSURE: 140 MMHG | OXYGEN SATURATION: 93 % | DIASTOLIC BLOOD PRESSURE: 70 MMHG | BODY MASS INDEX: 37.95 KG/M2

## 2019-10-14 DIAGNOSIS — I10 ESSENTIAL HYPERTENSION: ICD-10-CM

## 2019-10-14 DIAGNOSIS — I25.110 CORONARY ARTERY DISEASE INVOLVING NATIVE CORONARY ARTERY OF NATIVE HEART WITH UNSTABLE ANGINA PECTORIS (HCC): Primary | ICD-10-CM

## 2019-10-14 DIAGNOSIS — E78.2 MIXED HYPERLIPIDEMIA: ICD-10-CM

## 2019-10-14 DIAGNOSIS — N18.30 STAGE 3 CHRONIC KIDNEY DISEASE (HCC): ICD-10-CM

## 2019-10-14 RX ORDER — METOPROLOL TARTRATE 50 MG/1
TABLET ORAL
Qty: 180 TAB | Refills: 3 | Status: SHIPPED | OUTPATIENT
Start: 2019-10-14 | End: 2020-10-19

## 2019-10-14 NOTE — PROGRESS NOTES
Reuben Amador, RAUL-BC    Subjective/HPI:     Ms. Sumner Alpers is a 80 y.o. female is here for hospital f/u. She has a PMHx of CAD, HTN, HLD, hx of PE/DVT and hypothyroidism. She was admitted to [facility] in 9/2019 with ACS. She had been seen a month prior to that in the office, for similar symptoms, and Imdur was added to her regimen at that time. She had cardiac cath in 9/2019 and no progression of CAD was noted. She had a insignificant RCA stenosis, which was managed medically. She continues to feel the same. She believes her symptoms are due to worsening scoliosis, which is causing more pain and decreased mobility. She notes some lower extremity edema, but this is not bothersome. She has compression stockings, but only wears them when she thinks about it.        PCP Provider  Rizwaan Way MD  Past Medical History:   Diagnosis Date    CAD (coronary artery disease)     stent placed on left 1 stent,in 2007    Cancer [facility])     BCCA    DVT (deep venous thrombosis) ([address]     GERD (gastroesophageal reflux disease)     Hypercholesterolemia     Hypertension     Pulmonary embolism ([address] 9/15/2015    Thromboembolus ([address]     DVT after hernia operation, PE spontaneous 10 years later    Thyroid disease       Past Surgical History:   Procedure Laterality Date    HX GI      esophageal hiatal hernia - 2004    HX GYN      partial hysterectomy - 1970    HX OTHER SURGICAL      BCCAs removed    HX UROLOGICAL      bladder repair - 1999     Family History   Problem Relation Age of Onset    Heart Disease Mother     Dementia Father     Diabetes Son     Kidney Disease Son         autoimmune     Social History     Socioeconomic History    Marital status:      Spouse name: Not on file    Number of children: Not on file    Years of education: Not on file    Highest education level: Not on file   Occupational History    Not on file   Social Needs    Financial resource strain: Not on file    Food insecurity:     Worry: Not on file     Inability: Not on file    Transportation needs:     Medical: Not on file     Non-medical: Not on file   Tobacco Use    Smoking status: Never Smoker    Smokeless tobacco: Never Used   Substance and Sexual Activity    Alcohol use: No    Drug use: No     Types: Sedatives/Hypnotics, Prescription    Sexual activity: Not on file   Lifestyle    Physical activity:     Days per week: Not on file     Minutes per session: Not on file    Stress: Not on file   Relationships    Social connections:     Talks on phone: Not on file     Gets together: Not on file     Attends Jainism service: Not on file     Active member of club or organization: Not on file     Attends meetings of clubs or organizations: Not on file     Relationship status: Not on file    Intimate partner violence:     Fear of current or ex partner: Not on file     Emotionally abused: Not on file     Physically abused: Not on file     Forced sexual activity: Not on file   Other Topics Concern    Not on file   Social History Narrative    Not on file       Allergies   Allergen Reactions    Codeine Unknown (comments)     Unsure of reaction.  Morphine Hives and Itching        Current Outpatient Medications   Medication Sig    gabapentin (NEURONTIN) 100 mg capsule Take  by mouth three (3) times daily.  traMADol (ULTRAM) 50 mg tablet Take 1 Tab by mouth every twelve (12) hours for 30 days. Max Daily Amount: 100 mg.    naloxone (NARCAN) 4 mg/actuation nasal spray Use 1 spray intranasally, then discard. Repeat with new spray every 2 min as needed for opioid overdose symptoms, alternating nostrils.     clotrimazole (LOTRIMIN) 1 % topical cream clotrimazole 1 % topical cream    lisinopril (PRINIVIL, ZESTRIL) 5 mg tablet TAKE ONE TABLET BY MOUTH ONE TIME DAILY    alendronate (FOSAMAX) 10 mg tablet TAKE ONE TABLET BY MOUTH EVERY MORNING BEFORE BREAKFAST    rosuvastatin (CRESTOR) 40 mg tablet Take 1 Tab by mouth nightly.  ammonium lactate (LAC-HYDRIN) 12 % lotion APPLY TO AFFECTED AREA(S) TWO TIMES A DAY  GENERIC FOR LAC-HYDRIN    levothyroxine (SYNTHROID) 75 mcg tablet TAKE ONE TABLET BY MOUTH DAILY BEFORE BREAKFAST    ALPRAZolam (XANAX) 0.25 mg tablet Take 1 Tab by mouth three (3) times daily as needed for Sleep or Anxiety. (Patient taking differently: Take 0.25 mg by mouth as needed for Anxiety or Sleep.)    metoprolol tartrate (LOPRESSOR) 50 mg tablet TAKE ONE TABLET BY MOUTH TWICE A DAY    melatonin 5 mg cap capsule Take 5 mg by mouth nightly.  acetaminophen (TYLENOL ARTHRITIS PAIN) 650 mg TbER Take 650 mg by mouth every eight (8) hours.  multivitamin (ONE A DAY) tablet Take 1 Tab by mouth daily.  cholecalciferol, vitamin D3, (VITAMIN D3) 2,000 unit tab Take 1 Tab by mouth daily.  ACTIVATED CHARCOAL (CHARCOCAPS PO) Take  by mouth.  nitroglycerin (NITROSTAT) 0.3 mg SL tablet 1 tablet by SubLINGual route every five (5) minutes as needed for Chest Pain.  aspirin 81 mg Tab Take 81 mg by mouth daily. No current facility-administered medications for this visit. I have reviewed the problem list, allergy list, medical history, family, social history and medications. Review of Symptoms:    Review of Systems   Constitutional: Negative for chills, fever and weight loss. HENT: Negative for nosebleeds. Eyes: Negative for blurred vision and double vision. Respiratory: Negative for cough, shortness of breath and wheezing. Cardiovascular: Negative for chest pain, palpitations, orthopnea, leg swelling and PND. Gastrointestinal: Negative for abdominal pain, blood in stool, diarrhea, nausea and vomiting. Musculoskeletal: Negative for joint pain. Skin: Negative for rash. Neurological: Negative for dizziness, tingling and loss of consciousness. Endo/Heme/Allergies: Does not bruise/bleed easily.        Physical Exam:      General: Well developed, in no acute distress, cooperative and alert  HEENT: No carotid bruits, no JVD, trach is midline. Neck Supple, PEERL, EOM intact. Heart:  reg rate and rhythm; normal S1/S2; no murmurs, gallops or rubs. Respiratory: Clear bilaterally x 4, no wheezing or rales  Abdomen:   Soft, non-tender, no distention, no masses. + BS. Extremities:  Normal cap refill, no cyanosis, atraumatic. Bilateral LE edema 1+ pitting  Neuro: A&Ox3, speech clear, gait stable. Skin: Skin color is normal. No rashes or lesions. Non diaphoretic  Vascular: 2+ pulses symmetric in all extremities    Vitals:    10/14/19 0949   BP: 140/70   Pulse: 60   Resp: 16   SpO2: 93%   Weight: 193 lb 4.8 oz (87.7 kg)   Height: 5' (1.524 m)       Cardiographics    ECG: sinus rhythm  Results for orders placed or performed during the hospital encounter of 09/01/19   EKG, 12 LEAD, INITIAL   Result Value Ref Range    Ventricular Rate 72 BPM    Atrial Rate 72 BPM    P-R Interval 160 ms    QRS Duration 92 ms    Q-T Interval 392 ms    QTC Calculation (Bezet) 429 ms    Calculated P Axis 27 degrees    Calculated R Axis -6 degrees    Calculated T Axis -2 degrees    Diagnosis       Normal sinus rhythm  Inferior infarct (cited on or before 08-NOV-2011)  When compared with ECG of 22-OCT-2016 12:21,  No significant change was found  Confirmed by Christ Stroud ([id number]) on 9/2/2019 1:34:09 PM     Results for orders placed or performed in visit on 12/23/14   CARDIAC HOLTER MONITOR, 24 HOURS    Narrative    ECG Monitor/24 hours, Complete    Reason for Holter Monitor   PALPITATIONS    Heartbeat    Slowest 52  Average 64  Fastest  115          Results:   Underlying Rhythm: Normal sinus rhythm      Atrial Arrhythmias: premature atrial contractions; rare ( 31  isolated beats )           AV Conduction: normal    Ventricular Arrhythmias: none     ST Segment Analysis:normal     Symptom Correlation:  None. Comment:   No significant dysrhythmias.       Bridget Mccain MD Results for orders placed or performed in visit on 11/08/11   AMB POC EKG ROUTINE W/ 12 LEADS, INTER & REP    Narrative    Normal sinus rhythm. Q Waves leads aVF and 3. ST Segment  depression and flattened T waves V1-V3. Cardiology Labs:  Lab Results   Component Value Date/Time    Cholesterol, total 147 08/26/2019 08:12 AM    HDL Cholesterol 62 08/26/2019 08:12 AM    LDL, calculated 60 08/26/2019 08:12 AM    Triglyceride 125 08/26/2019 08:12 AM    CHOL/HDL Ratio 2.5 09/02/2015 03:20 AM       Lab Results   Component Value Date/Time    Sodium 140 09/03/2019 03:30 AM    Potassium 4.3 09/03/2019 03:30 AM    Chloride 109 (H) 09/03/2019 03:30 AM    CO2 24 09/03/2019 03:30 AM    Anion gap 7 09/03/2019 03:30 AM    Glucose 102 (H) 09/03/2019 03:30 AM    BUN 28 (H) 09/03/2019 03:30 AM    Creatinine 1.48 (H) 09/03/2019 03:30 AM    BUN/Creatinine ratio 19 09/03/2019 03:30 AM    GFR est AA 41 (L) 09/03/2019 03:30 AM    GFR est non-AA 33 (L) 09/03/2019 03:30 AM    Calcium 9.8 09/03/2019 03:30 AM    Bilirubin, total 0.7 09/01/2019 09:56 AM    AST (SGOT) 20 09/01/2019 09:56 AM    Alk. phosphatase 79 09/01/2019 09:56 AM    Protein, total 6.9 09/01/2019 09:56 AM    Albumin 3.6 09/01/2019 09:56 AM    Globulin 3.3 09/01/2019 09:56 AM    A-G Ratio 1.1 09/01/2019 09:56 AM    ALT (SGPT) 13 09/01/2019 09:56 AM           Assessment:     Assessment:       ICD-10-CM ICD-9-CM    1. Coronary artery disease involving native coronary artery of native heart with unstable angina pectoris (HCC) I25.110 414.01 AMB POC EKG ROUTINE W/ 12 LEADS, INTER & REP     411.1    2. Essential hypertension I10 401.9    3. Mixed hyperlipidemia E78.2 272.2         Plan:     1.  Coronary artery disease involving native coronary artery of native heart with unstable angina pectoris ([facility]  Cath in 9/2019 with stable coronary disease; patent LAD stent placed previously, moderate 50% RCA stenosis, negative by FFR (0.85)  Continue with BB, statin and ASA therapy    2. Essential hypertension  BP controlled. Continue anti-hypertensive therapy and low sodium diet    3. Mixed hyperlipidemia  LDL 60 in 8/2019  Continue statin therapy and low fat, low cholesterol diet    4. Leg swelling  Treat conservatively; use compression stockings daily. F/u in 6 months with Dr. Jersey Mclain NP       Patient seen and examined by me with nurse practitioner. I personally performed all components of the history, physical, and medical decision making and agree with the assessment and plan as noted. If SOB worsens then consider pulmonary evaluation. D/w pt.      Carlos Love MD

## 2019-10-14 NOTE — PROGRESS NOTES
1. Have you been to the ER, urgent care clinic since your last visit? Hospitalized since your last visit? Yes 9/2019 [place]    2. Have you seen or consulted any other health care providers outside of the [address] [place] [address] since your last visit? Include any pap smears or colon screening. Eye exam & Nephrologist Dr Christopher Howard. Chief Complaint   Patient presents with   [facility] Follow Up    Coronary Artery Disease    Patient C/O bilateral ankle swelling .  She is not taking Imdur

## 2019-10-17 NOTE — PROGRESS NOTES
Thyroid is over supplemented, advise patient to take half of a pill twice a week and continue full pill 5 days a week ( 75 mcg) . Repeat TSH ordered.

## 2019-10-23 NOTE — PROGRESS NOTES
Spoke with patient. Two pt identifiers confirmed. Patient advised that her recent lab results showed Thyroid is over supplemented, advise patient to take half of a pill twice a week and continue full pill 5 days a week ( 75 mcg) . Repeat TSH ordered. Pt verbalized understanding of information discussed w/ no further questions at this time.

## 2019-12-18 ENCOUNTER — TELEPHONE (OUTPATIENT)
Dept: INTERNAL MEDICINE CLINIC | Age: 84
End: 2019-12-18

## 2019-12-18 DIAGNOSIS — M43.06 SPONDYLOLYSIS OF LUMBAR REGION: Primary | ICD-10-CM

## 2019-12-18 RX ORDER — TRAMADOL HYDROCHLORIDE 50 MG/1
50 TABLET ORAL
Qty: 60 TAB | Refills: 0 | Status: SHIPPED | OUTPATIENT
Start: 2019-12-18 | End: 2020-01-17

## 2019-12-18 NOTE — TELEPHONE ENCOUNTER
PT requesting Rx for Tramadol    [address] [employer] ([address])    Pt # - [phone]

## 2019-12-18 NOTE — TELEPHONE ENCOUNTER
MD Jaswinder Francisco LPN   Caller: Unspecified (Today,  9:50 AM)             Prescription sent      noted

## 2020-01-07 ENCOUNTER — OFFICE VISIT (OUTPATIENT)
Dept: INTERNAL MEDICINE CLINIC | Age: 85
End: 2020-01-07

## 2020-01-07 ENCOUNTER — HOSPITAL ENCOUNTER (OUTPATIENT)
Dept: LAB | Age: 85
Discharge: HOME | End: 2020-01-07
Payer: MEDICARE

## 2020-01-07 VITALS
HEIGHT: 60 IN | TEMPERATURE: 97.8 F | HEART RATE: 63 BPM | RESPIRATION RATE: 18 BRPM | BODY MASS INDEX: 37.89 KG/M2 | DIASTOLIC BLOOD PRESSURE: 71 MMHG | SYSTOLIC BLOOD PRESSURE: 131 MMHG | OXYGEN SATURATION: 99 % | WEIGHT: 193 LBS

## 2020-01-07 DIAGNOSIS — I10 ESSENTIAL HYPERTENSION: Primary | ICD-10-CM

## 2020-01-07 DIAGNOSIS — E03.9 HYPOTHYROIDISM, UNSPECIFIED TYPE: ICD-10-CM

## 2020-01-07 DIAGNOSIS — M54.50 CHRONIC BILATERAL LOW BACK PAIN WITHOUT SCIATICA: ICD-10-CM

## 2020-01-07 DIAGNOSIS — Z85.828 HX OF NONMELANOMA SKIN CANCER: ICD-10-CM

## 2020-01-07 DIAGNOSIS — E83.52 HIGH CALCIUM LEVELS: ICD-10-CM

## 2020-01-07 DIAGNOSIS — G89.29 CHRONIC BILATERAL LOW BACK PAIN WITHOUT SCIATICA: ICD-10-CM

## 2020-01-07 PROCEDURE — 36415 COLL VENOUS BLD VENIPUNCTURE: CPT

## 2020-01-07 PROCEDURE — 82570 ASSAY OF URINE CREATININE: CPT

## 2020-01-07 PROCEDURE — 80053 COMPREHEN METABOLIC PANEL: CPT

## 2020-01-07 PROCEDURE — 84443 ASSAY THYROID STIM HORMONE: CPT

## 2020-01-07 RX ORDER — GABAPENTIN 100 MG/1
100 CAPSULE ORAL DAILY
Qty: 90 CAP | Refills: 0
Start: 2020-01-07 | End: 2020-01-16 | Stop reason: SINTOL

## 2020-01-07 NOTE — PROGRESS NOTES
Reviewed record in preparation for visit and have obtained necessary documentation. Identified pt with two pt identifiers(name and ). Chief Complaint   Patient presents with    Pain (Chronic)       Health Maintenance Due   Topic Date Due    DTaP/Tdap/Td  (1 - Tdap) 1944    Shingles Vaccine (1 of 2) 1983    Flu Vaccine  2019       Ms. Chaparro has a reminder for a \"due or due soon\" health maintenance. I have asked that she discuss this further with her primary care provider for follow-up on this health maintenance. Coordination of Care Questionnaire:  :     1) Have you been to an emergency room, urgent care clinic since your last visit? no   Hospitalized since your last visit? no             2) Have you seen or consulted any other health care providers outside of [address] [place] [address] since your last visit? no  (Include any pap smears or colon screenings in this section.)    3) In the event something were to happen to you and you were unable to speak on your behalf, do you have an Advance Directive/ Living Will in place stating your wishes? NO    Do you have an Advance Directive on file? no    4) Are you interested in receiving information on Advance Directives? NO    Patient is accompanied by self I have received verbal consent from Lincoln Turner to discuss any/all medical information while they are present in the room.

## 2020-01-07 NOTE — PROGRESS NOTES
CC: Pain (Chronic)      HPI:    She is a 80 y.o. female who presents for chronic medical issues follow up     She reports she was started on gabapentin by Dr Lan Wilhelm and she gradually increased to TID, and had hallucinations. She was doing well on once a day \" it really helps my pain\" advised to take once a day. In the interval patient had abnormal stress test followed by heart catherization which showed patent LAD stent and  mid RCA lesion 50 % stenosed  No significant CAD   What triggered this was concern with shortness of breath with exertion   Today she denies dyspnea and chest pain  ishan Dr Anusha Hopper October and advised to continue medical therapy  Chest x ray was normal     Recall  Patient has long history of back pain  I suspect that the back pain is causing shallow breathing.    -Severe degenerative scoliosis thoracolumbar spine  [facility] and [facility] at all times  She continues to have back pain.  Taking 1 tramadol twice a day with improvement in quality of life  Last filled tramadol 12/18-I reviewed her   Does not need a refill today  Due for compliance drugscreen    Patient had skin graft in her head and last year hit her head in a bush and since then has persistent scab in area and called Dr Sourav Mcginnis   ROS:  Constitutional: negative for fevers, chills, anorexia and weight loss  10 systems reviewed and negative other than HPI    Past Medical History:   Diagnosis Date    CAD (coronary artery disease)     stent placed on left 1 stent,in 2007    Cancer ([address]     BCCA    DVT (deep venous thrombosis) ([address]     GERD (gastroesophageal reflux disease)     Hypercholesterolemia     Hypertension     Pulmonary embolism ([address] 9/15/2015    Thromboembolus ([address]     DVT after hernia operation, PE spontaneous 10 years later    Thyroid disease        Current Outpatient Medications on File Prior to Visit   Medication Sig Dispense Refill    traMADol (ULTRAM) 50 mg tablet Take 1 Tab by mouth two (2) times daily as needed for Pain for up to 30 days. Max Daily Amount: 100 mg. 60 Tab 0    metoprolol tartrate (LOPRESSOR) 50 mg tablet TAKE ONE TABLET BY MOUTH TWICE A  Tab 3    naloxone (NARCAN) 4 mg/actuation nasal spray Use 1 spray intranasally, then discard. Repeat with new spray every 2 min as needed for opioid overdose symptoms, alternating nostrils. 1 Each 0    clotrimazole (LOTRIMIN) 1 % topical cream clotrimazole 1 % topical cream      lisinopril (PRINIVIL, ZESTRIL) 5 mg tablet TAKE ONE TABLET BY MOUTH ONE TIME DAILY 90 Tab 1    alendronate (FOSAMAX) 10 mg tablet TAKE ONE TABLET BY MOUTH EVERY MORNING BEFORE BREAKFAST 90 Tab 2    rosuvastatin (CRESTOR) 40 mg tablet Take 1 Tab by mouth nightly. 90 Tab 3    ammonium lactate (LAC-HYDRIN) 12 % lotion APPLY TO AFFECTED AREA(S) TWO TIMES A DAY  GENERIC FOR LAC-HYDRIN  2    levothyroxine (SYNTHROID) 75 mcg tablet TAKE ONE TABLET BY MOUTH DAILY BEFORE BREAKFAST 90 Tab 3    ALPRAZolam (XANAX) 0.25 mg tablet Take 1 Tab by mouth three (3) times daily as needed for Sleep or Anxiety. (Patient taking differently: Take 0.25 mg by mouth as needed for Anxiety or Sleep.) 30 Tab 2    melatonin 5 mg cap capsule Take 5 mg by mouth nightly.  acetaminophen (TYLENOL ARTHRITIS PAIN) 650 mg TbER Take 650 mg by mouth every eight (8) hours.  multivitamin (ONE A DAY) tablet Take 1 Tab by mouth daily.  cholecalciferol, vitamin D3, (VITAMIN D3) 2,000 unit tab Take 1 Tab by mouth daily. 30 Tab 0    ACTIVATED CHARCOAL (CHARCOCAPS PO) Take  by mouth.  nitroglycerin (NITROSTAT) 0.3 mg SL tablet 1 tablet by SubLINGual route every five (5) minutes as needed for Chest Pain. 1 Bottle 1    aspirin 81 mg Tab Take 81 mg by mouth daily.  gabapentin (NEURONTIN) 100 mg capsule Take  by mouth three (3) times daily. No current facility-administered medications on file prior to visit.         Past Surgical History:   Procedure Laterality Date    HX GI      esophageal hiatal hernia - 2004    HX GYN      partial hysterectomy - 1970    HX OTHER SURGICAL      BCCAs removed    HX UROLOGICAL      bladder repair - 1999       Family History   Problem Relation Age of Onset    Heart Disease Mother     Dementia Father     Diabetes Son     Kidney Disease Son         autoimmune     Reviewed and no changes     Social History     Socioeconomic History    Marital status:      Spouse name: Not on file    Number of children: Not on file    Years of education: Not on file    Highest education level: Not on file   Occupational History    Not on file   Social Needs    Financial resource strain: Not on file    Food insecurity:     Worry: Not on file     Inability: Not on file    Transportation needs:     Medical: Not on file     Non-medical: Not on file   Tobacco Use    Smoking status: Never Smoker    Smokeless tobacco: Never Used   Substance and Sexual Activity    Alcohol use: No    Drug use: No     Types: Sedatives/Hypnotics, Prescription    Sexual activity: Not on file   Lifestyle    Physical activity:     Days per week: Not on file     Minutes per session: Not on file    Stress: Not on file   Relationships    Social connections:     Talks on phone: Not on file     Gets together: Not on file     Attends Congregation service: Not on file     Active member of club or organization: Not on file     Attends meetings of clubs or organizations: Not on file     Relationship status: Not on file    Intimate partner violence:     Fear of current or ex partner: Not on file     Emotionally abused: Not on file     Physically abused: Not on file     Forced sexual activity: Not on file   Other Topics Concern    Not on file   Social History Narrative    Not on file            Visit Vitals  /71 (BP 1 Location: Right arm, BP Patient Position: Sitting)   Pulse 63   Temp 97.8 °F (36.6 °C) (Oral)   Resp 18   Ht 5' (1.524 m)   Wt 193 lb (87.5 kg)   SpO2 99%   BMI 37.69 kg/m²       Physical Examination:   General - Well appearing female  Neck - supple, no bruits, no TMG, no LAD  Pulm - clear to auscultation bilaterally  Cardio - RRR, normal S1 S2, no murmur gallops or rubs  Abd - soft, nontender, no masses, no HSM  Extrem - no edema, +2 distal pulses  Psych - normal affect, appropriate mood  She has severe scoliosis and she is wearing a back brace    Skin: There is a scar with hypopigmentation base on her frontoparietal area and on the right side -Per patient that scar has been getting thicker and not healing since she bumped her head 1 year ago.   She has a history of skin cancer that was resected in that area  Lab Results   Component Value Date/Time    WBC 4.1 09/01/2019 09:56 AM    HGB 14.3 09/01/2019 09:56 AM    HCT 42.6 09/01/2019 09:56 AM    PLATELET 122 42/84/9410 09:56 AM    MCV 99.1 (H) 09/01/2019 09:56 AM     Lab Results   Component Value Date/Time    Sodium 140 09/03/2019 03:30 AM    Potassium 4.3 09/03/2019 03:30 AM    Chloride 109 (H) 09/03/2019 03:30 AM    CO2 24 09/03/2019 03:30 AM    Anion gap 7 09/03/2019 03:30 AM    Glucose 102 (H) 09/03/2019 03:30 AM    BUN 28 (H) 09/03/2019 03:30 AM    Creatinine 1.48 (H) 09/03/2019 03:30 AM    BUN/Creatinine ratio 19 09/03/2019 03:30 AM    GFR est AA 41 (L) 09/03/2019 03:30 AM    GFR est non-AA 33 (L) 09/03/2019 03:30 AM    Calcium 9.8 09/03/2019 03:30 AM     Lab Results   Component Value Date/Time    Cholesterol, total 147 08/26/2019 08:12 AM    HDL Cholesterol 62 08/26/2019 08:12 AM    LDL, calculated 60 08/26/2019 08:12 AM    VLDL, calculated 25 08/26/2019 08:12 AM    Triglyceride 125 08/26/2019 08:12 AM    CHOL/HDL Ratio 2.5 09/02/2015 03:20 AM     Lab Results   Component Value Date/Time    TSH 0.437 (L) 10/04/2019 01:11 PM     No results found for: PSA, Frieda Ashwin, PSAR3, [id number], [id number], PSALT  Lab Results   Component Value Date/Time    Hemoglobin A1c 5.7 05/07/2009 01:40 PM     Lab Results Component Value Date/Time    Vitamin D 25-Hydroxy 31.8 (L) 07/28/2011 08:00 AM    VITAMIN D, 25-HYDROXY 39.8 04/05/2019 02:25 PM       Lab Results   Component Value Date/Time    ALT (SGPT) 13 09/01/2019 09:56 AM    AST (SGOT) 20 09/01/2019 09:56 AM    Alk. phosphatase 79 09/01/2019 09:56 AM    Bilirubin, total 0.7 09/01/2019 09:56 AM           Assessment/Plan:      . Essential hypertension  -Blood pressure is at goal on metoprolol  - METABOLIC PANEL, COMPREHENSIVE     Hypothyroidism, unspecified type  We recently adjusted her levothyroxine and decreased to 75 mcg daily she is due for recheck  - TSH AND FREE T4  - METABOLIC PANEL, COMPREHENSIVE    3. Hx of nonmelanoma skin cancer  -The area where the cancer was resected is having a scar that is not healing after small trauma. It has been there for over 6 months. I advised her to see her dermatologist and given referral  - REFERRAL TO DERMATOLOGY      Spondylolysis of lumbar region  Chronic bilateral low back pain without sciatica  Pain is chronic  Advised to take tramadol BID. She tolerates it well, no drowsiness. -  is appropriate last refill December 18  - UDS is appropriate -repeat due today  - Dr Trace Rankin started her on gabapentin she was doing well on 1 pill a day when she increased to 3 times a day she developed hallucinations. Advised her to take it only once a day and change to prescription instructions    Dyspnea on exertio / hx of coronary artery disease / stents -dyspnea is better  - on Crestor, metoprolol and aspirin  - she had a stress test that was abnormal followed by a catherization which showed patent LAD ( stented) and non obstructive CAD  She had normal chest x ray  - suspect the back pain is causing more shallow breathing      Stage 3 chronic kidney disease ([facility] [address]  - followed by Dr Oly Plaza at [address] [employer] D level/ osteoporosis  - has been on fosamax 10mg daily / patient does not want BMD at this time due to pain with lying on table.  I could not locate her last BMD  Takes 2000 units of vitamin D 3 daily          Orders Placed This Encounter    TSH AND FREE T4    METABOLIC PANEL, COMPREHENSIVE    COMPLIANCE DRUG SCREEN/PRESCRIPTION MONITORING    REFERRAL TO DERMATOLOGY     Referral Type:   Consultation     Referral Reason:   Specialty Services Required     Referred to Provider:   Ruby Arredondo MD    gabapentin (NEURONTIN) 100 mg capsule     Sig: Take 1 Cap by mouth daily. Max Daily Amount: 100 mg.      Dispense:  90 Cap     Refill:  0

## 2020-01-07 NOTE — PATIENT INSTRUCTIONS
Office Policies    Phone calls/patient messages:            Please allow up to 24 hours for someone in the office to contact you about your call or message. Be mindful your provider may be out of the office or your message may require further review. We encourage you to use [email or web address] for your messages as this is a faster, more efficient way to communicate with our office                         Medication Refills:            Prescription medications require 48-72 business hours to process. We encourage you to use [email or web address] for your refills. For controlled medications: Please allow 72 business hours to process. Certain medications may require you to  a written prescription at our office. NO narcotic/controlled medications will be prescribed after 4pm Monday through Friday or on weekends              Form/Paperwork Completion:            Please note a $25 fee may incur for all paperwork for completed by our providers. We ask that you allow 7-10 business days. Pre-payment is due prior to picking up/faxing the completed form. You may also download your forms to [email or web address] to have your doctor print off.

## 2020-01-08 LAB
ALBUMIN SERPL-MCNC: 4.1 G/DL (ref 3.5–4.7)
ALBUMIN/GLOB SERPL: 1.9 {RATIO} (ref 1.2–2.2)
ALP SERPL-CCNC: 75 IU/L (ref 39–117)
ALT SERPL-CCNC: 7 IU/L (ref 0–32)
AST SERPL-CCNC: 20 IU/L (ref 0–40)
BILIRUB SERPL-MCNC: 0.5 MG/DL (ref 0–1.2)
BUN SERPL-MCNC: 22 MG/DL (ref 8–27)
BUN/CREAT SERPL: 15 (ref 12–28)
CALCIUM SERPL-MCNC: 11.2 MG/DL (ref 8.7–10.3)
CHLORIDE SERPL-SCNC: 102 MMOL/L (ref 96–106)
CO2 SERPL-SCNC: 25 MMOL/L (ref 20–29)
CREAT SERPL-MCNC: 1.42 MG/DL (ref 0.57–1)
GLOBULIN SER CALC-MCNC: 2.2 G/DL (ref 1.5–4.5)
GLUCOSE SERPL-MCNC: 102 MG/DL (ref 65–99)
POTASSIUM SERPL-SCNC: 4.3 MMOL/L (ref 3.5–5.2)
PROT SERPL-MCNC: 6.3 G/DL (ref 6–8.5)
SODIUM SERPL-SCNC: 143 MMOL/L (ref 134–144)
T4 FREE SERPL-MCNC: 1.32 NG/DL (ref 0.82–1.77)
TSH SERPL DL<=0.005 MIU/L-ACNC: 4.33 UIU/ML (ref 0.45–4.5)

## 2020-01-10 NOTE — PROGRESS NOTES
I have reviewed the provider's instructions with the patient, answering all questions to her satisfaction. Patient is not taking any calcium supplements or MVI that contains calcium. Patient will come back for repeat BMP.

## 2020-01-12 LAB — DRUGS UR: NORMAL

## 2020-01-16 ENCOUNTER — TELEPHONE (OUTPATIENT)
Dept: INTERNAL MEDICINE CLINIC | Age: 85
End: 2020-01-16

## 2020-01-16 NOTE — TELEPHONE ENCOUNTER
#[phone] pt states for the past five days she has taken 1 gabapentin pill. The same thing is happening with the hallucinations/seeing things. Pt has stopped taking the medication and needs a call about this.

## 2020-01-20 ENCOUNTER — TELEPHONE (OUTPATIENT)
Dept: INTERNAL MEDICINE CLINIC | Age: 85
End: 2020-01-20

## 2020-01-20 ENCOUNTER — HOSPITAL ENCOUNTER (OUTPATIENT)
Dept: LAB | Age: 85
Discharge: HOME | End: 2020-01-20
Payer: MEDICARE

## 2020-01-20 DIAGNOSIS — G89.29 CHRONIC BILATERAL LOW BACK PAIN WITHOUT SCIATICA: Primary | ICD-10-CM

## 2020-01-20 DIAGNOSIS — M54.50 CHRONIC BILATERAL LOW BACK PAIN WITHOUT SCIATICA: Primary | ICD-10-CM

## 2020-01-20 PROCEDURE — 83970 ASSAY OF PARATHORMONE: CPT

## 2020-01-20 PROCEDURE — 36415 COLL VENOUS BLD VENIPUNCTURE: CPT

## 2020-01-20 PROCEDURE — 80048 BASIC METABOLIC PNL TOTAL CA: CPT

## 2020-01-20 RX ORDER — TRAMADOL HYDROCHLORIDE 50 MG/1
50 TABLET ORAL
Qty: 60 TAB | Refills: 1 | Status: SHIPPED | OUTPATIENT
Start: 2020-01-20 | End: 2020-02-19

## 2020-01-20 NOTE — TELEPHONE ENCOUNTER
MD Gentry Fortune LPN   Caller: Unspecified (Today,  2:01 PM)             Prescription sent to her pharmacy tramadol 50mg #60      Noted

## 2020-01-20 NOTE — TELEPHONE ENCOUNTER
Caller (if not patient): n/a   Relationship of caller (if not patient): n/a   Best contact number(s): [phone]   Name of medication and dosage if known: New Prescription for Tramadol   Is patient out of this medication (yes/no): yes   Pharmacy name: [address] listed in chart? (yes/no): yes   Pharmacy phone number: [phone]   Details to clarify the request: Karyn Mobley

## 2020-01-21 LAB
BUN SERPL-MCNC: 17 MG/DL (ref 8–27)
BUN/CREAT SERPL: 13 (ref 12–28)
CALCIUM SERPL-MCNC: 10.5 MG/DL (ref 8.7–10.3)
CHLORIDE SERPL-SCNC: 108 MMOL/L (ref 96–106)
CO2 SERPL-SCNC: 22 MMOL/L (ref 20–29)
CREAT SERPL-MCNC: 1.36 MG/DL (ref 0.57–1)
GLUCOSE SERPL-MCNC: 109 MG/DL (ref 65–99)
POTASSIUM SERPL-SCNC: 4.8 MMOL/L (ref 3.5–5.2)
PTH-INTACT SERPL-MCNC: 73 PG/ML (ref 15–65)
SODIUM SERPL-SCNC: 146 MMOL/L (ref 134–144)

## 2020-02-11 DIAGNOSIS — E83.52 HIGH CALCIUM LEVELS: Primary | ICD-10-CM

## 2020-02-11 NOTE — PROGRESS NOTES
Kidney function is stable  Calcium level is slightly high with slightly high PTH  Lets repeat calcium level prior to follow up in April

## 2020-02-24 RX ORDER — LISINOPRIL 5 MG/1
TABLET ORAL
Qty: 90 TAB | Refills: 1 | Status: SHIPPED | OUTPATIENT
Start: 2020-02-24 | End: 2020-08-18

## 2020-02-25 RX ORDER — LEVOTHYROXINE SODIUM 75 UG/1
TABLET ORAL
Qty: 90 TAB | Refills: 3 | Status: SHIPPED | OUTPATIENT
Start: 2020-02-25 | End: 2021-02-16

## 2020-02-25 RX ORDER — ALENDRONATE SODIUM 10 MG/1
TABLET ORAL
Qty: 90 TAB | Refills: 2 | Status: SHIPPED | OUTPATIENT
Start: 2020-02-25 | End: 2020-12-01 | Stop reason: SDUPTHER

## 2020-02-25 NOTE — TELEPHONE ENCOUNTER
PCP: Janey Malone MD    Last appt: 1/20/2020  Future Appointments   Date Time Provider Jazmyne Cristina   4/6/2020 11:45 AM Appa Flako Lopes MD [address]   4/23/2020  1:00 PM Tamanna Robertson MD [address] [facility]       Requested Prescriptions     Pending Prescriptions Disp Refills    levothyroxine (SYNTHROID) 75 mcg tablet 90 Tab 3     Sig: TAKE ONE TABLET BY MOUTH DAILY BEFORE BREAKFAST    alendronate (FOSAMAX) 10 mg tablet 90 Tab 2     Sig: TAKE ONE TABLET BY MOUTH EVERY MORNING BEFORE BREAKFAST

## 2020-03-10 ENCOUNTER — TELEPHONE (OUTPATIENT)
Dept: FAMILY MEDICINE CLINIC | Age: 85
End: 2020-03-10

## 2020-03-17 ENCOUNTER — TELEPHONE (OUTPATIENT)
Dept: INTERNAL MEDICINE CLINIC | Age: 85
End: 2020-03-17

## 2020-03-17 DIAGNOSIS — M54.50 CHRONIC BILATERAL LOW BACK PAIN WITHOUT SCIATICA: Primary | ICD-10-CM

## 2020-03-17 DIAGNOSIS — G89.29 CHRONIC BILATERAL LOW BACK PAIN WITHOUT SCIATICA: Primary | ICD-10-CM

## 2020-03-17 NOTE — TELEPHONE ENCOUNTER
Caller (if not patient): N/A   Relationship of caller (if not patient): N/A   Best contact number(s): [phone]   Name of medication and dosage if known: Tramadol 50mg   Is patient out of this medication (yes/no): No, enough for two more days.    Pharmacy name: [facility] listed in chart? (yes/no): Yes   Pharmacy phone number: N/A   Date of last visit: 01/07/2020   Details to clarify the request: N/Mike

## 2020-03-18 RX ORDER — TRAMADOL HYDROCHLORIDE 50 MG/1
50 TABLET ORAL
Qty: 60 TAB | Refills: 0 | Status: SHIPPED | OUTPATIENT
Start: 2020-03-18 | End: 2020-04-15 | Stop reason: SDUPTHER

## 2020-04-06 ENCOUNTER — VIRTUAL VISIT (OUTPATIENT)
Dept: INTERNAL MEDICINE CLINIC | Age: 85
End: 2020-04-06

## 2020-04-06 DIAGNOSIS — E03.9 HYPOTHYROIDISM, UNSPECIFIED TYPE: ICD-10-CM

## 2020-04-06 DIAGNOSIS — R89.9 ABNORMAL LABORATORY TEST: Primary | ICD-10-CM

## 2020-04-06 DIAGNOSIS — M43.06 SPONDYLOLYSIS OF LUMBAR REGION: ICD-10-CM

## 2020-04-06 DIAGNOSIS — I10 ESSENTIAL HYPERTENSION: ICD-10-CM

## 2020-04-06 NOTE — PROGRESS NOTES
Reviewed record in preparation for visit and have obtained necessary documentation. Identified pt with two pt identifiers(name and ). Chief Complaint   Patient presents with    Hypertension       Health Maintenance Due   Topic Date Due    DTaP/Tdap/Td  (1 - Tdap) 1954    Shingles Vaccine (1 of 2) 1983       Ms. Chaparro has a reminder for a \"due or due soon\" health maintenance. I have asked that she discuss this further with her primary care provider for follow-up on this health maintenance. Coordination of Care Questionnaire:  :     1) Have you been to an emergency room, urgent care clinic since your last visit? no   Hospitalized since your last visit? no             2) Have you seen or consulted any other health care providers outside of [address] [place] [address] since your last visit? no  (Include any pap smears or colon screenings in this section.)    3) In the event something were to happen to you and you were unable to speak on your behalf, do you have an Advance Directive/ Living Will in place stating your wishes? NO    Do you have an Advance Directive on file? no    4) Are you interested in receiving information on Advance Directives? NO    Patient is accompanied by self I have received verbal consent from Alayna Phelps to discuss any/all medical information while they are present in the room.

## 2020-04-06 NOTE — PROGRESS NOTES
CC: Abnormal labs at the kidney doctor      HPI:    She is a 80 y.o. female who presents     She went and saw her kidney specialist at [facility] and was noted to have abnormal labs ( Dr Lynda Restrepo) . Patient does not know the details of the labs but is going to see a hematologist.  She she was referred to a hematologist at [facility] however she is concerned about going to her appointment due to the [place] situation and her son is a transplant patient. She is asking for my advice        In the interval patient had abnormal stress test followed by heart catherization which showed patent LAD stent and  mid RCA lesion 50 % stenosed  No significant CAD   What triggered this was concern with shortness of breath with exertion   Today she denies dyspnea and chest pain  ishan Dr Aicha Grullon October and advised to continue medical therapy  Chest x ray was normal     Recall  Patient has long history of back pain  I suspect that the back pain is causing shallow breathing.    -Severe degenerative scoliosis thoracolumbar spine  [facility] at all times  She continues to have back pain. Taking 1 tramadol twice a day with improvement in quality of life  Last filled tramadol in March 2019   Does not need a refill today  Recall she stopped gabapentin due to negative side effects.   It was helping her pain      ROS:  Constitutional: negative for fevers, chills, anorexia and weight loss  10 systems reviewed and negative other than HPI    Past Medical History:   Diagnosis Date    CAD (coronary artery disease)     stent placed on left 1 stent,in 2007    Cancer ([address]     BCCA    DVT (deep venous thrombosis) (HCC)     GERD (gastroesophageal reflux disease)     Hypercholesterolemia     Hypertension     Pulmonary embolism ([address] 9/15/2015    Thromboembolus ([address]     DVT after hernia operation, PE spontaneous 10 years later    Thyroid disease        Current Outpatient Medications on File Prior to Visit   Medication Sig Dispense Refill    traMADoL (ULTRAM) 50 mg tablet Take 1 Tab by mouth every six (6) hours as needed for Pain for up to 30 days. Max Daily Amount: 200 mg. 60 Tab 0    levothyroxine (SYNTHROID) 75 mcg tablet TAKE ONE TABLET BY MOUTH DAILY BEFORE BREAKFAST 90 Tab 3    alendronate (FOSAMAX) 10 mg tablet TAKE ONE TABLET BY MOUTH EVERY MORNING BEFORE BREAKFAST 90 Tab 2    lisinopril (PRINIVIL, ZESTRIL) 5 mg tablet TAKE ONE TABLET BY MOUTH ONE TIME DAILY 90 Tab 1    metoprolol tartrate (LOPRESSOR) 50 mg tablet TAKE ONE TABLET BY MOUTH TWICE A  Tab 3    naloxone (NARCAN) 4 mg/actuation nasal spray Use 1 spray intranasally, then discard. Repeat with new spray every 2 min as needed for opioid overdose symptoms, alternating nostrils. 1 Each 0    clotrimazole (LOTRIMIN) 1 % topical cream clotrimazole 1 % topical cream      rosuvastatin (CRESTOR) 40 mg tablet Take 1 Tab by mouth nightly. 90 Tab 3    ammonium lactate (LAC-HYDRIN) 12 % lotion APPLY TO AFFECTED AREA(S) TWO TIMES A DAY  GENERIC FOR LAC-HYDRIN  2    ALPRAZolam (XANAX) 0.25 mg tablet Take 1 Tab by mouth three (3) times daily as needed for Sleep or Anxiety. (Patient taking differently: Take 0.25 mg by mouth as needed for Anxiety or Sleep.) 30 Tab 2    melatonin 5 mg cap capsule Take 5 mg by mouth nightly.  acetaminophen (TYLENOL ARTHRITIS PAIN) 650 mg TbER Take 650 mg by mouth every eight (8) hours.  multivitamin (ONE A DAY) tablet Take 1 Tab by mouth daily.  cholecalciferol, vitamin D3, (VITAMIN D3) 2,000 unit tab Take 1 Tab by mouth daily. 30 Tab 0    ACTIVATED CHARCOAL (CHARCOCAPS PO) Take  by mouth.  nitroglycerin (NITROSTAT) 0.3 mg SL tablet 1 tablet by SubLINGual route every five (5) minutes as needed for Chest Pain. 1 Bottle 1    aspirin 81 mg Tab Take 81 mg by mouth daily. No current facility-administered medications on file prior to visit.         Past Surgical History:   Procedure Laterality Date    HX GI esophageal hiatal hernia - 2004    HX GYN      partial hysterectomy - 1970    HX OTHER SURGICAL      BCCAs removed    HX UROLOGICAL      bladder repair - 1999       Family History   Problem Relation Age of Onset    Heart Disease Mother     Dementia Father     Diabetes Son     Kidney Disease Son         autoimmune     Reviewed and no changes     Social History     Socioeconomic History    Marital status:      Spouse name: Not on file    Number of children: Not on file    Years of education: Not on file    Highest education level: Not on file   Occupational History    Not on file   Social Needs    Financial resource strain: Not on file    Food insecurity     Worry: Not on file     Inability: Not on file    Transportation needs     Medical: Not on file     Non-medical: Not on file   Tobacco Use    Smoking status: Never Smoker    Smokeless tobacco: Never Used   Substance and Sexual Activity    Alcohol use: No    Drug use: No     Types: Sedatives/Hypnotics, Prescription    Sexual activity: Not on file   Lifestyle    Physical activity     Days per week: Not on file     Minutes per session: Not on file    Stress: Not on file   Relationships    Social connections     Talks on phone: Not on file     Gets together: Not on file     Attends [employer] service: Not on file     Active member of club or organization: Not on file     Attends meetings of clubs or organizations: Not on file     Relationship status: Not on file    Intimate partner violence     Fear of current or ex partner: Not on file     Emotionally abused: Not on file     Physically abused: Not on file     Forced sexual activity: Not on file   Other Topics Concern    Not on file   Social History Narrative    Not on file            There were no vitals taken for this visit.     Physical Examination:   Telephone visit     She is alert and oriented x4 /speaking full sentences  Lab Results   Component Value Date/Time    WBC 4.1 09/01/2019 09:56 AM    HGB 14.3 09/01/2019 09:56 AM    HCT 42.6 09/01/2019 09:56 AM    PLATELET 703 97/15/1417 09:56 AM    MCV 99.1 (H) 09/01/2019 09:56 AM     Lab Results   Component Value Date/Time    Sodium 146 (H) 01/20/2020 12:58 PM    Potassium 4.8 01/20/2020 12:58 PM    Chloride 108 (H) 01/20/2020 12:58 PM    CO2 22 01/20/2020 12:58 PM    Anion gap 7 09/03/2019 03:30 AM    Glucose 109 (H) 01/20/2020 12:58 PM    BUN 17 01/20/2020 12:58 PM    Creatinine 1.36 (H) 01/20/2020 12:58 PM    BUN/Creatinine ratio 13 01/20/2020 12:58 PM    GFR est AA 41 (L) 01/20/2020 12:58 PM    GFR est non-AA 35 (L) 01/20/2020 12:58 PM    Calcium 10.5 (H) 01/20/2020 12:58 PM     Lab Results   Component Value Date/Time    Cholesterol, total 147 08/26/2019 08:12 AM    HDL Cholesterol 62 08/26/2019 08:12 AM    LDL, calculated 60 08/26/2019 08:12 AM    VLDL, calculated 25 08/26/2019 08:12 AM    Triglyceride 125 08/26/2019 08:12 AM    CHOL/HDL Ratio 2.5 09/02/2015 03:20 AM     Lab Results   Component Value Date/Time    TSH 4.330 01/07/2020 12:00 PM     No results found for: PSA, PSA2, PSAR1, Glorine Wilman, PSAR3, [id number], [id number], PSALT  Lab Results   Component Value Date/Time    Hemoglobin A1c 5.7 05/07/2009 01:40 PM     Lab Results   Component Value Date/Time    Vitamin D 25-Hydroxy 31.8 (L) 07/28/2011 08:00 AM    VITAMIN D, 25-HYDROXY 39.8 04/05/2019 02:25 PM       Lab Results   Component Value Date/Time    ALT (SGPT) 7 01/07/2020 12:00 PM    AST (SGOT) 20 01/07/2020 12:00 PM    Alk. phosphatase 75 01/07/2020 12:00 PM    Bilirubin, total 0.5 01/07/2020 12:00 PM           Assessment/Plan:    Abnormal CBC or urine at [facility] Dr Blackmon Means I am requesting the records to clarify what the abnormality is.   I advised her to call the hematologist and see if they can change her [facility] appointment to a virtual appointment or change her location from [facility] to a more remote location     Essential hypertension  -Blood pressure on metoprolol Hypothyroidism, unspecified type  We recently adjusted her levothyroxine and decreased to 75 mcg daily and her repeat TSH was normal      Spondylolysis of lumbar region  Chronic bilateral low back pain without sciatica  Pain is chronic  Advised to take tramadol BID. She tolerates it well, no drowsiness. -  is appropriate last refill March 2020  - UDS is appropriate -done in Jan 2020  - Dr Diana Durant started her on gabapentin she was doing well on 1 pill a day when she increased to 3 times a day she developed hallucinations. She stopped medication due to side effects    Dyspnea on exertio / hx of coronary artery disease / stents -dyspnea is better  - on Crestor, metoprolol and aspirin  - she had a stress test that was abnormal followed by a catherization which showed patent LAD ( stented) and non obstructive CAD  She had normal chest x ray  - suspect the back pain is causing more shallow breathing      Stage 3 chronic kidney disease ([facility]  - followed by Dr Sara Johnson at [address] D level/ osteoporosis  - has been on fosamax 10mg daily / patient does not want BMD at this time due to pain with lying on table. I could not locate her last BMD  Takes 2000 units of vitamin D 3 daily        This is an established visit conducted via telemedicine PHONE only 15 minutes on the phone . The patient has been instructed that this meets HIPAA criteria and acknowledges and agrees to this method of visitation.     Latrice Duncan MD  04/06/20  1:09 PM

## 2020-04-15 DIAGNOSIS — M54.50 CHRONIC BILATERAL LOW BACK PAIN WITHOUT SCIATICA: ICD-10-CM

## 2020-04-15 DIAGNOSIS — G89.29 CHRONIC BILATERAL LOW BACK PAIN WITHOUT SCIATICA: ICD-10-CM

## 2020-04-15 RX ORDER — ROSUVASTATIN CALCIUM 40 MG/1
40 TABLET, COATED ORAL
Qty: 90 TAB | Refills: 3 | Status: SHIPPED | OUTPATIENT
Start: 2020-04-15 | End: 2021-04-13

## 2020-04-15 RX ORDER — TRAMADOL HYDROCHLORIDE 50 MG/1
50 TABLET ORAL
Qty: 90 TAB | Refills: 0 | Status: SHIPPED | OUTPATIENT
Start: 2020-04-15 | End: 2020-05-15

## 2020-05-26 ENCOUNTER — TELEPHONE (OUTPATIENT)
Dept: INTERNAL MEDICINE CLINIC | Age: 85
End: 2020-05-26

## 2020-05-26 DIAGNOSIS — M54.50 CHRONIC BILATERAL LOW BACK PAIN WITHOUT SCIATICA: Primary | ICD-10-CM

## 2020-05-26 DIAGNOSIS — G89.29 CHRONIC BILATERAL LOW BACK PAIN WITHOUT SCIATICA: Primary | ICD-10-CM

## 2020-05-26 RX ORDER — TRAMADOL HYDROCHLORIDE 50 MG/1
50 TABLET ORAL
Qty: 60 TAB | Refills: 0 | Status: SHIPPED | OUTPATIENT
Start: 2020-05-26 | End: 2020-06-25

## 2020-05-26 NOTE — TELEPHONE ENCOUNTER
#[phone] pt states she needs a refill on Tramadol that is not on med list.   Please refill as soon as possible. Pt would also like a call to discuss an appt. She isn't sure what to do.

## 2020-06-30 ENCOUNTER — TELEPHONE (OUTPATIENT)
Dept: INTERNAL MEDICINE CLINIC | Age: 85
End: 2020-06-30

## 2020-06-30 DIAGNOSIS — G89.29 CHRONIC BILATERAL LOW BACK PAIN WITHOUT SCIATICA: Primary | ICD-10-CM

## 2020-06-30 DIAGNOSIS — M54.50 CHRONIC BILATERAL LOW BACK PAIN WITHOUT SCIATICA: Primary | ICD-10-CM

## 2020-06-30 RX ORDER — TRAMADOL HYDROCHLORIDE 50 MG/1
50 TABLET ORAL
Qty: 90 TAB | Refills: 0 | Status: SHIPPED | OUTPATIENT
Start: 2020-06-30 | End: 2020-08-10 | Stop reason: SDUPTHER

## 2020-06-30 NOTE — TELEPHONE ENCOUNTER
#[phone] pt is requesting refill on Tramadol 50 mg that is not on med list.    Please send to [facility] on file.

## 2020-07-13 DIAGNOSIS — F41.9 ANXIETY: ICD-10-CM

## 2020-07-13 NOTE — TELEPHONE ENCOUNTER
----- Message from Devonte Franz sent at 7/13/2020  9:03 AM EDT -----  Regarding: Dr. Kingston Landau / refill  Contact: [phone] (if not patient): n/a  Relationship of caller (if not patient): n/a  Best contact number(s): [phone]  Name of medication and dosage if known: \"alprazolam .25 mg\"  Is patient out of this medication (yes/no): no  Pharmacy name: [facility] in [address] [place] [address] listed in chart? (yes/no): yes  Pharmacy phone number: n/a   Date of last visit: April 06, 2020  Details to clarify the request: Per pt. she has 5 pills left

## 2020-07-13 NOTE — TELEPHONE ENCOUNTER
PCP: Antonio Reynolds MD    Last appt: 4/6/2020  Future Appointments   Date Time Provider Jazmyne Cristina   7/23/2020 10:45 AM Appa Marina Schmitt MD [address]   8/13/2020  3:00 PM Daniel Robertson MD [address] [facility]       Requested Prescriptions     Pending Prescriptions Disp Refills    ALPRAZolam (XANAX) 0.25 mg tablet 30 Tab 1     Sig: Take 1 Tab by mouth daily as needed for Anxiety or Sleep.

## 2020-07-14 RX ORDER — ALPRAZOLAM 0.25 MG/1
0.25 TABLET ORAL
Qty: 30 TAB | Refills: 1 | Status: SHIPPED | OUTPATIENT
Start: 2020-07-14 | End: 2020-10-01 | Stop reason: SDUPTHER

## 2020-07-23 ENCOUNTER — VIRTUAL VISIT (OUTPATIENT)
Dept: INTERNAL MEDICINE CLINIC | Age: 85
End: 2020-07-23

## 2020-07-23 DIAGNOSIS — M81.0 AGE RELATED OSTEOPOROSIS, UNSPECIFIED PATHOLOGICAL FRACTURE PRESENCE: ICD-10-CM

## 2020-07-23 DIAGNOSIS — M48.00 SPINAL STENOSIS, UNSPECIFIED SPINAL REGION: ICD-10-CM

## 2020-07-23 DIAGNOSIS — N18.30 STAGE 3 CHRONIC KIDNEY DISEASE (HCC): ICD-10-CM

## 2020-07-23 DIAGNOSIS — F41.9 ANXIETY: ICD-10-CM

## 2020-07-23 DIAGNOSIS — E66.01 SEVERE OBESITY WITH BODY MASS INDEX (BMI) OF 35.0 TO 39.9 WITH SERIOUS COMORBIDITY (HCC): ICD-10-CM

## 2020-07-23 DIAGNOSIS — F51.01 PRIMARY INSOMNIA: ICD-10-CM

## 2020-07-23 DIAGNOSIS — M43.06 SPONDYLOLYSIS OF LUMBAR REGION: Primary | ICD-10-CM

## 2020-07-23 DIAGNOSIS — I20.1 CORONARY ARTERY SPASM (HCC): ICD-10-CM

## 2020-07-23 NOTE — PROGRESS NOTES
CC: Abnormal labs at the kidney doctor      HPI:    She is a 80 y.o. female who presents     She went and saw her kidney specialist at [facility] and was noted to have abnormal labs ( Dr Zayra Diaz)  Concerning for multiple myeloma and had extensive testing which was negative for MM . Seen a hematologist at [facility]      In the interval patient had abnormal stress test followed by heart catherization which showed patent LAD stent and  mid RCA lesion 50 % stenosed  No significant CAD   What triggered this was concern with shortness of breath with exertion   Today she denies dyspnea and chest pain  ishan Dr Alise Ambrose October and advised to continue medical therapy  Chest x ray was normal     Recall  Patient has long history of back pain  I suspect that the back pain is causing shallow breathing.    -Severe degenerative scoliosis thoracolumbar spine  [facility] at all times  She continues to have back pain. Taking 1 tramadol twice a day with improvement in quality of life  Last filled tramadol in March 2019   Does not need a refill today  Recall she stopped gabapentin due to negative side effects. It was helping her pain      Patient is struggling to sleep having bad dreams.   Taking xanax 0.25mg daily for anxiety /sleep   Once wakes having difficulty falling asleep       ROS:  Constitutional: negative for fevers, chills, anorexia and weight loss  10 systems reviewed and negative other than HPI    Past Medical History:   Diagnosis Date    CAD (coronary artery disease)     stent placed on left 1 stent,in 2007    Cancer ([address]     BCCA    DVT (deep venous thrombosis) (HCC)     GERD (gastroesophageal reflux disease)     Hypercholesterolemia     Hypertension     Pulmonary embolism ([address] 9/15/2015    Thromboembolus ([address]     DVT after hernia operation, PE spontaneous 10 years later    Thyroid disease        Current Outpatient Medications on File Prior to Visit   Medication Sig Dispense Refill    ALPRAZolam (XANAX) 0.25 mg tablet Take 1 Tab by mouth daily as needed for Anxiety or Sleep. 30 Tab 1    traMADoL (ULTRAM) 50 mg tablet Take 1 Tab by mouth every eight (8) hours as needed for Pain for up to 30 days. Max Daily Amount: 150 mg. 90 Tab 0    rosuvastatin (Crestor) 40 mg tablet Take 1 Tab by mouth nightly. 90 Tab 3    levothyroxine (SYNTHROID) 75 mcg tablet TAKE ONE TABLET BY MOUTH DAILY BEFORE BREAKFAST 90 Tab 3    alendronate (FOSAMAX) 10 mg tablet TAKE ONE TABLET BY MOUTH EVERY MORNING BEFORE BREAKFAST 90 Tab 2    lisinopril (PRINIVIL, ZESTRIL) 5 mg tablet TAKE ONE TABLET BY MOUTH ONE TIME DAILY 90 Tab 1    metoprolol tartrate (LOPRESSOR) 50 mg tablet TAKE ONE TABLET BY MOUTH TWICE A  Tab 3    naloxone (NARCAN) 4 mg/actuation nasal spray Use 1 spray intranasally, then discard. Repeat with new spray every 2 min as needed for opioid overdose symptoms, alternating nostrils. 1 Each 0    clotrimazole (LOTRIMIN) 1 % topical cream clotrimazole 1 % topical cream      ammonium lactate (LAC-HYDRIN) 12 % lotion APPLY TO AFFECTED AREA(S) TWO TIMES A DAY  GENERIC FOR LAC-HYDRIN  2    melatonin 5 mg cap capsule Take 5 mg by mouth nightly.  acetaminophen (TYLENOL ARTHRITIS PAIN) 650 mg TbER Take 650 mg by mouth every eight (8) hours.  multivitamin (ONE A DAY) tablet Take 1 Tab by mouth daily.  cholecalciferol, vitamin D3, (VITAMIN D3) 2,000 unit tab Take 1 Tab by mouth daily. 30 Tab 0    ACTIVATED CHARCOAL (CHARCOCAPS PO) Take  by mouth.  nitroglycerin (NITROSTAT) 0.3 mg SL tablet 1 tablet by SubLINGual route every five (5) minutes as needed for Chest Pain. 1 Bottle 1    aspirin 81 mg Tab Take 81 mg by mouth daily. No current facility-administered medications on file prior to visit.         Past Surgical History:   Procedure Laterality Date    HX GI      esophageal hiatal hernia - 2004    HX GYN      partial hysterectomy - 1970    HX OTHER SURGICAL      BCCAs removed    HX UROLOGICAL      bladder repair - 1999       Family History   Problem Relation Age of Onset    Heart Disease Mother     Dementia Father     Diabetes Son     Kidney Disease Son         autoimmune     Reviewed and no changes     Social History     Socioeconomic History    Marital status:      Spouse name: Not on file    Number of children: Not on file    Years of education: Not on file    Highest education level: Not on file   Occupational History    Not on file   Social Needs    Financial resource strain: Not on file    Food insecurity     Worry: Not on file     Inability: Not on file    Transportation needs     Medical: Not on file     Non-medical: Not on file   Tobacco Use    Smoking status: Never Smoker    Smokeless tobacco: Never Used   Substance and Sexual Activity    Alcohol use: No    Drug use: No     Types: Sedatives/Hypnotics, Prescription    Sexual activity: Not on file   Lifestyle    Physical activity     Days per week: Not on file     Minutes per session: Not on file    Stress: Not on file   Relationships    Social connections     Talks on phone: Not on file     Gets together: Not on file     Attends Taoist service: Not on file     Active member of club or organization: Not on file     Attends meetings of clubs or organizations: Not on file     Relationship status: Not on file    Intimate partner violence     Fear of current or ex partner: Not on file     Emotionally abused: Not on file     Physically abused: Not on file     Forced sexual activity: Not on file   Other Topics Concern    Not on file   Social History Narrative    Not on file            There were no vitals taken for this visit.     Physical Examination:   Telephone visit     She is alert and oriented x4 /speaking full sentences  Lab Results   Component Value Date/Time    WBC 4.1 09/01/2019 09:56 AM    HGB 14.3 09/01/2019 09:56 AM    HCT 42.6 09/01/2019 09:56 AM    PLATELET 997 27/71/5341 09:56 AM    MCV 99.1 (H) 09/01/2019 09:56 AM     Lab Results   Component Value Date/Time    Sodium 146 (H) 01/20/2020 12:58 PM    Potassium 4.8 01/20/2020 12:58 PM    Chloride 108 (H) 01/20/2020 12:58 PM    CO2 22 01/20/2020 12:58 PM    Anion gap 7 09/03/2019 03:30 AM    Glucose 109 (H) 01/20/2020 12:58 PM    BUN 17 01/20/2020 12:58 PM    Creatinine 1.36 (H) 01/20/2020 12:58 PM    BUN/Creatinine ratio 13 01/20/2020 12:58 PM    GFR est AA 41 (L) 01/20/2020 12:58 PM    GFR est non-AA 35 (L) 01/20/2020 12:58 PM    Calcium 10.5 (H) 01/20/2020 12:58 PM     Lab Results   Component Value Date/Time    Cholesterol, total 147 08/26/2019 08:12 AM    HDL Cholesterol 62 08/26/2019 08:12 AM    LDL, calculated 60 08/26/2019 08:12 AM    VLDL, calculated 25 08/26/2019 08:12 AM    Triglyceride 125 08/26/2019 08:12 AM    CHOL/HDL Ratio 2.5 09/02/2015 03:20 AM     Lab Results   Component Value Date/Time    TSH 4.330 01/07/2020 12:00 PM     No results found for: Salvatore DIETZ, PSAR3, [id number], [id number], PSALT  Lab Results   Component Value Date/Time    Hemoglobin A1c 5.7 05/07/2009 01:40 PM     Lab Results   Component Value Date/Time    Vitamin D 25-Hydroxy 31.8 (L) 07/28/2011 08:00 AM    VITAMIN D, 25-HYDROXY 39.8 04/05/2019 02:25 PM       Lab Results   Component Value Date/Time    ALT (SGPT) 7 01/07/2020 12:00 PM    Alk. phosphatase 75 01/07/2020 12:00 PM    Bilirubin, total 0.5 01/07/2020 12:00 PM           Assessment/Plan:    Abnormal CBC or urine at [facility] concerning for MM had extensive testing and reports negative, requesting records. Dr Red Muller I am requesting the records to clarify     Essential hypertension  -Blood pressure on metoprolol       Hypothyroidism, unspecified type  We recently adjusted her levothyroxine and decreased to 75 mcg daily and her repeat TSH was normal      Spondylolysis of lumbar region  Chronic bilateral low back pain without sciatica  Pain is chronic  Advised to take tramadol BID.  She tolerates it well, no drowsiness. -  is appropriate reviewed   - UDS is appropriate -done in Jan 2020  - on tramadol BID   Stopped gabapentin due to side effects     Dyspnea on exertio / hx of coronary artery disease / stents -dyspnea is better  - on Crestor, metoprolol and aspirin  - she had a stress test that was abnormal followed by a catherization which showed patent LAD ( stented) and non obstructive CAD  She had normal chest x ray  - suspect the back pain is causing more shallow breathing      Stage 3 chronic kidney disease ([facility]  - followed by Dr Hauser Patient at [address] level/ osteoporosis  - has been on fosamax 10mg daily / patient does not want BMD at this time due to pain with lying on table. I could not locate her last BMD  Takes 2000 units of vitamin D 3 daily        This is an established visit conducted via telemedicine PHONE only 21 minutes on the phone . The patient has been instructed that this meets HIPAA criteria and acknowledges and agrees to this method of visitation.     Given covid and her age  I am not requesting a UDS today    Follow up in 6 months     Taylor Wright MD  07/23/20  1:09 PM

## 2020-08-10 DIAGNOSIS — G89.29 CHRONIC BILATERAL LOW BACK PAIN WITHOUT SCIATICA: ICD-10-CM

## 2020-08-10 DIAGNOSIS — M54.50 CHRONIC BILATERAL LOW BACK PAIN WITHOUT SCIATICA: ICD-10-CM

## 2020-08-10 RX ORDER — TRAMADOL HYDROCHLORIDE 50 MG/1
50 TABLET ORAL
Qty: 90 TAB | Refills: 0 | Status: SHIPPED | OUTPATIENT
Start: 2020-08-10 | End: 2020-09-28 | Stop reason: SDUPTHER

## 2020-08-18 RX ORDER — LISINOPRIL 5 MG/1
TABLET ORAL
Qty: 90 TAB | Refills: 1 | Status: SHIPPED | OUTPATIENT
Start: 2020-08-18 | End: 2020-09-17 | Stop reason: SDUPTHER

## 2020-08-18 RX ORDER — LISINOPRIL 5 MG/1
TABLET ORAL
Qty: 90 TAB | Refills: 1 | Status: SHIPPED | OUTPATIENT
Start: 2020-08-18 | End: 2021-02-16

## 2020-09-17 ENCOUNTER — OFFICE VISIT (OUTPATIENT)
Dept: CARDIOLOGY CLINIC | Age: 85
End: 2020-09-17
Payer: MEDICARE

## 2020-09-17 VITALS
WEIGHT: 184.7 LBS | OXYGEN SATURATION: 97 % | SYSTOLIC BLOOD PRESSURE: 130 MMHG | BODY MASS INDEX: 36.26 KG/M2 | HEIGHT: 60 IN | HEART RATE: 76 BPM | DIASTOLIC BLOOD PRESSURE: 76 MMHG | RESPIRATION RATE: 16 BRPM

## 2020-09-17 DIAGNOSIS — E78.2 MIXED HYPERLIPIDEMIA: ICD-10-CM

## 2020-09-17 DIAGNOSIS — I25.10 ASHD (ARTERIOSCLEROTIC HEART DISEASE): Primary | ICD-10-CM

## 2020-09-17 DIAGNOSIS — I10 ESSENTIAL HYPERTENSION: ICD-10-CM

## 2020-09-17 DIAGNOSIS — Z98.890 S/P CARDIAC CATH: ICD-10-CM

## 2020-09-17 DIAGNOSIS — I25.10 CORONARY ARTERY DISEASE INVOLVING NATIVE CORONARY ARTERY OF NATIVE HEART WITHOUT ANGINA PECTORIS: ICD-10-CM

## 2020-09-17 PROCEDURE — G8427 DOCREV CUR MEDS BY ELIG CLIN: HCPCS | Performed by: INTERNAL MEDICINE

## 2020-09-17 PROCEDURE — G8510 SCR DEP NEG, NO PLAN REQD: HCPCS | Performed by: INTERNAL MEDICINE

## 2020-09-17 PROCEDURE — 93010 ELECTROCARDIOGRAM REPORT: CPT | Performed by: INTERNAL MEDICINE

## 2020-09-17 PROCEDURE — G8417 CALC BMI ABV UP PARAM F/U: HCPCS | Performed by: INTERNAL MEDICINE

## 2020-09-17 PROCEDURE — 99214 OFFICE O/P EST MOD 30 MIN: CPT | Performed by: INTERNAL MEDICINE

## 2020-09-17 PROCEDURE — G8536 NO DOC ELDER MAL SCRN: HCPCS | Performed by: INTERNAL MEDICINE

## 2020-09-17 PROCEDURE — 1101F PT FALLS ASSESS-DOCD LE1/YR: CPT | Performed by: INTERNAL MEDICINE

## 2020-09-17 PROCEDURE — G0463 HOSPITAL OUTPT CLINIC VISIT: HCPCS | Performed by: INTERNAL MEDICINE

## 2020-09-17 PROCEDURE — 93005 ELECTROCARDIOGRAM TRACING: CPT | Performed by: INTERNAL MEDICINE

## 2020-09-17 PROCEDURE — 1090F PRES/ABSN URINE INCON ASSESS: CPT | Performed by: INTERNAL MEDICINE

## 2020-09-17 NOTE — PROGRESS NOTES
1. Have you been to the ER, urgent care clinic since your last visit? Hospitalized since your last visit? No.    2. Have you seen or consulted any other health care providers outside of the [address] [place] [address] since your last visit? Include any pap smears or colon screening.    No.      Chief Complaint   Patient presents with    Follow-up     6 month- soboe

## 2020-09-17 NOTE — PROGRESS NOTES
9/17/2020 3:55 PM      Subjective:     Griffin Rojas is here for f/u. Breathing stable. She denies chest pain, chest pressure/discomfort, palpitations, irregular heart beats, near-syncope, syncope, fatigue, orthopnea, paroxysmal nocturnal dyspnea, exertional chest pressure/discomfort, claudication, lower extremity edema. Poor sleep quality. Visit Vitals  /76 (BP 1 Location: Right arm, BP Patient Position: Sitting)   Pulse 76   Resp 16   Ht 5' (1.524 m)   Wt 184 lb 11.2 oz (83.8 kg)   SpO2 97%   BMI 36.07 kg/m²     Current Outpatient Medications   Medication Sig    lisinopriL (PRINIVIL, ZESTRIL) 5 mg tablet TAKE ONE TABLET BY MOUTH ONE TIME DAILY    ALPRAZolam (XANAX) 0.25 mg tablet Take 1 Tab by mouth daily as needed for Anxiety or Sleep.  rosuvastatin (Crestor) 40 mg tablet Take 1 Tab by mouth nightly.  levothyroxine (SYNTHROID) 75 mcg tablet TAKE ONE TABLET BY MOUTH DAILY BEFORE BREAKFAST    alendronate (FOSAMAX) 10 mg tablet TAKE ONE TABLET BY MOUTH EVERY MORNING BEFORE BREAKFAST    metoprolol tartrate (LOPRESSOR) 50 mg tablet TAKE ONE TABLET BY MOUTH TWICE A DAY    naloxone (NARCAN) 4 mg/actuation nasal spray Use 1 spray intranasally, then discard. Repeat with new spray every 2 min as needed for opioid overdose symptoms, alternating nostrils.  clotrimazole (LOTRIMIN) 1 % topical cream clotrimazole 1 % topical cream    ammonium lactate (LAC-HYDRIN) 12 % lotion APPLY TO AFFECTED AREA(S) TWO TIMES A DAY  GENERIC FOR LAC-HYDRIN    melatonin 5 mg cap capsule Take 5 mg by mouth nightly. As needed    acetaminophen (TYLENOL ARTHRITIS PAIN) 650 mg TbER Take 650 mg by mouth every eight (8) hours.  multivitamin (ONE A DAY) tablet Take 1 Tab by mouth daily.  cholecalciferol, vitamin D3, (VITAMIN D3) 2,000 unit tab Take 1 Tab by mouth daily.  ACTIVATED CHARCOAL (CHARCOCAPS PO) Take  by mouth.     nitroglycerin (NITROSTAT) 0.3 mg SL tablet 1 tablet by SubLINGual route every five (5) minutes as needed for Chest Pain.  aspirin 81 mg Tab Take 81 mg by mouth daily.  suvorexant (BELSOMRA) 10 mg tablet Take 1 Tab by mouth nightly as needed for Insomnia. Max Daily Amount: 10 mg. No current facility-administered medications for this visit. Objective:      Visit Vitals  /76 (BP 1 Location: Right arm, BP Patient Position: Sitting)   Pulse 76   Resp 16   Ht 5' (1.524 m)   Wt 184 lb 11.2 oz (83.8 kg)   SpO2 97%   BMI 36.07 kg/m²       Data Review:    EKG: Normal sinus rhythm, inferior infarct age undetermined. Reviewed and/or ordered active problem list, medication list tests    Past Medical History:   Diagnosis Date    CAD (coronary artery disease)     stent placed on left 1 stent,in 2007    Cancer [facility])     BCCA    D[facility] ([facility]) ([facility]     GERD (gastroesophageal reflux disease)     Hypercholesterolemia     Hypertension     Pulmonary embolism ([facility] 9/15/2015    Thromboembolus ([facility]     DVT after hernia operation, PE spontaneous 10 years later    Thyroid disease       Past Surgical History:   Procedure Laterality Date    HX GI      esophageal hiatal hernia - 2004    HX GYN      partial hysterectomy - 1970    HX OTHER SURGICAL      BCCAs removed    HX UROLOGICAL      bladder repair - 1999     Allergies   Allergen Reactions    Codeine Unknown (comments)     Unsure of reaction.     Gabapentin Other (comments)     Hallucinations even at lowest dose    Morphine Hives and Itching      Family History   Problem Relation Age of Onset    Heart Disease Mother     Dementia Father     Diabetes Son     Kidney Disease Son         autoimmune      Social History     Socioeconomic History    Marital status:      Spouse name: Not on file    Number of children: Not on file    Years of education: Not on file    Highest education level: Not on file   Occupational History    Not on file   Social Needs    Financial resource strain: Not on file    Food insecurity     Worry: Not on file     Inability: Not on file    Transportation needs     Medical: Not on file     Non-medical: Not on file   Tobacco Use    Smoking status: Never Smoker    Smokeless tobacco: Never Used   Substance and Sexual Activity    Alcohol use: No    Drug use: No     Types: Sedatives/Hypnotics, Prescription    Sexual activity: Not on file   Lifestyle    Physical activity     Days per week: Not on file     Minutes per session: Not on file    Stress: Not on file   Relationships    Social connections     Talks on phone: Not on file     Gets together: Not on file     Attends Jehovah's witness service: Not on file     Active member of club or organization: Not on file     Attends meetings of clubs or organizations: Not on file     Relationship status: Not on file    Intimate partner violence     Fear of current or ex partner: Not on file     Emotionally abused: Not on file     Physically abused: Not on file     Forced sexual activity: Not on file   Other Topics Concern    Not on file   Social History Narrative    Not on file         Review of Systems     General: Not Present- Anorexia, Chills, Dietary Changes, Fatigue, Fever, Medication Changes, Night Sweats, Weight Gain > 10lbs. and Weight Loss > 10lbs. .  Skin: Not Present- Bruising and Excessive Sweating. HEENT: Not Present- Headache, Visual Loss and Vertigo. Respiratory: Not Present- Cough, Decreased Exercise Tolerance, Difficulty Breathing, Snoring and Wheezing. Cardiovascular: Not Present- Abnormal Blood Pressure, Chest Pain, Claudications, Difficulty Breathing On Exertion, Edema, Fainting / Blacking Out, Irregular Heart Beat, Night Cramps, Orthopnea, Palpitations, Paroxysmal Nocturnal Dyspnea, Rapid Heart Rate, Shortness of Breath and Swelling of Extremities. Gastrointestinal: Not Present- Black, Tarry Stool, Bloody Stool, Diarrhea, Hematemesis, Rectal Bleeding and Vomiting.   Musculoskeletal: Not Present- Muscle Pain and Muscle Weakness. Neurological: Not Present- Dizziness. Psychiatric: Not Present- Depression. Endocrine: Not Present- Cold Intolerance, Heat Intolerance and Thyroid Problems. Hematology: Not Present- Abnormal Bleeding, Anemia, Blood Clots and Easy Bruising. Physical Exam   The physical exam findings are as follows:       General   Mental Status - Alert. General Appearance - Not in acute distress. Chest and Lung Exam   Inspection: Accessory muscles - No use of accessory muscles in breathing. Auscultation:   Breath sounds: - Normal.      Cardiovascular   Inspection: Jugular vein - Bilateral - Inspection Normal.  Palpation/Percussion:   Apical Impulse: - Normal.  Auscultation: Rhythm - Regular. Heart Sounds - S1 WNL and S2 WNL. No S3 or S4. Murmurs & Other Heart Sounds: Auscultation of the heart reveals - No Murmurs. Carotid arteries - No Carotid bruit. Peripheral Vascular   Upper Extremity: Inspection - Bilateral - No Cyanotic nailbeds or Digital clubbing. Lower Extremity:   Palpation: Edema - Bilateral - No edema. Assessment:       ICD-10-CM ICD-9-CM    1. ASHD (arteriosclerotic heart disease)  I25.10 414.00 AMB POC EKG ROUTINE W/ 12 LEADS, INTER & REP   2. Essential hypertension  I10 401.9 AMB POC EKG ROUTINE W/ 12 LEADS, INTER & REP   3. S/P cardiac cath  Z98.890 V45.89 AMB POC EKG ROUTINE W/ 12 LEADS, INTER & REP   4. Mixed hyperlipidemia  E78.2 272.2    5. Coronary artery disease involving native coronary artery of native heart without angina pectoris  I25.10 414.01        Plan:     1. Coronary artery disease involving native coronary artery of native heart with unstable angina pectoris ([facility]  Cath in 9/2019 with stable coronary disease; patent LAD stent placed previously, moderate 50% RCA stenosis, negative by FFR (0.85)  Continue with BB, statin and ASA therapy     2. Essential hypertension  BP controlled. Continue anti-hypertensive therapy and low sodium diet     3.  Mixed hyperlipidemia  On statin. Last FLP noted.      4. Poor sleep quality. Option of sleep medicine referral d/w pt. She wants to hold off for now.

## 2020-09-28 DIAGNOSIS — M54.50 CHRONIC BILATERAL LOW BACK PAIN WITHOUT SCIATICA: ICD-10-CM

## 2020-09-28 DIAGNOSIS — G89.29 CHRONIC BILATERAL LOW BACK PAIN WITHOUT SCIATICA: ICD-10-CM

## 2020-09-28 RX ORDER — TRAMADOL HYDROCHLORIDE 50 MG/1
50 TABLET ORAL
Qty: 90 TAB | Refills: 0 | Status: SHIPPED | OUTPATIENT
Start: 2020-09-28 | End: 2020-11-12

## 2020-09-28 NOTE — TELEPHONE ENCOUNTER
Patient states she needs refill done thru [facility]. Please call if any questions.  Thank you

## 2020-10-01 DIAGNOSIS — G89.29 CHRONIC BILATERAL LOW BACK PAIN WITHOUT SCIATICA: ICD-10-CM

## 2020-10-01 DIAGNOSIS — M54.50 CHRONIC BILATERAL LOW BACK PAIN WITHOUT SCIATICA: ICD-10-CM

## 2020-10-01 DIAGNOSIS — F41.9 ANXIETY: ICD-10-CM

## 2020-10-01 RX ORDER — ALPRAZOLAM 0.25 MG/1
0.25 TABLET ORAL
Qty: 30 TAB | Refills: 0 | Status: SHIPPED | OUTPATIENT
Start: 2020-10-01 | End: 2020-11-02 | Stop reason: SDUPTHER

## 2020-10-01 RX ORDER — NALOXONE HYDROCHLORIDE 4 MG/.1ML
SPRAY NASAL
Qty: 1 EACH | Refills: 0 | Status: SHIPPED | OUTPATIENT
Start: 2020-10-01 | End: 2021-12-21

## 2020-10-19 DIAGNOSIS — I10 ESSENTIAL HYPERTENSION: ICD-10-CM

## 2020-10-19 RX ORDER — METOPROLOL TARTRATE 50 MG/1
TABLET ORAL
Qty: 180 TAB | Refills: 3 | Status: SHIPPED | OUTPATIENT
Start: 2020-10-19 | End: 2021-07-16

## 2020-11-02 DIAGNOSIS — F41.9 ANXIETY: ICD-10-CM

## 2020-11-02 NOTE — TELEPHONE ENCOUNTER
----- Message from Artie Casarez sent at 11/2/2020  9:49 AM EST -----  Regarding: Dr Velasquez Lou (if not patient): self  Relationship of caller (if not patient): n/a  Best contact number(s): [phone]  Name of medication and dosage if known: Alprazolam .25 mg  Is patient out of this medication (yes/no): no  Pharmacy name: [address] listed in chart? (yes/no): yes  Pharmacy phone number: n/a  Date of last visit: 7/23/2020  Details to clarify the request: n/a    Message from [facility]

## 2020-11-04 RX ORDER — ALPRAZOLAM 0.25 MG/1
0.25 TABLET ORAL
Qty: 30 TAB | Refills: 0 | Status: SHIPPED | OUTPATIENT
Start: 2020-11-04 | End: 2020-12-02

## 2020-11-12 DIAGNOSIS — G89.29 CHRONIC BILATERAL LOW BACK PAIN WITHOUT SCIATICA: ICD-10-CM

## 2020-11-12 DIAGNOSIS — M54.50 CHRONIC BILATERAL LOW BACK PAIN WITHOUT SCIATICA: ICD-10-CM

## 2020-11-12 RX ORDER — TRAMADOL HYDROCHLORIDE 50 MG/1
TABLET ORAL
Qty: 90 TAB | Refills: 0 | Status: SHIPPED | OUTPATIENT
Start: 2020-11-12 | End: 2020-12-12

## 2020-12-01 RX ORDER — ALENDRONATE SODIUM 10 MG/1
TABLET ORAL
Qty: 90 TAB | Refills: 2 | Status: SHIPPED | OUTPATIENT
Start: 2020-12-01 | End: 2021-08-31 | Stop reason: SDUPTHER

## 2020-12-01 NOTE — TELEPHONE ENCOUNTER
Medication Refill     Caller (if not patient):       Relationship of caller (if not patient): n/a       Best contact number(s):[phone]       Name of medication and dosage if known: Alendronate - Sodium 10 mg       Is patient out of this medication (yes/no): no, two left       Pharmacy name: [facility]     Pharmacy listed in chart? (yes/no): yes   Pharmacy phone number:n/a       Details to clarify the request: n/a       Kerry Olguin

## 2020-12-02 DIAGNOSIS — F41.9 ANXIETY: ICD-10-CM

## 2020-12-02 RX ORDER — ALPRAZOLAM 0.25 MG/1
TABLET ORAL
Qty: 30 TAB | Refills: 0 | Status: SHIPPED | OUTPATIENT
Start: 2020-12-02 | End: 2020-12-28 | Stop reason: SDUPTHER

## 2020-12-14 ENCOUNTER — TELEPHONE (OUTPATIENT)
Dept: INTERNAL MEDICINE CLINIC | Age: 85
End: 2020-12-14

## 2020-12-14 NOTE — TELEPHONE ENCOUNTER
Spoke with patients daughter Myesha Landa. Two pt identifiers confirmed. Lizzie states that patient seems to be having difficulty with swallowing. Lizzie states that when patient tries to eat she gets choke and starts coughing. Lizzie states that she started patient on a mechanical soft diet. Advised Lizzie that I would start with the mechanical soft diet and if patient still seems to be having issues then she should contact the office again. Pt verbalized understanding of information discussed w/ no further questions at this time.

## 2020-12-14 NOTE — TELEPHONE ENCOUNTER
Nadira Arrington, daughter, [phone] wants to help her mom. She is having difficulty swallowing and wants direction. Her mother is unaware of her calling because she does not want her to get upset.

## 2020-12-28 DIAGNOSIS — F41.9 ANXIETY: ICD-10-CM

## 2020-12-28 DIAGNOSIS — M54.50 CHRONIC BILATERAL LOW BACK PAIN WITHOUT SCIATICA: Primary | ICD-10-CM

## 2020-12-28 DIAGNOSIS — G89.29 CHRONIC BILATERAL LOW BACK PAIN WITHOUT SCIATICA: Primary | ICD-10-CM

## 2020-12-28 RX ORDER — ALPRAZOLAM 0.25 MG/1
0.25 TABLET ORAL
Qty: 30 TAB | Refills: 0 | Status: SHIPPED | OUTPATIENT
Start: 2020-12-28 | End: 2021-01-27 | Stop reason: SDUPTHER

## 2020-12-28 RX ORDER — TRAMADOL HYDROCHLORIDE 50 MG/1
50 TABLET ORAL
Qty: 90 TAB | Refills: 0 | Status: SHIPPED | OUTPATIENT
Start: 2020-12-28 | End: 2021-02-12 | Stop reason: SDUPTHER

## 2020-12-28 NOTE — TELEPHONE ENCOUNTER
----- Message from Luz Maria Ambrose sent at 12/28/2020 10:00 AM EST -----  Regarding: Dr. Josep Saravia / Ruddy Nyhan (if not patient): self  Relationship of caller (if not patient): self  Best contact number(s): [phone]  Name of medication and dosage if known: tramadol 50 mg, alprazolam .25 mg  Is patient out of this medication (yes/no): no 2 days  Pharmacy name: [facility]  Pharmacy listed in chart? (yes/no): yes  Pharmacy phone number: [phone]  Date of last visit: 7/23/20  Details to clarify the request:    Message from [facility]

## 2021-01-27 DIAGNOSIS — F41.9 ANXIETY: ICD-10-CM

## 2021-01-27 RX ORDER — ALPRAZOLAM 0.25 MG/1
0.25 TABLET ORAL
Qty: 30 TAB | Refills: 0 | Status: SHIPPED | OUTPATIENT
Start: 2021-01-27 | End: 2021-02-23 | Stop reason: SDUPTHER

## 2021-01-27 NOTE — TELEPHONE ENCOUNTER
Patient states she needs refill done thru [facility] [facility]. Please call if any questions. Thank you    Patient reminded of [address]72 [address] hr turn around.

## 2021-02-12 DIAGNOSIS — G89.29 CHRONIC BILATERAL LOW BACK PAIN WITHOUT SCIATICA: ICD-10-CM

## 2021-02-12 DIAGNOSIS — M54.50 CHRONIC BILATERAL LOW BACK PAIN WITHOUT SCIATICA: ICD-10-CM

## 2021-02-12 RX ORDER — TRAMADOL HYDROCHLORIDE 50 MG/1
50 TABLET ORAL
Qty: 90 TAB | Refills: 0 | Status: SHIPPED | OUTPATIENT
Start: 2021-02-12 | End: 2021-03-14

## 2021-02-12 NOTE — TELEPHONE ENCOUNTER
Pt requests refill on:    traMADoL (ULTRAM) 50 mg tablet, EVERY 6 HOURS AS NEEDED    In meds list it shows:  ()    Pt states: have enough for today and tomorrow, not for . Pt states to tell nurse she is sorry she waited so long, she states she forgot to look. Please refill as soon as possible. Pt advised of refill request turn around.

## 2021-02-16 ENCOUNTER — TELEPHONE (OUTPATIENT)
Dept: INTERNAL MEDICINE CLINIC | Age: 86
End: 2021-02-16

## 2021-02-16 RX ORDER — LEVOTHYROXINE SODIUM 75 UG/1
TABLET ORAL
Qty: 90 TAB | Refills: 3 | Status: SHIPPED | OUTPATIENT
Start: 2021-02-16 | End: 2022-02-11

## 2021-02-16 RX ORDER — LISINOPRIL 5 MG/1
TABLET ORAL
Qty: 90 TAB | Refills: 1 | Status: SHIPPED | OUTPATIENT
Start: 2021-02-16 | End: 2021-08-15

## 2021-02-16 NOTE — TELEPHONE ENCOUNTER
Pt wants Dr. Elijah Nava to know she has had first COVID vaccine and will be getting the second one on 2-20-21. Pt wanted this noted in chart.

## 2021-02-23 DIAGNOSIS — F41.9 ANXIETY: ICD-10-CM

## 2021-02-23 NOTE — TELEPHONE ENCOUNTER
Patient needs refill done thru [facility]. Thank you    Patient reminded of [address]  hr turn around on refill requests

## 2021-02-24 RX ORDER — ALPRAZOLAM 0.25 MG/1
0.25 TABLET ORAL
Qty: 30 TAB | Refills: 0 | Status: SHIPPED | OUTPATIENT
Start: 2021-02-24 | End: 2021-03-25 | Stop reason: SDUPTHER

## 2021-03-22 ENCOUNTER — OFFICE VISIT (OUTPATIENT)
Dept: CARDIOLOGY CLINIC | Age: 86
End: 2021-03-22
Payer: MEDICARE

## 2021-03-22 VITALS
BODY MASS INDEX: 36.02 KG/M2 | HEIGHT: 60 IN | RESPIRATION RATE: 18 BRPM | HEART RATE: 60 BPM | SYSTOLIC BLOOD PRESSURE: 134 MMHG | DIASTOLIC BLOOD PRESSURE: 82 MMHG | WEIGHT: 183.5 LBS | OXYGEN SATURATION: 96 %

## 2021-03-22 DIAGNOSIS — I10 ESSENTIAL HYPERTENSION: ICD-10-CM

## 2021-03-22 DIAGNOSIS — I25.10 ASHD (ARTERIOSCLEROTIC HEART DISEASE): ICD-10-CM

## 2021-03-22 DIAGNOSIS — I25.10 CORONARY ARTERY DISEASE DUE TO LIPID RICH PLAQUE: Primary | ICD-10-CM

## 2021-03-22 DIAGNOSIS — I25.83 CORONARY ARTERY DISEASE DUE TO LIPID RICH PLAQUE: Primary | ICD-10-CM

## 2021-03-22 DIAGNOSIS — E78.2 MIXED HYPERLIPIDEMIA: ICD-10-CM

## 2021-03-22 PROCEDURE — G8536 NO DOC ELDER MAL SCRN: HCPCS | Performed by: INTERNAL MEDICINE

## 2021-03-22 PROCEDURE — 99214 OFFICE O/P EST MOD 30 MIN: CPT | Performed by: INTERNAL MEDICINE

## 2021-03-22 PROCEDURE — G8510 SCR DEP NEG, NO PLAN REQD: HCPCS | Performed by: INTERNAL MEDICINE

## 2021-03-22 PROCEDURE — 93005 ELECTROCARDIOGRAM TRACING: CPT | Performed by: INTERNAL MEDICINE

## 2021-03-22 PROCEDURE — 1090F PRES/ABSN URINE INCON ASSESS: CPT | Performed by: INTERNAL MEDICINE

## 2021-03-22 PROCEDURE — G0463 HOSPITAL OUTPT CLINIC VISIT: HCPCS | Performed by: INTERNAL MEDICINE

## 2021-03-22 PROCEDURE — 93010 ELECTROCARDIOGRAM REPORT: CPT | Performed by: INTERNAL MEDICINE

## 2021-03-22 PROCEDURE — 1101F PT FALLS ASSESS-DOCD LE1/YR: CPT | Performed by: INTERNAL MEDICINE

## 2021-03-22 PROCEDURE — G8417 CALC BMI ABV UP PARAM F/U: HCPCS | Performed by: INTERNAL MEDICINE

## 2021-03-22 PROCEDURE — G8427 DOCREV CUR MEDS BY ELIG CLIN: HCPCS | Performed by: INTERNAL MEDICINE

## 2021-03-22 NOTE — PROGRESS NOTES
1. Have you been to the ER, urgent care clinic since your last visit? Hospitalized since your last visit? No.    2. Have you seen or consulted any other health care providers outside of the [address] [place] [address] since your last visit? Include any pap smears or colon screening.    No.      Chief Complaint   Patient presents with    Coronary Artery Disease     6 month- soboe, swelling in ankles    Hypertension

## 2021-03-22 NOTE — PROGRESS NOTES
[address] [facility] [place] [address] [facility]  [phone]     Subjective:      Alena Ariza is a 80 y.o. female is here for routine f/u. She has a PMHx of CAD, HTN, HLD, hx of PE/DVT and hypothyroidism. Last seen by us in 9/2020. The patient denies chest pain/ shortness of breath, orthopnea, PND, LE edema, palpitations, syncope, or presyncope.        Patient Active Problem List    Diagnosis Date Noted    Coronary artery disease involving native coronary artery of native heart without angina pectoris 09/01/2019    Unstable angina ([address] 09/01/2019    ACS (acute coronary syndrome) ([address] 09/01/2019    Abnormal stress test 08/22/2019    Severe obesity (BMI 35.0-39.9) 07/09/2018    Scoliosis 02/12/2018    Spondylolysis of lumbar region 02/12/2018    History of pulmonary embolus (PE) 08/17/2017    CKD (chronic kidney disease) 07/25/2012    S/P cardiac cath 11/10/2011    Coronary artery spasm ([address] 11/09/2011    ASHD (arteriosclerotic heart disease) 11/08/2011    Hyperlipidemia 11/08/2011    Hypertension 11/08/2011    Hypothyroidism 02/08/2010    Other and unspecified hyperlipidemia 09/29/2009    Unspecified vitamin D deficiency 09/29/2009      Cherry Torres MD  Past Medical History:   Diagnosis Date    CAD (coronary artery disease)     stent placed on left 1 stent,in 2007    Cancer ([address]     BCCA    DVT (deep venous thrombosis) ([address]     GERD (gastroesophageal reflux disease)     Hypercholesterolemia     Hypertension     Pulmonary embolism ([address] 9/15/2015    Thromboembolus ([address]     DVT after hernia operation, PE spontaneous 10 years later    Thyroid disease       Past Surgical History:   Procedure Laterality Date    HX GI      esophageal hiatal hernia - 2004    HX GYN      partial hysterectomy - 1970    HX OTHER SURGICAL      BCCAs removed    HX UROLOGICAL      bladder repair - 1999     Allergies   Allergen Reactions    Codeine Unknown (comments)     Unsure of reaction.  Gabapentin Other (comments)     Hallucinations even at lowest dose    Morphine Hives and Itching      Family History   Problem Relation Age of Onset    Heart Disease Mother     Dementia Father     Diabetes Son     Kidney Disease Son         autoimmune      Social History     Socioeconomic History    Marital status:      Spouse name: Not on file    Number of children: Not on file    Years of education: Not on file    Highest education level: Not on file   Occupational History    Not on file   Social Needs    Financial resource strain: Not on file    Food insecurity     Worry: Not on file     Inability: Not on file    Transportation needs     Medical: Not on file     Non-medical: Not on file   Tobacco Use    Smoking status: Never Smoker    Smokeless tobacco: Never Used   Substance and Sexual Activity    Alcohol use: No    Drug use: No     Types: Sedatives/Hypnotics, Prescription    Sexual activity: Not on file   Lifestyle    Physical activity     Days per week: Not on file     Minutes per session: Not on file    Stress: Not on file   Relationships    Social connections     Talks on phone: Not on file     Gets together: Not on file     Attends Jehovah's witness service: Not on file     Active member of club or organization: Not on file     Attends meetings of clubs or organizations: Not on file     Relationship status: Not on file    Intimate partner violence     Fear of current or ex partner: Not on file     Emotionally abused: Not on file     Physically abused: Not on file     Forced sexual activity: Not on file   Other Topics Concern    Not on file   Social History Narrative    Not on file      Current Outpatient Medications   Medication Sig    ALPRAZolam (XANAX) 0.25 mg tablet Take 1 Tab by mouth nightly as needed for Anxiety or Sleep.  Max Daily Amount: 0.25 mg.    lisinopriL (PRINIVIL, ZESTRIL) 5 mg tablet TAKE ONE TABLET BY MOUTH ONE TIME DAILY    levothyroxine (SYNTHROID) 75 mcg tablet TAKE ONE TABLET BY MOUTH ONE TIME DAILY BEFORE BREAKFAST    alendronate (FOSAMAX) 10 mg tablet TAKE ONE TABLET BY MOUTH EVERY MORNING BEFORE BREAKFAST    metoprolol tartrate (LOPRESSOR) 50 mg tablet TAKE ONE TABLET BY MOUTH TWICE A DAY    naloxone (NARCAN) 4 mg/actuation nasal spray Use 1 spray intranasally, then discard. Repeat with new spray every 2 min as needed for opioid overdose symptoms, alternating nostrils.  suvorexant (BELSOMRA) 10 mg tablet Take 1 Tab by mouth nightly as needed for Insomnia. Max Daily Amount: 10 mg.    rosuvastatin (Crestor) 40 mg tablet Take 1 Tab by mouth nightly.  clotrimazole (LOTRIMIN) 1 % topical cream clotrimazole 1 % topical cream    ammonium lactate (LAC-HYDRIN) 12 % lotion APPLY TO AFFECTED AREA(S) TWO TIMES A DAY  GENERIC FOR LAC-HYDRIN    melatonin 5 mg cap capsule Take 5 mg by mouth nightly. As needed    acetaminophen (TYLENOL ARTHRITIS PAIN) 650 mg TbER Take 650 mg by mouth every eight (8) hours.  multivitamin (ONE A DAY) tablet Take 1 Tab by mouth daily.  cholecalciferol, vitamin D3, (VITAMIN D3) 2,000 unit tab Take 1 Tab by mouth daily.  ACTIVATED CHARCOAL (CHARCOCAPS PO) Take  by mouth.  nitroglycerin (NITROSTAT) 0.3 mg SL tablet 1 tablet by SubLINGual route every five (5) minutes as needed for Chest Pain.  aspirin 81 mg Tab Take 81 mg by mouth daily. No current facility-administered medications for this visit. Review of Symptoms:  11 systems reviewed, negative other than as stated in the HPI    Physical ExamPhysical Exam:    Vitals:    03/22/21 1032   Height: 5' (1.524 m)     Body mass index is 36.07 kg/m². General PE  Gen:  NAD  Mental Status - Alert. General Appearance - Not in acute distress. HEENT:  PERRL, no carotid bruits or JVD  Chest and Lung Exam   Inspection: Accessory muscles - No use of accessory muscles in breathing.    Auscultation:   Breath sounds: - Normal.   Cardiovascular   Inspection: Jugular vein - Bilateral - Inspection Normal.   Palpation/Percussion:   Apical Impulse: - Normal.   Auscultation: Rhythm - Regular. Heart Sounds - S1 WNL and S2 WNL. No S3 or S4. Murmurs & Other Heart Sounds: Auscultation of the heart reveals - No Murmurs. Peripheral Vascular   Upper Extremity: Inspection - Bilateral - No Cyanotic nailbeds or Digital clubbing. Lower Extremity:   Palpation: Edema - Bilateral - No edema. Abdomen:   Soft, non-tender, bowel sounds are active.   Neuro: A&O times 3, CN and motor grossly WNL    Labs:   Lab Results   Component Value Date/Time    Cholesterol, total 147 08/26/2019 08:12 AM    Cholesterol, total 150 08/21/2018 09:42 AM    Cholesterol, total 168 08/07/2017 12:00 AM    Cholesterol, total 156 08/02/2016 10:00 AM    Cholesterol, total 155 09/02/2015 03:20 AM    HDL Cholesterol 62 08/26/2019 08:12 AM    HDL Cholesterol 70 08/21/2018 09:42 AM    HDL Cholesterol 73 08/07/2017 12:00 AM    HDL Cholesterol 71 08/02/2016 10:00 AM    HDL Cholesterol 63 09/02/2015 03:20 AM    LDL, calculated 60 08/26/2019 08:12 AM    LDL, calculated 51 08/21/2018 09:42 AM    LDL, calculated 63 08/07/2017 12:00 AM    LDL, calculated 60 08/02/2016 10:00 AM    LDL, calculated 62.8 09/02/2015 03:20 AM    Triglyceride 125 08/26/2019 08:12 AM    Triglyceride 146 08/21/2018 09:42 AM    Triglyceride 158 (H) 08/07/2017 12:00 AM    Triglyceride 126 08/02/2016 10:00 AM    Triglyceride 146 09/02/2015 03:20 AM    CHOL/HDL Ratio 2.5 09/02/2015 03:20 AM    CHOL/HDL Ratio 2.6 08/12/2010 11:06 AM    CHOL/HDL Ratio 2.9 02/08/2010 10:09 AM     Lab Results   Component Value Date/Time    CK 49 11/08/2011 06:25 PM     Lab Results   Component Value Date/Time    Sodium 146 (H) 01/20/2020 12:58 PM    Potassium 4.8 01/20/2020 12:58 PM    Chloride 108 (H) 01/20/2020 12:58 PM    CO2 22 01/20/2020 12:58 PM    Anion gap 7 09/03/2019 03:30 AM    Glucose 109 (H) 01/20/2020 12:58 PM    BUN 17 01/20/2020 12:58 PM    Creatinine 1.36 (H) 01/20/2020 12:58 PM    BUN/Creatinine ratio 13 01/20/2020 12:58 PM    GFR est AA 41 (L) 01/20/2020 12:58 PM    GFR est non-AA 35 (L) 01/20/2020 12:58 PM    Calcium 10.5 (H) 01/20/2020 12:58 PM    Bilirubin, total 0.5 01/07/2020 12:00 PM    Alk. phosphatase 75 01/07/2020 12:00 PM    Protein, total 6.3 01/07/2020 12:00 PM    Albumin 4.1 01/07/2020 12:00 PM    Globulin 3.3 09/01/2019 09:56 AM    A-G Ratio 1.9 01/07/2020 12:00 PM    ALT (SGPT) 7 01/07/2020 12:00 PM       EKG: SB, old inferior infarct     Assessment:     Assessment:      1. Coronary artery disease due to lipid rich plaque    2. ASHD (arteriosclerotic heart disease)    3. Essential hypertension    4. Mixed hyperlipidemia         Plan:     1. ASHD  Stable. Cath in 9/2019 with stable coronary disease; patent LAD stent placed previously, moderate 50% RCA stenosis, negative by FFR (0.85)  Continue with ASA, BB, statin    2. Essential hypertension  BP controlled.  Continue anti-hypertensive therapy and low sodium diet     3. Mixed hyperlipidemia  On statin. Last FLP noted.      4. Poor sleep quality. Option of sleep medicine referral d/w pt again. She is not interested.      Merari Hancock MD

## 2021-03-25 DIAGNOSIS — F41.9 ANXIETY: ICD-10-CM

## 2021-03-25 RX ORDER — ALPRAZOLAM 0.25 MG/1
0.25 TABLET ORAL
Qty: 30 TAB | Refills: 0 | Status: SHIPPED | OUTPATIENT
Start: 2021-03-25 | End: 2021-04-27 | Stop reason: SDUPTHER

## 2021-03-25 NOTE — TELEPHONE ENCOUNTER
Future Appointments:  Future Appointments   Date Time Provider Jazmyne Cristina   3/30/2021 10:15 AM Nick Chavez MD [facility] BS AMB   9/27/2021 10:45 AM Leila Robertson MD [facility]        Last Appointment With Me:  Visit date not found     Requested Prescriptions     Pending Prescriptions Disp Refills    ALPRAZolam (XANAX) 0.25 mg tablet 30 Tab 0     Sig: Take 1 Tab by mouth nightly as needed for Anxiety or Sleep. Max Daily Amount: 0.25 mg.

## 2021-03-25 NOTE — TELEPHONE ENCOUNTER
Medication Refill     Caller (if not patient): pt       Relationship of caller (if not patient): self       Best contact number(s): [phone]       Name of medication and dosage if known: Alprazolam .25 mg       Is patient out of this medication (yes/no): no       Pharmacy name: [facility]     Pharmacy listed in chart? (yes/no): yes   Pharmacy phone number:   [phone]         Details to clarify the request:N/A       Message from [facility]

## 2021-03-26 ENCOUNTER — TELEPHONE (OUTPATIENT)
Dept: INTERNAL MEDICINE CLINIC | Age: 86
End: 2021-03-26

## 2021-03-26 DIAGNOSIS — G89.29 CHRONIC BILATERAL LOW BACK PAIN WITHOUT SCIATICA: Primary | ICD-10-CM

## 2021-03-26 DIAGNOSIS — M54.50 CHRONIC BILATERAL LOW BACK PAIN WITHOUT SCIATICA: Primary | ICD-10-CM

## 2021-03-26 RX ORDER — TRAMADOL HYDROCHLORIDE 50 MG/1
50 TABLET ORAL
Qty: 60 TAB | Refills: 0 | Status: SHIPPED | OUTPATIENT
Start: 2021-03-26 | End: 2021-04-26 | Stop reason: SDUPTHER

## 2021-03-26 NOTE — TELEPHONE ENCOUNTER
MD Kirill Harris LPN   Caller: Unspecified (Today,  9:14 AM)             Prescription sent      Noted

## 2021-03-26 NOTE — TELEPHONE ENCOUNTER
----- Message from Liz Delong sent at 3/26/2021  9:09 AM EDT -----  Regarding: Dr. Abhijeet Aguilar  Medication Refill    Caller (if not patient): Pt       Relationship of caller (if not patient): self       Best contact number(s): [phone]      Name of medication and dosage if known: Tramadol 50 mg       Is patient out of this medication (yes/no): no       Pharmacy name: [facility]     Pharmacy listed in chart? (yes/no): yes   Pharmacy phone number: [phone]        Message from [facility]

## 2021-03-30 ENCOUNTER — OFFICE VISIT (OUTPATIENT)
Dept: INTERNAL MEDICINE CLINIC | Age: 86
End: 2021-03-30
Payer: MEDICARE

## 2021-03-30 VITALS
RESPIRATION RATE: 18 BRPM | HEIGHT: 60 IN | SYSTOLIC BLOOD PRESSURE: 131 MMHG | WEIGHT: 181 LBS | OXYGEN SATURATION: 96 % | DIASTOLIC BLOOD PRESSURE: 84 MMHG | HEART RATE: 77 BPM | BODY MASS INDEX: 35.53 KG/M2 | TEMPERATURE: 97.9 F

## 2021-03-30 DIAGNOSIS — F51.01 PRIMARY INSOMNIA: ICD-10-CM

## 2021-03-30 DIAGNOSIS — I10 ESSENTIAL HYPERTENSION: ICD-10-CM

## 2021-03-30 DIAGNOSIS — N18.31 STAGE 3A CHRONIC KIDNEY DISEASE (HCC): ICD-10-CM

## 2021-03-30 DIAGNOSIS — R13.19 ESOPHAGEAL DYSPHAGIA: ICD-10-CM

## 2021-03-30 DIAGNOSIS — K59.00 CONSTIPATION, UNSPECIFIED CONSTIPATION TYPE: ICD-10-CM

## 2021-03-30 DIAGNOSIS — M43.06 SPONDYLOLYSIS OF LUMBAR REGION: ICD-10-CM

## 2021-03-30 DIAGNOSIS — E03.9 ACQUIRED HYPOTHYROIDISM: ICD-10-CM

## 2021-03-30 DIAGNOSIS — F41.9 ANXIETY: Primary | ICD-10-CM

## 2021-03-30 PROCEDURE — G8536 NO DOC ELDER MAL SCRN: HCPCS | Performed by: INTERNAL MEDICINE

## 2021-03-30 PROCEDURE — 99214 OFFICE O/P EST MOD 30 MIN: CPT | Performed by: INTERNAL MEDICINE

## 2021-03-30 PROCEDURE — G8417 CALC BMI ABV UP PARAM F/U: HCPCS | Performed by: INTERNAL MEDICINE

## 2021-03-30 PROCEDURE — G8428 CUR MEDS NOT DOCUMENT: HCPCS | Performed by: INTERNAL MEDICINE

## 2021-03-30 PROCEDURE — G8510 SCR DEP NEG, NO PLAN REQD: HCPCS | Performed by: INTERNAL MEDICINE

## 2021-03-30 PROCEDURE — G0463 HOSPITAL OUTPT CLINIC VISIT: HCPCS | Performed by: INTERNAL MEDICINE

## 2021-03-30 PROCEDURE — 1090F PRES/ABSN URINE INCON ASSESS: CPT | Performed by: INTERNAL MEDICINE

## 2021-03-30 PROCEDURE — 1101F PT FALLS ASSESS-DOCD LE1/YR: CPT | Performed by: INTERNAL MEDICINE

## 2021-03-30 NOTE — PATIENT INSTRUCTIONS
Take docusate ( over the counter ) 50mg twice a day and a fiber supplement such as metamucil or benefiber Drink 6 glasses of water at least daily If linzess covered then you can stop the docusate Schedule barium swallow to evaluate the swallowing

## 2021-03-30 NOTE — PROGRESS NOTES
Reviewed record in preparation for visit and have obtained necessary documentation. Identified pt with two pt identifiers(name and ). Chief Complaint   Patient presents with    Hypertension       Health Maintenance Due   Topic Date Due    Shingles Vaccine (1 of 2) Never done    Annual Well Visit  2020    Cholesterol Test   2020       Ms. Chaparro has a reminder for a \"due or due soon\" health maintenance. I have asked that she discuss this further with her primary care provider for follow-up on this health maintenance. Coordination of Care Questionnaire:  :     1) Have you been to an emergency room, urgent care clinic since your last visit? no   Hospitalized since your last visit? no             2) Have you seen or consulted any other health care providers outside of [address] [place] [address] since your last visit? no  (Include any pap smears or colon screenings in this section.)    3) In the event something were to happen to you and you were unable to speak on your behalf, do you have an Advance Directive/ Living Will in place stating your wishes? NO    Do you have an Advance Directive on file? no    4) Are you interested in receiving information on Advance Directives? NO    Patient is accompanied by self I have received verbal consent from Karli Seen to discuss any/all medical information while they are present in the room.

## 2021-03-30 NOTE — PROGRESS NOTES
Ms. Ian Ramírez is presenting to follow up     CC:  Hypertension       HPI:      She is a 80 y.o. female who presents for follow up   Patient is having pain with swallowing, hx of hiatal hernia, eating slow. Issues with   Solids only. Had esophageal hiatal hernia repaired in 2004  Denies weight loss  She is struggling with constipation: Taking MiraLAX almost every other day to have bowel movements also taking senna. Admits to not being consistent with water intake. She went and saw her kidney specialist at [address] [place] [address] and was noted to have abnormal labs ( Dr Zakia Michaud)  Concerning for multiple myeloma and had extensive testing which was negative for MM . Seen a hematologist at [address] [place] [address]      Recall  Hx of  abnormal stress test followed by heart catherization which showed patent LAD stent and  mid RCA lesion 50 % stenosed  No significant CAD   What triggered this was concern with shortness of breath with exertion   Today she denies dyspnea and chest pain  ishan Dr Yu Been March 2021 and advised to continue medical therapy      Recall  Patient has long history of back pain  I suspect that the back pain is causing shallow breathing.    -Severe degenerative scoliosis thoracolumbar spine  [facility] at all times  She continues to have back pain. Taking 1 tramadol twice a day with improvement in quality of life  Last filled tramadol in March 2020  Does not need a refill today  Recall she stopped gabapentin due to negative side effects. It was helping her pain      Patient is struggling to sleep having bad dreams.   Taking xanax 0.25mg daily for anxiety /sleep   Once wakes having difficulty falling asleep   Recently found out daughter has bladder cancer and this is causing significant stress to patient    Colonoscopy done in 2013 no repeat due to age      Review of systems:  Constitutional: negative for fever, chills, weight loss, night sweats   10 systems reviewed and negative other than HPI      Past Medical History:   Diagnosis Date    CAD (coronary artery disease)     stent placed on left 1 stent,in 2007    Cancer [facility])     BCCA    DVT (deep venous thrombosis) (HCC)     GERD (gastroesophageal reflux disease)     Hypercholesterolemia     Hypertension     Pul[facility] [address] 9/15/2015    Thromboembolus ([facility]     DVT after hernia operation, PE spontaneous 10 years later    Thyroid disease         Past Surgical History:   Procedure Laterality Date    HX GI      esophageal hiatal hernia - 2004    HX GYN      partial hysterectomy - 1970    HX OTHER SURGICAL      BCCAs removed    HX UROLOGICAL      bladder repair - 1999       Allergies   Allergen Reactions    Codeine Unknown (comments)     Unsure of reaction.  Gabapentin Other (comments)     Hallucinations even at lowest dose    Morphine Hives and Itching       Current Outpatient Medications on File Prior to Visit   Medication Sig Dispense Refill    traMADoL (ULTRAM) 50 mg tablet Take 1 Tab by mouth every eight (8) hours as needed for Pain for up to 30 days. Max Daily Amount: 150 mg. 60 Tab 0    ALPRAZolam (XANAX) 0.25 mg tablet Take 1 Tab by mouth nightly as needed for Anxiety or Sleep. Max Daily Amount: 0.25 mg. 30 Tab 0    lisinopriL (PRINIVIL, ZESTRIL) 5 mg tablet TAKE ONE TABLET BY MOUTH ONE TIME DAILY 90 Tab 1    levothyroxine (SYNTHROID) 75 mcg tablet TAKE ONE TABLET BY MOUTH ONE TIME DAILY BEFORE BREAKFAST 90 Tab 3    alendronate (FOSAMAX) 10 mg tablet TAKE ONE TABLET BY MOUTH EVERY MORNING BEFORE BREAKFAST 90 Tab 2    metoprolol tartrate (LOPRESSOR) 50 mg tablet TAKE ONE TABLET BY MOUTH TWICE A  Tab 3    naloxone (NARCAN) 4 mg/actuation nasal spray Use 1 spray intranasally, then discard. Repeat with new spray every 2 min as needed for opioid overdose symptoms, alternating nostrils. 1 Each 0    rosuvastatin (Crestor) 40 mg tablet Take 1 Tab by mouth nightly.  90 Tab 3    clotrimazole (LOTRIMIN) 1 % topical cream clotrimazole 1 % topical cream      ammonium lactate (LAC-HYDRIN) 12 % lotion APPLY TO AFFECTED AREA(S) TWO TIMES A DAY  GENERIC FOR LAC-HYDRIN  2    melatonin 5 mg cap capsule Take 5 mg by mouth nightly. As needed      acetaminophen (TYLENOL ARTHRITIS PAIN) 650 mg TbER Take 650 mg by mouth every eight (8) hours.  multivitamin (ONE A DAY) tablet Take 1 Tab by mouth daily.  cholecalciferol, vitamin D3, (VITAMIN D3) 2,000 unit tab Take 1 Tab by mouth daily. 30 Tab 0    ACTIVATED CHARCOAL (CHARCOCAPS PO) Take  by mouth as needed.  nitroglycerin (NITROSTAT) 0.3 mg SL tablet 1 tablet by SubLINGual route every five (5) minutes as needed for Chest Pain. 1 Bottle 1    aspirin 81 mg Tab Take 81 mg by mouth daily. No current facility-administered medications on file prior to visit. family history includes Dementia in her father; Diabetes in her son; Heart Disease in her mother; Kidney Disease in her son.     Social History     Socioeconomic History    Marital status:      Spouse name: Not on file    Number of children: Not on file    Years of education: Not on file    Highest education level: Not on file   Occupational History    Not on file   Social Needs    Financial resource strain: Not on file    Food insecurity     Worry: Not on file     Inability: Not on file    Transportation needs     Medical: Not on file     Non-medical: Not on file   Tobacco Use    Smoking status: Never Smoker    Smokeless tobacco: Never Used   Substance and Sexual Activity    Alcohol use: No    Drug use: No     Types: Sedatives/Hypnotics, Prescription    Sexual activity: Not on file   Lifestyle    Physical activity     Days per week: Not on file     Minutes per session: Not on file    Stress: Not on file   Relationships    Social connections     Talks on phone: Not on file     Gets together: Not on file     Attends Shinto service: Not on file     Active member of club or organization: Not on file     Attends meetings of clubs or organizations: Not on file     Relationship status: Not on file    Intimate partner violence     Fear of current or ex partner: Not on file     Emotionally abused: Not on file     Physically abused: Not on file     Forced sexual activity: Not on file   Other Topics Concern    Not on file   Social History Narrative    Not on file       Visit Vitals  /84 (BP 1 Location: Right arm, BP Patient Position: Supine)   Pulse 77   Temp 97.9 °F (36.6 °C) (Axillary)   Resp 18   Ht 5' (1.524 m)   Wt 181 lb (82.1 kg)   SpO2 96%   BMI 35.35 kg/m²     General:  Well appearing female no acute distress  HEENT:   PERRL,normal conjunctiva. External ear and canals normal, TMs normal.  Hearing normal to voice. Neck:  Supple. Thyroid normal size, nontender, without nodules. No carotid bruit. No masses or lymphadenopathy  Respiratory: no respiratory distress,  no wheezing, no rhonchi, no rales. No chest wall tenderness. Cardiovascular:  RRR, normal S1S2, no murmur. Gastrointestinal: normal bowel sounds, soft, nontender, without masses. No hepatosplenomegaly. Extremities +2 pulses, no edema, normal sensation   Musculoskeletal:  Normal gait. Normal digits and nails. Normal strength and tone, no atrophy, and no abnormal movement. Skin:  No rash, no lesions, no ulcers. Skin warm, normal turgor, without induration or nodules. Neuro:  A and OX4, fluent speech, cranial nerves normal 2-12. Sensation normal to light touch.   DTR symmetrical  Psych:  Normal affect      Lab Results   Component Value Date/Time    WBC 4.1 09/01/2019 09:56 AM    HGB 14.3 09/01/2019 09:56 AM    HCT 42.6 09/01/2019 09:56 AM    PLATELET 219 41/31/7720 09:56 AM    MCV 99.1 (H) 09/01/2019 09:56 AM     Lab Results   Component Value Date/Time    Sodium 146 (H) 01/20/2020 12:58 PM    Potassium 4.8 01/20/2020 12:58 PM    Chloride 108 (H) 01/20/2020 12:58 PM    CO2 22 01/20/2020 12:58 PM    Anion gap 7 09/03/2019 03:30 AM Glucose 109 (H) 01/20/2020 12:58 PM    BUN 17 01/20/2020 12:58 PM    Creatinine 1.36 (H) 01/20/2020 12:58 PM    BUN/Creatinine ratio 13 01/20/2020 12:58 PM    GFR est AA 41 (L) 01/20/2020 12:58 PM    GFR est non-AA 35 (L) 01/20/2020 12:58 PM    Calcium 10.5 (H) 01/20/2020 12:58 PM     Lab Results   Component Value Date/Time    Cholesterol, total 147 08/26/2019 08:12 AM    HDL Cholesterol 62 08/26/2019 08:12 AM    LDL, calculated 60 08/26/2019 08:12 AM    VLDL, calculated 25 08/26/2019 08:12 AM    Triglyceride 125 08/26/2019 08:12 AM    CHOL/HDL Ratio 2.5 09/02/2015 03:20 AM     Lab Results   Component Value Date/Time    TSH 4.330 01/07/2020 12:00 PM     Lab Results   Component Value Date/Time    Hemoglobin A1c 5.7 05/07/2009 01:40 PM     Lab Results   Component Value Date/Time    Vitamin D 25-Hydroxy 31.8 (L) 07/28/2011 08:00 AM    VITAMIN D, 25-HYDROXY 39.8 04/05/2019 02:25 PM                   Assessment and Plan:     1. Anxiety  Increased anxiety due to recent diagnosis of daughter with bladder cancer. Has tried multiple different medications for sleep including Belsomra and trazodone. Takes 0.025 of Xanax at bedtime when having severe insomnia    2. Essential hypertension  Blood pressure is controlled today on lisinopril 5 mg daily metoprolol 50 mg twice a day  - METABOLIC PANEL, COMPREHENSIVE; Future  - METABOLIC PANEL, COMPREHENSIVE    3. Spondylolysis of lumbar region  Patient has chronic back pain wears brace. She takes tramadol 50 mg twice a day with improvement in quality of life. 4. Primary insomnia  Declined sleep referral.    5. Stage 3a chronic kidney disease  This is followed by [facility]    6. Acquired hypothyroidism  Currently having her constipation we will check TSH and continue Synthroid 75 mcg daily  - METABOLIC PANEL, COMPREHENSIVE; Future  - TSH AND FREE T4; Future  - LIPID PANEL; Future  - TSH AND FREE T4  - LIPID PANEL  - METABOLIC PANEL, COMPREHENSIVE    7.  Esophageal dysphagia: History of hiatal hernia has had repair in 2004 now having dysphagia of solids  - XR BA SWALLOW ESOPHOGRAM; Future    8. Constipation, unspecified constipation type  Discussed increasing water intake. Failed over-the-counter including senna docusate and MiraLAX  - linaCLOtide (Linzess) 72 mcg cap capsule; Take 1 Cap by mouth Daily (before breakfast). Dispense: 90 Cap; Refill: 1    9. History of coronary artery disease: Per cardiologist  Cath in 9/2019 with stable coronary disease; patent LAD stent placed previously, moderate 50% RCA stenosis, negative by FFR (0.85)  Continue with ASA, BB, statin  Follow-up and Dispositions    · Return in about 3 months (around 6/30/2021) for constipation .           Bren Hickey MD

## 2021-03-31 LAB
ALBUMIN SERPL-MCNC: 3.5 G/DL (ref 3.5–5)
ALBUMIN/GLOB SERPL: 1.3 {RATIO} (ref 1.1–2.2)
ALP SERPL-CCNC: 88 U/L (ref 45–117)
ALT SERPL-CCNC: 10 U/L (ref 12–78)
ANION GAP SERPL CALC-SCNC: 7 MMOL/L (ref 5–15)
AST SERPL-CCNC: 19 U/L (ref 15–37)
BILIRUB SERPL-MCNC: 0.6 MG/DL (ref 0.2–1)
BUN SERPL-MCNC: 18 MG/DL (ref 6–20)
BUN/CREAT SERPL: 13 (ref 12–20)
CALCIUM SERPL-MCNC: 9.6 MG/DL (ref 8.5–10.1)
CHLORIDE SERPL-SCNC: 110 MMOL/L (ref 97–108)
CHOLEST SERPL-MCNC: 155 MG/DL
CO2 SERPL-SCNC: 25 MMOL/L (ref 21–32)
CREAT SERPL-MCNC: 1.36 MG/DL (ref 0.55–1.02)
GLOBULIN SER CALC-MCNC: 2.8 G/DL (ref 2–4)
GLUCOSE SERPL-MCNC: 87 MG/DL (ref 65–100)
HDLC SERPL-MCNC: 75 MG/DL
HDLC SERPL: 2.1 {RATIO} (ref 0–5)
LDLC SERPL CALC-MCNC: 62.6 MG/DL (ref 0–100)
LIPID PROFILE,FLP: NORMAL
POTASSIUM SERPL-SCNC: 4.2 MMOL/L (ref 3.5–5.1)
PROT SERPL-MCNC: 6.3 G/DL (ref 6.4–8.2)
SODIUM SERPL-SCNC: 142 MMOL/L (ref 136–145)
T4 FREE SERPL-MCNC: 1.2 NG/DL (ref 0.8–1.5)
TRIGL SERPL-MCNC: 87 MG/DL (ref ?–150)
TSH SERPL DL<=0.05 MIU/L-ACNC: 0.88 UIU/ML (ref 0.36–3.74)
VLDLC SERPL CALC-MCNC: 17.4 MG/DL

## 2021-04-02 ENCOUNTER — PATIENT MESSAGE (OUTPATIENT)
Dept: INTERNAL MEDICINE CLINIC | Age: 86
End: 2021-04-02

## 2021-04-06 ENCOUNTER — HOSPITAL ENCOUNTER (OUTPATIENT)
Dept: GENERAL RADIOLOGY | Age: 86
Discharge: HOME | End: 2021-04-06
Attending: INTERNAL MEDICINE
Payer: MEDICARE

## 2021-04-06 DIAGNOSIS — R13.19 ESOPHAGEAL DYSPHAGIA: ICD-10-CM

## 2021-04-06 PROCEDURE — 74220 X-RAY XM ESOPHAGUS 1CNTRST: CPT

## 2021-04-07 NOTE — PROGRESS NOTES
Mild slower mechanism of swallowing no mass, overall results look good. Eat slowly, and puree foods that are harder to swallow.  Sit up after eating for 1 hour

## 2021-04-08 NOTE — PROGRESS NOTES
2 pt identifiers verified:   MD Ruby Cuevas, LPN  Mild slower mechanism of swallowing no mass, overall results look good. Eat slowly, and puree foods that are harder to swallow.  Sit up after eating for 1 hour

## 2021-04-13 RX ORDER — ROSUVASTATIN CALCIUM 40 MG/1
TABLET, COATED ORAL
Qty: 90 TAB | Refills: 3 | Status: SHIPPED | OUTPATIENT
Start: 2021-04-13 | End: 2021-04-14 | Stop reason: SDUPTHER

## 2021-04-14 RX ORDER — ROSUVASTATIN CALCIUM 40 MG/1
TABLET, COATED ORAL
Qty: 90 TAB | Refills: 3 | Status: SHIPPED | OUTPATIENT
Start: 2021-04-14 | End: 2022-04-04

## 2021-04-14 NOTE — TELEPHONE ENCOUNTER
----- Message from Edelmira Vera sent at 4/13/2021  4:55 PM EDT -----  Regarding: Dr Cuca Owens /rx refill   Medication Refill    Caller (if not patient):      Relationship of caller (if not patient):       Best contact number(s):[phone], this is her daughter number please use that number if you need to call  back, her phone is not working ,           Name of medication and dosage if known:rosuvastatin 40 mg      Is patient out of this medication (yes/no):yes      Pharmacy name:[facility]    Pharmacy listed in chart? (yes/no):yes    Pharmacy phone [phone]      Details to clarify the request:      Edelmira Vera  Copy/paste [employer]

## 2021-04-26 DIAGNOSIS — G89.29 CHRONIC BILATERAL LOW BACK PAIN WITHOUT SCIATICA: ICD-10-CM

## 2021-04-26 DIAGNOSIS — M54.50 CHRONIC BILATERAL LOW BACK PAIN WITHOUT SCIATICA: ICD-10-CM

## 2021-04-26 RX ORDER — TRAMADOL HYDROCHLORIDE 50 MG/1
50 TABLET ORAL
Qty: 60 TAB | Refills: 0 | Status: SHIPPED | OUTPATIENT
Start: 2021-04-26 | End: 2021-05-25 | Stop reason: SDUPTHER

## 2021-04-26 NOTE — TELEPHONE ENCOUNTER
Patient states she needs a refill done thru [facility]/[place] [facility]. Please call if any questions.  Thank you

## 2021-04-27 DIAGNOSIS — F41.9 ANXIETY: ICD-10-CM

## 2021-04-27 NOTE — TELEPHONE ENCOUNTER
Future Appointments:  Future Appointments   Date Time Provider Jazmyne Cristina   7/2/2021 11:45 AM Nick Lux MD [facility] BS AMB   9/27/2021 10:45 AM Stephen Robertson MD [facility] BS AMB        Last Appointment With Me:  3/30/2021     Requested Prescriptions     Pending Prescriptions Disp Refills    ALPRAZolam (XANAX) 0.25 mg tablet 30 Tab 0     Sig: Take 1 Tab by mouth nightly as needed for Anxiety or Sleep. Max Daily Amount: 0.25 mg.

## 2021-04-27 NOTE — TELEPHONE ENCOUNTER
Pt requesting refill of :    ALPRAZolam (XANAX) 0.25 mg tablet      Please send to:    [facility] [address] [facility],  [place]

## 2021-04-28 RX ORDER — ALPRAZOLAM 0.25 MG/1
0.25 TABLET ORAL
Qty: 30 TAB | Refills: 0 | Status: SHIPPED | OUTPATIENT
Start: 2021-04-28 | End: 2021-05-25 | Stop reason: SDUPTHER

## 2021-05-25 DIAGNOSIS — F41.9 ANXIETY: ICD-10-CM

## 2021-05-25 DIAGNOSIS — G89.29 CHRONIC BILATERAL LOW BACK PAIN WITHOUT SCIATICA: ICD-10-CM

## 2021-05-25 DIAGNOSIS — M54.50 CHRONIC BILATERAL LOW BACK PAIN WITHOUT SCIATICA: ICD-10-CM

## 2021-05-25 NOTE — TELEPHONE ENCOUNTER
Future Appointments:  Future Appointments   Date Time Provider Jazmyne Cristina   7/2/2021 11:45 AM Nick Ann MD [facility] BS AMB   9/27/2021 10:45 AM Stan Robertson MD [facility] BS AMB        Last Appointment With Me:  3/30/2021     Requested Prescriptions     Pending Prescriptions Disp Refills    traMADoL (ULTRAM) 50 mg tablet 60 Tablet 0     Sig: Take 1 Tablet by mouth every eight (8) hours as needed for Pain for up to 30 days. Max Daily Amount: 150 mg.    ALPRAZolam (XANAX) 0.25 mg tablet 30 Tablet 0     Sig: Take 1 Tablet by mouth nightly as needed for Anxiety or Sleep. Max Daily Amount: 0.25 mg.

## 2021-05-25 NOTE — TELEPHONE ENCOUNTER
----- Message from Cat Uplike sent at 5/25/2021  1:13 PM EDT -----  Regarding: /Refill  Medication Refill    Caller (if not patient):      Relationship of caller (if not patient):      Best contact number(s): [phone]      Name of medication and dosage if known: \" Tramadol 50mg\" ,\"Alprazolam 0.25mg''       Is patient out of this medication (yes/no): no       [facility]     Pharmacy listed in chart? (yes/no): yes   Pharmacy phone number: on file       Details to clarify the request: N/A       [facility]

## 2021-05-26 RX ORDER — TRAMADOL HYDROCHLORIDE 50 MG/1
50 TABLET ORAL
Qty: 60 TABLET | Refills: 0 | Status: SHIPPED | OUTPATIENT
Start: 2021-05-26 | End: 2021-06-23 | Stop reason: SDUPTHER

## 2021-05-26 RX ORDER — ALPRAZOLAM 0.25 MG/1
0.25 TABLET ORAL
Qty: 30 TABLET | Refills: 0 | Status: SHIPPED | OUTPATIENT
Start: 2021-05-26 | End: 2021-06-28 | Stop reason: SDUPTHER

## 2021-06-23 DIAGNOSIS — M54.50 CHRONIC BILATERAL LOW BACK PAIN WITHOUT SCIATICA: ICD-10-CM

## 2021-06-23 DIAGNOSIS — G89.29 CHRONIC BILATERAL LOW BACK PAIN WITHOUT SCIATICA: ICD-10-CM

## 2021-06-23 NOTE — TELEPHONE ENCOUNTER
Future Appointments:  Future Appointments   Date Time Provider Jazmyne Cristina   7/2/2021 11:45 AM Nick Jackson MD [facility] BS AMB   9/27/2021 10:45 AM Laura Robertson MD [facility] BS AMB        Last Appointment With Me:  3/30/2021     Requested Prescriptions     Pending Prescriptions Disp Refills    traMADoL (ULTRAM) 50 mg tablet 60 Tablet 0     Sig: Take 1 Tablet by mouth every eight (8) hours as needed for Pain for up to 30 days. Max Daily Amount: 150 mg.

## 2021-06-23 NOTE — TELEPHONE ENCOUNTER
Medication Refill     Caller (if not patient):pt       Relationship of caller (if not patient):pt       Best contact number(s):[phone]       Name of medication and dosage if known: \"Tramadol 50mg, Alprazolam .25mg\"       Is patient out of this medication (yes/no):no       Pharmacy name: [facility] in [address] [place] [address] listed in chart? (yes/no):yes   Pharmacy phone number:n/a       Details to clarify the request:n/a       Message from [facility]

## 2021-06-24 RX ORDER — TRAMADOL HYDROCHLORIDE 50 MG/1
50 TABLET ORAL
Qty: 60 TABLET | Refills: 0 | Status: SHIPPED | OUTPATIENT
Start: 2021-06-24 | End: 2021-07-25 | Stop reason: SDUPTHER

## 2021-06-28 DIAGNOSIS — F41.9 ANXIETY: ICD-10-CM

## 2021-06-28 NOTE — TELEPHONE ENCOUNTER
Patient states she needs to request approval Asap as she requested refill last week at the same time of Tramadol refill request & we Never Received from the Pharmacy & Now patient reports she is out as of yesterday. Please call if any questions.  Thank you

## 2021-06-29 RX ORDER — ALPRAZOLAM 0.25 MG/1
0.25 TABLET ORAL
Qty: 30 TABLET | Refills: 0 | Status: SHIPPED | OUTPATIENT
Start: 2021-06-29 | End: 2021-07-25 | Stop reason: SDUPTHER

## 2021-06-29 NOTE — TELEPHONE ENCOUNTER
Future Appointments:  Future Appointments   Date Time Provider Jazmyne Cristina   7/2/2021 11:45 AM Nick Arnett MD [facility] BS AMB   9/27/2021 10:45 AM Danie Robertson MD [facility] BS AMB        Last Appointment With Me:  3/30/2021     Requested Prescriptions     Pending Prescriptions Disp Refills    ALPRAZolam (XANAX) 0.25 mg tablet 30 Tablet 0     Sig: Take 1 Tablet by mouth nightly as needed for Anxiety or Sleep. Max Daily Amount: 0.25 mg.

## 2021-06-29 NOTE — TELEPHONE ENCOUNTER
Patient states she is requesting a call back to discuss medication refill made last week & was Never received from Pharmacy. Patient states she is now out & needs to get refill done Asap.  Thank you

## 2021-07-02 ENCOUNTER — OFFICE VISIT (OUTPATIENT)
Dept: INTERNAL MEDICINE CLINIC | Age: 86
End: 2021-07-02
Payer: MEDICARE

## 2021-07-02 VITALS
OXYGEN SATURATION: 94 % | SYSTOLIC BLOOD PRESSURE: 139 MMHG | HEIGHT: 63 IN | WEIGHT: 181 LBS | DIASTOLIC BLOOD PRESSURE: 74 MMHG | HEART RATE: 65 BPM | BODY MASS INDEX: 32.07 KG/M2 | RESPIRATION RATE: 18 BRPM

## 2021-07-02 DIAGNOSIS — M47.26 OTHER SPONDYLOSIS WITH RADICULOPATHY, LUMBAR REGION: ICD-10-CM

## 2021-07-02 DIAGNOSIS — E03.9 ACQUIRED HYPOTHYROIDISM: ICD-10-CM

## 2021-07-02 DIAGNOSIS — F41.9 ANXIETY: ICD-10-CM

## 2021-07-02 DIAGNOSIS — E78.2 MIXED HYPERLIPIDEMIA: ICD-10-CM

## 2021-07-02 DIAGNOSIS — E55.9 VITAMIN D DEFICIENCY: ICD-10-CM

## 2021-07-02 DIAGNOSIS — M43.06 SPONDYLOLYSIS OF LUMBAR REGION: ICD-10-CM

## 2021-07-02 DIAGNOSIS — I20.1 CORONARY ARTERY SPASM (HCC): ICD-10-CM

## 2021-07-02 DIAGNOSIS — G89.29 CHRONIC BILATERAL LOW BACK PAIN WITHOUT SCIATICA: ICD-10-CM

## 2021-07-02 DIAGNOSIS — Z00.00 MEDICARE ANNUAL WELLNESS VISIT, SUBSEQUENT: Primary | ICD-10-CM

## 2021-07-02 DIAGNOSIS — M54.50 CHRONIC BILATERAL LOW BACK PAIN WITHOUT SCIATICA: ICD-10-CM

## 2021-07-02 PROCEDURE — G8427 DOCREV CUR MEDS BY ELIG CLIN: HCPCS | Performed by: INTERNAL MEDICINE

## 2021-07-02 PROCEDURE — G8510 SCR DEP NEG, NO PLAN REQD: HCPCS | Performed by: INTERNAL MEDICINE

## 2021-07-02 PROCEDURE — 1101F PT FALLS ASSESS-DOCD LE1/YR: CPT | Performed by: INTERNAL MEDICINE

## 2021-07-02 PROCEDURE — G8417 CALC BMI ABV UP PARAM F/U: HCPCS | Performed by: INTERNAL MEDICINE

## 2021-07-02 PROCEDURE — G0439 PPPS, SUBSEQ VISIT: HCPCS | Performed by: INTERNAL MEDICINE

## 2021-07-02 PROCEDURE — G8536 NO DOC ELDER MAL SCRN: HCPCS | Performed by: INTERNAL MEDICINE

## 2021-07-02 NOTE — PROGRESS NOTES
Reviewed record in preparation for visit and have obtained necessary documentation. Identified pt with two pt identifiers(name and ). Chief Complaint   Patient presents with    Hypertension       Health Maintenance Due   Topic Date Due    Shingles Vaccine (1 of 2) Never done    Annual Well Visit  2020       Ms. Chaparro has a reminder for a \"due or due soon\" health maintenance. I have asked that she discuss this further with her primary care provider for follow-up on this health maintenance. Coordination of Care Questionnaire:  :     1) Have you been to an emergency room, urgent care clinic since your last visit? no   Hospitalized since your last visit? no             2) Have you seen or consulted any other health care providers outside of [address] [place] [address] since your last visit? no  (Include any pap smears or colon screenings in this section.)    3) In the event something were to happen to you and you were unable to speak on your behalf, do you have an Advance Directive/ Living Will in place stating your wishes? NO    Do you have an Advance Directive on file? no    4) Are you interested in receiving information on Advance Directives? NO    Patient is accompanied by self I have received verbal consent from Lanette Rodriguez to discuss any/all medical information while they are present in the room.

## 2021-07-02 NOTE — PATIENT INSTRUCTIONS
Labs in September apt with me late October     Increase dose of linzess  Medicare Wellness Visit, Female     The best way to live healthy is to have a lifestyle where you eat a well-balanced diet, exercise regularly, limit alcohol use, and quit all forms of tobacco/nicotine, if applicable. Regular preventive services are another way to keep healthy. Preventive services (vaccines, screening tests, monitoring & exams) can help personalize your care plan, which helps you manage your own care. Screening tests can find health problems at the earliest stages, when they are easiest to treat. [facility] follows the current, evidence-based guidelines published by the [facility] ([facility]) when recommending preventive services for our patients. Because we follow these guidelines, sometimes recommendations change over time as research supports it. (For example, mammograms used to be recommended annually. Even though Medicare will still pay for an annual mammogram, the newer guidelines recommend a mammogram every two years for women of average risk). Of course, you and your doctor may decide to screen more often for some diseases, based on your risk and your co-morbidities (chronic disease you are already diagnosed with). Preventive services for you include:  - Medicare offers their members a free annual wellness visit, which is time for you and your primary care provider to discuss and plan for your preventive service needs. Take advantage of this benefit every year!  -All adults over the age of 72 should receive the recommended pneumonia vaccines. Current USPSTF guidelines recommend a series of two vaccines for the best pneumonia protection.   -All adults should have a flu vaccine yearly and a tetanus vaccine every 10 years.   -All adults age 48 and older should receive the shingles vaccines (series of two vaccines).       -All adults age 38-68 who are overweight should have a diabetes screening test once every three years.   -All adults born between [birth year] and 1965 should be screened once for Hepatitis C.  -Other screening tests and preventive services for persons with diabetes include: an eye exam to screen for diabetic retinopathy, a kidney function test, a foot exam, and stricter control over your cholesterol.   -Cardiovascular screening for adults with routine risk involves an electrocardiogram (ECG) at intervals determined by your doctor.   -Colorectal cancer screenings should be done for adults age 54-65 with no increased risk factors for colorectal cancer. There are a number of acceptable methods of screening for this type of cancer. Each test has its own benefits and drawbacks. Discuss with your doctor what is most appropriate for you during your annual wellness visit. The different tests include: colonoscopy (considered the best screening method), a fecal occult blood test, a fecal DNA test, and sigmoidoscopy.    -A bone mass density test is recommended when a woman turns 65 to screen for osteoporosis. This test is only recommended one time, as a screening. Some providers will use this same test as a disease monitoring tool if you already have osteoporosis. -Breast cancer screenings are recommended every other year for women of normal risk, age 54-69.  -Cervical cancer screenings for women over age 72 are only recommended with certain risk factors.      Here is a list of your current Health Maintenance items (your personalized list of preventive services) with a due date:  Health Maintenance Due   Topic Date Due    Shingles Vaccine (1 of 2) Never done

## 2021-07-08 LAB — DRUGS UR: NORMAL

## 2021-07-16 DIAGNOSIS — I10 ESSENTIAL HYPERTENSION: ICD-10-CM

## 2021-07-16 RX ORDER — METOPROLOL TARTRATE 50 MG/1
TABLET ORAL
Qty: 180 TABLET | Refills: 3 | Status: SHIPPED | OUTPATIENT
Start: 2021-07-16 | End: 2021-10-11 | Stop reason: SDUPTHER

## 2021-07-25 DIAGNOSIS — F41.9 ANXIETY: ICD-10-CM

## 2021-07-25 DIAGNOSIS — G89.29 CHRONIC BILATERAL LOW BACK PAIN WITHOUT SCIATICA: ICD-10-CM

## 2021-07-25 DIAGNOSIS — M54.50 CHRONIC BILATERAL LOW BACK PAIN WITHOUT SCIATICA: ICD-10-CM

## 2021-07-26 RX ORDER — TRAMADOL HYDROCHLORIDE 50 MG/1
50 TABLET ORAL
Qty: 60 TABLET | Refills: 0 | Status: SHIPPED | OUTPATIENT
Start: 2021-07-26 | End: 2021-08-26 | Stop reason: SDUPTHER

## 2021-07-26 RX ORDER — ALPRAZOLAM 0.25 MG/1
0.25 TABLET ORAL
Qty: 30 TABLET | Refills: 0 | Status: SHIPPED | OUTPATIENT
Start: 2021-07-26 | End: 2021-08-02

## 2021-07-26 NOTE — TELEPHONE ENCOUNTER
Future Appointments:  Future Appointments   Date Time Provider Jazmyne Cristina   9/27/2021 10:45 AM Rick Robertson MD [facility] BS AMB        Last Appointment With Me:  7/2/2021     Requested Prescriptions     Pending Prescriptions Disp Refills    traMADoL (ULTRAM) 50 mg tablet 60 Tablet 0     Sig: Take 1 Tablet by mouth every eight (8) hours as needed for Pain for up to 30 days. Max Daily Amount: 150 mg.    ALPRAZolam (XANAX) 0.25 mg tablet 30 Tablet 0     Sig: Take 1 Tablet by mouth nightly as needed for Anxiety or Sleep. Max Daily Amount: 0.25 mg.

## 2021-08-01 DIAGNOSIS — F41.9 ANXIETY: ICD-10-CM

## 2021-08-02 RX ORDER — ALPRAZOLAM 0.25 MG/1
TABLET ORAL
Qty: 30 TABLET | Refills: 0 | Status: SHIPPED | OUTPATIENT
Start: 2021-08-02 | End: 2021-08-26 | Stop reason: SDUPTHER

## 2021-08-04 ENCOUNTER — TELEPHONE (OUTPATIENT)
Dept: INTERNAL MEDICINE CLINIC | Age: 86
End: 2021-08-04

## 2021-08-04 NOTE — TELEPHONE ENCOUNTER
----- Message from Duncan Manifold sent at 8/4/2021  2:41 PM EDT -----  Regarding: Dr. Dulce Fisher: [phone]  General Message/Vendor Calls    Caller's first and last name: Maxine Esquivel with [facility]      Reason for call: Checking status for paperwork that was faxed on 8/3/21      Callback required yes/no and why: yes/follow up       Best contact number(s): [phone]      Message from [facility]

## 2021-08-04 NOTE — TELEPHONE ENCOUNTER
----- Message from [facility] sent at 8/4/2021 11:16 AM EDT -----  Regarding: /Telephone  General Message/Vendor Calls    Caller's first and last name: Pt       Reason for call: Requesting a call back for an update on the 3 paged application she states she left at the office       Callback required yes/no and why: yes       Best contact number(s): [phone]      Message from [facility]

## 2021-08-15 RX ORDER — LISINOPRIL 5 MG/1
TABLET ORAL
Qty: 90 TABLET | Refills: 1 | Status: SHIPPED | OUTPATIENT
Start: 2021-08-15 | End: 2021-10-28 | Stop reason: ALTCHOICE

## 2021-08-31 DIAGNOSIS — F41.9 ANXIETY: ICD-10-CM

## 2021-08-31 RX ORDER — ALENDRONATE SODIUM 10 MG/1
TABLET ORAL
Qty: 90 TABLET | Refills: 2 | OUTPATIENT
Start: 2021-08-31

## 2021-08-31 RX ORDER — ALPRAZOLAM 0.25 MG/1
TABLET ORAL
Qty: 30 TABLET | Refills: 0 | Status: SHIPPED | OUTPATIENT
Start: 2021-08-31 | End: 2021-09-23 | Stop reason: SDUPTHER

## 2021-08-31 RX ORDER — ALENDRONATE SODIUM 10 MG/1
TABLET ORAL
Qty: 90 TABLET | Refills: 2 | Status: SHIPPED | OUTPATIENT
Start: 2021-08-31 | End: 2022-02-27

## 2021-09-03 ENCOUNTER — HOSPITAL ENCOUNTER (EMERGENCY)
Age: 86
Discharge: HOME | End: 2021-09-03
Attending: EMERGENCY MEDICINE
Payer: MEDICARE

## 2021-09-03 VITALS
HEART RATE: 64 BPM | RESPIRATION RATE: 16 BRPM | HEIGHT: 60 IN | BODY MASS INDEX: 34.93 KG/M2 | DIASTOLIC BLOOD PRESSURE: 56 MMHG | WEIGHT: 177.91 LBS | SYSTOLIC BLOOD PRESSURE: 150 MMHG | TEMPERATURE: 98.4 F | OXYGEN SATURATION: 98 %

## 2021-09-03 DIAGNOSIS — B02.9 HERPES ZOSTER WITHOUT COMPLICATION: Primary | ICD-10-CM

## 2021-09-03 PROCEDURE — 99283 EMERGENCY DEPT VISIT LOW MDM: CPT

## 2021-09-03 RX ORDER — VALACYCLOVIR HYDROCHLORIDE 1 G/1
1000 TABLET, FILM COATED ORAL 3 TIMES DAILY
Qty: 21 TABLET | Refills: 0 | Status: SHIPPED | OUTPATIENT
Start: 2021-09-03 | End: 2021-09-09 | Stop reason: SDUPTHER

## 2021-09-03 RX ORDER — ACETAMINOPHEN 325 MG/1
650 TABLET ORAL
Qty: 20 TABLET | Refills: 0 | Status: SHIPPED | OUTPATIENT
Start: 2021-09-03

## 2021-09-03 NOTE — ED NOTES
Shawna Pino reviewed discharge instructions with the patient. The patient verbalized understanding. All questions and concerns were addressed. The patient declined a wheelchair and is discharged ambulatory in the care of family members with instructions and prescriptions in hand. Pt is alert and oriented x 4. Respirations are clear and unlabored.

## 2021-09-03 NOTE — ED PROVIDER NOTES
EMERGENCY DEPARTMENT HISTORY AND PHYSICAL EXAM      Date: 9/3/2021  Patient Name: Chari Tran    History of Presenting Illness     Chief Complaint   Patient presents with    Rash     pt is complaining of pain to touch the left side of her scalp, face and neck x 1 week. This morning she woke up with a rash to  her left forehead and eye. She also complains of numbness to the left side of her body, which she attributes to scoliosis.  Numbness     History Provided By: Patient    HPI: Chari Tran, 80 y.o. female with medical history significant for hypertension, thyroid disease, coronary artery disease, hypercholesterolemia, GERD, thromboembolus who presents via private vehicle with family member to the ED with cc of acute moderate aching left-sided headache and rash X 2 days. Endorses that pain exacerbated with palpation. No known injuries or trauma. Denies fever, chills, nausea, vomiting, focal weakness, chest pain, shortness of breath, syncope, seizure, lightheadedness, dizziness, vision changes, photophobia, eye pain or redness, ear pain or drainage, tinnitus, neck pain or stiffness. Patient also endorses chronic intermittent left-sided numbness which is unchanged X \"months\" for which she has been seen by her PCP within the last 2 months. History of scoliosis which she attributes to her numbness. Denies any history of CVA. PCP: Morales Moya MD    There are no other complaints, changes, or physical findings at this time. No current facility-administered medications on file prior to encounter.      Current Outpatient Medications on File Prior to Encounter   Medication Sig Dispense Refill    ALPRAZolam (XANAX) 0.25 mg tablet TAKE ONE TABLET BY MOUTH ONE TIME DAILY AS NEEDED FOR ANXIETY 30 Tablet 0    alendronate (FOSAMAX) 10 mg tablet TAKE ONE TABLET BY MOUTH EVERY MORNING BEFORE BREAKFAST 90 Tablet 2    traMADoL (ULTRAM) 50 mg tablet Take 1 Tablet by mouth every eight (8) hours as needed for Pain for up to 30 days. Max Daily Amount: 150 mg. 60 Tablet 0    lisinopriL (PRINIVIL, ZESTRIL) 5 mg tablet TAKE ONE TABLET BY MOUTH ONE TIME DAILY 90 Tablet 1    metoprolol tartrate (LOPRESSOR) 50 mg tablet TAKE ONE TABLET BY MOUTH TWICE A  Tablet 3    linaCLOtide (Linzess) 145 mcg cap capsule Take 1 Capsule by mouth Daily (before breakfast). 30 Capsule 5    rosuvastatin (CRESTOR) 40 mg tablet TAKE ONE TABLET BY MOUTH EVERY EVENING 90 Tab 3    levothyroxine (SYNTHROID) 75 mcg tablet TAKE ONE TABLET BY MOUTH ONE TIME DAILY BEFORE BREAKFAST 90 Tab 3    naloxone (NARCAN) 4 mg/actuation nasal spray Use 1 spray intranasally, then discard. Repeat with new spray every 2 min as needed for opioid overdose symptoms, alternating nostrils. 1 Each 0    clotrimazole (LOTRIMIN) 1 % topical cream clotrimazole 1 % topical cream      ammonium lactate (LAC-HYDRIN) 12 % lotion APPLY TO AFFECTED AREA(S) TWO TIMES A DAY  GENERIC FOR LAC-HYDRIN  2    melatonin 5 mg cap capsule Take 5 mg by mouth nightly. As needed (Patient not taking: Reported on 7/2/2021)      acetaminophen (TYLENOL ARTHRITIS PAIN) 650 mg TbER Take 650 mg by mouth every eight (8) hours.  multivitamin (ONE A DAY) tablet Take 1 Tab by mouth daily.  cholecalciferol, vitamin D3, (VITAMIN D3) 2,000 unit tab Take 1 Tab by mouth daily. 30 Tab 0    ACTIVATED CHARCOAL (CHARCOCAPS PO) Take  by mouth as needed.  nitroglycerin (NITROSTAT) 0.3 mg SL tablet 1 tablet by SubLINGual route every five (5) minutes as needed for Chest Pain. 1 Bottle 1    aspirin 81 mg Tab Take 81 mg by mouth daily.        Past History     Past Medical History:  Past Medical History:   Diagnosis Date    CAD (coronary artery disease)     stent placed on left 1 stent,in 2007    Cancer [facility])     BCCA    DVT (deep venous thrombosis) (HCC)     GERD (gastroesophageal reflux disease)     Hypercholesterolemia     Hypertension     Pulmonary embolism ([facility] [address] 9/15/2015    Thromboembolus ([facility] [address]     DVT after hernia operation, PE spontaneous 10 years later    Thyroid disease      Past Surgical History:  Past Surgical History:   Procedure Laterality Date    HX GI      esophageal hiatal hernia - 2004    HX GYN      partial hysterectomy - 1970    HX OTHER SURGICAL      BCCAs removed    HX UROLOGICAL      bladder repair - 1999     Family History:  Family History   Problem Relation Age of Onset    Heart Disease Mother     Dementia Father     Diabetes Son     Kidney Disease Son         autoimmune     Social History:  Social History     Tobacco Use    Smoking status: Never Smoker    Smokeless tobacco: Never Used   Substance Use Topics    Alcohol use: No    Drug use: No     Types: Sedatives/Hypnotics, Prescription     Allergies: Allergies   Allergen Reactions    Codeine Unknown (comments)     Unsure of reaction.  Gabapentin Other (comments)     Hallucinations even at lowest dose    Morphine Hives and Itching     Review of Systems   Review of Systems   Constitutional: Negative for activity change, chills, diaphoresis, fatigue and fever. HENT: Negative for dental problem, ear pain, facial swelling, sinus pressure and sore throat. Eyes: Negative for photophobia, pain, discharge, redness and visual disturbance. Respiratory: Negative for apnea, cough, chest tightness and shortness of breath. Cardiovascular: Negative for chest pain and palpitations. Gastrointestinal: Negative for abdominal pain, diarrhea, nausea and vomiting. Genitourinary: Negative for dysuria, frequency, hematuria and urgency. Musculoskeletal: Negative. Negative for arthralgias, back pain, gait problem, joint swelling, myalgias, neck pain and neck stiffness. Skin: Positive for rash. Negative for color change, pallor and wound. Neurological: Positive for numbness (chronic left sided/ unchanged.) and headaches.  Negative for dizziness, tremors, seizures, syncope, facial asymmetry, speech difficulty, weakness and light-headedness. Psychiatric/Behavioral: Negative. Negative for confusion. The patient is not nervous/anxious. Physical Exam   Physical Exam  Vitals and nursing note reviewed. Constitutional:       General: She is not in acute distress. Appearance: Normal appearance. She is well-developed. She is not ill-appearing, toxic-appearing or diaphoretic. Comments: Pleasant, elderly female ambulatory into room with Rollator in no apparent distress. HENT:      Head: Normocephalic and atraumatic. No raccoon eyes, Escobar's sign, abrasion, contusion, right periorbital erythema, left periorbital erythema or laceration. Jaw: There is normal jaw occlusion. Right Ear: Hearing, tympanic membrane, ear canal and external ear normal.      Left Ear: Hearing, tympanic membrane, ear canal and external ear normal.      Nose: No mucosal edema, congestion or rhinorrhea. Right Sinus: No maxillary sinus tenderness or frontal sinus tenderness. Left Sinus: Frontal sinus tenderness present. No maxillary sinus tenderness. Mouth/Throat:      Mouth: Mucous membranes are moist.      Pharynx: Oropharynx is clear. Uvula midline. Eyes:      General: Lids are normal. Vision grossly intact. No visual field deficit. Right eye: No foreign body, discharge or hordeolum. Left eye: No foreign body, discharge or hordeolum. Extraocular Movements: Extraocular movements intact. Right eye: Normal extraocular motion and no nystagmus. Left eye: Normal extraocular motion and no nystagmus. Conjunctiva/sclera: Conjunctivae normal.      Right eye: Right conjunctiva is not injected. No chemosis, exudate or hemorrhage. Left eye: Left conjunctiva is not injected. No chemosis, exudate or hemorrhage. Pupils: Pupils are equal, round, and reactive to light. Pupils are equal.      Right eye: Pupil is round, reactive and not sluggish.       Left eye: Pupil is round, reactive and not sluggish. Visual Fields: Right eye visual fields normal and left eye visual fields normal.   Cardiovascular:      Rate and Rhythm: Normal rate and regular rhythm. Pulses: Normal pulses. Heart sounds: Normal heart sounds. Pulmonary:      Effort: Pulmonary effort is normal. No respiratory distress. Breath sounds: Normal breath sounds. Musculoskeletal:         General: Normal range of motion. Cervical back: Full passive range of motion without pain and normal range of motion. Skin:     General: Skin is warm and dry. Capillary Refill: Capillary refill takes less than 2 seconds. Findings: Rash present. Rash is vesicular. Comments: 1 second vesicular rash noted to left temporal face, periorbital region and scalp. There is no ocular involvement. No crepitus, crusting, scaling, drainage, pustules, induration, fluctuance. Localized tenderness to palpation. Neurological:      General: No focal deficit present. Mental Status: She is alert and oriented to person, place, and time. GCS: GCS eye subscore is 4. GCS verbal subscore is 5. GCS motor subscore is 6. Cranial Nerves: Cranial nerves are intact. No cranial nerve deficit, dysarthria or facial asymmetry. Motor: Motor function is intact. No weakness, tremor, atrophy, abnormal muscle tone, seizure activity or pronator drift. Coordination: Coordination is intact. Romberg sign negative. Coordination normal. Finger-Nose-Finger Test and Heel to [facility] Test normal. Rapid alternating movements normal.      Gait: Gait is intact. Gait and tandem walk normal.      Comments: Pt endorses chronic intermittent numbness to LUE and LLE. Psychiatric:         Behavior: Behavior normal.         Thought Content: Thought content normal.         Judgment: Judgment normal.       Diagnostic Study Results   Labs -   No results found for this or any previous visit (from the past 12 hour(s)).     Radiologic Studies -   No orders to display     No results found. Medical Decision Making   I am the first provider for this patient. I reviewed the vital signs, available nursing notes, past medical history, past surgical history, family history and social history. Vital Signs-Reviewed the patient's vital signs. Patient Vitals for the past 24 hrs:   Temp Pulse Resp BP SpO2   09/03/21 1030 98.4 °F (36.9 °C) 64 16 (!) 150/56 98 %     Pulse Oximetry Analysis - 98% on RA (normal)    Records Reviewed: Nursing Notes, Old Medical Records, Previous Radiology Studies and Previous Laboratory Studies    Provider Notes (Medical Decision Making):   58-year-old female presents with vesicular rash to the left side of her face with facial and scalp pain X 2 days. No ocular involvement. Differential includes herpes zoster, electric contact dermatitis, irritant contact dermatitis, insect bites, tension headache, migraine headache, cluster headache. There are no focal neurological deficits on exam.  Patient endorses chronic intermittent left-sided numbness which is unchanged. No indication for further labs or imaging at this time given patient's benign neurological exam.  Will have follow-up with PCP. ED Course:   Initial assessment performed. The patients presenting problems have been discussed, and they are in agreement with the care plan formulated and outlined with them. I have encouraged them to ask questions as they arise throughout their visit. Progress Note:   Updated pt on all returned results and findings. Discussed the importance of proper follow up as referred below along with return precautions. Pt in agreement with the care plan and expresses agreement with and understanding of all items discussed. Disposition:  10:45 AM  I have discussed with patient their diagnosis, treatment, and follow up plan.  The patient agrees to follow up as outlined in discharge paperwork and also to return to the ED with any worsening. Julio Sousa PA-C      PLAN:  1. Current Discharge Medication List      START taking these medications    Details   valACYclovir (VALTREX) 1 gram tablet Take 1 Tablet by mouth three (3) times daily for 7 days. Qty: 21 Tablet, Refills: 0  Start date: 9/3/2021, End date: 9/10/2021      acetaminophen (TYLENOL) 325 mg tablet Take 2 Tablets by mouth every four (4) hours as needed for Pain. Qty: 20 Tablet, Refills: 0  Start date: 9/3/2021         CONTINUE these medications which have NOT CHANGED    Details   acetaminophen (TYLENOL ARTHRITIS PAIN) 650 mg TbER Take 650 mg by mouth every eight (8) hours. 2.   Follow-up Information     Follow up With Specialties Details Why Contact Info    Saurabh Humphrey MD Internal Medicine Schedule an appointment as soon as possible for a visit in 1 day As needed [address]  Schedule an appointment as soon as possible for a visit in 1 day As needed [address] [place] [address]  [phone]        Return to ED if worse     Diagnosis     Clinical Impression:   1. Herpes zoster without complication            Please note that this dictation was completed with Dragon, computer voice recognition software. Quite often unanticipated grammatical, syntax, homophones, and other interpretive errors are inadvertently transcribed by the computer software. Please disregard these errors. Additionally, please excuse any errors that have escaped final proofreading.

## 2021-09-09 ENCOUNTER — VIRTUAL VISIT (OUTPATIENT)
Dept: INTERNAL MEDICINE CLINIC | Age: 86
End: 2021-09-09
Payer: MEDICARE

## 2021-09-09 DIAGNOSIS — E55.9 VITAMIN D DEFICIENCY: ICD-10-CM

## 2021-09-09 DIAGNOSIS — I20.1 CORONARY ARTERY SPASM (HCC): ICD-10-CM

## 2021-09-09 DIAGNOSIS — B02.9 HERPES ZOSTER WITHOUT COMPLICATION: Primary | ICD-10-CM

## 2021-09-09 DIAGNOSIS — Z09 HOSPITAL DISCHARGE FOLLOW-UP: ICD-10-CM

## 2021-09-09 DIAGNOSIS — E03.9 ACQUIRED HYPOTHYROIDISM: ICD-10-CM

## 2021-09-09 PROCEDURE — 99496 TRANSJ CARE MGMT HIGH F2F 7D: CPT | Performed by: INTERNAL MEDICINE

## 2021-09-09 PROCEDURE — 99213 OFFICE O/P EST LOW 20 MIN: CPT | Performed by: INTERNAL MEDICINE

## 2021-09-09 PROCEDURE — G8427 DOCREV CUR MEDS BY ELIG CLIN: HCPCS | Performed by: INTERNAL MEDICINE

## 2021-09-09 PROCEDURE — 1111F DSCHRG MED/CURRENT MED MERGE: CPT | Performed by: INTERNAL MEDICINE

## 2021-09-09 RX ORDER — VALACYCLOVIR HYDROCHLORIDE 1 G/1
1000 TABLET, FILM COATED ORAL 3 TIMES DAILY
Qty: 9 TABLET | Refills: 0 | Status: SHIPPED | OUTPATIENT
Start: 2021-09-09 | End: 2021-09-09 | Stop reason: ALTCHOICE

## 2021-09-09 RX ORDER — NEOMYCIN SULFATE, POLYMYXIN B SULFATE, AND DEXAMETHASONE 3.5; 10000; 1 MG/G; [USP'U]/G; MG/G
OINTMENT OPHTHALMIC
COMMUNITY
Start: 2021-09-07 | End: 2021-12-21

## 2021-09-09 RX ORDER — PREDNISOLONE ACETATE 10 MG/ML
SUSPENSION/ DROPS OPHTHALMIC
COMMUNITY
Start: 2021-09-07 | End: 2021-12-21

## 2021-09-09 NOTE — PROGRESS NOTES
CC: Hospital Follow Up (shingles)      HPI:    She is a 80 y.o. female with a hx     who presents for evaluation of hospital follow up    Patient went to ER on September 3rd from shingles pain in left side of face and scalp and rash over left eye  She is currently on valtrex  Patient saw ophthalmologist Tuesday   And shingles not affecting eye    However she is on antibiotic ointment to avoid bacteria    Feels generally weak   Has decreased PO intake        This is an established visit conducted via telemedicine with video. The patient has been instructed that this meets HIPAA criteria and acknowledges and agrees to this method of visitation. Pursuant to the emergency declaration under the [facility] [place] [address] waiver authority and the [employer] Act, this Virtual Visit was conducted, with patient's consent, to reduce the patient's risk of exposure to COVID-19 and provide continuity of care for an established patient. Services were provided through a video synchronous discussion virtually to substitute for in-person clinic visit. ROS:  Constitutional: negative for fevers, chills, anorexia and weight loss  Eyes:   negative for visual disturbance,  irritation  ENT:   negative for tinnitus,sore throat,nasal congestion,ear pain, sinus pain.    Respiratory:  negative for cough, hemoptysis, dyspnea,wheezing  CV:   negative for chest pain, palpitations, lower extremity edema  GI:   negative for nausea, vomiting, diarrhea, abdominal pain,melena  Genitourinary: negative for frequency, dysuria, hematuria  Musculoskel: negative for myalgias, arthralgias, back pain, muscle weakness, joint pain  Neurological:  negative for headaches, dizziness, focal weakness, numbness  Psych:             Negative for depression and anxiety    Past Medical History:   Diagnosis Date    CAD (coronary artery disease)     stent placed on left 1 stent,in 2007    Cancer [facility])     BCCA    DVT (deep venous thrombosis) (HCC)     GERD (gastroesophageal reflux disease)     Hypercholesterolemia     Hypertension     Pulmonary embolism ([facility] [address] 9/15/2015    Thromboembolus ([facility] [address]     DVT after hernia operation, PE spontaneous 10 years later    Thyroid disease        Current Outpatient Medications on File Prior to Visit   Medication Sig Dispense Refill    neomycin-polymyxin-dexamethasone (DEXACINE) 3.5 mg/g-10,000 unit/g-0.1 % ophthalmic ointment       prednisoLONE acetate (PRED FORTE) 1 % ophthalmic suspension       acetaminophen (TYLENOL) 325 mg tablet Take 2 Tablets by mouth every four (4) hours as needed for Pain. 20 Tablet 0    ALPRAZolam (XANAX) 0.25 mg tablet TAKE ONE TABLET BY MOUTH ONE TIME DAILY AS NEEDED FOR ANXIETY 30 Tablet 0    alendronate (FOSAMAX) 10 mg tablet TAKE ONE TABLET BY MOUTH EVERY MORNING BEFORE BREAKFAST 90 Tablet 2    traMADoL (ULTRAM) 50 mg tablet Take 1 Tablet by mouth every eight (8) hours as needed for Pain for up to 30 days. Max Daily Amount: 150 mg. 60 Tablet 0    lisinopriL (PRINIVIL, ZESTRIL) 5 mg tablet TAKE ONE TABLET BY MOUTH ONE TIME DAILY 90 Tablet 1    metoprolol tartrate (LOPRESSOR) 50 mg tablet TAKE ONE TABLET BY MOUTH TWICE A  Tablet 3    rosuvastatin (CRESTOR) 40 mg tablet TAKE ONE TABLET BY MOUTH EVERY EVENING 90 Tab 3    levothyroxine (SYNTHROID) 75 mcg tablet TAKE ONE TABLET BY MOUTH ONE TIME DAILY BEFORE BREAKFAST 90 Tab 3    naloxone (NARCAN) 4 mg/actuation nasal spray Use 1 spray intranasally, then discard. Repeat with new spray every 2 min as needed for opioid overdose symptoms, alternating nostrils. 1 Each 0    acetaminophen (TYLENOL ARTHRITIS PAIN) 650 mg TbER Take 650 mg by mouth every eight (8) hours.  nitroglycerin (NITROSTAT) 0.3 mg SL tablet 1 tablet by SubLINGual route every five (5) minutes as needed for Chest Pain. 1 Bottle 1    aspirin 81 mg Tab Take 81 mg by mouth daily.  valACYclovir (VALTREX) 1 gram tablet Take 1 Tablet by mouth three (3) times daily for 7 days. (Patient not taking: Reported on 9/9/2021) 21 Tablet 0    linaCLOtide (Linzess) 145 mcg cap capsule Take 1 Capsule by mouth Daily (before breakfast). (Patient not taking: Reported on 9/9/2021) 30 Capsule 5    clotrimazole (LOTRIMIN) 1 % topical cream clotrimazole 1 % topical cream (Patient not taking: Reported on 9/9/2021)      ammonium lactate (LAC-HYDRIN) 12 % lotion APPLY TO AFFECTED AREA(S) TWO TIMES A DAY  GENERIC FOR LAC-HYDRIN (Patient not taking: Reported on 9/9/2021)  2    melatonin 5 mg cap capsule Take 5 mg by mouth nightly. As needed (Patient not taking: Reported on 7/2/2021)      multivitamin (ONE A DAY) tablet Take 1 Tab by mouth daily. (Patient not taking: Reported on 9/9/2021)      cholecalciferol, vitamin D3, (VITAMIN D3) 2,000 unit tab Take 1 Tab by mouth daily. (Patient not taking: Reported on 9/9/2021) 30 Tab 0    ACTIVATED CHARCOAL (CHARCOCAPS PO) Take  by mouth as needed. (Patient not taking: Reported on 9/9/2021)       No current facility-administered medications on file prior to visit.        Past Surgical History:   Procedure Laterality Date    HX GI      esophageal hiatal hernia - 2004    HX GYN      partial hysterectomy - 1970    HX OTHER SURGICAL      BCCAs removed    HX UROLOGICAL      bladder repair - 1999       Family History   Problem Relation Age of Onset    Heart Disease Mother     Dementia Father     Diabetes Son     Kidney Disease Son         autoimmune     Reviewed and no changes     Social History     Socioeconomic History    Marital status:      Spouse name: Not on file    Number of children: Not on file    Years of education: Not on file    Highest education level: Not on file   Occupational History    Not on file   Tobacco Use    Smoking status: Never Smoker    Smokeless tobacco: Never Used   Substance and Sexual Activity    Alcohol use: No    Drug use: No     Types: Sedatives/Hypnotics, Prescription    Sexual activity: Not on file   Other Topics Concern    Not on file   Social History Narrative    Not on file     Social Determinants of Health     Financial Resource Strain:     Difficulty of Paying Living Expenses:    Food Insecurity:     Worried About Running Out of Food in the Last Year:     [address] in the Last Year:    Transportation Needs:     Lack of Transportation (Medical):  Lack of Transportation (Non-Medical):    Physical Activity:     Days of Exercise per Week:     Minutes of Exercise per Session:    Stress:     Feeling of Stress :    Social Connections:     Frequency of Communication with Friends and Family:     Frequency of Social Gatherings with Friends and Family:     Attends [employer] Services:     Active Member of Clubs or Organizations:     Attends Club or Organization Meetings:     Marital Status:    Intimate Partner Violence:     Fear of Current or Ex-Partner:     Emotionally Abused:     Physically Abused:     Sexually Abused: There were no vitals taken for this visit. Physical Examination:   Gen: well appearing female  HEENT: normal conjunctiva, no audible congestion, patient does not see oral erythema, has MMM  Neck: patient does not feel enlarged or tender LAD or masses  Resp: normal respiratory effort, no audible wheezing. CV: patient does not feel palpitations or heart irregularity  Abd: patient does not feel abdominal tenderness or mass, patient does not notice distension  Extrem: patient does not see swelling in ankles or joints.    Neuro: Alert and oriented, able to answer questions without difficulty, able to move all extremities and walk normally          Lab Results   Component Value Date/Time    WBC 4.1 09/01/2019 09:56 AM    HGB 14.3 09/01/2019 09:56 AM    HCT 42.6 09/01/2019 09:56 AM    PLATELET 308 45/11/0308 09:56 AM    MCV 99.1 (H) 09/01/2019 09:56 AM     Lab Results   Component Value Date/Time    Sodium 142 03/30/2021 12:19 PM    Potassium 4.2 03/30/2021 12:19 PM    Chloride 110 (H) 03/30/2021 12:19 PM    CO2 25 03/30/2021 12:19 PM    Anion gap 7 03/30/2021 12:19 PM    Glucose 87 03/30/2021 12:19 PM    BUN 18 03/30/2021 12:19 PM    Creatinine 1.36 (H) 03/30/2021 12:19 PM    BUN/Creatinine ratio 13 03/30/2021 12:19 PM    GFR est AA 45 (L) 03/30/2021 12:19 PM    GFR est non-AA 37 (L) 03/30/2021 12:19 PM    Calcium 9.6 03/30/2021 12:19 PM     Lab Results   Component Value Date/Time    Cholesterol, total 155 03/30/2021 12:19 PM    HDL Cholesterol 75 03/30/2021 12:19 PM    LDL, calculated 62.6 03/30/2021 12:19 PM    VLDL, calculated 17.4 03/30/2021 12:19 PM    Triglyceride 87 03/30/2021 12:19 PM    CHOL/HDL Ratio 2.1 03/30/2021 12:19 PM     Lab Results   Component Value Date/Time    TSH 0.88 03/30/2021 12:20 PM     No results found for: PSA, Halley Carvajal, [id number], [id number]  Lab Results   Component Value Date/Time    Hemoglobin A1c 5.7 05/07/2009 01:40 PM     Lab Results   Component Value Date/Time    Vitamin D 25-Hydroxy 31.8 (L) 07/28/2011 08:00 AM    VITAMIN D, 25-HYDROXY 39.8 04/05/2019 02:25 PM       Lab Results   Component Value Date/Time    ALT (SGPT) 10 (L) 03/30/2021 12:19 PM    Alk. phosphatase 88 03/30/2021 12:19 PM    Bilirubin, total 0.6 03/30/2021 12:19 PM           Assessment/Plan:    ER follow up for shingles: had vaccine zostavax several years ago. improving slowly. Saw ophto and no eye involvement  continue valtrex but discussed based on renal function should only take medication twice a day ( stage III renal disease)       Constipation: on linzess     Anxiety  Takes 0.025 of Xanax at bedtime when having severe insomnia     Essential hypertension  Blood pressure is controlled on lisinopril 5 mg daily metoprolol 50 mg twice a day      Spondylolysis of lumbar region  Patient has chronic back pain wears brace.   She takes tramadol 50 mg twice a day with improvement in quality of life. Stage 3a chronic kidney disease  This is followed by [facility]  Renally dose meds    Acquired hypothyroidism  Synthroid 75 mcg daily  Overdue for labs              Sherry Machuca MD    This is an established visit conducted via real time video and audio telemedicine. The patient has been instructed that this meets HIPAA criteria and acknowledges and agrees to this method of visitation.

## 2021-09-09 NOTE — PROGRESS NOTES
Chief Complaint   Patient presents with   [facility] Follow Up     shingles     1. Have you been to the ER, urgent care clinic since your last visit? Hospitalized since your last visit? Yes When: Last weekend ED [facility]    2. Have you seen or consulted any other health care providers outside of the [address] [place] [address] since your last visit? Include any pap smears or colon screening.  Yes When: Dr. Florencia Osler nephrology

## 2021-09-10 ENCOUNTER — APPOINTMENT (OUTPATIENT)
Dept: INTERNAL MEDICINE CLINIC | Age: 86
End: 2021-09-10

## 2021-09-11 LAB
25(OH)D3 SERPL-MCNC: 49.1 NG/ML (ref 30–100)
ALBUMIN SERPL-MCNC: 3.3 G/DL (ref 3.5–5)
ALBUMIN/GLOB SERPL: 1 {RATIO} (ref 1.1–2.2)
ALP SERPL-CCNC: 85 U/L (ref 45–117)
ALT SERPL-CCNC: 13 U/L (ref 12–78)
ANION GAP SERPL CALC-SCNC: 6 MMOL/L (ref 5–15)
AST SERPL-CCNC: 21 U/L (ref 15–37)
BASOPHILS # BLD: 0.1 K/UL (ref 0–0.1)
BASOPHILS NFR BLD: 1 % (ref 0–1)
BILIRUB SERPL-MCNC: 0.6 MG/DL (ref 0.2–1)
BUN SERPL-MCNC: 24 MG/DL (ref 6–20)
BUN/CREAT SERPL: 17 (ref 12–20)
CALCIUM SERPL-MCNC: 10 MG/DL (ref 8.5–10.1)
CHLORIDE SERPL-SCNC: 108 MMOL/L (ref 97–108)
CHOLEST SERPL-MCNC: 146 MG/DL
CO2 SERPL-SCNC: 26 MMOL/L (ref 21–32)
CREAT SERPL-MCNC: 1.39 MG/DL (ref 0.55–1.02)
DIFFERENTIAL METHOD BLD: ABNORMAL
EOSINOPHIL # BLD: 0 K/UL (ref 0–0.4)
EOSINOPHIL NFR BLD: 0 % (ref 0–7)
ERYTHROCYTE [DISTWIDTH] IN BLOOD BY AUTOMATED COUNT: 13.2 % (ref 11.5–14.5)
GLOBULIN SER CALC-MCNC: 3.2 G/DL (ref 2–4)
GLUCOSE SERPL-MCNC: 95 MG/DL (ref 65–100)
HCT VFR BLD AUTO: 38.4 % (ref 35–47)
HDLC SERPL-MCNC: 59 MG/DL
HDLC SERPL: 2.5 {RATIO} (ref 0–5)
HGB BLD-MCNC: 12.3 G/DL (ref 11.5–16)
IMM GRANULOCYTES # BLD AUTO: 0 K/UL (ref 0–0.04)
IMM GRANULOCYTES NFR BLD AUTO: 0 % (ref 0–0.5)
LDLC SERPL CALC-MCNC: 59.2 MG/DL (ref 0–100)
LYMPHOCYTES # BLD: 0.8 K/UL (ref 0.8–3.5)
LYMPHOCYTES NFR BLD: 14 % (ref 12–49)
MCH RBC QN AUTO: 33.3 PG (ref 26–34)
MCHC RBC AUTO-ENTMCNC: 32 G/DL (ref 30–36.5)
MCV RBC AUTO: 104.1 FL (ref 80–99)
MONOCYTES # BLD: 0.5 K/UL (ref 0–1)
MONOCYTES NFR BLD: 9 % (ref 5–13)
NEUTS SEG # BLD: 4 K/UL (ref 1.8–8)
NEUTS SEG NFR BLD: 76 % (ref 32–75)
NRBC # BLD: 0 K/UL (ref 0–0.01)
NRBC BLD-RTO: 0 PER 100 WBC
PLATELET # BLD AUTO: 178 K/UL (ref 150–400)
PMV BLD AUTO: 11.4 FL (ref 8.9–12.9)
POTASSIUM SERPL-SCNC: 4.3 MMOL/L (ref 3.5–5.1)
PROT SERPL-MCNC: 6.5 G/DL (ref 6.4–8.2)
RBC # BLD AUTO: 3.69 M/UL (ref 3.8–5.2)
RBC MORPH BLD: ABNORMAL
RBC MORPH BLD: ABNORMAL
SODIUM SERPL-SCNC: 140 MMOL/L (ref 136–145)
T4 FREE SERPL-MCNC: 1.2 NG/DL (ref 0.8–1.5)
TRIGL SERPL-MCNC: 139 MG/DL (ref ?–150)
TSH SERPL DL<=0.05 MIU/L-ACNC: 2.54 UIU/ML (ref 0.36–3.74)
VLDLC SERPL CALC-MCNC: 27.8 MG/DL
WBC # BLD AUTO: 5.4 K/UL (ref 3.6–11)

## 2021-09-12 NOTE — PROGRESS NOTES
Vitamin D is at goal continue current supplementation   Normal thyroid  Normal blood count and normal kidney function   Cholesterol looks good

## 2021-09-16 ENCOUNTER — HOSPITAL ENCOUNTER (EMERGENCY)
Age: 86
Discharge: HOME | End: 2021-09-16
Attending: STUDENT IN AN ORGANIZED HEALTH CARE EDUCATION/TRAINING PROGRAM
Payer: MEDICARE

## 2021-09-16 ENCOUNTER — PATIENT MESSAGE (OUTPATIENT)
Dept: INTERNAL MEDICINE CLINIC | Age: 86
End: 2021-09-16

## 2021-09-16 VITALS
SYSTOLIC BLOOD PRESSURE: 147 MMHG | TEMPERATURE: 98 F | WEIGHT: 180 LBS | HEIGHT: 60 IN | HEART RATE: 68 BPM | DIASTOLIC BLOOD PRESSURE: 64 MMHG | BODY MASS INDEX: 35.34 KG/M2 | RESPIRATION RATE: 16 BRPM | OXYGEN SATURATION: 97 %

## 2021-09-16 DIAGNOSIS — B02.9 HERPES ZOSTER WITHOUT COMPLICATION: Primary | ICD-10-CM

## 2021-09-16 DIAGNOSIS — B02.29 POST HERPETIC NEURALGIA: ICD-10-CM

## 2021-09-16 DIAGNOSIS — B02.29 POST HERPETIC NEURALGIA: Primary | ICD-10-CM

## 2021-09-16 PROCEDURE — 99282 EMERGENCY DEPT VISIT SF MDM: CPT

## 2021-09-16 RX ORDER — LIDOCAINE 40 MG/G
CREAM TOPICAL
Qty: 15 G | Refills: 0 | Status: SHIPPED | OUTPATIENT
Start: 2021-09-16 | End: 2021-12-21

## 2021-09-16 NOTE — ED PROVIDER NOTES
EMERGENCY DEPARTMENT HISTORY AND PHYSICAL EXAM      Date: 9/16/2021  Patient Name: Kay Hall    History of Presenting Illness     Chief Complaint   Patient presents with    Shingles     Patient diagnosed with shingles last week and now with severe pain to Left side of face that goes down to her neck    Headache     Pressure to the top of the head         HPI: Kay Hall, 80 y.o. female presents to the ED with cc of left-sided headache. She was diagnosed with shingles, has had symptoms for about 2 weeks, the pain initially got better, then yesterday got worse again. Her rash has improved significantly, she is still taking her antiviral, and saw ophthalmology on Tuesday. Last night, she had increasing sharp pain along the left side of her face and the top of her head. She denies any vision changes, no fevers, no nausea or vomiting. Daughter and patient were hoping that the pain would get better when the rash got better. There are no other complaints, changes, or physical findings at this time. PCP: Cristofer Ochoa MD    No current facility-administered medications on file prior to encounter. Current Outpatient Medications on File Prior to Encounter   Medication Sig Dispense Refill    neomycin-polymyxin-dexamethasone (DEXACINE) 3.5 mg/g-10,000 unit/g-0.1 % ophthalmic ointment       prednisoLONE acetate (PRED FORTE) 1 % ophthalmic suspension       acetaminophen (TYLENOL) 325 mg tablet Take 2 Tablets by mouth every four (4) hours as needed for Pain. 20 Tablet 0    ALPRAZolam (XANAX) 0.25 mg tablet TAKE ONE TABLET BY MOUTH ONE TIME DAILY AS NEEDED FOR ANXIETY 30 Tablet 0    alendronate (FOSAMAX) 10 mg tablet TAKE ONE TABLET BY MOUTH EVERY MORNING BEFORE BREAKFAST 90 Tablet 2    traMADoL (ULTRAM) 50 mg tablet Take 1 Tablet by mouth every eight (8) hours as needed for Pain for up to 30 days.  Max Daily Amount: 150 mg. 60 Tablet 0    lisinopriL (PRINIVIL, ZESTRIL) 5 mg tablet TAKE ONE TABLET BY MOUTH ONE TIME DAILY 90 Tablet 1    metoprolol tartrate (LOPRESSOR) 50 mg tablet TAKE ONE TABLET BY MOUTH TWICE A  Tablet 3    linaCLOtide (Linzess) 145 mcg cap capsule Take 1 Capsule by mouth Daily (before breakfast). (Patient not taking: Reported on 9/9/2021) 30 Capsule 5    rosuvastatin (CRESTOR) 40 mg tablet TAKE ONE TABLET BY MOUTH EVERY EVENING 90 Tab 3    levothyroxine (SYNTHROID) 75 mcg tablet TAKE ONE TABLET BY MOUTH ONE TIME DAILY BEFORE BREAKFAST 90 Tab 3    naloxone (NARCAN) 4 mg/actuation nasal spray Use 1 spray intranasally, then discard. Repeat with new spray every 2 min as needed for opioid overdose symptoms, alternating nostrils. 1 Each 0    clotrimazole (LOTRIMIN) 1 % topical cream clotrimazole 1 % topical cream (Patient not taking: Reported on 9/9/2021)      ammonium lactate (LAC-HYDRIN) 12 % lotion APPLY TO AFFECTED AREA(S) TWO TIMES A DAY  GENERIC FOR LAC-HYDRIN (Patient not taking: Reported on 9/9/2021)  2    melatonin 5 mg cap capsule Take 5 mg by mouth nightly. As needed (Patient not taking: Reported on 7/2/2021)      acetaminophen (TYLENOL ARTHRITIS PAIN) 650 mg TbER Take 650 mg by mouth every eight (8) hours.  multivitamin (ONE A DAY) tablet Take 1 Tab by mouth daily. (Patient not taking: Reported on 9/9/2021)      cholecalciferol, vitamin D3, (VITAMIN D3) 2,000 unit tab Take 1 Tab by mouth daily. (Patient not taking: Reported on 9/9/2021) 30 Tab 0    ACTIVATED CHARCOAL (CHARCOCAPS PO) Take  by mouth as needed. (Patient not taking: Reported on 9/9/2021)      nitroglycerin (NITROSTAT) 0.3 mg SL tablet 1 tablet by SubLINGual route every five (5) minutes as needed for Chest Pain. 1 Bottle 1    aspirin 81 mg Tab Take 81 mg by mouth daily.          Past History     Past Medical History:  Past Medical History:   Diagnosis Date    CAD (coronary artery disease)     stent placed on left 1 stent,in 2007    Cancer [facility])     BCCA    [facility]) [facility] [address]     GERD (gastroesophageal reflux disease)     Hypercholesterolemia     Hypertension     Pulmonary embolism ([facility] [address] 9/15/2015    Thromboembolus ([facility] [address]     DVT after hernia operation, PE spontaneous 10 years later    Thyroid disease        Past Surgical History:  Past Surgical History:   Procedure Laterality Date    HX GI      esophageal hiatal hernia - 2004    HX GYN      partial hysterectomy - 1970    HX OTHER SURGICAL      BCCAs removed    HX UROLOGICAL      bladder repair - 1999       Family History:  Family History   Problem Relation Age of Onset    Heart Disease Mother     Dementia Father     Diabetes Son     Kidney Disease Son         autoimmune       Social History:  Social History     Tobacco Use    Smoking status: Never Smoker    Smokeless tobacco: Never Used   Substance Use Topics    Alcohol use: No    Drug use: No     Types: Sedatives/Hypnotics, Prescription       Allergies: Allergies   Allergen Reactions    Codeine Unknown (comments)     Unsure of reaction.  Gabapentin Other (comments)     Hallucinations even at lowest dose    Morphine Hives and Itching         Review of Systems   no fever  No vision changes  no shortness of breath  no chest pain  no abdominal pain  no dysuria  no leg pain  No rash  No lymphadenopathy  No weight loss reports rash   Physical Exam   Physical Exam  Constitutional:       General: She is not in acute distress. Appearance: She is not toxic-appearing. HENT:      Head: Normocephalic and atraumatic. Comments: Left sided face with healing, dry, small patches of reddish-brown patches. No vesicles, no purulence, no warmth or fluctuance. Slightly tender over this dermatome     Mouth/Throat:      Mouth: Mucous membranes are moist.   Eyes:      Extraocular Movements: Extraocular movements intact. Conjunctiva/sclera: Conjunctivae normal.   Cardiovascular:      Rate and Rhythm: Normal rate and regular rhythm.    Pulmonary:      Effort: Pulmonary effort is normal.      Breath sounds: Normal breath sounds. Abdominal:      Palpations: Abdomen is soft. Tenderness: There is no abdominal tenderness. Musculoskeletal:         General: No deformity. Cervical back: Neck supple. Skin:     General: Skin is warm. Neurological:      General: No focal deficit present. Mental Status: She is alert. Comments: Symmetric facial expressions, extraocular movements intact, symmetric  strength bilaterally, sensation to light touch intact over upper extremities bilaterally   Psychiatric:         Mood and Affect: Mood normal.         Diagnostic Study Results     Labs -   No results found for this or any previous visit (from the past 24 hour(s)). Radiologic Studies -   No orders to display     CT Results  (Last 48 hours)    None        CXR Results  (Last 48 hours)    None            Medical Decision Making   I am the first provider for this patient. I reviewed the vital signs, available nursing notes, past medical history, past surgical history, family history and social history. Vital Signs-Reviewed the patient's vital signs. Patient Vitals for the past 24 hrs:   Temp Pulse Resp BP SpO2   09/16/21 0817 98 °F (36.7 °C) 68 16 (!) 147/64 97 %         Provider Notes (Medical Decision Making):   24-year-old female presenting with left-sided facial pain, recent shingles. Shingles rash is healing on exam, no signs of superinfection. She is afebrile and nontoxic-appearing. Her continued sharp pain over this dermatome is consistent with postherpetic neuralgia, her neuro exam is unremarkable. Differential includes continued pain from shingles rash. ED Course:     Initial assessment performed. The patients presenting problems have been discussed, and they are in agreement with the care plan formulated and outlined with them. I have encouraged them to ask questions as they arise throughout their visit.         Patient will be given prescription for lidocaine cream, will follow up with her primary care doctor, we will also schedule ibuprofen and Tylenol around-the-clock. Patient is counseled on supportive care and return precautions. Will return to the ED for any worsening pain, any fevers, or any new or worrisome symptoms. Will followup with primary care doctor within 5-7 days. Critical Care Time:         Disposition:  Home    PLAN:  1. Current Discharge Medication List      START taking these medications    Details   lidocaine (XYLOCAINE) 4 % topical cream Apply  to affected area two (2) times daily as needed for Pain. Qty: 15 g, Refills: 0  Start date: 9/16/2021           2.    Follow-up Information     Follow up With Specialties Details Why Contact Info    Adan Montes MD Internal Medicine   [address]  [facility] [address]  [address] [address] [phone]          Return to ED if worse     Diagnosis     Clinical Impression: Acute postherpetic neuralgia

## 2021-09-17 RX ORDER — PREGABALIN 50 MG/1
50 CAPSULE ORAL
Qty: 30 CAPSULE | Refills: 0 | Status: SHIPPED | OUTPATIENT
Start: 2021-09-17 | End: 2021-09-30 | Stop reason: DRUGHIGH

## 2021-09-17 NOTE — TELEPHONE ENCOUNTER
Benedicto Sasha 9/17/2021 7:55 AM EDT      ----- Message -----  From: Oksana Carrion Shankar  Sent: 9/16/2021 4:59 PM EDT  To: Cle[facility] Nurse Pool  Subject: Non-Urgent Medical Question     Dr. Coty Valdivia,  I had a very difficult night and could not sleep because of pain. Lizzie took me to the er this morning and the dr prescribed Lidocaine 4% cream and added ibuprofen. I am not sure about taking the pain pill because I remember my son cannot take it because of his kidney transplant. Would it be okay for me to take it. Last night everything on my left side of my face and head were unbearable and I couldnt lay my head against the pillow. The lidocaine has worked wonders and I feel so much better. There are no refills for it so if I need it for a period of time; would you prescribe this again. Thank you for all you are doing for me.   Go Estrada

## 2021-09-23 DIAGNOSIS — G89.29 CHRONIC BILATERAL LOW BACK PAIN WITHOUT SCIATICA: ICD-10-CM

## 2021-09-23 DIAGNOSIS — M54.50 CHRONIC BILATERAL LOW BACK PAIN WITHOUT SCIATICA: ICD-10-CM

## 2021-09-23 RX ORDER — TRAMADOL HYDROCHLORIDE 50 MG/1
50 TABLET ORAL
Qty: 60 TABLET | Refills: 0 | Status: SHIPPED | OUTPATIENT
Start: 2021-09-23 | End: 2021-10-23

## 2021-09-23 NOTE — TELEPHONE ENCOUNTER
Future Appointments:  Future Appointments   Date Time Provider Jazmyne Cristina   10/4/2021  1:40 PM Christ Lechuga NP [facility] BS AMB   10/28/2021  9:00 AM Nick Champagne MD [facility] BS AMB        Last Appointment With Me:  9/9/2021     Requested Prescriptions     Pending Prescriptions Disp Refills    traMADoL (ULTRAM) 50 mg tablet 60 Tablet 0     Sig: Take 1 Tablet by mouth every eight (8) hours as needed for Pain for up to 30 days. Max Daily Amount: 150 mg.

## 2021-09-27 ENCOUNTER — PATIENT MESSAGE (OUTPATIENT)
Dept: INTERNAL MEDICINE CLINIC | Age: 86
End: 2021-09-27

## 2021-09-27 DIAGNOSIS — B02.29 POST HERPETIC NEURALGIA: Primary | ICD-10-CM

## 2021-09-30 DIAGNOSIS — F41.9 ANXIETY: ICD-10-CM

## 2021-09-30 RX ORDER — ALPRAZOLAM 0.25 MG/1
TABLET ORAL
Qty: 30 TABLET | Refills: 0 | Status: SHIPPED | OUTPATIENT
Start: 2021-09-30 | End: 2021-10-25 | Stop reason: SDUPTHER

## 2021-09-30 RX ORDER — PREGABALIN 75 MG/1
75 CAPSULE ORAL 2 TIMES DAILY
Qty: 60 CAPSULE | Refills: 1 | Status: SHIPPED | OUTPATIENT
Start: 2021-09-30 | End: 2021-10-28 | Stop reason: DRUGHIGH

## 2021-09-30 NOTE — TELEPHONE ENCOUNTER
Kelsey, Generic 9/29/2021 6:29 PM EDT    The pain has lessened and have been taking it since 9/17. The problem is I go to bed after 10, take tramadol about 8 and going to bed I take lyrica, and tylenol. I use the lidocaine for the throbbing on the temple, left eye brow and left side of my head. I am in bed relaxing but sleep doesnt come. After about hour or two I take a Xanax and after about an hour or two I fall asleep. So then I wake up at noon. Is there an alternative to what I am doing? I do use cold compresses too.

## 2021-10-04 PROBLEM — R94.39 ABNORMAL STRESS TEST: Status: RESOLVED | Noted: 2019-08-22 | Resolved: 2021-10-04

## 2021-10-04 PROBLEM — I24.9 ACS (ACUTE CORONARY SYNDROME) (HCC): Status: RESOLVED | Noted: 2019-09-01 | Resolved: 2021-10-04

## 2021-10-04 PROBLEM — I20.0 UNSTABLE ANGINA (HCC): Status: RESOLVED | Noted: 2019-09-01 | Resolved: 2021-10-04

## 2021-10-10 DIAGNOSIS — I10 ESSENTIAL HYPERTENSION: ICD-10-CM

## 2021-10-11 RX ORDER — METOPROLOL TARTRATE 50 MG/1
TABLET ORAL
Qty: 180 TABLET | Refills: 3 | Status: SHIPPED | OUTPATIENT
Start: 2021-10-11 | End: 2022-10-04 | Stop reason: SDUPTHER

## 2021-10-25 DIAGNOSIS — F41.9 ANXIETY: ICD-10-CM

## 2021-10-25 RX ORDER — TRAMADOL HYDROCHLORIDE 50 MG/1
50 TABLET ORAL
Qty: 30 TABLET | Refills: 0 | Status: SHIPPED | OUTPATIENT
Start: 2021-10-25 | End: 2021-10-28

## 2021-10-25 RX ORDER — ALPRAZOLAM 0.25 MG/1
TABLET ORAL
Qty: 30 TABLET | Refills: 0 | Status: SHIPPED | OUTPATIENT
Start: 2021-10-25 | End: 2021-10-30

## 2021-10-25 NOTE — TELEPHONE ENCOUNTER
Future Appointments:  Future Appointments   Date Time Provider Jazmyne Cristina   10/28/2021  9:00 AM Nick Tabor MD [facility] BS AMB   12/9/2021  2:30 PM Ariella Robertson MD [facility] BS AMB        Last Appointment With Me:  9/9/2021     Requested Prescriptions     Pending Prescriptions Disp Refills    ALPRAZolam (XANAX) 0.25 mg tablet 30 Tablet 0     Sig: TAKE ONE TABLET BY MOUTH DAILY AS NEEDED    traMADoL (ULTRAM) 50 mg tablet 30 Tablet 0     Sig: Take 1 Tablet by mouth every six (6) hours as needed for Pain for up to 3 days. Max Daily Amount: 200 mg.

## 2021-10-25 NOTE — TELEPHONE ENCOUNTER
----- Message from Candace Lopez sent at 10/25/2021  9:16 AM EDT -----  Regarding: Non-Urgent Medical Question  Contact: [phone]  Dr. Baldo Lundy,  I am requesting refills for tramadol 50 mg and alprazalam .25 mg. Thank you and I am sorry for requesting them so late.   Jose Delgado

## 2021-10-28 ENCOUNTER — OFFICE VISIT (OUTPATIENT)
Dept: INTERNAL MEDICINE CLINIC | Age: 86
End: 2021-10-28
Payer: MEDICARE

## 2021-10-28 VITALS
RESPIRATION RATE: 16 BRPM | HEART RATE: 68 BPM | TEMPERATURE: 97.3 F | HEIGHT: 60 IN | OXYGEN SATURATION: 93 % | WEIGHT: 173 LBS | BODY MASS INDEX: 33.96 KG/M2 | DIASTOLIC BLOOD PRESSURE: 65 MMHG | SYSTOLIC BLOOD PRESSURE: 97 MMHG

## 2021-10-28 DIAGNOSIS — F41.9 ANXIETY: ICD-10-CM

## 2021-10-28 DIAGNOSIS — B02.29 POST HERPETIC NEURALGIA: ICD-10-CM

## 2021-10-28 DIAGNOSIS — M47.25 OSTEOARTHRITIS OF SPINE WITH RADICULOPATHY, THORACOLUMBAR REGION: ICD-10-CM

## 2021-10-28 DIAGNOSIS — I10 ESSENTIAL HYPERTENSION: ICD-10-CM

## 2021-10-28 DIAGNOSIS — Z23 NEEDS FLU SHOT: Primary | ICD-10-CM

## 2021-10-28 DIAGNOSIS — N18.31 STAGE 3A CHRONIC KIDNEY DISEASE (HCC): ICD-10-CM

## 2021-10-28 PROCEDURE — G8417 CALC BMI ABV UP PARAM F/U: HCPCS | Performed by: INTERNAL MEDICINE

## 2021-10-28 PROCEDURE — G8510 SCR DEP NEG, NO PLAN REQD: HCPCS | Performed by: INTERNAL MEDICINE

## 2021-10-28 PROCEDURE — G0463 HOSPITAL OUTPT CLINIC VISIT: HCPCS | Performed by: INTERNAL MEDICINE

## 2021-10-28 PROCEDURE — G8536 NO DOC ELDER MAL SCRN: HCPCS | Performed by: INTERNAL MEDICINE

## 2021-10-28 PROCEDURE — G8427 DOCREV CUR MEDS BY ELIG CLIN: HCPCS | Performed by: INTERNAL MEDICINE

## 2021-10-28 PROCEDURE — 1101F PT FALLS ASSESS-DOCD LE1/YR: CPT | Performed by: INTERNAL MEDICINE

## 2021-10-28 PROCEDURE — 90694 VACC AIIV4 NO PRSRV 0.5ML IM: CPT | Performed by: INTERNAL MEDICINE

## 2021-10-28 PROCEDURE — 1090F PRES/ABSN URINE INCON ASSESS: CPT | Performed by: INTERNAL MEDICINE

## 2021-10-28 PROCEDURE — 99214 OFFICE O/P EST MOD 30 MIN: CPT | Performed by: INTERNAL MEDICINE

## 2021-10-28 RX ORDER — PREGABALIN 50 MG/1
50 CAPSULE ORAL
Qty: 90 CAPSULE | Refills: 3 | Status: SHIPPED | OUTPATIENT
Start: 2021-10-28 | End: 2022-05-09 | Stop reason: SDUPTHER

## 2021-10-28 RX ORDER — LISINOPRIL 2.5 MG/1
2.5 TABLET ORAL DAILY
Qty: 90 TABLET | Refills: 1 | Status: SHIPPED | OUTPATIENT
Start: 2021-10-28 | End: 2022-04-04

## 2021-10-28 NOTE — PROGRESS NOTES
Ms. Ian Ramírez is presenting to follow up     CC:  Medication Management, Labs, and Immunization/Injection (65+flu shot)       HPI:    Post herpetic neuralgia on the left side of face : had vaccine zostavax several years ago. improving slowly. Saw ophto and no eye involvement  Lesions all resolved but has persistent pain mostly itching burning in quality on left side near eye. Lyrica helped 75 caused sedation and doing well on 50mg at bedtimen  Started  On lyrica 50mg   Tramadol twice a day helps with pain in legs due to osteoarthritis        Constipation: on linzess and working well      Anxiety  Takes 0.025 of Xanax at bedtime when having severe insomnia/ not daily   No confusion      Essential hypertension  Blood pressure is low today 97/65 on lisinopril 5 mg daily metoprolol 50 mg twice a day      Spondylolysis of lumbar region  Patient has chronic back pain wears brace. She takes tramadol 50 mg twice a day with improvement in quality of life. Tramadol improves her quality of life       Stage 3a chronic kidney disease  This is followed by [facility] Dr Dorothy Lanza dose meds    Acquired hypothyroidism  Synthroid 75 mcg daily with TSH at goal       Struggling with balance has OA of back and hips and knees, difficulty with transfers and pivoting. Needs assistance to stand. Has had instances of \" leg giving out \" and near falls.              Review of systems:  Constitutional: negative for fever, chills, weight loss, night sweats   10 systems reviewed and negative other then HPI    Past Medical History:   Diagnosis Date    CAD (coronary artery disease)     stent placed on left 1 stent,in 2007    [facility] [address]     B[facility]    DVT (deep venous thrombosis) (HCC)     GERD (gastroesophageal reflux disease)     Hypercholesterolemia     Hypertension     Pulmonary embolism ([address] 9/15/2015    Thromboembolus ([facility] [address]     DVT after hernia operation, PE spontaneous 10 years later    Thyroid disease         Past Surgical History:   Procedure Laterality Date    HX GI      esophageal hiatal hernia - 2004    HX GYN      partial hysterectomy - 1970    HX OTHER SURGICAL      BCCAs removed    HX UROLOGICAL      bladder repair - 1999       Allergies   Allergen Reactions    Codeine Unknown (comments)     Unsure of reaction.  Gabapentin Other (comments)     Hallucinations even at lowest dose    Morphine Hives and Itching       Current Outpatient Medications on File Prior to Visit   Medication Sig Dispense Refill    ALPRAZolam (XANAX) 0.25 mg tablet TAKE ONE TABLET BY MOUTH DAILY AS NEEDED 30 Tablet 0    traMADoL (ULTRAM) 50 mg tablet Take 1 Tablet by mouth every six (6) hours as needed for Pain for up to 3 days. Max Daily Amount: 200 mg. 30 Tablet 0    metoprolol tartrate (LOPRESSOR) 50 mg tablet TAKE ONE TABLET BY MOUTH TWICE A  Tablet 3    lidocaine (XYLOCAINE) 4 % topical cream Apply  to affected area two (2) times daily as needed for Pain. 15 g 0    acetaminophen (TYLENOL) 325 mg tablet Take 2 Tablets by mouth every four (4) hours as needed for Pain. 20 Tablet 0    alendronate (FOSAMAX) 10 mg tablet TAKE ONE TABLET BY MOUTH EVERY MORNING BEFORE BREAKFAST 90 Tablet 2    linaCLOtide (Linzess) 145 mcg cap capsule Take 1 Capsule by mouth Daily (before breakfast). 30 Capsule 5    rosuvastatin (CRESTOR) 40 mg tablet TAKE ONE TABLET BY MOUTH EVERY EVENING 90 Tab 3    levothyroxine (SYNTHROID) 75 mcg tablet TAKE ONE TABLET BY MOUTH ONE TIME DAILY BEFORE BREAKFAST 90 Tab 3    melatonin 5 mg cap capsule Take 5 mg by mouth nightly. As needed      acetaminophen (TYLENOL ARTHRITIS PAIN) 650 mg TbER Take 650 mg by mouth every eight (8) hours.  cholecalciferol, vitamin D3, (VITAMIN D3) 2,000 unit tab Take 1 Tab by mouth daily. 30 Tab 0    nitroglycerin (NITROSTAT) 0.3 mg SL tablet 1 tablet by SubLINGual route every five (5) minutes as needed for Chest Pain. 1 Bottle 1    aspirin 81 mg Tab Take 81 mg by mouth daily.  neomycin-polymyxin-dexamethasone (DEXACINE) 3.5 mg/g-10,000 unit/g-0.1 % ophthalmic ointment  (Patient not taking: Reported on 10/28/2021)      prednisoLONE acetate (PRED FORTE) 1 % ophthalmic suspension  (Patient not taking: Reported on 10/28/2021)      [DISCONTINUED] metoprolol tartrate (LOPRESSOR) 50 mg tablet TAKE ONE TABLET BY MOUTH TWICE A  Tablet 3    naloxone (NARCAN) 4 mg/actuation nasal spray Use 1 spray intranasally, then discard. Repeat with new spray every 2 min as needed for opioid overdose symptoms, alternating nostrils. (Patient not taking: Reported on 10/28/2021) 1 Each 0    clotrimazole (LOTRIMIN) 1 % topical cream clotrimazole 1 % topical cream (Patient not taking: Reported on 9/9/2021)      ammonium lactate (LAC-HYDRIN) 12 % lotion APPLY TO AFFECTED AREA(S) TWO TIMES A DAY  GENERIC FOR LAC-HYDRIN (Patient not taking: Reported on 9/9/2021)  2    multivitamin (ONE A DAY) tablet Take 1 Tab by mouth daily. (Patient not taking: Reported on 9/9/2021)      ACTIVATED CHARCOAL (CHARCOCAPS PO) Take  by mouth as needed. (Patient not taking: Reported on 9/9/2021)       No current facility-administered medications on file prior to visit. family history includes Dementia in her father; Diabetes in her son; Heart Disease in her mother; Kidney Disease in her son.     Social History     Socioeconomic History    Marital status:      Spouse name: Not on file    Number of children: Not on file    Years of education: Not on file    Highest education level: Not on file   Occupational History    Not on file   Tobacco Use    Smoking status: Never Smoker    Smokeless tobacco: Never Used   Vaping Use    Vaping Use: Never used   Substance and Sexual Activity    Alcohol use: No    Drug use: No     Types: Sedatives/Hypnotics, Prescription    Sexual activity: Not Currently     Partners: Male   Other Topics Concern    Not on file   Social History Narrative    Not on file     Social Determinants of Health     Financial Resource Strain:     Difficulty of Paying Living Expenses:    Food Insecurity:     Worried About Running Out of Food in the Last Year:     [address] in the Last Year:    Transportation Needs:     Lack of Transportation (Medical):  Lack of Transportation (Non-Medical):    Physical Activity:     Days of Exercise per Week:     Minutes of Exercise per Session:    Stress:     Feeling of Stress :    Social Connections:     Frequency of Communication with Friends and Family:     Frequency of Social Gatherings with Friends and Family:     Attends Gn[employer] Services:     Active Member of Clubs or Organizations:     Attends Club or Organization Meetings:     Marital Status:    Intimate Partner Violence:     Fear of Current or Ex-Partner:     Emotionally Abused:     Physically Abused:     Sexually Abused:        Visit Vitals  BP 97/65 (BP 1 Location: Left upper arm, BP Patient Position: Sitting, BP Cuff Size: Adult)   Pulse 68   Temp 97.3 °F (36.3 °C) (Temporal)   Resp 16   Ht 5' (1.524 m)   Wt 173 lb (78.5 kg)   SpO2 93%   BMI 33.79 kg/m²     General:  Well appearing female no acute distress  HEENT:   PERRL,normal conjunctiva. External ear and canals normal, TMs normal.  Hearing normal to voice. Nose without edema or discharge, normal septum. Lips, teeth, gums normal.  Oropharynx: no erythema, no exudates, no lesions, normal tongue. Neck:  Supple. Thyroid normal size, nontender, without nodules. No carotid bruit. No masses or lymphadenopathy  Respiratory: no respiratory distress,  no wheezing, no rhonchi, no rales. No chest wall tenderness. Cardiovascular:  RRR, normal S1S2, no murmur. Gastrointestinal: normal bowel sounds, soft, nontender, without masses. No hepatosplenomegaly. Extremities +2 pulses, no edema, normal sensation   Musculoskeletal:  Normal gait. Normal digits and nails.   Normal strength and tone, no atrophy, and no abnormal movement. Skin:  No rash, no lesions, no ulcers. Skin warm, normal turgor, without induration or nodules. Neuro:  A and OX4, fluent speech, cranial nerves normal 2-12. Sensation normal to light touch. DTR symmetrical  Psych:  Normal affect      Lab Results   Component Value Date/Time    WBC 5.4 09/10/2021 12:40 PM    HGB 12.3 09/10/2021 12:40 PM    HCT 38.4 09/10/2021 12:40 PM    PLATELET 601 23/27/1996 12:40 PM    .1 (H) 09/10/2021 12:40 PM     Lab Results   Component Value Date/Time    Sodium 140 09/10/2021 12:40 PM    Potassium 4.3 09/10/2021 12:40 PM    Chloride 108 09/10/2021 12:40 PM    CO2 26 09/10/2021 12:40 PM    Anion gap 6 09/10/2021 12:40 PM    Glucose 95 09/10/2021 12:40 PM    BUN 24 (H) 09/10/2021 12:40 PM    Creatinine 1.39 (H) 09/10/2021 12:40 PM    BUN/Creatinine ratio 17 09/10/2021 12:40 PM    GFR est AA 43 (L) 09/10/2021 12:40 PM    GFR est non-AA 36 (L) 09/10/2021 12:40 PM    Calcium 10.0 09/10/2021 12:40 PM     Lab Results   Component Value Date/Time    Cholesterol, total 146 09/10/2021 12:40 PM    HDL Cholesterol 59 09/10/2021 12:40 PM    LDL, calculated 59.2 09/10/2021 12:40 PM    VLDL, calculated 27.8 09/10/2021 12:40 PM    Triglyceride 139 09/10/2021 12:40 PM    CHOL/HDL Ratio 2.5 09/10/2021 12:40 PM     Lab Results   Component Value Date/Time    TSH 2.54 09/10/2021 12:40 PM     Lab Results   Component Value Date/Time    Hemoglobin A1c 5.7 05/07/2009 01:40 PM     Lab Results   Component Value Date/Time    Vitamin D 25-Hydroxy 49.1 09/10/2021 12:40 PM                   Assessment and Plan:     1. Needs flu shot  - FLU (FLUAD QUAD INFLUENZA VACCINE,QUAD,ADJUVANTED)    2. Essential hypertension  BP is lower than goal decrease lisinopril to 2.5mg   - REFERRAL TO [facility] [address]    3. Anxiety  Doing well rare use of xanax . 25 at bedtime    4. Post herpetic neuralgia  - pregabalin (LYRICA) 50 mg capsule; Take 1 Capsule by mouth nightly. Max Daily Amount: 50 mg.   Dispense: 90 Capsule; Refill: 3    5. Stage 3a chronic kidney disease (HCC)  Following at [facility]  - lisinopriL (PRINIVIL, ZESTRIL) 2.5 mg tablet; Take 1 Tablet by mouth daily. Dispense: 90 Tablet; Refill: 1    6. Osteoarthritis of spine with radiculopathy, thoracolumbar region  Back pain, radicular and chronic  Takes tramadol 1-2 times daily which improves pain control and improves quality of life. Given issues with gait transferring would benefit from PT   - [facility]      Follow-up and Dispositions    · Return in about 3 months (around 1/28/2022) for hypertenion .           Sandrine Diallo MD

## 2021-10-30 DIAGNOSIS — F41.9 ANXIETY: ICD-10-CM

## 2021-10-30 RX ORDER — ALPRAZOLAM 0.25 MG/1
TABLET ORAL
Qty: 30 TABLET | Refills: 0 | Status: SHIPPED | OUTPATIENT
Start: 2021-10-30 | End: 2021-12-08 | Stop reason: SDUPTHER

## 2021-11-03 ENCOUNTER — PATIENT MESSAGE (OUTPATIENT)
Dept: INTERNAL MEDICINE CLINIC | Age: 86
End: 2021-11-03

## 2021-11-03 DIAGNOSIS — M47.25 OSTEOARTHRITIS OF SPINE WITH RADICULOPATHY, THORACOLUMBAR REGION: Primary | ICD-10-CM

## 2021-11-03 DIAGNOSIS — B02.29 POST HERPETIC NEURALGIA: ICD-10-CM

## 2021-11-03 DIAGNOSIS — M43.06 SPONDYLOLYSIS OF LUMBAR REGION: ICD-10-CM

## 2021-11-03 NOTE — TELEPHONE ENCOUNTER
Bharat Grullon 11/3/2021 11:17 AM EDT      ----- Message -----  From: Karli Irving  Sent: 11/3/2021 11:04 AM EDT  To: Layton Ordaz  Subject: Non-Urgent Medical Question     Dr. Frankie Barrientos,  We contacted [facility] in [address] that provides in home physical therapy in our area. They said that if you fax over a referral for the medical need/diagnosis for in home Physical therapy as well as the last office note from [facility] last in person visit with you, they can start PT by next week. [facility] fax # is: [phone]. Contact there is Chucho Vera. [facility] phone # is: [phone]    Thank you for your help in getting this setup for Mom.     Sincerely, Mary(Lizzie) Devon Martinez, daughter

## 2021-11-08 ENCOUNTER — PATIENT MESSAGE (OUTPATIENT)
Dept: INTERNAL MEDICINE CLINIC | Age: 86
End: 2021-11-08

## 2021-11-08 ENCOUNTER — TELEPHONE (OUTPATIENT)
Dept: INTERNAL MEDICINE CLINIC | Age: 86
End: 2021-11-08

## 2021-11-08 DIAGNOSIS — B02.29 POST HERPETIC NEURALGIA: Primary | ICD-10-CM

## 2021-11-08 DIAGNOSIS — M43.06 SPONDYLOLYSIS OF LUMBAR REGION: ICD-10-CM

## 2021-11-08 NOTE — TELEPHONE ENCOUNTER
States she needs the most recent visit note sent over please.       Please fax as soon as able    [facility]irdiiana Daniels [facility]  Fax: [phone]  phone: [phone]

## 2021-11-08 NOTE — TELEPHONE ENCOUNTER
From: Marie Chaparro  To: Elizabeth Flower MD  Sent: 11/8/2021 1:19 PM EST  Subject: Prescription request    Dr. Danna Monae,  On 10/25 I picked up my prescription for Tramadol 50 mg pills. I take two Tramadols every day, one in the morning and one in the evening. I didnt notice the prescription was for 30 tablets. Could you please prescribe another prescription so I will have enough to last until 11/25. The prescription is for Tramadol 50 mg tablets. Thank you for helping me with this.   Sincerely, Tate Browning

## 2021-11-09 RX ORDER — TRAMADOL HYDROCHLORIDE 50 MG/1
50 TABLET ORAL EVERY 12 HOURS
Qty: 60 TABLET | Refills: 0 | Status: SHIPPED | OUTPATIENT
Start: 2021-11-09 | End: 2021-12-09

## 2021-11-10 ENCOUNTER — TELEPHONE (OUTPATIENT)
Dept: INTERNAL MEDICINE CLINIC | Age: 86
End: 2021-11-10

## 2021-11-10 DIAGNOSIS — M43.06 SPONDYLOLYSIS OF LUMBAR REGION: Primary | ICD-10-CM

## 2021-11-10 DIAGNOSIS — L89.302 PRESSURE INJURY OF BUTTOCK, STAGE 2, UNSPECIFIED LATERALITY (HCC): ICD-10-CM

## 2021-11-10 NOTE — TELEPHONE ENCOUNTER
Olivier Montano//[facility] states she needs to get orders for patient to receive Occupational Therapy & also needs to request [address] as patient has a wound on the Right Buttocks that is Bleeding & needs nursing care. Please call to discuss.  Thank you

## 2021-11-15 ENCOUNTER — TELEPHONE (OUTPATIENT)
Dept: INTERNAL MEDICINE CLINIC | Age: 86
End: 2021-11-15

## 2021-11-15 NOTE — TELEPHONE ENCOUNTER
----- Message from Julietamarianela Vee sent at 11/15/2021  4:51 PM EST -----  Subject: Message to Provider    QUESTIONS  Information for Provider? pt. needs an at home nurse for wound right   buttock. Rep. had gotten in touch last week and has still heard nothing.  ---------------------------------------------------------------------------  --------------  CALL BACK INFO  What is the best way for the office to contact you? OK to leave message on   voicemail  Preferred Call Back Phone Number? [phone]  ---------------------------------------------------------------------------  --------------  SCRIPT ANSWERS  Relationship to Patient? Third Party  Representative Name?  Gloria Smiley from [employer]

## 2021-12-07 NOTE — PROGRESS NOTES
[address] [place] [address], [address] [facility]  [phone]     Subjective:      Pola Briggs is a 80 y.o. female is here for routine f/u. She has a PMHx of CAD, HTN, HLD, hx of PE/DVT and hypothyroidism. Last seen by us in 3/2021. Shingles outbreak in September, feeling better now, still recovering. She is working with physical therapy for strengthening. Occasional sob at the time of shingles, improving now. The patient denies chest pain, orthopnea, PND, LE edema, palpitations, syncope, or presyncope. Patient Active Problem List    Diagnosis Date Noted    Coronary artery disease involving native coronary artery of native heart without angina pectoris 09/01/2019    Severe obesity (BMI 35.0-39.9) 07/09/2018    Scoliosis 02/12/2018    Spondylolysis of lumbar region 02/12/2018    History of pulmonary embolus (PE) 08/17/2017    CKD (chronic kidney disease) 07/25/2012    S/P cardiac cath 11/10/2011    Coronary artery spasm ([address] 11/09/2011    Hyperlipidemia 11/08/2011    Hypertension 11/08/2011    Hypothyroidism 02/08/2010    Unspecified vitamin D deficiency 09/29/2009      Margo Dsouza MD  Past Medical History:   Diagnosis Date    CAD (coronary artery disease)     stent placed on left 1 stent,in 2007    Cancer ([address]     BCCA    DVT (deep venous thrombosis) ([address]     GERD (gastroesophageal reflux disease)     Hypercholesterolemia     Hypertension     Pulmonary embolism ([address] 9/15/2015    Thromboembolus ([address]     DVT after hernia operation, PE spontaneous 10 years later    Thyroid disease       Past Surgical History:   Procedure Laterality Date    HX GI      esophageal hiatal hernia - 2004    HX GYN      partial hysterectomy - 1970    HX OTHER SURGICAL      BCCAs removed    HX UROLOGICAL      bladder repair - 1999     Allergies   Allergen Reactions    Codeine Unknown (comments)     Unsure of reaction.     Gabapentin Other (comments)     Hallucinations even at lowest dose    Morphine Hives and Itching      Family History   Problem Relation Age of Onset    Heart Disease Mother     Dementia Father     Diabetes Son     Kidney Disease Son         autoimmune      Social History     Socioeconomic History    Marital status:      Spouse name: Not on file    Number of children: Not on file    Years of education: Not on file    Highest education level: Not on file   Occupational History    Not on file   Tobacco Use    Smoking status: Never Smoker    Smokeless tobacco: Never Used   Vaping Use    Vaping Use: Never used   Substance and Sexual Activity    Alcohol use: No    Drug use: No     Types: Sedatives/Hypnotics, Prescription    Sexual activity: Not Currently     Partners: Male   Other Topics Concern    Not on file   Social History Narrative    Not on file     Social Determinants of Health     Financial Resource Strain:     Difficulty of Paying Living Expenses: Not on file   Food Insecurity:     Worried About [address] in the Last Year: Not on file    Yarely of Food in the Last Year: Not on file   Transportation Needs:     Lack of Transportation (Medical): Not on file    Lack of Transportation (Non-Medical):  Not on file   Physical Activity:     Days of Exercise per Week: Not on file    Minutes of Exercise per Session: Not on file   Stress:     Feeling of Stress : Not on file   Social Connections:     Frequency of Communication with Friends and Family: Not on file    Frequency of Social Gatherings with Friends and Family: Not on file    Attends Congregational Services: Not on file    Active Member of Clubs or Organizations: Not on file    Attends Club or Organization Meetings: Not on file    Marital Status: Not on file   Intimate Partner Violence:     Fear of Current or Ex-Partner: Not on file    Emotionally Abused: Not on file    Physically Abused: Not on file    Sexually Abused: Not on file   Housing Stability:     Unable to Pay for Housing in the Last Year: Not on file    Number of Places Lived in the Last Year: Not on file    Unstable Housing in the Last Year: Not on file      Current Outpatient Medications   Medication Sig    traMADoL (ULTRAM) 50 mg tablet Take 1 Tablet by mouth every twelve (12) hours for 30 days. Max Daily Amount: 100 mg.    ALPRAZolam (XANAX) 0.25 mg tablet TAKE ONE TABLET BY MOUTH ONE TIME DAILY AS NEEDED    pregabalin (LYRICA) 50 mg capsule Take 1 Capsule by mouth nightly. Max Daily Amount: 50 mg.    lisinopriL (PRINIVIL, ZESTRIL) 2.5 mg tablet Take 1 Tablet by mouth daily.  metoprolol tartrate (LOPRESSOR) 50 mg tablet TAKE ONE TABLET BY MOUTH TWICE A DAY    lidocaine (XYLOCAINE) 4 % topical cream Apply  to affected area two (2) times daily as needed for Pain.  neomycin-polymyxin-dexamethasone (DEXACINE) 3.5 mg/g-10,000 unit/g-0.1 % ophthalmic ointment  (Patient not taking: Reported on 10/28/2021)    prednisoLONE acetate (PRED FORTE) 1 % ophthalmic suspension  (Patient not taking: Reported on 10/28/2021)    acetaminophen (TYLENOL) 325 mg tablet Take 2 Tablets by mouth every four (4) hours as needed for Pain.  alendronate (FOSAMAX) 10 mg tablet TAKE ONE TABLET BY MOUTH EVERY MORNING BEFORE BREAKFAST    linaCLOtide (Linzess) 145 mcg cap capsule Take 1 Capsule by mouth Daily (before breakfast).  rosuvastatin (CRESTOR) 40 mg tablet TAKE ONE TABLET BY MOUTH EVERY EVENING    levothyroxine (SYNTHROID) 75 mcg tablet TAKE ONE TABLET BY MOUTH ONE TIME DAILY BEFORE BREAKFAST    naloxone (NARCAN) 4 mg/actuation nasal spray Use 1 spray intranasally, then discard. Repeat with new spray every 2 min as needed for opioid overdose symptoms, alternating nostrils.  (Patient not taking: Reported on 10/28/2021)    clotrimazole (LOTRIMIN) 1 % topical cream clotrimazole 1 % topical cream (Patient not taking: Reported on 9/9/2021)    ammonium lactate (LAC-HYDRIN) 12 % lotion APPLY TO AFFECTED AREA(S) TWO TIMES A DAY  GENERIC FOR LAC-HYDRIN (Patient not taking: Reported on 9/9/2021)    melatonin 5 mg cap capsule Take 5 mg by mouth nightly. As needed    acetaminophen (TYLENOL ARTHRITIS PAIN) 650 mg TbER Take 650 mg by mouth every eight (8) hours.  multivitamin (ONE A DAY) tablet Take 1 Tab by mouth daily. (Patient not taking: Reported on 9/9/2021)    cholecalciferol, vitamin D3, (VITAMIN D3) 2,000 unit tab Take 1 Tab by mouth daily.  ACTIVATED CHARCOAL (CHARCOCAPS PO) Take  by mouth as needed. (Patient not taking: Reported on 9/9/2021)    nitroglycerin (NITROSTAT) 0.3 mg SL tablet 1 tablet by SubLINGual route every five (5) minutes as needed for Chest Pain.  aspirin 81 mg Tab Take 81 mg by mouth daily. No current facility-administered medications for this visit. Review of Symptoms:  11 systems reviewed, negative other than as stated in the HPI    Physical ExamPhysical Exam:    There were no vitals filed for this visit. There is no height or weight on file to calculate BMI. General PE  Gen:  NAD  Mental Status - Alert. General Appearance - Not in acute distress. HEENT:  PERRL, no carotid bruits or JVD  Chest and Lung Exam   Inspection: Accessory muscles - No use of accessory muscles in breathing. Auscultation:   Breath sounds: - Normal.   Cardiovascular   Inspection: Jugular vein - Bilateral - Inspection Normal.   Palpation/Percussion:   Apical Impulse: - Normal.   Auscultation: Rhythm - Regular. Heart Sounds - S1 WNL and S2 WNL. No S3 or S4. Murmurs & Other Heart Sounds: Auscultation of the heart reveals - No Murmurs. Peripheral Vascular   Upper Extremity: Inspection - Bilateral - No Cyanotic nailbeds or Digital clubbing. Lower Extremity:   Palpation: Edema - Bilateral - No edema. Abdomen:   Soft, non-tender, bowel sounds are active.   Neuro: A&O times 3, CN and motor grossly WNL    Labs:   Lab Results   Component Value Date/Time    Cholesterol, total 146 09/10/2021 12:40 PM Cholesterol, total 155 03/30/2021 12:19 PM    Cholesterol, total 147 08/26/2019 08:12 AM    Cholesterol, total 150 08/21/2018 09:42 AM    Cholesterol, total 168 08/07/2017 12:00 AM    HDL Cholesterol 59 09/10/2021 12:40 PM    HDL Cholesterol 75 03/30/2021 12:19 PM    HDL Cholesterol 62 08/26/2019 08:12 AM    HDL Cholesterol 70 08/21/2018 09:42 AM    HDL Cholesterol 73 08/07/2017 12:00 AM    LDL, calculated 59.2 09/10/2021 12:40 PM    LDL, calculated 62.6 03/30/2021 12:19 PM    LDL, calculated 60 08/26/2019 08:12 AM    LDL, calculated 51 08/21/2018 09:42 AM    LDL, calculated 63 08/07/2017 12:00 AM    Triglyceride 139 09/10/2021 12:40 PM    Triglyceride 87 03/30/2021 12:19 PM    Triglyceride 125 08/26/2019 08:12 AM    Triglyceride 146 08/21/2018 09:42 AM    Triglyceride 158 (H) 08/07/2017 12:00 AM    CHOL/HDL Ratio 2.5 09/10/2021 12:40 PM    CHOL/HDL Ratio 2.1 03/30/2021 12:19 PM    CHOL/HDL Ratio 2.5 09/02/2015 03:20 AM    CHOL/HDL Ratio 2.6 08/12/2010 11:06 AM    CHOL/HDL Ratio 2.9 02/08/2010 10:09 AM     Lab Results   Component Value Date/Time    CK 49 11/08/2011 06:25 PM     Lab Results   Component Value Date/Time    Sodium 140 09/10/2021 12:40 PM    Potassium 4.3 09/10/2021 12:40 PM    Chloride 108 09/10/2021 12:40 PM    CO2 26 09/10/2021 12:40 PM    Anion gap 6 09/10/2021 12:40 PM    Glucose 95 09/10/2021 12:40 PM    BUN 24 (H) 09/10/2021 12:40 PM    Creatinine 1.39 (H) 09/10/2021 12:40 PM    BUN/Creatinine ratio 17 09/10/2021 12:40 PM    GFR est AA 43 (L) 09/10/2021 12:40 PM    GFR est non-AA 36 (L) 09/10/2021 12:40 PM    Calcium 10.0 09/10/2021 12:40 PM    Bilirubin, total 0.6 09/10/2021 12:40 PM    Alk. phosphatase 85 09/10/2021 12:40 PM    Protein, total 6.5 09/10/2021 12:40 PM    Albumin 3.3 (L) 09/10/2021 12:40 PM    Globulin 3.2 09/10/2021 12:40 PM    A-G Ratio 1.0 (L) 09/10/2021 12:40 PM    ALT (SGPT) 13 09/10/2021 12:40 PM       EKG:   SR, old inferior infarct     Assessment:     Assessment:      1. Coronary artery disease involving native coronary artery of native heart without angina pectoris    2. Mixed hyperlipidemia    3. Essential hypertension    4. ASHD (arteriosclerotic heart disease)         Plan:     1. ASHD  Stable. Cath in 9/2019 with stable coronary disease; patent LAD stent placed previously, moderate 50% RCA stenosis, negative by FFR (0.85)  Continue with ASA, BB, statin    2. Essential hypertension  BP controlled.  Continue anti-hypertensive therapy and low sodium diet     3. Mixed hyperlipidemia  9/2021 LDL 59 On statin. Labs and lipids per PCP     4. Poor sleep quality. Option of sleep medicine referral d/w pt again. She is not interested.        Continue current care and f/u with Dr Lizandro Rosado in 1 yr      Александр Lambert NP

## 2021-12-08 DIAGNOSIS — F41.9 ANXIETY: ICD-10-CM

## 2021-12-08 RX ORDER — ALPRAZOLAM 0.25 MG/1
TABLET ORAL
Qty: 30 TABLET | Refills: 0 | Status: SHIPPED | OUTPATIENT
Start: 2021-12-08 | End: 2022-01-07 | Stop reason: SDUPTHER

## 2021-12-08 NOTE — TELEPHONE ENCOUNTER
Future Appointments:  Future Appointments   Date Time Provider Jazmyne Daveyi   12/21/2021 11:00 AM JULIA Moseley BS AMB   1/31/2022 10:15 AM Nick Mosquera MD [facility] BS AMB        Last Appointment With Me:  10/28/2021     Requested Prescriptions     Pending Prescriptions Disp Refills    ALPRAZolam (XANAX) 0.25 mg tablet 30 Tablet 0     Sig: TAKE ONE TABLET BY MOUTH ONE TIME DAILY AS NEEDED

## 2021-12-08 NOTE — TELEPHONE ENCOUNTER
----- Message from Cammy Schlatter sent at 12/8/2021  9:48 AM EST -----  Regarding: Prescription  Good morning Dr Te Alcantara. Would you please send a request  for Alprazolam .25 mg to [facility] for me. Thank you very much.   Tio Leach

## 2021-12-12 ENCOUNTER — PATIENT MESSAGE (OUTPATIENT)
Dept: INTERNAL MEDICINE CLINIC | Age: 86
End: 2021-12-12

## 2021-12-12 DIAGNOSIS — M54.50 CHRONIC BILATERAL LOW BACK PAIN WITHOUT SCIATICA: Primary | ICD-10-CM

## 2021-12-12 DIAGNOSIS — G89.29 CHRONIC BILATERAL LOW BACK PAIN WITHOUT SCIATICA: Primary | ICD-10-CM

## 2021-12-12 RX ORDER — TRAMADOL HYDROCHLORIDE 50 MG/1
50 TABLET ORAL
Qty: 60 TABLET | Refills: 0 | Status: SHIPPED | OUTPATIENT
Start: 2021-12-12 | End: 2022-01-12 | Stop reason: SDUPTHER

## 2021-12-12 NOTE — TELEPHONE ENCOUNTER
From: Shi Chaparro  To: Nitin Gill MD  Sent: 12/12/2021 10:02 AM EST  Subject: Tramadol    Good Sunday morning. I just realized I have 1 Tramadol left. Would you please send an order for a new prescription.   Thank you, Santos Blas

## 2021-12-12 NOTE — TELEPHONE ENCOUNTER
PCP: Justus Sicard, MD    Last appt: 10/28/2021  Future Appointments   Date Time Provider Jazmyne Cristina   12/21/2021 11:00 AM Gretchen Jones NP [facility] BS AMB   1/31/2022 10:15 AM Appa Felipe Cullen MD [facility] BS AMB       Requested Prescriptions     Pending Prescriptions Disp Refills    traMADoL (ULTRAM) 50 mg tablet 60 Tablet 0     Sig: Take 1 Tablet by mouth every eight (8) hours as needed for Pain for up to 30 days. Max Daily Amount: 150 mg.

## 2021-12-21 ENCOUNTER — OFFICE VISIT (OUTPATIENT)
Dept: CARDIOLOGY CLINIC | Age: 86
End: 2021-12-21
Payer: MEDICARE

## 2021-12-21 VITALS
HEIGHT: 60 IN | RESPIRATION RATE: 18 BRPM | DIASTOLIC BLOOD PRESSURE: 74 MMHG | SYSTOLIC BLOOD PRESSURE: 136 MMHG | BODY MASS INDEX: 35.42 KG/M2 | HEART RATE: 65 BPM | WEIGHT: 180.4 LBS | OXYGEN SATURATION: 99 %

## 2021-12-21 DIAGNOSIS — I25.10 CORONARY ARTERY DISEASE INVOLVING NATIVE CORONARY ARTERY OF NATIVE HEART WITHOUT ANGINA PECTORIS: Primary | ICD-10-CM

## 2021-12-21 DIAGNOSIS — I10 ESSENTIAL HYPERTENSION: ICD-10-CM

## 2021-12-21 DIAGNOSIS — I25.10 ASHD (ARTERIOSCLEROTIC HEART DISEASE): ICD-10-CM

## 2021-12-21 DIAGNOSIS — E78.2 MIXED HYPERLIPIDEMIA: ICD-10-CM

## 2021-12-21 PROCEDURE — 99214 OFFICE O/P EST MOD 30 MIN: CPT | Performed by: NURSE PRACTITIONER

## 2021-12-21 PROCEDURE — G8432 DEP SCR NOT DOC, RNG: HCPCS | Performed by: NURSE PRACTITIONER

## 2021-12-21 PROCEDURE — G0463 HOSPITAL OUTPT CLINIC VISIT: HCPCS | Performed by: NURSE PRACTITIONER

## 2021-12-21 PROCEDURE — 1101F PT FALLS ASSESS-DOCD LE1/YR: CPT | Performed by: NURSE PRACTITIONER

## 2021-12-21 PROCEDURE — 1090F PRES/ABSN URINE INCON ASSESS: CPT | Performed by: NURSE PRACTITIONER

## 2021-12-21 PROCEDURE — G8427 DOCREV CUR MEDS BY ELIG CLIN: HCPCS | Performed by: NURSE PRACTITIONER

## 2021-12-21 PROCEDURE — G8417 CALC BMI ABV UP PARAM F/U: HCPCS | Performed by: NURSE PRACTITIONER

## 2021-12-21 PROCEDURE — 93005 ELECTROCARDIOGRAM TRACING: CPT | Performed by: NURSE PRACTITIONER

## 2021-12-21 PROCEDURE — 93010 ELECTROCARDIOGRAM REPORT: CPT | Performed by: NURSE PRACTITIONER

## 2021-12-21 PROCEDURE — G8536 NO DOC ELDER MAL SCRN: HCPCS | Performed by: NURSE PRACTITIONER

## 2021-12-21 NOTE — PROGRESS NOTES
1. Have you been to the ER, urgent care clinic since your last visit? Hospitalized since your last visit? Yes, 9/16/21, ER, Post herpetic neuralgia    2. Have you seen or consulted any other health care providers outside of the [address] [place] [address] since your last visit? Include any pap smears or colon screening.  No         Chief Complaint   Patient presents with    Coronary Artery Disease     C/O  SOB, Ankle and leg swelling

## 2022-01-07 DIAGNOSIS — F41.9 ANXIETY: ICD-10-CM

## 2022-01-07 RX ORDER — ALPRAZOLAM 0.25 MG/1
TABLET ORAL
Qty: 30 TABLET | Refills: 0 | Status: SHIPPED | OUTPATIENT
Start: 2022-01-07 | End: 2022-03-09 | Stop reason: SDUPTHER

## 2022-01-07 NOTE — TELEPHONE ENCOUNTER
----- Message from Brandon Crowe sent at 1/7/2022 10:05 AM EST -----  Regarding: Prescription   Dr. Margarette Fernandez,  Please send a request to fill a prescription for Alprazolam .25. I have 3 pills left. Thank you very much. Happy New Year to you and your staff.   Robin Contreras

## 2022-01-12 DIAGNOSIS — M54.50 CHRONIC BILATERAL LOW BACK PAIN WITHOUT SCIATICA: ICD-10-CM

## 2022-01-12 DIAGNOSIS — G89.29 CHRONIC BILATERAL LOW BACK PAIN WITHOUT SCIATICA: ICD-10-CM

## 2022-01-12 RX ORDER — TRAMADOL HYDROCHLORIDE 50 MG/1
50 TABLET ORAL
Qty: 60 TABLET | Refills: 0 | Status: SHIPPED | OUTPATIENT
Start: 2022-01-12 | End: 2022-02-10 | Stop reason: SDUPTHER

## 2022-01-12 NOTE — TELEPHONE ENCOUNTER
----- Message from Marylin García sent at 1/11/2022  8:54 PM EST -----  Regarding: Prescription   Dr. Jia Orozco,  I just realized I have only one tablet of Tramadol 50 mg. Would you please send a request to [facility] for 30 tablets for me. Thank you very much.   Peter Pineda

## 2022-01-12 NOTE — TELEPHONE ENCOUNTER
Future Appointments:  Future Appointments   Date Time Provider Jazmyne Cristina   1/31/2022 10:15 AM Nick Rodríguez MD [facility] BS AMB   12/26/2022 11:00 AM Jaron Robertson MD [facility] BS AMB        Last Appointment With Me:  10/28/2021     Requested Prescriptions     Pending Prescriptions Disp Refills    traMADoL (ULTRAM) 50 mg tablet 60 Tablet 0     Sig: Take 1 Tablet by mouth every eight (8) hours as needed for Pain for up to 30 days. Max Daily Amount: 150 mg.

## 2022-01-24 RX ORDER — LINACLOTIDE 145 UG/1
CAPSULE, GELATIN COATED ORAL
Qty: 30 CAPSULE | Refills: 5 | Status: SHIPPED | OUTPATIENT
Start: 2022-01-24 | End: 2022-08-23 | Stop reason: ALTCHOICE

## 2022-02-10 ENCOUNTER — VIRTUAL VISIT (OUTPATIENT)
Dept: INTERNAL MEDICINE CLINIC | Age: 87
End: 2022-02-10
Payer: MEDICARE

## 2022-02-10 DIAGNOSIS — G89.29 CHRONIC BILATERAL LOW BACK PAIN WITHOUT SCIATICA: ICD-10-CM

## 2022-02-10 DIAGNOSIS — N18.31 STAGE 3A CHRONIC KIDNEY DISEASE (HCC): ICD-10-CM

## 2022-02-10 DIAGNOSIS — L89.311 PRESSURE INJURY OF RIGHT BUTTOCK, STAGE 1: Primary | ICD-10-CM

## 2022-02-10 DIAGNOSIS — M54.50 CHRONIC BILATERAL LOW BACK PAIN WITHOUT SCIATICA: ICD-10-CM

## 2022-02-10 DIAGNOSIS — E66.01 SEVERE OBESITY (BMI 35.0-39.9) WITH COMORBIDITY (HCC): ICD-10-CM

## 2022-02-10 DIAGNOSIS — D64.9 ANEMIA, UNSPECIFIED TYPE: ICD-10-CM

## 2022-02-10 DIAGNOSIS — I20.1 CORONARY ARTERY SPASM (HCC): ICD-10-CM

## 2022-02-10 DIAGNOSIS — D53.9 MACROCYTIC ANEMIA: ICD-10-CM

## 2022-02-10 DIAGNOSIS — M43.06 SPONDYLOLYSIS OF LUMBAR REGION: ICD-10-CM

## 2022-02-10 DIAGNOSIS — F11.99 OPIOID USE, UNSPECIFIED WITH UNSPECIFIED OPIOID-INDUCED DISORDER (HCC): ICD-10-CM

## 2022-02-10 DIAGNOSIS — I10 ESSENTIAL HYPERTENSION: ICD-10-CM

## 2022-02-10 DIAGNOSIS — B02.29 POST HERPETIC NEURALGIA: ICD-10-CM

## 2022-02-10 PROCEDURE — G8417 CALC BMI ABV UP PARAM F/U: HCPCS | Performed by: INTERNAL MEDICINE

## 2022-02-10 PROCEDURE — G8432 DEP SCR NOT DOC, RNG: HCPCS | Performed by: INTERNAL MEDICINE

## 2022-02-10 PROCEDURE — G0463 HOSPITAL OUTPT CLINIC VISIT: HCPCS | Performed by: INTERNAL MEDICINE

## 2022-02-10 PROCEDURE — 99214 OFFICE O/P EST MOD 30 MIN: CPT | Performed by: INTERNAL MEDICINE

## 2022-02-10 PROCEDURE — 1101F PT FALLS ASSESS-DOCD LE1/YR: CPT | Performed by: INTERNAL MEDICINE

## 2022-02-10 PROCEDURE — G8427 DOCREV CUR MEDS BY ELIG CLIN: HCPCS | Performed by: INTERNAL MEDICINE

## 2022-02-10 PROCEDURE — 1090F PRES/ABSN URINE INCON ASSESS: CPT | Performed by: INTERNAL MEDICINE

## 2022-02-10 PROCEDURE — G8536 NO DOC ELDER MAL SCRN: HCPCS | Performed by: INTERNAL MEDICINE

## 2022-02-10 RX ORDER — TRAMADOL HYDROCHLORIDE 50 MG/1
50 TABLET ORAL
Qty: 60 TABLET | Refills: 0 | Status: CANCELLED | OUTPATIENT
Start: 2022-02-10 | End: 2022-03-12

## 2022-02-10 RX ORDER — TRAMADOL HYDROCHLORIDE 50 MG/1
50 TABLET ORAL
Qty: 60 TABLET | Refills: 0 | Status: SHIPPED | OUTPATIENT
Start: 2022-02-10 | End: 2022-03-11 | Stop reason: SDUPTHER

## 2022-02-10 RX ORDER — FOAM BANDAGE 6" X 8"
1 BANDAGE TOPICAL DAILY
Qty: 30 EACH | Refills: 1 | Status: SHIPPED | OUTPATIENT
Start: 2022-02-10

## 2022-02-10 NOTE — PROGRESS NOTES
Mathew Chaparro (: 7/3/1933) is a 80 y.o. female, established patient, here for evaluation of the following chief complaint(s):   Hypertension and Follow-up       ASSESSMENT/PLAN:  Below is the assessment and plan developed based on review of pertinent history, labs, studies, and medications. Mathew Chaparro, was evaluated through a synchronous (real-time) audio-video encounter. The patient (or guardian if applicable) is aware that this is a billable service, which includes applicable co-pays. Verbal consent to proceed has been obtained. The visit was conducted pursuant to the emergency declaration under the [facility], [address] [place] [address] authority and the [employer] Act. Patient identification was verified, and a caregiver was present when appropriate. The patient was located at home in a state where the provider was licensed to provide care. An electronic signature was used to authenticate this note.   -- Kathleen Sellers LPN

## 2022-02-10 NOTE — TELEPHONE ENCOUNTER
----- Message from Jessica Miles sent at 2/10/2022  2:09 PM EST -----  Regarding: Prescription refill  Dr. Sally Wall,  Please send a request for my medication called Tramadol 50 mg.   Thank you, Christopher Hunt

## 2022-02-10 NOTE — TELEPHONE ENCOUNTER
Future Appointments:  Future Appointments   Date Time Provider Jazmyne Cristina   12/26/2022 11:00 AM Janice Robertson MD [facility] BS AMB        Last Appointment With Me:  2/10/2022     Requested Prescriptions     Pending Prescriptions Disp Refills    traMADoL (ULTRAM) 50 mg tablet 60 Tablet 0     Sig: Take 1 Tablet by mouth every eight (8) hours as needed for Pain for up to 30 days. Max Daily Amount: 150 mg.

## 2022-02-10 NOTE — PROGRESS NOTES
CC: Hypertension and Follow-up      HPI:    She is a 80 y.o. female who presents for evaluation of       Post herpetic neuralgia on the left side of face : had vaccine zostavax several years ago. improving slowly. Saw ophto and no eye involvement  Lesions all resolved but has persistent pain mostly itching burning in quality on left side near eye. Lyrica helped 75 caused sedation and doing well on 50mg at bedtimen  Taking the lyrica at night  Nerve pain not all the time  Sleeping \" I sleep too much\"         Constipation: on linzess and working well      Anxiety  Takes 0.025 of Xanax at bedtime when having severe insomnia/ not daily   No confusion      Essential hypertension  Blood pressure she is on lisinopril 2.5 mg BID metoprolol 50mg long acting      Spondylolysis of lumbar region  Patient has chronic back pain wears brace. She takes tramadol 50 mg twice a day with improvement in quality of life. Tramadol improves her quality of life       Stage 3a chronic kidney disease  This is followed by [facility] Dr Yonis Marr dose meds    Acquired hypothyroidism  Synthroid 75 mcg daily with TSH at goal     Reports near sides of buttock developed pressure injury\" still blanchable   Right side greater then left  Initially home health nurse came in and advised to continue with covering barrier cream and then covering with gauze  Tapes were causing skin tears                  This is an established visit conducted via telemedicine with video. The patient has been instructed that this meets HIPAA criteria and acknowledges and agrees to this method of visitation. Pursuant to the emergency declaration under the [facility], 1135 waiver authority and the [employer] Act, this Virtual Visit was conducted, with patient's consent, to reduce the patient's risk of exposure to COVID-19 and provide continuity of care for an established patient. Services were provided through a video synchronous discussion virtually to substitute for in-person clinic visit. ROS:  Constitutional: negative for fevers, chills, anorexia and weight loss  10 systems reviewed and negative other    Past Medical History:   Diagnosis Date    CAD (coronary artery disease)     stent placed on left 1 stent,in 2007    Cancer [facility])     BCCA    Chronic kidney disease     DVT (deep venous thrombosis) (HCC)     GERD (gastroesophageal reflux disease)     Hypercholesterolemia     Hypertension     Pulmonary embolism ([facility] [address] 9/15/2015    Thromboembolus ([facility] [address]     DVT after hernia operation, PE spontaneous 10 years later    Thyroid disease        Current Outpatient Medications on File Prior to Visit   Medication Sig Dispense Refill    Linzess 145 mcg cap capsule TAKE ONE CAPSULE BY MOUTH EVERY MORNING BEFORE BREAKFAST 30 Capsule 5    traMADoL (ULTRAM) 50 mg tablet Take 1 Tablet by mouth every eight (8) hours as needed for Pain for up to 30 days. Max Daily Amount: 150 mg. 60 Tablet 0    ALPRAZolam (XANAX) 0.25 mg tablet TAKE ONE TABLET BY MOUTH ONE TIME DAILY AS NEEDED 30 Tablet 0    pregabalin (LYRICA) 50 mg capsule Take 1 Capsule by mouth nightly. Max Daily Amount: 50 mg. 90 Capsule 3    lisinopriL (PRINIVIL, ZESTRIL) 2.5 mg tablet Take 1 Tablet by mouth daily. 90 Tablet 1    metoprolol tartrate (LOPRESSOR) 50 mg tablet TAKE ONE TABLET BY MOUTH TWICE A  Tablet 3    acetaminophen (TYLENOL) 325 mg tablet Take 2 Tablets by mouth every four (4) hours as needed for Pain.  20 Tablet 0    alendronate (FOSAMAX) 10 mg tablet TAKE ONE TABLET BY MOUTH EVERY MORNING BEFORE BREAKFAST 90 Tablet 2    rosuvastatin (CRESTOR) 40 mg tablet TAKE ONE TABLET BY MOUTH EVERY EVENING 90 Tab 3    levothyroxine (SYNTHROID) 75 mcg tablet TAKE ONE TABLET BY MOUTH ONE TIME DAILY BEFORE BREAKFAST 90 Tab 3    acetaminophen (TYLENOL ARTHRITIS PAIN) 650 mg TbER Take 650 mg by mouth every eight (8) hours.      cholecalciferol, vitamin D3, (VITAMIN D3) 2,000 unit tab Take 1 Tab by mouth daily. 30 Tab 0    nitroglycerin (NITROSTAT) 0.3 mg SL tablet 1 tablet by SubLINGual route every five (5) minutes as needed for Chest Pain. 1 Bottle 1    aspirin 81 mg Tab Take 81 mg by mouth daily. No current facility-administered medications on file prior to visit. Past Surgical History:   Procedure Laterality Date    HX CORONARY STENT PLACEMENT      HX GI      esophageal hiatal hernia - 2004    HX GYN      partial hysterectomy - 1970    HX HEART CATHETERIZATION      HX OTHER SURGICAL      BCCAs removed    HX UROLOGICAL      bladder repair - 1999       Family History   Problem Relation Age of Onset    Heart Disease Mother     Dementia Father     Diabetes Son     Kidney Disease Son         autoimmune     Reviewed and no changes     Social History     Socioeconomic History    Marital status:      Spouse name: Not on file    Number of children: Not on file    Years of education: Not on file    Highest education level: Not on file   Occupational History    Not on file   Tobacco Use    Smoking status: Never Smoker    Smokeless tobacco: Never Used   Vaping Use    Vaping Use: Never used   Substance and Sexual Activity    Alcohol use: No    Drug use: No     Types: Sedatives/Hypnotics, Prescription    Sexual activity: Not Currently     Partners: Male   Other Topics Concern    Not on file   Social History Narrative    Not on file     Social Determinants of Health     Financial Resource Strain:     Difficulty of Paying Living Expenses: Not on file   Food Insecurity:     Worried About Running Out of Food in the Last Year: Not on file    Yarely of Food in the Last Year: Not on file   Transportation Needs:     Lack of Transportation (Medical): Not on file    Lack of Transportation (Non-Medical):  Not on file   Physical Activity:     Days of Exercise per Week: Not on file    Minutes of Exercise per Session: Not on file   Stress:     Feeling of Stress : Not on file   Social Connections:     Frequency of Communication with Friends and Family: Not on file    Frequency of Social Gatherings with Friends and Family: Not on file    Attends Restoration Services: Not on file    Active Member of Clubs or Organizations: Not on file    Attends Club or Organization Meetings: Not on file    Marital Status: Not on file   Intimate Partner Violence:     Fear of Current or Ex-Partner: Not on file    Emotionally Abused: Not on file    Physically Abused: Not on file    Sexually Abused: Not on file   Housing Stability:     Unable to Pay for Housing in the Last Year: Not on file    Number of [place] in the Last Year: Not on file    Unstable Housing in the Last Year: Not on file          There were no vitals taken for this visit. Physical Examination:   Gen: well appearing female  HEENT: normal conjunctiva, no audible congestion, patient does not see oral erythema, has MMM  Neck: patient does not feel enlarged or tender LAD or masses  Resp: normal respiratory effort, no audible wheezing. CV: patient does not feel palpitations or heart irregularity  Abd: patient does not feel abdominal tenderness or mass, patient does not notice distension  Extrem: patient does not see swelling in ankles or joints.    Neuro: Alert and oriented, able to answer questions without difficulty, able to move all extremities and walk normally          Lab Results   Component Value Date/Time    WBC 5.4 09/10/2021 12:40 PM    HGB 12.3 09/10/2021 12:40 PM    HCT 38.4 09/10/2021 12:40 PM    PLATELET 806 36/44/1083 12:40 PM    .1 (H) 09/10/2021 12:40 PM     Lab Results   Component Value Date/Time    Sodium 140 09/10/2021 12:40 PM    Potassium 4.3 09/10/2021 12:40 PM    Chloride 108 09/10/2021 12:40 PM    CO2 26 09/10/2021 12:40 PM    Anion gap 6 09/10/2021 12:40 PM    Glucose 95 09/10/2021 12:40 PM    BUN 24 (H) 09/10/2021 12:40 PM    Creatinine 1.39 (H) 09/10/2021 12:40 PM    BUN/Creatinine ratio 17 09/10/2021 12:40 PM    GFR est AA 43 (L) 09/10/2021 12:40 PM    GFR est non-AA 36 (L) 09/10/2021 12:40 PM    Calcium 10.0 09/10/2021 12:40 PM     Lab Results   Component Value Date/Time    Cholesterol, total 146 09/10/2021 12:40 PM    HDL Cholesterol 59 09/10/2021 12:40 PM    LDL, calculated 59.2 09/10/2021 12:40 PM    VLDL, calculated 27.8 09/10/2021 12:40 PM    Triglyceride 139 09/10/2021 12:40 PM    CHOL/HDL Ratio 2.5 09/10/2021 12:40 PM     Lab Results   Component Value Date/Time    TSH 2.54 09/10/2021 12:40 PM     No results found for: PSA, Yanna Bran, [id number], [id number]  Lab Results   Component Value Date/Time    Hemoglobin A1c 5.7 05/07/2009 01:40 PM     Lab Results   Component Value Date/Time    Vitamin D 25-Hydroxy 49.1 09/10/2021 12:40 PM       Lab Results   Component Value Date/Time    ALT (SGPT) 13 09/10/2021 12:40 PM    Alk. phosphatase 85 09/10/2021 12:40 PM    Bilirubin, total 0.6 09/10/2021 12:40 PM           Assessment/Plan:    1. Pressure injury of right buttock, stage 1  Continue with barrier cream instead of using gauze recommend using foam bandage  Discussed off loading area frequent turning  - Foam Bandage (Tendra Mepilex Border) 6 X 8 \" bndg; 1 Each by Apply Externally route daily. Dispense: 30 Each; Refill: 1    2. Spondylolysis of lumbar region    3. Chronic bilateral low back pain without sciatica    4. Post herpetic neuralgia    Renewed tramadol prescription  Improves quality of life     reviewed    Concomitant use of a benzodiazepine: yes xanax not at same time       5. Essential hypertension  Not checking at home patient will come for nurse visit   On lisinopril 2.5mg and metoprolol 77QI BID  - METABOLIC PANEL, BASIC; Future  - CBC WITH AUTOMATED DIFF; Future    6.  Stage 3a chronic kidney disease (HCC)  Avoid  NSAIDs / renally dose meds  - METABOLIC PANEL, BASIC; Future  - CBC WITH AUTOMATED DIFF; Future    7. Severe obesity (BMI 35.0-39. 9) with comorbidity ([address]    8. Coronary artery spasm ([address]  Resolved             Bravo Ricardo MD    This is an established visit conducted via real time video and audio telemedicine. The patient has been instructed that this meets HIPAA criteria and acknowledges and agrees to this method of visitation.

## 2022-02-11 RX ORDER — LEVOTHYROXINE SODIUM 75 UG/1
TABLET ORAL
Qty: 90 TABLET | Refills: 3 | Status: SHIPPED | OUTPATIENT
Start: 2022-02-11

## 2022-02-18 LAB
ANION GAP SERPL CALC-SCNC: 4 MMOL/L (ref 5–15)
BASOPHILS # BLD: 0 K/UL (ref 0–0.1)
BASOPHILS NFR BLD: 1 % (ref 0–1)
BUN SERPL-MCNC: 27 MG/DL (ref 6–20)
BUN/CREAT SERPL: 16 (ref 12–20)
CALCIUM SERPL-MCNC: 10.4 MG/DL (ref 8.5–10.1)
CHLORIDE SERPL-SCNC: 113 MMOL/L (ref 97–108)
CO2 SERPL-SCNC: 26 MMOL/L (ref 21–32)
CREAT SERPL-MCNC: 1.69 MG/DL (ref 0.55–1.02)
DIFFERENTIAL METHOD BLD: ABNORMAL
EOSINOPHIL # BLD: 0 K/UL (ref 0–0.4)
EOSINOPHIL NFR BLD: 1 % (ref 0–7)
ERYTHROCYTE [DISTWIDTH] IN BLOOD BY AUTOMATED COUNT: 13.4 % (ref 11.5–14.5)
GLUCOSE SERPL-MCNC: 110 MG/DL (ref 65–100)
HCT VFR BLD AUTO: 28.9 % (ref 35–47)
HGB BLD-MCNC: 8.4 G/DL (ref 11.5–16)
IMM GRANULOCYTES # BLD AUTO: 0 K/UL (ref 0–0.04)
IMM GRANULOCYTES NFR BLD AUTO: 1 % (ref 0–0.5)
LYMPHOCYTES # BLD: 0.5 K/UL (ref 0.8–3.5)
LYMPHOCYTES NFR BLD: 12 % (ref 12–49)
MCH RBC QN AUTO: 30.9 PG (ref 26–34)
MCHC RBC AUTO-ENTMCNC: 29.1 G/DL (ref 30–36.5)
MCV RBC AUTO: 106.3 FL (ref 80–99)
MONOCYTES # BLD: 0.4 K/UL (ref 0–1)
MONOCYTES NFR BLD: 10 % (ref 5–13)
NEUTS SEG # BLD: 3.2 K/UL (ref 1.8–8)
NEUTS SEG NFR BLD: 75 % (ref 32–75)
NRBC # BLD: 0 K/UL (ref 0–0.01)
NRBC BLD-RTO: 0 PER 100 WBC
PLATELET # BLD AUTO: 143 K/UL (ref 150–400)
PMV BLD AUTO: 11.8 FL (ref 8.9–12.9)
POTASSIUM SERPL-SCNC: 3.9 MMOL/L (ref 3.5–5.1)
RBC # BLD AUTO: 2.72 M/UL (ref 3.8–5.2)
RBC MORPH BLD: ABNORMAL
SODIUM SERPL-SCNC: 143 MMOL/L (ref 136–145)
WBC # BLD AUTO: 4.1 K/UL (ref 3.6–11)

## 2022-02-19 NOTE — PROGRESS NOTES
New anemia - patient denies bleeding. Stools are normal in color.    No blood in the urine and no vaginal bleeding  Will check further labs and refer to hematologist  Silvia Conte please give patient referral when she comes in for labs on Monday or Tuesdsay

## 2022-02-21 ENCOUNTER — LAB ONLY (OUTPATIENT)
Dept: INTERNAL MEDICINE CLINIC | Age: 87
End: 2022-02-21

## 2022-02-21 DIAGNOSIS — D53.9 MACROCYTIC ANEMIA: ICD-10-CM

## 2022-02-21 DIAGNOSIS — D64.9 ANEMIA, UNSPECIFIED TYPE: ICD-10-CM

## 2022-02-22 LAB
FERRITIN SERPL-MCNC: 11 NG/ML (ref 8–252)
IRON SATN MFR SERPL: 9 % (ref 20–50)
IRON SERPL-MCNC: 34 UG/DL (ref 35–150)
RETICS # AUTO: 0.04 M/UL (ref 0.02–0.08)
RETICS/RBC NFR AUTO: 1.3 % (ref 0.7–2.1)
TIBC SERPL-MCNC: 383 UG/DL (ref 250–450)
VIT B12 SERPL-MCNC: 294 PG/ML (ref 193–986)

## 2022-02-27 RX ORDER — ALENDRONATE SODIUM 10 MG/1
TABLET ORAL
Qty: 90 TABLET | Refills: 2 | Status: SHIPPED | OUTPATIENT
Start: 2022-02-27 | End: 2022-02-28

## 2022-03-03 LAB
ALBUMIN SERPL ELPH-MCNC: 3.2 G/DL (ref 2.9–4.4)
ALBUMIN/GLOB SERPL: 1.2 {RATIO} (ref 0.7–1.7)
ALPHA1 GLOB SERPL ELPH-MCNC: 0.3 G/DL (ref 0–0.4)
ALPHA2 GLOB SERPL ELPH-MCNC: 0.7 G/DL (ref 0.4–1)
B-GLOBULIN SERPL ELPH-MCNC: 1 G/DL (ref 0.7–1.3)
GAMMA GLOB SERPL ELPH-MCNC: 0.6 G/DL (ref 0.4–1.8)
GLOBULIN SER CALC-MCNC: 2.6 G/DL (ref 2.2–3.9)
IGA SERPL-MCNC: 235 MG/DL (ref 64–422)
IGG SERPL-MCNC: 936 MG/DL (ref 586–1602)
IGM SERPL-MCNC: 77 MG/DL (ref 26–217)
INTERPRETATION SERPL IEP-IMP: ABNORMAL
KAPPA LC FREE SER-MCNC: 37.7 MG/L (ref 3.3–19.4)
KAPPA LC FREE/LAMBDA FREE SER: 0.19 {RATIO} (ref 0.26–1.65)
LAMBDA LC FREE SERPL-MCNC: 198 MG/L (ref 5.7–26.3)
M PROTEIN SERPL ELPH-MCNC: ABNORMAL G/DL
PLEASE NOTE, 011150: ABNORMAL
PROT SERPL-MCNC: 5.8 G/DL (ref 6–8.5)
REFLEX TESTING: ABNORMAL

## 2022-03-05 NOTE — PROGRESS NOTES
Elevation in free light chains with abnormal ratio need further evaluation by hematologist to evaluate for possible blood abnormality   Referral made 021/19 I see appointment scheduled

## 2022-03-07 NOTE — PROGRESS NOTES
Called, spoke to Stef Rod (HIPAA). Two pt identifiers confirmed. [facility] informed lab results per. Mike Cruz    See message below. Elevation in free light chains with abnormal ratio need further evaluation by hematologist to evaluate for possible blood abnormality     Referral made 021/19 I see appointment scheduled     [facility] states patient has appointment scheduled with Dr. Francisco Ferris on 03/16 at 3:30. Lizzie verbalized understanding of information discussed with no further questions at this time.

## 2022-03-09 ENCOUNTER — PATIENT MESSAGE (OUTPATIENT)
Dept: INTERNAL MEDICINE CLINIC | Age: 87
End: 2022-03-09

## 2022-03-09 DIAGNOSIS — F41.9 ANXIETY: ICD-10-CM

## 2022-03-09 RX ORDER — ALPRAZOLAM 0.25 MG/1
TABLET ORAL
Qty: 30 TABLET | Refills: 0 | Status: SHIPPED | OUTPATIENT
Start: 2022-03-09 | End: 2022-04-07 | Stop reason: SDUPTHER

## 2022-03-09 NOTE — TELEPHONE ENCOUNTER
----- Message from Kalee Oneal sent at 3/9/2022 12:18 PM EST -----  Regarding: Prescription  Dr. Zhao Leblanc,  Please approve a refill prescription for Alprazolam 0.25 mg.   Thank you,  Monica Carpentert

## 2022-03-09 NOTE — TELEPHONE ENCOUNTER
Future Appointments:  Future Appointments   Date Time Provider Jazmyne Cristina   3/14/2022  3:30 PM Kinjal Deluca MD [facility]   12/26/2022 11:00 AM Robby Robertson MD [facility] AMB        Last Appointment With Me:  2/10/2022     Requested Prescriptions     Pending Prescriptions Disp Refills    ALPRAZolam (XANAX) 0.25 mg tablet 30 Tablet 0     Sig: TAKE ONE TABLET BY MOUTH ONE TIME DAILY AS NEEDED

## 2022-03-11 DIAGNOSIS — M54.50 CHRONIC BILATERAL LOW BACK PAIN WITHOUT SCIATICA: ICD-10-CM

## 2022-03-11 DIAGNOSIS — G89.29 CHRONIC BILATERAL LOW BACK PAIN WITHOUT SCIATICA: ICD-10-CM

## 2022-03-11 RX ORDER — TRAMADOL HYDROCHLORIDE 50 MG/1
50 TABLET ORAL
Qty: 60 TABLET | Refills: 0 | Status: SHIPPED | OUTPATIENT
Start: 2022-03-11 | End: 2022-04-10

## 2022-03-11 NOTE — TELEPHONE ENCOUNTER
Future Appointments:  Future Appointments   Date Time Provider Jazmyne Cristina   3/14/2022  3:30 PM Gogo Samuel MD [facility] AM[facility]   12/26/2022 11:00 AM Carmen Robertson MD [facility] BS AMB        Last Appointment With Me:  2/10/2022     Requested Prescriptions     Pending Prescriptions Disp Refills    traMADoL (ULTRAM) 50 mg tablet 60 Tablet 0     Sig: Take 1 Tablet by mouth every eight (8) hours as needed for Pain for up to 30 days. Max Daily Amount: 150 mg.

## 2022-03-11 NOTE — PROGRESS NOTES
Priya Spears is a 80 y.o. female here for new patient appt for anemia. Referred by Dr Monie Sullivan  Barium swallow 4/2021   EGD/COLONOSCOPY 2013  She has never had transfusion before. 1. Have you been to the ER, urgent care clinic since your last visit? Hospitalized since your last visit? New Pt    2. Have you seen or consulted any other health care providers outside of the [address] [place] [address] since your last visit? Include any pap smears or colon screening.  New Pt

## 2022-03-11 NOTE — TELEPHONE ENCOUNTER
----- Message from Emmanuel Berumen sent at 3/10/2022  9:38 PM EST -----  Regarding: Prescription  Dr. Edward Carrero,  Would you please refill a prescription for Tramadol 50 mg for me. Thank you very much.   Elton Moyer

## 2022-03-14 ENCOUNTER — OFFICE VISIT (OUTPATIENT)
Dept: ONCOLOGY | Age: 87
End: 2022-03-14
Payer: MEDICARE

## 2022-03-14 VITALS
HEART RATE: 56 BPM | BODY MASS INDEX: 34 KG/M2 | DIASTOLIC BLOOD PRESSURE: 67 MMHG | SYSTOLIC BLOOD PRESSURE: 109 MMHG | HEIGHT: 60 IN | TEMPERATURE: 98 F | WEIGHT: 173.2 LBS | OXYGEN SATURATION: 97 %

## 2022-03-14 DIAGNOSIS — D50.0 IRON DEFICIENCY ANEMIA DUE TO CHRONIC BLOOD LOSS: Primary | ICD-10-CM

## 2022-03-14 PROCEDURE — G8432 DEP SCR NOT DOC, RNG: HCPCS | Performed by: INTERNAL MEDICINE

## 2022-03-14 PROCEDURE — G0463 HOSPITAL OUTPT CLINIC VISIT: HCPCS | Performed by: INTERNAL MEDICINE

## 2022-03-14 PROCEDURE — 1090F PRES/ABSN URINE INCON ASSESS: CPT | Performed by: INTERNAL MEDICINE

## 2022-03-14 PROCEDURE — G8536 NO DOC ELDER MAL SCRN: HCPCS | Performed by: INTERNAL MEDICINE

## 2022-03-14 PROCEDURE — G8417 CALC BMI ABV UP PARAM F/U: HCPCS | Performed by: INTERNAL MEDICINE

## 2022-03-14 PROCEDURE — G8427 DOCREV CUR MEDS BY ELIG CLIN: HCPCS | Performed by: INTERNAL MEDICINE

## 2022-03-14 PROCEDURE — 1101F PT FALLS ASSESS-DOCD LE1/YR: CPT | Performed by: INTERNAL MEDICINE

## 2022-03-14 PROCEDURE — 99205 OFFICE O/P NEW HI 60 MIN: CPT | Performed by: INTERNAL MEDICINE

## 2022-03-14 NOTE — LETTER
3/15/2022    Patient: Glen Balbuena   YOB: 1933   Date of Visit: 3/14/2022     Jewell Olsen MD  [address]  [address]  [facility]    Dear Jewell Olsen MD,      Thank you for referring Ms. Downey Monday to [facility] for evaluation. My notes for this consultation are attached. If you have questions, please do not hesitate to call me. I look forward to following your patient along with you.       Sincerely,    Mike Jones MD

## 2022-03-14 NOTE — PROGRESS NOTES
[address] [facility] [address], [address] [facility], [address] [place] [address]  [phone]      Hematology Note        Patient: Mckay Gandara MRN: [id number]  SSN: [id number]    YOB: 1933  Age: 80 y.o. Sex: female      Subjective:      Mckay Gandara is a 80 y.o. female who who I am seeing in consultation for iron deficiency anemia. She has suffered with iron deficiency anemia for over a yr. She feels fatigued. Denies active rectal bleeding. He does not tolerate oral iron supplement due to constipation and nausea.         Review of Systems:    Constitutional: negative  Eyes: negative  Ears, Nose, Mouth, Throat, and Face: negative  Respiratory: negative  Cardiovascular: negative  Gastrointestinal: negative  Genitourinary:negative  Integument/Breast: negative  Hematologic/Lymphatic: negative  Musculoskeletal:negative  Neurological: negative      Past Medical History:   Diagnosis Date    Anemia     CAD (coronary artery disease)     stent placed on left 1 stent,in 2007    Cancer ([address]     BCCA    Chronic kidney disease     DVT (deep venous thrombosis) (HCC)     GERD (gastroesophageal reflux disease)     Hypercholesterolemia     Hypertension     Pulmonary embolism ([address] 9/15/2015    Thromboembolus ([address]     DVT after hernia operation, PE spontaneous 10 years later    Thyroid disease      Past Surgical History:   Procedure Laterality Date    HX CORONARY STENT PLACEMENT      HX GI      esophageal hiatal hernia - 2004    HX GYN      partial hysterectomy - 1970    HX HEART CATHETERIZATION      HX OTHER SURGICAL      BCCAs removed    HX UROLOGICAL      bladder repair - 1999      Family History   Problem Relation Age of Onset    Heart Disease Mother     Dementia Father     Diabetes Son     Kidney Disease Son         autoimmune     Social History     Tobacco Use    Smoking status: Never Smoker    Smokeless tobacco: Never Used   Substance Use Topics    Alcohol use: No      Prior to Admission medications    Medication Sig Start Date End Date Taking? Authorizing Provider   traMADoL (ULTRAM) 50 mg tablet Take 1 Tablet by mouth every eight (8) hours as needed for Pain for up to 30 days. Max Daily Amount: 150 mg. 3/11/22 4/10/22 Yes Brett Jacob MD   ALPRAZolam Thuy Spearing) 0.25 mg tablet TAKE ONE TABLET BY MOUTH ONE TIME DAILY AS NEEDED 3/9/22  Yes Tessa Funk MD   alendronate (FOSAMAX) 10 mg tablet TAKE ONE TABLET BY MOUTH EVERY MORNING BEFORE BREAKFAST 2/28/22  Yes Nick Perkins MD   levothyroxine (SYNTHROID) 75 mcg tablet TAKE ONE TABLET BY MOUTH ONE TIME DAILY BEFORE BREAKFAST 2/11/22  Yes Nick Perkins MD   Foam Bandage (Tendra Mepilex Border) 6 X 8 \" bndg 1 Each by Apply Externally route daily. 2/10/22  Yes Suzanna Anderson MD   Linzess 145 mcg cap capsule TAKE ONE CAPSULE BY MOUTH EVERY MORNING BEFORE BREAKFAST 1/24/22  Yes Suzanna Anderson MD   pregabalin (LYRICA) 50 mg capsule Take 1 Capsule by mouth nightly. Max Daily Amount: 50 mg. 10/28/21  Yes Suzanna Anderson MD   lisinopriL (PRINIVIL, ZESTRIL) 2.5 mg tablet Take 1 Tablet by mouth daily. 10/28/21  Yes Nick Perkins MD   metoprolol tartrate (LOPRESSOR) 50 mg tablet TAKE ONE TABLET BY MOUTH TWICE A DAY 10/11/21  Yes Brett Jacob MD   acetaminophen (TYLENOL) 325 mg tablet Take 2 Tablets by mouth every four (4) hours as needed for Pain. 9/3/21  Yes Megha Rubio PA-C   rosuvastatin (CRESTOR) 40 mg tablet TAKE ONE TABLET BY MOUTH EVERY EVENING 4/14/21  Yes Nick Perkins MD   cholecalciferol, vitamin D3, (VITAMIN D3) 2,000 unit tab Take 1 Tab by mouth daily. 8/8/17  Yes Prudencmaura Hazel MD   nitroglycerin (NITROSTAT) 0.3 mg SL tablet 1 tablet by SubLINGual route every five (5) minutes as needed for Chest Pain. 12/23/14  Yes Yaima Ackerman MD   aspirin 81 mg Tab Take 81 mg by mouth daily.    Yes Provider, Historical   acetaminophen (TYLENOL ARTHRITIS PAIN) 650 mg TbER Take 650 mg by mouth every eight (8) hours. Provider, Historical            Allergies   Allergen Reactions    Codeine Unknown (comments)     Unsure of reaction.  Gabapentin Other (comments)     Hallucinations even at lowest dose    Morphine Hives and Itching           Objective:     Vitals:    03/14/22 1540   BP: 109/67   Pulse: (!) 56   Temp: 98 °F (36.7 °C)   SpO2: 97%   Weight: 173 lb 3.2 oz (78.6 kg)   Height: 5' (1.524 m)            Physical Exam:    GENERAL: alert, cooperative, no distress, appears stated age  EYE: conjunctivae/corneas clear. PERRL, EOM's intact. Fundi benign  LYMPHATIC: Cervical, supraclavicular, and axillary nodes normal.   THROAT & NECK: normal and no erythema or exudates noted. LUNG: clear to auscultation bilaterally  HEART: regular rate and rhythm, S1, S2 normal, no murmur, click, rub or gallop  ABDOMEN: soft, non-tender. Bowel sounds normal. No masses,  no organomegaly  EXTREMITIES:  extremities normal, atraumatic, no cyanosis or edema  SKIN: Normal.  NEUROLOGIC: AOx3. Gait normal. Reflexes and motor strength normal and symmetric. Cranial nerves 2-12 and sensation grossly intact. Lab Results   Component Value Date/Time    WBC 4.1 02/18/2022 12:28 PM    HGB 8.4 (L) 02/18/2022 12:28 PM    HCT 28.9 (L) 02/18/2022 12:28 PM    PLATELET 198 (L) 85/20/3555 12:28 PM    .3 (H) 02/18/2022 12:28 PM           Lab Results   Component Value Date/Time    Iron 34 (L) 02/21/2022 12:00 PM    TIBC 383 02/21/2022 12:00 PM    Iron % saturation 9 (L) 02/21/2022 12:00 PM    Ferritin 11 02/21/2022 12:00 PM           Assessment:     1.  Iron deficiency anemia secondary to chronic gastrointestinal bleeding:    Last colonoscopy in 2013    I discussed with her various ways to replace/supplement iron which includes giving oral iron preparation such as iron sulfate or similar products or utilizing intravenous access to administer the total dose of iron. The patient was given the option to choose from various oral and intravenous iron preparation and after a prolonged discussion we have agreed to proceed with IV Iron to be administered in OPIC. I counseled the patient regarding the side effects of IV Iron infusion which includes rare instances of anaphylactic reactions etc.  After weighing the benefit and risks, the patient agreed to proceed with the treatment. To reduce the number of infusions and potential exposure to COVID infection, I recommended she receive Injectafer. Plan:       1. IV Iron in OPIC  2. Labs in 3 and 6 months  3. Return in 6 monhs      Signed by: Danial Pollard MD                     March 14, 2022       CC.  Edgar France MD

## 2022-03-15 PROBLEM — D50.0 IRON DEFICIENCY ANEMIA DUE TO CHRONIC BLOOD LOSS: Status: ACTIVE | Noted: 2022-03-15

## 2022-03-15 RX ORDER — DIPHENHYDRAMINE HYDROCHLORIDE 50 MG/ML
50 INJECTION, SOLUTION INTRAMUSCULAR; INTRAVENOUS AS NEEDED
Status: CANCELLED
Start: 2022-04-12

## 2022-03-15 RX ORDER — EPINEPHRINE 1 MG/ML
0.3 INJECTION, SOLUTION, CONCENTRATE INTRAVENOUS AS NEEDED
Status: CANCELLED | OUTPATIENT
Start: 2022-04-12

## 2022-03-15 RX ORDER — SODIUM CHLORIDE 0.9 % (FLUSH) 0.9 %
10 SYRINGE (ML) INJECTION AS NEEDED
Status: CANCELLED | OUTPATIENT
Start: 2022-04-12

## 2022-03-15 RX ORDER — HEPARIN 100 UNIT/ML
300-500 SYRINGE INTRAVENOUS AS NEEDED
Status: CANCELLED
Start: 2022-04-05

## 2022-03-15 RX ORDER — ACETAMINOPHEN 325 MG/1
650 TABLET ORAL AS NEEDED
Status: CANCELLED
Start: 2022-04-19

## 2022-03-15 RX ORDER — HEPARIN 100 UNIT/ML
300-500 SYRINGE INTRAVENOUS AS NEEDED
Status: CANCELLED
Start: 2022-04-26

## 2022-03-15 RX ORDER — HYDROCORTISONE SODIUM SUCCINATE 100 MG/2ML
100 INJECTION, POWDER, FOR SOLUTION INTRAMUSCULAR; INTRAVENOUS AS NEEDED
Status: CANCELLED | OUTPATIENT
Start: 2022-04-05

## 2022-03-15 RX ORDER — SODIUM CHLORIDE 9 MG/ML
25 INJECTION, SOLUTION INTRAVENOUS CONTINUOUS
Status: CANCELLED | OUTPATIENT
Start: 2022-04-12

## 2022-03-15 RX ORDER — ACETAMINOPHEN 325 MG/1
650 TABLET ORAL AS NEEDED
Status: CANCELLED
Start: 2022-04-12

## 2022-03-15 RX ORDER — SODIUM CHLORIDE 9 MG/ML
25 INJECTION, SOLUTION INTRAVENOUS CONTINUOUS
Status: CANCELLED | OUTPATIENT
Start: 2022-04-26

## 2022-03-15 RX ORDER — SODIUM CHLORIDE 9 MG/ML
10 INJECTION INTRAMUSCULAR; INTRAVENOUS; SUBCUTANEOUS AS NEEDED
Status: CANCELLED | OUTPATIENT
Start: 2022-03-29

## 2022-03-15 RX ORDER — SODIUM CHLORIDE 9 MG/ML
25 INJECTION, SOLUTION INTRAVENOUS CONTINUOUS
Status: CANCELLED | OUTPATIENT
Start: 2022-04-19

## 2022-03-15 RX ORDER — DIPHENHYDRAMINE HYDROCHLORIDE 50 MG/ML
50 INJECTION, SOLUTION INTRAMUSCULAR; INTRAVENOUS AS NEEDED
Status: CANCELLED
Start: 2022-04-05

## 2022-03-15 RX ORDER — EPINEPHRINE 1 MG/ML
0.3 INJECTION, SOLUTION, CONCENTRATE INTRAVENOUS AS NEEDED
Status: CANCELLED | OUTPATIENT
Start: 2022-04-19

## 2022-03-15 RX ORDER — ACETAMINOPHEN 325 MG/1
650 TABLET ORAL ONCE
Status: CANCELLED
Start: 2022-04-26 | End: 2022-04-26

## 2022-03-15 RX ORDER — DIPHENHYDRAMINE HYDROCHLORIDE 50 MG/ML
50 INJECTION, SOLUTION INTRAMUSCULAR; INTRAVENOUS ONCE
Status: CANCELLED
Start: 2022-04-05 | End: 2022-04-05

## 2022-03-15 RX ORDER — DIPHENHYDRAMINE HYDROCHLORIDE 50 MG/ML
50 INJECTION, SOLUTION INTRAMUSCULAR; INTRAVENOUS AS NEEDED
Status: CANCELLED
Start: 2022-03-29

## 2022-03-15 RX ORDER — ONDANSETRON 2 MG/ML
8 INJECTION INTRAMUSCULAR; INTRAVENOUS AS NEEDED
Status: CANCELLED | OUTPATIENT
Start: 2022-04-26

## 2022-03-15 RX ORDER — DIPHENHYDRAMINE HYDROCHLORIDE 50 MG/ML
50 INJECTION, SOLUTION INTRAMUSCULAR; INTRAVENOUS ONCE
Status: CANCELLED
Start: 2022-04-19 | End: 2022-04-19

## 2022-03-15 RX ORDER — ONDANSETRON 2 MG/ML
8 INJECTION INTRAMUSCULAR; INTRAVENOUS AS NEEDED
Status: CANCELLED | OUTPATIENT
Start: 2022-04-19

## 2022-03-15 RX ORDER — ACETAMINOPHEN 325 MG/1
650 TABLET ORAL ONCE
Status: CANCELLED
Start: 2022-03-29 | End: 2022-03-29

## 2022-03-15 RX ORDER — ACETAMINOPHEN 325 MG/1
650 TABLET ORAL AS NEEDED
Status: CANCELLED
Start: 2022-04-26

## 2022-03-15 RX ORDER — ONDANSETRON 2 MG/ML
8 INJECTION INTRAMUSCULAR; INTRAVENOUS AS NEEDED
Status: CANCELLED | OUTPATIENT
Start: 2022-04-12

## 2022-03-15 RX ORDER — ONDANSETRON 2 MG/ML
8 INJECTION INTRAMUSCULAR; INTRAVENOUS AS NEEDED
Status: CANCELLED | OUTPATIENT
Start: 2022-04-05

## 2022-03-15 RX ORDER — EPINEPHRINE 1 MG/ML
0.3 INJECTION, SOLUTION, CONCENTRATE INTRAVENOUS AS NEEDED
Status: CANCELLED | OUTPATIENT
Start: 2022-04-05

## 2022-03-15 RX ORDER — HYDROCORTISONE SODIUM SUCCINATE 100 MG/2ML
100 INJECTION, POWDER, FOR SOLUTION INTRAMUSCULAR; INTRAVENOUS AS NEEDED
Status: CANCELLED | OUTPATIENT
Start: 2022-03-29

## 2022-03-15 RX ORDER — ACETAMINOPHEN 325 MG/1
650 TABLET ORAL ONCE
Status: CANCELLED
Start: 2022-04-19 | End: 2022-04-19

## 2022-03-15 RX ORDER — SODIUM CHLORIDE 0.9 % (FLUSH) 0.9 %
10 SYRINGE (ML) INJECTION AS NEEDED
Status: CANCELLED | OUTPATIENT
Start: 2022-03-29

## 2022-03-15 RX ORDER — DIPHENHYDRAMINE HYDROCHLORIDE 50 MG/ML
25 INJECTION, SOLUTION INTRAMUSCULAR; INTRAVENOUS AS NEEDED
Status: CANCELLED
Start: 2022-04-05

## 2022-03-15 RX ORDER — DIPHENHYDRAMINE HYDROCHLORIDE 50 MG/ML
50 INJECTION, SOLUTION INTRAMUSCULAR; INTRAVENOUS ONCE
Status: CANCELLED
Start: 2022-04-12 | End: 2022-04-12

## 2022-03-15 RX ORDER — DIPHENHYDRAMINE HYDROCHLORIDE 50 MG/ML
25 INJECTION, SOLUTION INTRAMUSCULAR; INTRAVENOUS AS NEEDED
Status: CANCELLED
Start: 2022-04-12

## 2022-03-15 RX ORDER — HEPARIN 100 UNIT/ML
300-500 SYRINGE INTRAVENOUS AS NEEDED
Status: CANCELLED
Start: 2022-03-29

## 2022-03-15 RX ORDER — DIPHENHYDRAMINE HYDROCHLORIDE 50 MG/ML
50 INJECTION, SOLUTION INTRAMUSCULAR; INTRAVENOUS AS NEEDED
Status: CANCELLED
Start: 2022-04-26

## 2022-03-15 RX ORDER — DIPHENHYDRAMINE HYDROCHLORIDE 50 MG/ML
25 INJECTION, SOLUTION INTRAMUSCULAR; INTRAVENOUS AS NEEDED
Status: CANCELLED
Start: 2022-04-19

## 2022-03-15 RX ORDER — SODIUM CHLORIDE 9 MG/ML
25 INJECTION, SOLUTION INTRAVENOUS CONTINUOUS
Status: CANCELLED | OUTPATIENT
Start: 2022-04-05

## 2022-03-15 RX ORDER — SODIUM CHLORIDE 9 MG/ML
25 INJECTION, SOLUTION INTRAVENOUS CONTINUOUS
Status: CANCELLED | OUTPATIENT
Start: 2022-03-29

## 2022-03-15 RX ORDER — HEPARIN 100 UNIT/ML
300-500 SYRINGE INTRAVENOUS AS NEEDED
Status: CANCELLED
Start: 2022-04-12

## 2022-03-15 RX ORDER — ACETAMINOPHEN 325 MG/1
650 TABLET ORAL AS NEEDED
Status: CANCELLED
Start: 2022-03-29

## 2022-03-15 RX ORDER — ALBUTEROL SULFATE 0.83 MG/ML
2.5 SOLUTION RESPIRATORY (INHALATION) AS NEEDED
Status: CANCELLED
Start: 2022-04-05

## 2022-03-15 RX ORDER — ALBUTEROL SULFATE 0.83 MG/ML
2.5 SOLUTION RESPIRATORY (INHALATION) AS NEEDED
Status: CANCELLED
Start: 2022-03-29

## 2022-03-15 RX ORDER — EPINEPHRINE 1 MG/ML
0.3 INJECTION, SOLUTION, CONCENTRATE INTRAVENOUS AS NEEDED
Status: CANCELLED | OUTPATIENT
Start: 2022-04-26

## 2022-03-15 RX ORDER — SODIUM CHLORIDE 9 MG/ML
10 INJECTION INTRAMUSCULAR; INTRAVENOUS; SUBCUTANEOUS AS NEEDED
Status: CANCELLED | OUTPATIENT
Start: 2022-04-05

## 2022-03-15 RX ORDER — DIPHENHYDRAMINE HYDROCHLORIDE 50 MG/ML
25 INJECTION, SOLUTION INTRAMUSCULAR; INTRAVENOUS AS NEEDED
Status: CANCELLED
Start: 2022-03-29

## 2022-03-15 RX ORDER — ACETAMINOPHEN 325 MG/1
650 TABLET ORAL ONCE
Status: CANCELLED
Start: 2022-04-05 | End: 2022-04-05

## 2022-03-15 RX ORDER — DIPHENHYDRAMINE HYDROCHLORIDE 50 MG/ML
50 INJECTION, SOLUTION INTRAMUSCULAR; INTRAVENOUS ONCE
Status: CANCELLED
Start: 2022-04-26 | End: 2022-04-26

## 2022-03-15 RX ORDER — ACETAMINOPHEN 325 MG/1
650 TABLET ORAL ONCE
Status: CANCELLED
Start: 2022-04-12 | End: 2022-04-12

## 2022-03-15 RX ORDER — ONDANSETRON 2 MG/ML
8 INJECTION INTRAMUSCULAR; INTRAVENOUS AS NEEDED
Status: CANCELLED | OUTPATIENT
Start: 2022-03-29

## 2022-03-15 RX ORDER — ALBUTEROL SULFATE 0.83 MG/ML
2.5 SOLUTION RESPIRATORY (INHALATION) AS NEEDED
Status: CANCELLED
Start: 2022-04-12

## 2022-03-15 RX ORDER — SODIUM CHLORIDE 0.9 % (FLUSH) 0.9 %
10 SYRINGE (ML) INJECTION AS NEEDED
Status: CANCELLED | OUTPATIENT
Start: 2022-04-19

## 2022-03-15 RX ORDER — SODIUM CHLORIDE 9 MG/ML
10 INJECTION INTRAMUSCULAR; INTRAVENOUS; SUBCUTANEOUS AS NEEDED
Status: CANCELLED | OUTPATIENT
Start: 2022-04-26

## 2022-03-15 RX ORDER — EPINEPHRINE 1 MG/ML
0.3 INJECTION, SOLUTION, CONCENTRATE INTRAVENOUS AS NEEDED
Status: CANCELLED | OUTPATIENT
Start: 2022-03-29

## 2022-03-15 RX ORDER — SODIUM CHLORIDE 0.9 % (FLUSH) 0.9 %
10 SYRINGE (ML) INJECTION AS NEEDED
Status: CANCELLED | OUTPATIENT
Start: 2022-04-26

## 2022-03-15 RX ORDER — ACETAMINOPHEN 325 MG/1
650 TABLET ORAL AS NEEDED
Status: CANCELLED
Start: 2022-04-05

## 2022-03-15 RX ORDER — HYDROCORTISONE SODIUM SUCCINATE 100 MG/2ML
100 INJECTION, POWDER, FOR SOLUTION INTRAMUSCULAR; INTRAVENOUS AS NEEDED
Status: CANCELLED | OUTPATIENT
Start: 2022-04-12

## 2022-03-15 RX ORDER — HEPARIN 100 UNIT/ML
300-500 SYRINGE INTRAVENOUS AS NEEDED
Status: CANCELLED
Start: 2022-04-19

## 2022-03-15 RX ORDER — SODIUM CHLORIDE 9 MG/ML
10 INJECTION INTRAMUSCULAR; INTRAVENOUS; SUBCUTANEOUS AS NEEDED
Status: CANCELLED | OUTPATIENT
Start: 2022-04-19

## 2022-03-15 RX ORDER — DIPHENHYDRAMINE HYDROCHLORIDE 50 MG/ML
50 INJECTION, SOLUTION INTRAMUSCULAR; INTRAVENOUS ONCE
Status: CANCELLED
Start: 2022-03-29 | End: 2022-03-29

## 2022-03-15 RX ORDER — ALBUTEROL SULFATE 0.83 MG/ML
2.5 SOLUTION RESPIRATORY (INHALATION) AS NEEDED
Status: CANCELLED
Start: 2022-04-19

## 2022-03-15 RX ORDER — ALBUTEROL SULFATE 0.83 MG/ML
2.5 SOLUTION RESPIRATORY (INHALATION) AS NEEDED
Status: CANCELLED
Start: 2022-04-26

## 2022-03-15 RX ORDER — SODIUM CHLORIDE 0.9 % (FLUSH) 0.9 %
10 SYRINGE (ML) INJECTION AS NEEDED
Status: CANCELLED | OUTPATIENT
Start: 2022-04-05

## 2022-03-15 RX ORDER — DIPHENHYDRAMINE HYDROCHLORIDE 50 MG/ML
50 INJECTION, SOLUTION INTRAMUSCULAR; INTRAVENOUS AS NEEDED
Status: CANCELLED
Start: 2022-04-19

## 2022-03-15 RX ORDER — SODIUM CHLORIDE 9 MG/ML
10 INJECTION INTRAMUSCULAR; INTRAVENOUS; SUBCUTANEOUS AS NEEDED
Status: CANCELLED | OUTPATIENT
Start: 2022-04-12

## 2022-03-15 RX ORDER — DIPHENHYDRAMINE HYDROCHLORIDE 50 MG/ML
25 INJECTION, SOLUTION INTRAMUSCULAR; INTRAVENOUS AS NEEDED
Status: CANCELLED
Start: 2022-04-26

## 2022-03-15 RX ORDER — HYDROCORTISONE SODIUM SUCCINATE 100 MG/2ML
100 INJECTION, POWDER, FOR SOLUTION INTRAMUSCULAR; INTRAVENOUS AS NEEDED
Status: CANCELLED | OUTPATIENT
Start: 2022-04-19

## 2022-03-15 RX ORDER — HYDROCORTISONE SODIUM SUCCINATE 100 MG/2ML
100 INJECTION, POWDER, FOR SOLUTION INTRAMUSCULAR; INTRAVENOUS AS NEEDED
Status: CANCELLED | OUTPATIENT
Start: 2022-04-26

## 2022-03-18 PROBLEM — Z86.711 HISTORY OF PULMONARY EMBOLUS (PE): Status: ACTIVE | Noted: 2017-08-17

## 2022-03-18 PROBLEM — M43.06 SPONDYLOLYSIS OF LUMBAR REGION: Status: ACTIVE | Noted: 2018-02-12

## 2022-03-18 PROBLEM — F11.99 OPIOID USE, UNSPECIFIED WITH UNSPECIFIED OPIOID-INDUCED DISORDER (HCC): Status: ACTIVE | Noted: 2022-02-10

## 2022-03-20 PROBLEM — M41.9 SCOLIOSIS: Status: ACTIVE | Noted: 2018-02-12

## 2022-03-20 PROBLEM — I25.10 CORONARY ARTERY DISEASE INVOLVING NATIVE CORONARY ARTERY OF NATIVE HEART WITHOUT ANGINA PECTORIS: Status: ACTIVE | Noted: 2019-09-01

## 2022-03-24 PROBLEM — D50.0 IRON DEFICIENCY ANEMIA DUE TO CHRONIC BLOOD LOSS: Status: ACTIVE | Noted: 2022-03-15

## 2022-03-29 ENCOUNTER — HOSPITAL ENCOUNTER (OUTPATIENT)
Dept: INFUSION THERAPY | Age: 87
Discharge: HOME | End: 2022-03-29
Payer: MEDICARE

## 2022-03-29 VITALS
SYSTOLIC BLOOD PRESSURE: 119 MMHG | TEMPERATURE: 96.8 F | WEIGHT: 167 LBS | DIASTOLIC BLOOD PRESSURE: 53 MMHG | OXYGEN SATURATION: 100 % | HEART RATE: 56 BPM | RESPIRATION RATE: 17 BRPM | BODY MASS INDEX: 32.61 KG/M2

## 2022-03-29 DIAGNOSIS — D50.0 IRON DEFICIENCY ANEMIA DUE TO CHRONIC BLOOD LOSS: Primary | ICD-10-CM

## 2022-03-29 PROCEDURE — 74011000250 HC RX REV CODE- 250: Performed by: INTERNAL MEDICINE

## 2022-03-29 PROCEDURE — 74011250636 HC RX REV CODE- 250/636: Performed by: NURSE PRACTITIONER

## 2022-03-29 PROCEDURE — 96374 THER/PROPH/DIAG INJ IV PUSH: CPT

## 2022-03-29 RX ORDER — SODIUM CHLORIDE 0.9 % (FLUSH) 0.9 %
10 SYRINGE (ML) INJECTION AS NEEDED
Status: DISCONTINUED | OUTPATIENT
Start: 2022-03-29 | End: 2022-03-30 | Stop reason: HOSPADM

## 2022-03-29 RX ADMIN — SODIUM CHLORIDE, PRESERVATIVE FREE 10 ML: 5 INJECTION INTRAVENOUS at 14:44

## 2022-03-29 RX ADMIN — IRON SUCROSE 200 MG: 20 INJECTION, SOLUTION INTRAVENOUS at 14:45

## 2022-03-29 RX ADMIN — SODIUM CHLORIDE, PRESERVATIVE FREE 10 ML: 5 INJECTION INTRAVENOUS at 14:56

## 2022-03-29 NOTE — PROGRESS NOTES
[facility] Outpatient Infusion Progress Note  Name:Rekha Chaparro  : 7/3/1933  MRN: [id number]    Pt arrived to [facility] ambulatory in no acute distress at 1425 for Venofer . Assessment unremarkable except numbness in left lower extremity, constipation, dry skin, and swelling in both ankles . IV established in left acsite WNL. Labs obtained,  and . Nurse reviewed medication and gave patient and daughter handout on medication. Vital Signs:  Patient Vitals for the past 12 hrs:   Temp Pulse Resp BP SpO2   22 1530  (!) 56  (!) 119/53    22 1426 96.8 °F (36 °C) 63 17 (!) 112/59 100 %       Patient denied having any symptoms of COVID-19, i.e. SOB, coughing, fever, or generally not feeling well. Also denies having been exposed to COVID-19 recently or having had any recent contact with family/friend that has a pending COVID test.     The following medications administered:  Medications Administered     iron sucrose (VENOFER) injection 200 mg     Admin Date  2022 Action  Given Dose  200 mg Route  IntraVENous Administered By  Cameron Red RN          sodium chloride (NS) flush 10 mL     Admin Date  2022 Action  Given Dose  10 mL Route  IntraVENous Administered By  Cameron Rde RN           Admin Date  2022 Action  Given Dose  10 mL Route  IntraVENous Administered By  Cameron Red, MICHAEL            Patient observed for 30 minutes post infusion with no adverse events. Pt tolerated treatment well. IV flushed per policy and removed, 2x2 and coban  placed. Pt discharged ambulatory in no acute distress at 1525, accompanied by daughter. Next appointment  22 @ 1400.     Future Appointments   Date Time Provider Jazmyne Cristina   2022  2:00 PM SOTELO FASTRACK 6 [facility] REG   2022 11:00 AM SOTELO FASTRACK 6 [facility] REG   2022  2:00 PM SOTELO FASTRACK 4 [facility] REG   2022  2:00 PM SOTELO FASTRACK 2 [facility] REG   2022 11:15 AM Muriel Berg MD [facility] AMB   12/26/2022 11:00 AM Moy Robertson MD [facility] BS AMB

## 2022-04-05 ENCOUNTER — HOSPITAL ENCOUNTER (OUTPATIENT)
Dept: INFUSION THERAPY | Age: 87
Discharge: HOME | End: 2022-04-05
Payer: MEDICARE

## 2022-04-05 VITALS
SYSTOLIC BLOOD PRESSURE: 122 MMHG | OXYGEN SATURATION: 98 % | TEMPERATURE: 97.8 F | DIASTOLIC BLOOD PRESSURE: 60 MMHG | HEART RATE: 58 BPM | RESPIRATION RATE: 16 BRPM

## 2022-04-05 DIAGNOSIS — D50.0 IRON DEFICIENCY ANEMIA DUE TO CHRONIC BLOOD LOSS: Primary | ICD-10-CM

## 2022-04-05 PROCEDURE — 74011000250 HC RX REV CODE- 250: Performed by: INTERNAL MEDICINE

## 2022-04-05 PROCEDURE — 96374 THER/PROPH/DIAG INJ IV PUSH: CPT

## 2022-04-05 PROCEDURE — 74011250636 HC RX REV CODE- 250/636: Performed by: INTERNAL MEDICINE

## 2022-04-05 RX ORDER — SODIUM CHLORIDE 0.9 % (FLUSH) 0.9 %
10 SYRINGE (ML) INJECTION AS NEEDED
Status: DISCONTINUED | OUTPATIENT
Start: 2022-04-05 | End: 2022-04-06 | Stop reason: HOSPADM

## 2022-04-05 RX ADMIN — SODIUM CHLORIDE, PRESERVATIVE FREE 10 ML: 5 INJECTION INTRAVENOUS at 14:23

## 2022-04-05 RX ADMIN — SODIUM CHLORIDE, PRESERVATIVE FREE 10 ML: 5 INJECTION INTRAVENOUS at 14:12

## 2022-04-05 RX ADMIN — IRON SUCROSE 200 MG: 20 INJECTION, SOLUTION INTRAVENOUS at 14:15

## 2022-04-05 NOTE — PROGRESS NOTES
Outpatient Infusion Center Progress Note    1400 - Pt admit to [facility] for Venofer 2/5 in stable condition. Assessment completed. Patient denied having any symptoms of COVID-19, i.e. SOB, coughing, fever, or generally not feeling well. Also denies having been exposed to COVID-19 recently or having had any recent contact with family/friend that has a pending COVID test.     24G PIV established in Right AC with positive blood return noted. Patient Vitals for the past 12 hrs:   Temp Pulse Resp BP SpO2   04/05/22 1454 97.8 °F (36.6 °C) (!) 58 16 122/60 98 %   04/05/22 1406 97.9 °F (36.6 °C) 68 18 (!) 122/59 98 %       The following medications administered:  Medications Administered     iron sucrose (VENOFER) injection 200 mg     Admin Date  04/05/2022 Action  Given Dose  200 mg Route  IntraVENous Administered By  Curtis PECK          sodium chloride (NS) flush 10 mL     Admin Date  04/05/2022 Action  Given Dose  10 mL Route  IntraVENous Administered By  Cathleen Whyte Date  04/05/2022 Action  Given Dose  10 mL Route  IntraVENous Administered By  Curtis PECK              Pt monitored x 30 mins post infusion. Pt tolerated well, no s/s of adverse reaction noted. PIV flushed and discontinued. Site wrapped in gauze and coban. Pt provided with education on possible side effects of medication along with discharge instructions. Pt verbalized understanding. 1500- D/C home in no distress.      Pt aware of next appointment scheduled for:     Future Appointments   Date Time Provider Jazmyne Ibeth   4/12/2022 11:00 AM SOTELO FASTRACK 6 [facility] REG   4/19/2022  2:00 PM SOTELO FASTRACK 4 [facility] REG   4/26/2022  2:00 PM SOTELO FASTRACK 2 [facility] REG   9/26/2022 11:15 AM Ashley De Leon MD [facility] BS AMB   12/26/2022 11:00 AM Nadege Robertson MD [facility] BS AMB

## 2022-04-07 ENCOUNTER — PATIENT MESSAGE (OUTPATIENT)
Dept: INTERNAL MEDICINE CLINIC | Age: 87
End: 2022-04-07

## 2022-04-07 DIAGNOSIS — F41.9 ANXIETY: ICD-10-CM

## 2022-04-07 NOTE — TELEPHONE ENCOUNTER
----- Message from Kamille Go sent at 4/7/2022  1:50 PM EDT -----  Regarding: Prescription   Dr. Alba Osgood,  Would you please approve refill for Alprazolam 0.25.  Thank you very much, Chidi Modi  Have a good day

## 2022-04-08 RX ORDER — ALPRAZOLAM 0.25 MG/1
TABLET ORAL
Qty: 30 TABLET | Refills: 0 | Status: SHIPPED | OUTPATIENT
Start: 2022-04-08 | End: 2022-04-27 | Stop reason: SDUPTHER

## 2022-04-11 ENCOUNTER — HOSPITAL ENCOUNTER (OUTPATIENT)
Dept: INFUSION THERAPY | Age: 87
Discharge: HOME | End: 2022-04-11
Payer: MEDICARE

## 2022-04-11 ENCOUNTER — TELEPHONE (OUTPATIENT)
Dept: ONCOLOGY | Age: 87
End: 2022-04-11

## 2022-04-11 ENCOUNTER — TELEPHONE (OUTPATIENT)
Dept: INTERNAL MEDICINE CLINIC | Age: 87
End: 2022-04-11

## 2022-04-11 VITALS
SYSTOLIC BLOOD PRESSURE: 123 MMHG | OXYGEN SATURATION: 97 % | HEART RATE: 68 BPM | RESPIRATION RATE: 18 BRPM | WEIGHT: 171 LBS | TEMPERATURE: 97.6 F | BODY MASS INDEX: 33.4 KG/M2 | DIASTOLIC BLOOD PRESSURE: 58 MMHG

## 2022-04-11 LAB
BASOPHILS # BLD: 0 K/UL (ref 0–0.1)
BASOPHILS NFR BLD: 1 % (ref 0–1)
DIFFERENTIAL METHOD BLD: ABNORMAL
EOSINOPHIL # BLD: 0.1 K/UL (ref 0–0.4)
EOSINOPHIL NFR BLD: 1 % (ref 0–7)
ERYTHROCYTE [DISTWIDTH] IN BLOOD BY AUTOMATED COUNT: 16.8 % (ref 11.5–14.5)
HCT VFR BLD AUTO: 29.9 % (ref 35–47)
HGB BLD-MCNC: 8.9 G/DL (ref 11.5–16)
IMM GRANULOCYTES # BLD AUTO: 0 K/UL (ref 0–0.04)
IMM GRANULOCYTES NFR BLD AUTO: 0 % (ref 0–0.5)
LYMPHOCYTES # BLD: 0.8 K/UL (ref 0.8–3.5)
LYMPHOCYTES NFR BLD: 14 % (ref 12–49)
MCH RBC QN AUTO: 30.4 PG (ref 26–34)
MCHC RBC AUTO-ENTMCNC: 29.8 G/DL (ref 30–36.5)
MCV RBC AUTO: 102 FL (ref 80–99)
MONOCYTES # BLD: 0.6 K/UL (ref 0–1)
MONOCYTES NFR BLD: 10 % (ref 5–13)
NEUTS SEG # BLD: 4.2 K/UL (ref 1.8–8)
NEUTS SEG NFR BLD: 74 % (ref 32–75)
NRBC # BLD: 0 K/UL (ref 0–0.01)
NRBC BLD-RTO: 0 PER 100 WBC
PLATELET # BLD AUTO: 176 K/UL (ref 150–400)
PMV BLD AUTO: 11.5 FL (ref 8.9–12.9)
RBC # BLD AUTO: 2.93 M/UL (ref 3.8–5.2)
WBC # BLD AUTO: 5.8 K/UL (ref 3.6–11)

## 2022-04-11 PROCEDURE — 85025 COMPLETE CBC W/AUTO DIFF WBC: CPT

## 2022-04-11 PROCEDURE — 36415 COLL VENOUS BLD VENIPUNCTURE: CPT

## 2022-04-11 RX ORDER — HEPARIN 100 UNIT/ML
500 SYRINGE INTRAVENOUS AS NEEDED
Status: CANCELLED | OUTPATIENT
Start: 2022-04-11

## 2022-04-11 RX ORDER — SODIUM CHLORIDE 9 MG/ML
10 INJECTION INTRAMUSCULAR; INTRAVENOUS; SUBCUTANEOUS AS NEEDED
Status: CANCELLED | OUTPATIENT
Start: 2022-04-11

## 2022-04-11 RX ORDER — SODIUM CHLORIDE 0.9 % (FLUSH) 0.9 %
5-10 SYRINGE (ML) INJECTION AS NEEDED
Status: CANCELLED | OUTPATIENT
Start: 2022-04-11

## 2022-04-11 NOTE — TELEPHONE ENCOUNTER
Patient states she has been having some issues since she received her last two infusions and is due for another tomorrow. . Having difficulty walking, out of breath very easily, trouble with her legs not wanting to hold her up and pain in her left hand and foot.

## 2022-04-11 NOTE — TELEPHONE ENCOUNTER
Called pt, 2 identifiers, has had 2 iron infusions, is feeling weak \"all the time\", has difficulty getting out of bed, feels lethargic, advised pt to call Dr. Sharron Last office and that this message would be forwarded to Dr. Esther Bain also.

## 2022-04-11 NOTE — PROGRESS NOTES
[facility] Lab Draw Note:  Arrived - 14:11    Visit Vitals  BP (!) 123/58 (BP 1 Location: Right arm, BP Patient Position: Sitting)   Pulse 68   Temp 97.6 °F (36.4 °C)   Resp 18   Wt 77.6 kg (171 lb)   SpO2 97%   BMI 33.40 kg/m²       Labs drawn peripherally from right arm -    - Tolerated well. Pt denies any acute problems/changes. Discharged from [facility] ambulatory. No distress. Next appt: 4/12/22      See [facility] for pending lab results.

## 2022-04-11 NOTE — TELEPHONE ENCOUNTER
Pt states that she spoke with Dr Julio Bear Nurse. States they want her to check hemaglobin and have scheduled her to come in at 2 pm today to have it checked. States please inform Dr Km Levy and nurse.

## 2022-04-11 NOTE — TELEPHONE ENCOUNTER
Called pt. HIPAA verified by two patient identifiers. SOB and weakness sounds like pt may need blood in addition to iron. Pt will come in for labs at 2pm today. We will then have to figure out where pt will go if she needs blood. No answer given to pt  for the pain in her left and foot as not related to low hgb/iron. Pt appreciative of the call and understands the plan.

## 2022-04-11 NOTE — TELEPHONE ENCOUNTER
Patient called to follow up on mychart request.    States it is time sensitive as next infusion is scheduled for tomorrow. States needs to discuss via phone with nurse please. Original [employer] Message:  Tonya Buchanan  to Katty Aguilar MD    April 8, 2022    4:28 PM  Dr. Alba Osgood,  I have had two iron sessions and am not feeling very well. After the first fusion I was not feeling as well as I did before it and thought it was because it was new. Now after the second one I am feeling weak, sleeping more and my legs are feeling as they did after the shingles. I am back to using the rollater after at home but need the wheelchair to go out. I am having problems sleeping, meaning I have trouble moving in bed again. Please let me know what you advise because I am hesitant about continuing the fusions. I dont know what to do. Thank you for your help.  Chidi Modi      Please call patient at:  [phone]

## 2022-04-12 ENCOUNTER — HOSPITAL ENCOUNTER (OUTPATIENT)
Dept: INFUSION THERAPY | Age: 87
End: 2022-04-12

## 2022-04-13 RX ORDER — ONDANSETRON 2 MG/ML
8 INJECTION INTRAMUSCULAR; INTRAVENOUS AS NEEDED
OUTPATIENT
Start: 2022-04-26

## 2022-04-13 RX ORDER — HYDROCORTISONE SODIUM SUCCINATE 100 MG/2ML
100 INJECTION, POWDER, FOR SOLUTION INTRAMUSCULAR; INTRAVENOUS AS NEEDED
OUTPATIENT
Start: 2022-04-19

## 2022-04-13 RX ORDER — HYDROCORTISONE SODIUM SUCCINATE 100 MG/2ML
100 INJECTION, POWDER, FOR SOLUTION INTRAMUSCULAR; INTRAVENOUS AS NEEDED
OUTPATIENT
Start: 2022-04-26

## 2022-04-13 RX ORDER — SODIUM CHLORIDE 0.9 % (FLUSH) 0.9 %
10 SYRINGE (ML) INJECTION AS NEEDED
OUTPATIENT
Start: 2022-04-26

## 2022-04-13 RX ORDER — ACETAMINOPHEN 325 MG/1
650 TABLET ORAL AS NEEDED
Start: 2022-04-26

## 2022-04-13 RX ORDER — ALBUTEROL SULFATE 0.83 MG/ML
2.5 SOLUTION RESPIRATORY (INHALATION) AS NEEDED
Start: 2022-04-26

## 2022-04-13 RX ORDER — ONDANSETRON 2 MG/ML
8 INJECTION INTRAMUSCULAR; INTRAVENOUS AS NEEDED
OUTPATIENT
Start: 2022-04-19

## 2022-04-13 RX ORDER — SODIUM CHLORIDE 9 MG/ML
25 INJECTION, SOLUTION INTRAVENOUS CONTINUOUS
OUTPATIENT
Start: 2022-04-26

## 2022-04-13 RX ORDER — DIPHENHYDRAMINE HYDROCHLORIDE 50 MG/ML
25 INJECTION, SOLUTION INTRAMUSCULAR; INTRAVENOUS AS NEEDED
Start: 2022-04-19

## 2022-04-13 RX ORDER — DIPHENHYDRAMINE HYDROCHLORIDE 50 MG/ML
50 INJECTION, SOLUTION INTRAMUSCULAR; INTRAVENOUS AS NEEDED
Start: 2022-04-19

## 2022-04-13 RX ORDER — SODIUM CHLORIDE 9 MG/ML
25 INJECTION, SOLUTION INTRAVENOUS CONTINUOUS
OUTPATIENT
Start: 2022-04-19 | End: 2022-04-20

## 2022-04-13 RX ORDER — DIPHENHYDRAMINE HYDROCHLORIDE 50 MG/ML
50 INJECTION, SOLUTION INTRAMUSCULAR; INTRAVENOUS AS NEEDED
Start: 2022-04-26

## 2022-04-13 RX ORDER — SODIUM CHLORIDE 0.9 % (FLUSH) 0.9 %
10 SYRINGE (ML) INJECTION AS NEEDED
OUTPATIENT
Start: 2022-04-19 | End: 2022-04-20

## 2022-04-13 RX ORDER — DIPHENHYDRAMINE HYDROCHLORIDE 50 MG/ML
25 INJECTION, SOLUTION INTRAMUSCULAR; INTRAVENOUS AS NEEDED
Start: 2022-04-26

## 2022-04-13 RX ORDER — HEPARIN 100 UNIT/ML
300-500 SYRINGE INTRAVENOUS AS NEEDED
Start: 2022-04-26

## 2022-04-13 RX ORDER — SODIUM CHLORIDE 9 MG/ML
10 INJECTION INTRAMUSCULAR; INTRAVENOUS; SUBCUTANEOUS AS NEEDED
OUTPATIENT
Start: 2022-04-19

## 2022-04-13 RX ORDER — HEPARIN 100 UNIT/ML
300-500 SYRINGE INTRAVENOUS AS NEEDED
Start: 2022-04-19

## 2022-04-13 RX ORDER — EPINEPHRINE 1 MG/ML
0.3 INJECTION, SOLUTION, CONCENTRATE INTRAVENOUS AS NEEDED
OUTPATIENT
Start: 2022-04-26

## 2022-04-13 RX ORDER — SODIUM CHLORIDE 9 MG/ML
10 INJECTION INTRAMUSCULAR; INTRAVENOUS; SUBCUTANEOUS AS NEEDED
OUTPATIENT
Start: 2022-04-26

## 2022-04-13 RX ORDER — ALBUTEROL SULFATE 0.83 MG/ML
2.5 SOLUTION RESPIRATORY (INHALATION) AS NEEDED
Start: 2022-04-19

## 2022-04-13 RX ORDER — ACETAMINOPHEN 325 MG/1
650 TABLET ORAL AS NEEDED
Start: 2022-04-19

## 2022-04-13 RX ORDER — EPINEPHRINE 1 MG/ML
0.3 INJECTION, SOLUTION, CONCENTRATE INTRAVENOUS AS NEEDED
OUTPATIENT
Start: 2022-04-19

## 2022-04-14 ENCOUNTER — OFFICE VISIT (OUTPATIENT)
Dept: INTERNAL MEDICINE CLINIC | Age: 87
End: 2022-04-14
Payer: MEDICARE

## 2022-04-14 VITALS
WEIGHT: 171 LBS | OXYGEN SATURATION: 93 % | RESPIRATION RATE: 16 BRPM | TEMPERATURE: 97 F | HEART RATE: 66 BPM | DIASTOLIC BLOOD PRESSURE: 62 MMHG | HEIGHT: 60 IN | BODY MASS INDEX: 33.57 KG/M2 | SYSTOLIC BLOOD PRESSURE: 106 MMHG

## 2022-04-14 DIAGNOSIS — I10 PRIMARY HYPERTENSION: ICD-10-CM

## 2022-04-14 DIAGNOSIS — M54.41 CHRONIC MIDLINE LOW BACK PAIN WITH BILATERAL SCIATICA: ICD-10-CM

## 2022-04-14 DIAGNOSIS — M54.42 CHRONIC MIDLINE LOW BACK PAIN WITH BILATERAL SCIATICA: ICD-10-CM

## 2022-04-14 DIAGNOSIS — G89.29 CHRONIC MIDLINE LOW BACK PAIN WITH BILATERAL SCIATICA: ICD-10-CM

## 2022-04-14 DIAGNOSIS — E03.9 ACQUIRED HYPOTHYROIDISM: ICD-10-CM

## 2022-04-14 DIAGNOSIS — D64.9 ANEMIA, UNSPECIFIED TYPE: ICD-10-CM

## 2022-04-14 DIAGNOSIS — R53.82 CHRONIC FATIGUE: ICD-10-CM

## 2022-04-14 DIAGNOSIS — N18.31 STAGE 3A CHRONIC KIDNEY DISEASE (HCC): Primary | ICD-10-CM

## 2022-04-14 PROCEDURE — 1101F PT FALLS ASSESS-DOCD LE1/YR: CPT | Performed by: INTERNAL MEDICINE

## 2022-04-14 PROCEDURE — G8417 CALC BMI ABV UP PARAM F/U: HCPCS | Performed by: INTERNAL MEDICINE

## 2022-04-14 PROCEDURE — G8536 NO DOC ELDER MAL SCRN: HCPCS | Performed by: INTERNAL MEDICINE

## 2022-04-14 PROCEDURE — G0463 HOSPITAL OUTPT CLINIC VISIT: HCPCS | Performed by: INTERNAL MEDICINE

## 2022-04-14 PROCEDURE — 99214 OFFICE O/P EST MOD 30 MIN: CPT | Performed by: INTERNAL MEDICINE

## 2022-04-14 PROCEDURE — G8510 SCR DEP NEG, NO PLAN REQD: HCPCS | Performed by: INTERNAL MEDICINE

## 2022-04-14 PROCEDURE — 1090F PRES/ABSN URINE INCON ASSESS: CPT | Performed by: INTERNAL MEDICINE

## 2022-04-14 PROCEDURE — G8427 DOCREV CUR MEDS BY ELIG CLIN: HCPCS | Performed by: INTERNAL MEDICINE

## 2022-04-14 RX ORDER — TRAMADOL HYDROCHLORIDE 50 MG/1
50 TABLET ORAL EVERY 12 HOURS
Qty: 60 TABLET | Refills: 0 | Status: SHIPPED | OUTPATIENT
Start: 2022-04-14 | End: 2022-05-14

## 2022-04-14 NOTE — PROGRESS NOTES
Ms. Liza Griffin is presenting to follow up     CC:  Follow-up, Abnormal Lab Results, and Hypertension       HPI:    Mrs Fady Cline is a 81 yo woman with a hx of post herpetic neuralgia, anxiety, HTN, spondylolysis of lumbar region, hypothyroidism, CKD stage III and anemia presenting for fatigue and generally not feeling well     Patient reports after iron infusion on 04/05  has felt generalized weakness, with increase facial pain ( hx of shingles)  She feels shaky and tired. today she feels slighly better \" I am half way better\"  No fever no chills and no cough   Repeat CBC showed hemoglobin is 8.9       Constipation: on linzess and working well      Anxiety  Takes 0.025 of Xanax at bedtime when having severe insomnia/ not daily   No confusion      Essential hypertension  Blood pressure she is on lisinopril 2.5 mg BID metoprolol 50mg long acting  Blood pressure is low today  She feels fatigued     Spondylolysis of lumbar region  Patient has chronic back pain wears brace. She takes tramadol 50 mg twice a day with improvement in quality of life.   Tramadol improves her quality of life  She is requesting a refill today        Stage 3a chronic kidney disease  This is followed by [facility] Dr Bakari Wilcox dose meds    Acquired hypothyroidism  Synthroid 75 mcg daily   Feels fatigued                Review of systems:  Constitutional: negative for fever, chills, weight loss, night sweats   10 systems reviewed and negative other then HPI       Past Medical History:   Diagnosis Date    Anemia     CAD (coronary artery disease)     stent placed on left 1 stent,in 2007    Cancer [facility])     BCCA    Chronic kidney disease     DVT (deep venous thrombosis) (HCC)     GERD (gastroesophageal reflux disease)     Hypercholesterolemia     Hypertension     Pulmonary embolism ([address] 9/15/2015    Thromboembolus ([address]     DVT after hernia operation, PE spontaneous 10 years later    Thyroid disease         Past Surgical History:   Procedure Laterality Date    HX CORONARY STENT PLACEMENT      HX GI      esophageal hiatal hernia - 2004    HX GYN      partial hysterectomy - 1970    HX HEART CATHETERIZATION      HX OTHER SURGICAL      BCCAs removed    HX UROLOGICAL      bladder repair - 1999       Allergies   Allergen Reactions    Codeine Unknown (comments)     Unsure of reaction.  Gabapentin Other (comments)     Hallucinations even at lowest dose    Morphine Hives and Itching       Current Outpatient Medications on File Prior to Visit   Medication Sig Dispense Refill    ALPRAZolam (XANAX) 0.25 mg tablet TAKE ONE TABLET BY MOUTH ONE TIME DAILY AS NEEDED 30 Tablet 0    rosuvastatin (CRESTOR) 40 mg tablet TAKE ONE TABLET BY MOUTH EVERY EVENING 90 Tablet 3    alendronate (FOSAMAX) 10 mg tablet TAKE ONE TABLET BY MOUTH EVERY MORNING BEFORE BREAKFAST 90 Tablet 2    levothyroxine (SYNTHROID) 75 mcg tablet TAKE ONE TABLET BY MOUTH ONE TIME DAILY BEFORE BREAKFAST 90 Tablet 3    Foam Bandage (Tendra Mepilex Border) 6 X 8 \" bndg 1 Each by Apply Externally route daily. 30 Each 1    Linzess 145 mcg cap capsule TAKE ONE CAPSULE BY MOUTH EVERY MORNING BEFORE BREAKFAST 30 Capsule 5    pregabalin (LYRICA) 50 mg capsule Take 1 Capsule by mouth nightly. Max Daily Amount: 50 mg. 90 Capsule 3    metoprolol tartrate (LOPRESSOR) 50 mg tablet TAKE ONE TABLET BY MOUTH TWICE A  Tablet 3    acetaminophen (TYLENOL) 325 mg tablet Take 2 Tablets by mouth every four (4) hours as needed for Pain. 20 Tablet 0    acetaminophen (TYLENOL ARTHRITIS PAIN) 650 mg TbER Take 650 mg by mouth every eight (8) hours.  cholecalciferol, vitamin D3, (VITAMIN D3) 2,000 unit tab Take 1 Tab by mouth daily. 30 Tab 0    nitroglycerin (NITROSTAT) 0.3 mg SL tablet 1 tablet by SubLINGual route every five (5) minutes as needed for Chest Pain. 1 Bottle 1    aspirin 81 mg Tab Take 81 mg by mouth daily. No current facility-administered medications on file prior to visit. family history includes Dementia in her father; Diabetes in her son; Heart Disease in her mother; Kidney Disease in her son. Social History     Socioeconomic History    Marital status:      Spouse name: Not on file    Number of children: Not on file    Years of education: Not on file    Highest education level: Not on file   Occupational History    Not on file   Tobacco Use    Smoking status: Never Smoker    Smokeless tobacco: Never Used   Vaping Use    Vaping Use: Never used   Substance and Sexual Activity    Alcohol use: No    Drug use: No     Types: Sedatives/Hypnotics, Prescription    Sexual activity: Not Currently     Partners: Male   Other Topics Concern    Not on file   Social History Narrative    Not on file     Social Determinants of Health     Financial Resource Strain:     Difficulty of Paying Living Expenses: Not on file   Food Insecurity:     Worried About Running Out of Food in the Last Year: Not on file    Yarely of Food in the Last Year: Not on file   Transportation Needs:     Lack of Transportation (Medical): Not on file    Lack of Transportation (Non-Medical):  Not on file   Physical Activity:     Days of Exercise per Week: Not on file    Minutes of Exercise per Session: Not on file   Stress:     Feeling of Stress : Not on file   Social Connections:     Frequency of Communication with Friends and Family: Not on file    Frequency of Social Gatherings with Friends and Family: Not on file    Attends Jehovah's witness Services: Not on file    Active Member of Clubs or Organizations: Not on file    Attends Club or Organization Meetings: Not on file    Marital Status: Not on file   Intimate Partner Violence:     Fear of Current or Ex-Partner: Not on file    Emotionally Abused: Not on file    Physically Abused: Not on file    Sexually Abused: Not on file   Housing Stability:     Unable to Pay for Housing in the Last Year: Not on file    Number of [place] in the Last Year: Not on file    Unstable Housing in the Last Year: Not on file       Visit Vitals  /62   Pulse 66   Temp 97 °F (36.1 °C) (Temporal)   Resp 16   Ht 5' (1.524 m)   Wt 171 lb (77.6 kg)   SpO2 93%   BMI 33.40 kg/m²     General:  Well appearing female no acute distress  HEENT:   PERRL,normal conjunctiva. External ear and canals normal, TMs normal.  Hearing normal to voice. Nose without edema or discharge, normal septum. Lips, teeth, gums normal.  Oropharynx: no erythema, no exudates, no lesions, normal tongue. Neck:  Supple. Thyroid normal size, nontender, without nodules. No carotid bruit. No masses or lymphadenopathy  Respiratory: no respiratory distress,  no wheezing, no rhonchi, no rales. No chest wall tenderness. Cardiovascular:  RRR, normal S1S2, no murmur. Gastrointestinal: normal bowel sounds, soft, nontender, without masses. No hepatosplenomegaly. Extremities +2 pulses, no edema, normal sensation   Musculoskeletal:  Normal gait. Normal digits and nails. Normal strength and tone, no atrophy, and no abnormal movement. Skin:  No rash, no lesions, no ulcers. Skin warm, normal turgor, without induration or nodules. Neuro:  A and OX4, fluent speech, cranial nerves normal 2-12.    Psych:  Normal affect      Lab Results   Component Value Date/Time    WBC 5.8 04/11/2022 02:19 PM    HGB 8.9 (L) 04/11/2022 02:19 PM    HCT 29.9 (L) 04/11/2022 02:19 PM    PLATELET 056 41/04/3744 02:19 PM    .0 (H) 04/11/2022 02:19 PM     Lab Results   Component Value Date/Time    Sodium 143 02/18/2022 12:28 PM    Potassium 3.9 02/18/2022 12:28 PM    Chloride 113 (H) 02/18/2022 12:28 PM    CO2 26 02/18/2022 12:28 PM    Anion gap 4 (L) 02/18/2022 12:28 PM    Glucose 110 (H) 02/18/2022 12:28 PM    BUN 27 (H) 02/18/2022 12:28 PM    Creatinine 1.69 (H) 02/18/2022 12:28 PM    BUN/Creatinine ratio 16 02/18/2022 12:28 PM    GFR est AA 35 (L) 02/18/2022 12:28 PM    GFR est non-AA 29 (L) 02/18/2022 12:28 PM Calcium 10.4 (H) 02/18/2022 12:28 PM     Lab Results   Component Value Date/Time    Cholesterol, total 146 09/10/2021 12:40 PM    HDL Cholesterol 59 09/10/2021 12:40 PM    LDL, calculated 59.2 09/10/2021 12:40 PM    VLDL, calculated 27.8 09/10/2021 12:40 PM    Triglyceride 139 09/10/2021 12:40 PM    CHOL/HDL Ratio 2.5 09/10/2021 12:40 PM     Lab Results   Component Value Date/Time    TSH 2.54 09/10/2021 12:40 PM     Lab Results   Component Value Date/Time    Hemoglobin A1c 5.7 05/07/2009 01:40 PM     Lab Results   Component Value Date/Time    Vitamin D 25-Hydroxy 49.1 09/10/2021 12:40 PM                   Assessment and Plan:     Mrs Lyndsay Jackson is a 79 yo woman with a hx of post herpetic neuralgia, anxiety, HTN, spondylolysis of lumbar region, hypothyroidism, CKD stage III and anemia presenting for fatigue and generally not feeling well   Patient associated onset  symptoms with iron infusion     2. Anemia, unspecified type  Hemoglobin 8.9 s/p 2 iron infusions, patient is hesitant to do another iron infusion     3. Acute on  Chronic fatigue  Will check labs including TSH and CMP  Possibly due to hypotension - stop lisinopril    4. Primary hypertension  BP is low - will stop lisinopril and follow up     5. Acquired hypothyroidism  - TSH 3RD GENERATION; Future  - T4, FREE; Future    6. Chronic midline low back pain with bilateral sciatica  - traMADoL (ULTRAM) 50 mg tablet; Take 1 Tablet by mouth every twelve (12) hours for 30 days. Max Daily Amount: 100 mg. Dispense: 60 Tablet; Refill: 0      Follow-up and Dispositions    · Return in about 4 weeks (around 5/12/2022) for blood pressure.           Dontrell Rodríguez MD

## 2022-04-14 NOTE — PROGRESS NOTES
1. \"Have you been to the ER, urgent care clinic since your last visit? Hospitalized since your last visit? \" No    2. \"Have you seen or consulted any other health care providers outside of the [address] [place] [address] since your last visit? \" Yes Reason for visit: Ernestina Quijano     3. For patients aged 39-70: Has the patient had a colonoscopy / FIT/ Cologuard? NA - based on age      If the patient is female:    4. For patients aged 41-77: Has the patient had a mammogram within the past 2 years? NA - based on age or sex      11. For patients aged 21-65: Has the patient had a pap smear?  NA - based on age or sex

## 2022-04-15 LAB
ALBUMIN SERPL-MCNC: 3.4 G/DL (ref 3.5–5)
ALBUMIN/GLOB SERPL: 1.3 {RATIO} (ref 1.1–2.2)
ALP SERPL-CCNC: 79 U/L (ref 45–117)
ALT SERPL-CCNC: 27 U/L (ref 12–78)
ANION GAP SERPL CALC-SCNC: 5 MMOL/L (ref 5–15)
AST SERPL-CCNC: 39 U/L (ref 15–37)
BILIRUB SERPL-MCNC: 0.4 MG/DL (ref 0.2–1)
BUN SERPL-MCNC: 29 MG/DL (ref 6–20)
BUN/CREAT SERPL: 19 (ref 12–20)
CALCIUM SERPL-MCNC: 10.1 MG/DL (ref 8.5–10.1)
CHLORIDE SERPL-SCNC: 110 MMOL/L (ref 97–108)
CO2 SERPL-SCNC: 27 MMOL/L (ref 21–32)
CREAT SERPL-MCNC: 1.55 MG/DL (ref 0.55–1.02)
GLOBULIN SER CALC-MCNC: 2.7 G/DL (ref 2–4)
GLUCOSE SERPL-MCNC: 98 MG/DL (ref 65–100)
POTASSIUM SERPL-SCNC: 4.5 MMOL/L (ref 3.5–5.1)
PROT SERPL-MCNC: 6.1 G/DL (ref 6.4–8.2)
SODIUM SERPL-SCNC: 142 MMOL/L (ref 136–145)
T4 FREE SERPL-MCNC: 1.1 NG/DL (ref 0.8–1.5)
TSH SERPL DL<=0.05 MIU/L-ACNC: 1.01 UIU/ML (ref 0.36–3.74)

## 2022-04-15 NOTE — PROGRESS NOTES
Kidney function stable at CKD stage III ( chronic renal disease no significant change from previous)  Thyroid is at goal

## 2022-04-18 ENCOUNTER — PATIENT MESSAGE (OUTPATIENT)
Dept: INTERNAL MEDICINE CLINIC | Age: 87
End: 2022-04-18

## 2022-04-19 ENCOUNTER — HOSPITAL ENCOUNTER (OUTPATIENT)
Dept: INFUSION THERAPY | Age: 87
End: 2022-04-19

## 2022-04-26 ENCOUNTER — APPOINTMENT (OUTPATIENT)
Dept: INFUSION THERAPY | Age: 87
End: 2022-04-26

## 2022-04-27 DIAGNOSIS — F41.9 ANXIETY: ICD-10-CM

## 2022-04-27 RX ORDER — ALPRAZOLAM 0.25 MG/1
TABLET ORAL
Qty: 30 TABLET | Refills: 0 | Status: SHIPPED | OUTPATIENT
Start: 2022-04-27 | End: 2022-06-06 | Stop reason: SDUPTHER

## 2022-04-27 NOTE — TELEPHONE ENCOUNTER
----- Message from Codi Bartlett sent at 4/27/2022 11:58 AM EDT -----  Regarding: Prescription  Dr. Nataliya Fernandez,  Would you please send a refill prescription to [facility] pharmacy for Alprazolam 0.25 mg. Have a good day.   Thank you, Taya Ballard

## 2022-04-27 NOTE — TELEPHONE ENCOUNTER
Future Appointments:  Future Appointments   Date Time Provider Jazmyne Daveyi   2022 11:15 AM Nikc Gill MD [facility] BS AMB   2022 11:15 AM Purnell Apgar, MD [facility] AMB   2022 11:00 AM Evy Robertson MD [facility] BS AMB        Last Appointment With Me:  2022     Requested Prescriptions     Pending Prescriptions Disp Refills    ALPRAZolam (XANAX) 0.25 mg tablet 30 Tablet 0     Sig: TAKE ONE TABLET BY MOUTH ONE TIME DAILY AS NEEDED

## 2022-05-09 DIAGNOSIS — B02.29 POST HERPETIC NEURALGIA: ICD-10-CM

## 2022-05-09 RX ORDER — PREGABALIN 50 MG/1
50 CAPSULE ORAL
Qty: 90 CAPSULE | Refills: 3 | Status: SHIPPED | OUTPATIENT
Start: 2022-05-09 | End: 2022-08-23 | Stop reason: ALTCHOICE

## 2022-05-09 NOTE — TELEPHONE ENCOUNTER
Future Appointments:  Future Appointments   Date Time Provider Jazmyne Cristina   5/12/2022 11:15 AM Nick Amador MD [facility] BS AMB   9/26/2022 11:15 AM Layla Metzger MD [facility] AMB   12/26/2022 11:00 AM Shashi Robertson MD [facility] BS AMB        Last Appointment With Me:  4/14/2022     Requested Prescriptions     Pending Prescriptions Disp Refills    pregabalin (LYRICA) 50 mg capsule 90 Capsule 3     Sig: Take 1 Capsule by mouth nightly. Max Daily Amount: 50 mg.

## 2022-05-09 NOTE — TELEPHONE ENCOUNTER
----- Message from Codi Bartlett sent at 2022  1:21 PM EDT -----  Regarding: Pregabalin capsules  Dr. Nataliya Fernandez,  The prescription pregabalin capsules 50 mg I took for shingles has  and I still have the sensation on my face. Would you prescribe it again? Or is it possible that I will always have it.   Thank you very much, Taya Ballard

## 2022-05-12 ENCOUNTER — OFFICE VISIT (OUTPATIENT)
Dept: INTERNAL MEDICINE CLINIC | Age: 87
End: 2022-05-12
Payer: MEDICARE

## 2022-05-12 VITALS
DIASTOLIC BLOOD PRESSURE: 62 MMHG | WEIGHT: 169 LBS | TEMPERATURE: 97 F | HEIGHT: 60 IN | SYSTOLIC BLOOD PRESSURE: 114 MMHG | OXYGEN SATURATION: 97 % | BODY MASS INDEX: 33.18 KG/M2 | RESPIRATION RATE: 16 BRPM | HEART RATE: 61 BPM

## 2022-05-12 DIAGNOSIS — G89.29 CHRONIC MIDLINE LOW BACK PAIN WITH BILATERAL SCIATICA: ICD-10-CM

## 2022-05-12 DIAGNOSIS — D64.9 ANEMIA, UNSPECIFIED TYPE: ICD-10-CM

## 2022-05-12 DIAGNOSIS — I10 PRIMARY HYPERTENSION: ICD-10-CM

## 2022-05-12 DIAGNOSIS — N18.31 STAGE 3A CHRONIC KIDNEY DISEASE (HCC): ICD-10-CM

## 2022-05-12 DIAGNOSIS — M54.41 CHRONIC MIDLINE LOW BACK PAIN WITH BILATERAL SCIATICA: ICD-10-CM

## 2022-05-12 DIAGNOSIS — B02.29 POST HERPETIC NEURALGIA: Primary | ICD-10-CM

## 2022-05-12 DIAGNOSIS — F41.9 ANXIETY: ICD-10-CM

## 2022-05-12 DIAGNOSIS — M54.42 CHRONIC MIDLINE LOW BACK PAIN WITH BILATERAL SCIATICA: ICD-10-CM

## 2022-05-12 PROBLEM — N18.30 CHRONIC RENAL DISEASE, STAGE III (HCC): Status: ACTIVE | Noted: 2022-05-12

## 2022-05-12 PROCEDURE — G8510 SCR DEP NEG, NO PLAN REQD: HCPCS | Performed by: INTERNAL MEDICINE

## 2022-05-12 PROCEDURE — G8427 DOCREV CUR MEDS BY ELIG CLIN: HCPCS | Performed by: INTERNAL MEDICINE

## 2022-05-12 PROCEDURE — 99214 OFFICE O/P EST MOD 30 MIN: CPT | Performed by: INTERNAL MEDICINE

## 2022-05-12 PROCEDURE — 1101F PT FALLS ASSESS-DOCD LE1/YR: CPT | Performed by: INTERNAL MEDICINE

## 2022-05-12 PROCEDURE — 1090F PRES/ABSN URINE INCON ASSESS: CPT | Performed by: INTERNAL MEDICINE

## 2022-05-12 PROCEDURE — G0463 HOSPITAL OUTPT CLINIC VISIT: HCPCS | Performed by: INTERNAL MEDICINE

## 2022-05-12 PROCEDURE — G8417 CALC BMI ABV UP PARAM F/U: HCPCS | Performed by: INTERNAL MEDICINE

## 2022-05-12 PROCEDURE — G8536 NO DOC ELDER MAL SCRN: HCPCS | Performed by: INTERNAL MEDICINE

## 2022-05-12 RX ORDER — TRAMADOL HYDROCHLORIDE 50 MG/1
50 TABLET ORAL EVERY 12 HOURS
Qty: 60 TABLET | Refills: 0 | Status: CANCELLED | OUTPATIENT
Start: 2022-05-12 | End: 2022-06-11

## 2022-05-12 RX ORDER — TRAMADOL HYDROCHLORIDE 50 MG/1
50 TABLET ORAL EVERY 12 HOURS
Qty: 60 TABLET | Refills: 0 | Status: SHIPPED | OUTPATIENT
Start: 2022-05-12 | End: 2022-06-11

## 2022-05-12 NOTE — PROGRESS NOTES
1. \"Have you been to the ER, urgent care clinic since your last visit? Hospitalized since your last visit? \" No    2. \"Have you seen or consulted any other health care providers outside of the [address] [place] [address] since your last visit? \" No     3. For patients aged 39-70: Has the patient had a colonoscopy / FIT/ Cologuard? NA - based on age      If the patient is female:    4. For patients aged 41-77: Has the patient had a mammogram within the past 2 years? NA - based on age or sex      11. For patients aged 21-65: Has the patient had a pap smear?  NA - based on age or sex

## 2022-05-12 NOTE — PATIENT INSTRUCTIONS
Blood pressure is excellent today- periodically check your blood pressure    Will check CBC    Continue iron rich foods    Add B12 supplement 500mcg daily

## 2022-05-12 NOTE — PROGRESS NOTES
Ms. Paul Ibarra is presenting to follow up     CC:  Follow-up, Hypertension, and Medication Refill       HPI:    Mrs Alicia Huerta is a 79 yo woman with a hx of post herpetic neuralgia, anxiety, HTN, spondylolysis of lumbar region, hypothyroidism, CKD stage III and anemia presenting for fatigue and generally not feeling well     Patient reports after iron infusion on 04/05  has felt generalized weakness, with increase facial pain ( hx of shingles)  She feels shaky and tired. today she feels slighly better \" I am half way better\"  No fever no chills and no cough   Repeat CBC showed hemoglobin is 8.9       Constipation: on linzess and working well      Anxiety  Takes 0.025 of Xanax at bedtime when having severe insomnia/ not daily   No confusion      Essential hypertension  Blood pressure she is on lisinopril 2.5 mg BID metoprolol 50mg long acting  Blood pressure is low today  She feels fatigued     Spondylolysis of lumbar region  Patient has chronic back pain wears brace. She takes tramadol 50 mg twice a day with improvement in quality of life.   Tramadol improves her quality of life  She is requesting a refill today        Stage 3a chronic kidney disease  This is followed by [facility] dose meds    Acquired hypothyroidism  Synthroid 75 mcg daily   Feels fatigued                Review of systems:  Constitutional: negative for fever, chills, weight loss, night sweats   10 systems reviewed and negative other then HPI       Past Medical History:   Diagnosis Date    Anemia     CAD (coronary artery disease)     stent placed on left 1 stent,in 2007    Cancer [facility])     BCCA    Chronic kidney disease     DVT (deep venous thrombosis) (HCC)     GERD (gastroesophageal reflux disease)     Hypercholesterolemia     Hypertension     Pulmonary embolism ([address] 9/15/2015    Thromboembolus ([address]     DVT after hernia operation, PE spontaneous 10 years later    Thyroid disease         Past Surgical History:   Procedure Laterality Date    HX CORONARY STENT PLACEMENT      HX GI      esophageal hiatal hernia - 2004    HX GYN      partial hysterectomy - 1970    HX HEART CATHETERIZATION      HX OTHER SURGICAL      BCCAs removed    HX UROLOGICAL      bladder repair - 1999       Allergies   Allergen Reactions    Codeine Unknown (comments)     Unsure of reaction.  Gabapentin Other (comments)     Hallucinations even at lowest dose    Morphine Hives and Itching       Current Outpatient Medications on File Prior to Visit   Medication Sig Dispense Refill    pregabalin (LYRICA) 50 mg capsule Take 1 Capsule by mouth nightly. Max Daily Amount: 50 mg. 90 Capsule 3    ALPRAZolam (XANAX) 0.25 mg tablet TAKE ONE TABLET BY MOUTH ONE TIME DAILY AS NEEDED 30 Tablet 0    traMADoL (ULTRAM) 50 mg tablet Take 1 Tablet by mouth every twelve (12) hours for 30 days. Max Daily Amount: 100 mg. 60 Tablet 0    rosuvastatin (CRESTOR) 40 mg tablet TAKE ONE TABLET BY MOUTH EVERY EVENING 90 Tablet 3    alendronate (FOSAMAX) 10 mg tablet TAKE ONE TABLET BY MOUTH EVERY MORNING BEFORE BREAKFAST 90 Tablet 2    levothyroxine (SYNTHROID) 75 mcg tablet TAKE ONE TABLET BY MOUTH ONE TIME DAILY BEFORE BREAKFAST 90 Tablet 3    Foam Bandage (Tendra Mepilex Border) 6 X 8 \" bndg 1 Each by Apply Externally route daily. 30 Each 1    Linzess 145 mcg cap capsule TAKE ONE CAPSULE BY MOUTH EVERY MORNING BEFORE BREAKFAST 30 Capsule 5    metoprolol tartrate (LOPRESSOR) 50 mg tablet TAKE ONE TABLET BY MOUTH TWICE A  Tablet 3    acetaminophen (TYLENOL) 325 mg tablet Take 2 Tablets by mouth every four (4) hours as needed for Pain. 20 Tablet 0    acetaminophen (TYLENOL ARTHRITIS PAIN) 650 mg TbER Take 650 mg by mouth every eight (8) hours.  cholecalciferol, vitamin D3, (VITAMIN D3) 2,000 unit tab Take 1 Tab by mouth daily. 30 Tab 0    nitroglycerin (NITROSTAT) 0.3 mg SL tablet 1 tablet by SubLINGual route every five (5) minutes as needed for Chest Pain.  1 Bottle 1    aspirin 81 mg Tab Take 81 mg by mouth daily. No current facility-administered medications on file prior to visit. family history includes Dementia in her father; Diabetes in her son; Heart Disease in her mother; Kidney Disease in her son. Social History     Socioeconomic History    Marital status:      Spouse name: Not on file    Number of children: Not on file    Years of education: Not on file    Highest education level: Not on file   Occupational History    Not on file   Tobacco Use    Smoking status: Never Smoker    Smokeless tobacco: Never Used   Vaping Use    Vaping Use: Never used   Substance and Sexual Activity    Alcohol use: No    Drug use: No     Types: Sedatives/Hypnotics, Prescription    Sexual activity: Not Currently     Partners: Male   Other Topics Concern    Not on file   Social History Narrative    Not on file     Social Determinants of Health     Financial Resource Strain:     Difficulty of Paying Living Expenses: Not on file   Food Insecurity:     Worried About Running Out of Food in the Last Year: Not on file    Yarely of Food in the Last Year: Not on file   Transportation Needs:     Lack of Transportation (Medical): Not on file    Lack of Transportation (Non-Medical):  Not on file   Physical Activity:     Days of Exercise per Week: Not on file    Minutes of Exercise per Session: Not on file   Stress:     Feeling of Stress : Not on file   Social Connections:     Frequency of Communication with Friends and Family: Not on file    Frequency of Social Gatherings with Friends and Family: Not on file    Attends Congregational Services: Not on file    Active Member of Clubs or Organizations: Not on file    Attends Club or Organization Meetings: Not on file    Marital Status: Not on file   Intimate Partner Violence:     Fear of Current or Ex-Partner: Not on file    Emotionally Abused: Not on file    Physically Abused: Not on file   Kyleigh Booker Sexually Abused: Not on file   Housing Stability:     Unable to Pay for Housing in the Last Year: Not on file    Number of Ester in the Last Year: Not on file    Unstable Housing in the Last Year: Not on file       Visit Vitals  /62   Pulse 61   Temp 97 °F (36.1 °C) (Temporal)   Resp 16   Ht 5' (1.524 m)   Wt 169 lb (76.7 kg) Comment: Patient is wearing her back brace   SpO2 97%   BMI 33.01 kg/m²     General:  Well appearing female no acute distress  HEENT:   PERRL,normal conjunctiva. External ear and canals normal, TMs normal.  Hearing normal to voice. Nose without edema or discharge, normal septum. Lips, teeth, gums normal.  Oropharynx: no erythema, no exudates, no lesions, normal tongue. Neck:  Supple. Thyroid normal size, nontender, without nodules. No carotid bruit. No masses or lymphadenopathy  Respiratory: no respiratory distress,  no wheezing, no rhonchi, no rales. No chest wall tenderness. Cardiovascular:  RRR, normal S1S2, no murmur. Gastrointestinal: normal bowel sounds, soft, nontender, without masses. No hepatosplenomegaly. Extremities +2 pulses, no edema, normal sensation   Musculoskeletal:  Normal gait. Normal digits and nails. Normal strength and tone, no atrophy, and no abnormal movement. Skin:  No rash, no lesions, no ulcers. Skin warm, normal turgor, without induration or nodules. Neuro:  A and OX4, fluent speech, cranial nerves normal 2-12.    Psych:  Normal affect      Lab Results   Component Value Date/Time    WBC 5.8 04/11/2022 02:19 PM    HGB 8.9 (L) 04/11/2022 02:19 PM    HCT 29.9 (L) 04/11/2022 02:19 PM    PLATELET 845 88/33/8969 02:19 PM    .0 (H) 04/11/2022 02:19 PM     Lab Results   Component Value Date/Time    Sodium 142 04/14/2022 12:43 PM    Potassium 4.5 04/14/2022 12:43 PM    Chloride 110 (H) 04/14/2022 12:43 PM    CO2 27 04/14/2022 12:43 PM    Anion gap 5 04/14/2022 12:43 PM    Glucose 98 04/14/2022 12:43 PM    BUN 29 (H) 04/14/2022 12:43 PM    Creatinine 1.55 (H) 04/14/2022 12:43 PM    BUN/Creatinine ratio 19 04/14/2022 12:43 PM    GFR est AA 38 (L) 04/14/2022 12:43 PM    GFR est non-AA 32 (L) 04/14/2022 12:43 PM    Calcium 10.1 04/14/2022 12:43 PM     Lab Results   Component Value Date/Time    Cholesterol, total 146 09/10/2021 12:40 PM    HDL Cholesterol 59 09/10/2021 12:40 PM    LDL, calculated 59.2 09/10/2021 12:40 PM    VLDL, calculated 27.8 09/10/2021 12:40 PM    Triglyceride 139 09/10/2021 12:40 PM    CHOL/HDL Ratio 2.5 09/10/2021 12:40 PM     Lab Results   Component Value Date/Time    TSH 1.01 04/14/2022 12:43 PM     Lab Results   Component Value Date/Time    Hemoglobin A1c 5.7 05/07/2009 01:40 PM     Lab Results   Component Value Date/Time    Vitamin D 25-Hydroxy 49.1 09/10/2021 12:40 PM                   Assessment and Plan:     Mrs Isa Cooks is a 79 yo woman with a hx of post herpetic neuralgia, anxiety, HTN, spondylolysis of lumbar region, hypothyroidism, CKD stage III and anemia presenting for follow up     2. Anemia, unspecified type  Hemoglobin 8.9 s/p 2 iron infusions, patient is hesitant to do another iron infusion   Obtaining iron from diet   Check today    3. Acute on  Chronic fatigue  Better since BP improved     4. Primary hypertension  BP was low last week and stopped lisinopril today BP is normal 114/62       5  Chronic midline low back pain with bilateral sciatica  - traMADoL (ULTRAM) 50 mg tablet; Take 1 Tablet by mouth every twelve (12) hours for 30 days. Max Daily Amount: 100 mg. Dispense: 60 Tablet;  Refill: Uday Nicole MD

## 2022-05-13 LAB
BASOPHILS # BLD: 0 K/UL (ref 0–0.1)
BASOPHILS NFR BLD: 1 % (ref 0–1)
DIFFERENTIAL METHOD BLD: ABNORMAL
EOSINOPHIL # BLD: 0 K/UL (ref 0–0.4)
EOSINOPHIL NFR BLD: 1 % (ref 0–7)
ERYTHROCYTE [DISTWIDTH] IN BLOOD BY AUTOMATED COUNT: 15.6 % (ref 11.5–14.5)
HCT VFR BLD AUTO: 34 % (ref 35–47)
HGB BLD-MCNC: 10.1 G/DL (ref 11.5–16)
IMM GRANULOCYTES # BLD AUTO: 0 K/UL (ref 0–0.04)
IMM GRANULOCYTES NFR BLD AUTO: 0 % (ref 0–0.5)
LYMPHOCYTES # BLD: 0.5 K/UL (ref 0.8–3.5)
LYMPHOCYTES NFR BLD: 11 % (ref 12–49)
MCH RBC QN AUTO: 31.3 PG (ref 26–34)
MCHC RBC AUTO-ENTMCNC: 29.7 G/DL (ref 30–36.5)
MCV RBC AUTO: 105.3 FL (ref 80–99)
MONOCYTES # BLD: 0.4 K/UL (ref 0–1)
MONOCYTES NFR BLD: 9 % (ref 5–13)
NEUTS SEG # BLD: 3.6 K/UL (ref 1.8–8)
NEUTS SEG NFR BLD: 78 % (ref 32–75)
NRBC # BLD: 0 K/UL (ref 0–0.01)
NRBC BLD-RTO: 0 PER 100 WBC
PLATELET # BLD AUTO: 137 K/UL (ref 150–400)
PMV BLD AUTO: 12.4 FL (ref 8.9–12.9)
RBC # BLD AUTO: 3.23 M/UL (ref 3.8–5.2)
RBC MORPH BLD: ABNORMAL
RBC MORPH BLD: ABNORMAL
WBC # BLD AUTO: 4.5 K/UL (ref 3.6–11)

## 2022-05-13 NOTE — PROGRESS NOTES
Blood count is improving your hemoglobin is 10.1.   Continue to eat a diet.'s are rich in iron can also take over-the-counter iron Slow Fe release once a day

## 2022-05-29 RX ORDER — ALENDRONATE SODIUM 10 MG/1
TABLET ORAL
Qty: 90 TABLET | Refills: 2 | Status: SHIPPED | OUTPATIENT
Start: 2022-05-29 | End: 2022-08-23 | Stop reason: ALTCHOICE

## 2022-06-06 DIAGNOSIS — F41.9 ANXIETY: ICD-10-CM

## 2022-06-06 RX ORDER — ALPRAZOLAM 0.25 MG/1
TABLET ORAL
Qty: 30 TABLET | Refills: 0 | Status: SHIPPED | OUTPATIENT
Start: 2022-06-06 | End: 2022-07-05 | Stop reason: SDUPTHER

## 2022-06-06 NOTE — TELEPHONE ENCOUNTER
Future Appointments:  Future Appointments   Date Time Provider Jazmyne Daveyi   8/23/2022 11:15 AM Nick Crawley MD [facility] BS AMB   9/26/2022 11:15 AM Fernandez Madrid MD [facility] BS AMB   12/26/2022 11:00 AM Katie Robertson MD [facility] BS AMB        Last Appointment With Me:  5/12/2022     Requested Prescriptions     Pending Prescriptions Disp Refills    ALPRAZolam (XANAX) 0.25 mg tablet 30 Tablet 0     Sig: TAKE ONE TABLET BY MOUTH ONE TIME DAILY AS NEEDED

## 2022-06-13 DIAGNOSIS — M54.41 CHRONIC MIDLINE LOW BACK PAIN WITH BILATERAL SCIATICA: Primary | ICD-10-CM

## 2022-06-13 DIAGNOSIS — M54.42 CHRONIC MIDLINE LOW BACK PAIN WITH BILATERAL SCIATICA: Primary | ICD-10-CM

## 2022-06-13 DIAGNOSIS — G89.29 CHRONIC MIDLINE LOW BACK PAIN WITH BILATERAL SCIATICA: Primary | ICD-10-CM

## 2022-06-13 RX ORDER — TRAMADOL HYDROCHLORIDE 50 MG/1
50 TABLET ORAL
Qty: 60 TABLET | Refills: 0 | Status: SHIPPED | OUTPATIENT
Start: 2022-06-13 | End: 2022-06-16

## 2022-06-13 NOTE — TELEPHONE ENCOUNTER
----- Message from Juan Rainey sent at 6/12/2022  3:28 PM EDT -----  Regarding: Prescription  Dr. Cristina Otero,  Would you please send a refill prescription for Tramadol 50 mg. I still have pills, last refill was 6/13.   Thank you very much  Asher Sneed

## 2022-06-13 NOTE — TELEPHONE ENCOUNTER
Future Appointments:  Future Appointments   Date Time Provider Jazmyne Cristina   8/23/2022 11:15 AM Nick Whalen MD [facility] BS AMB   9/26/2022 11:15 AM Chao Angeles MD [facility] BS AMB   12/26/2022 11:00 AM Rick Robertson MD [facility] BS AMB        Last Appointment With Me:  5/12/2022     Requested Prescriptions     Pending Prescriptions Disp Refills    traMADoL (ULTRAM) 50 mg tablet 60 Tablet 0     Sig: Take 1 Tablet by mouth every eight (8) hours as needed for Pain for up to 3 days. Max Daily Amount: 150 mg.

## 2022-06-24 ENCOUNTER — TELEPHONE (OUTPATIENT)
Dept: INTERNAL MEDICINE CLINIC | Age: 87
End: 2022-06-24

## 2022-06-24 NOTE — TELEPHONE ENCOUNTER
----- Message from Marie Lovett sent at 6/23/2022  3:25 PM EDT -----  Subject: Message to Provider    QUESTIONS  Information for Provider? Patients Daughter would like to speak with her   moms PCP regarding her current symptoms? Hallucination, Confusion. Saw   something on tv and started texting a family member about something she   may have seen/heard on tv and daughter is concerned she's acting on what   she's hallucinating.  ---------------------------------------------------------------------------  --------------  CALL BACK INFO  What is the best way for the office to contact you? OK to leave message on   voicemail  Preferred Call Back Phone Number? [phone]  ---------------------------------------------------------------------------  --------------  SCRIPT ANSWERS  Relationship to Patient? Other  Representative Name? Lizzie  Is the Representative on the appropriate HIPAA document in Epic?  Yes

## 2022-06-24 NOTE — TELEPHONE ENCOUNTER
Called and let Lizzie know that Dr. Esther Bain is out of the office and I will call her back next week

## 2022-07-01 ENCOUNTER — VIRTUAL VISIT (OUTPATIENT)
Dept: INTERNAL MEDICINE CLINIC | Age: 87
End: 2022-07-01

## 2022-07-05 DIAGNOSIS — F41.9 ANXIETY: ICD-10-CM

## 2022-07-05 RX ORDER — ALPRAZOLAM 0.25 MG/1
TABLET ORAL
Qty: 30 TABLET | Refills: 0 | Status: SHIPPED | OUTPATIENT
Start: 2022-07-05 | End: 2022-08-04 | Stop reason: SDUPTHER

## 2022-07-05 NOTE — TELEPHONE ENCOUNTER
----- Message from Urbano Rogel sent at 7/4/2022  1:14 PM EDT -----  Regarding: Request prescription   Dr. Nick Mancilla,  Please send a prescription to [facility] for Alprazolam 0.25 mg for me. Last prescription was filled 6/6/22. Thank you very much.   Marco A Salinas

## 2022-07-05 NOTE — TELEPHONE ENCOUNTER
Future Appointments:  Future Appointments   Date Time Provider Jazmyne Cristina   7/19/2022  3:15 PM Nick Goldman MD [facility] BS AMB   8/23/2022 11:15 AM Nick Goldman MD [facility] BS AMB   9/26/2022 11:15 AM Dong Red MD [facility] BS AMB        Last Appointment With Me:  5/12/2022     Requested Prescriptions     Pending Prescriptions Disp Refills    ALPRAZolam (XANAX) 0.25 mg tablet 30 Tablet 0     Sig: TAKE ONE TABLET BY MOUTH ONE TIME DAILY AS NEEDED

## 2022-07-13 ENCOUNTER — PATIENT MESSAGE (OUTPATIENT)
Dept: INTERNAL MEDICINE CLINIC | Age: 87
End: 2022-07-13

## 2022-07-13 DIAGNOSIS — M54.41 CHRONIC MIDLINE LOW BACK PAIN WITH BILATERAL SCIATICA: Primary | ICD-10-CM

## 2022-07-13 DIAGNOSIS — G89.29 CHRONIC MIDLINE LOW BACK PAIN WITH BILATERAL SCIATICA: Primary | ICD-10-CM

## 2022-07-13 DIAGNOSIS — M54.42 CHRONIC MIDLINE LOW BACK PAIN WITH BILATERAL SCIATICA: Primary | ICD-10-CM

## 2022-07-13 DIAGNOSIS — B02.29 POST HERPETIC NEURALGIA: ICD-10-CM

## 2022-07-15 RX ORDER — TRAMADOL HYDROCHLORIDE 50 MG/1
50 TABLET ORAL
Qty: 60 TABLET | Refills: 1 | Status: SHIPPED | OUTPATIENT
Start: 2022-07-15 | End: 2022-08-14

## 2022-08-04 DIAGNOSIS — F41.9 ANXIETY: ICD-10-CM

## 2022-08-04 RX ORDER — ALPRAZOLAM 0.25 MG/1
TABLET ORAL
Qty: 30 TABLET | Refills: 0 | Status: SHIPPED | OUTPATIENT
Start: 2022-08-04 | End: 2022-09-02 | Stop reason: SDUPTHER

## 2022-08-04 NOTE — TELEPHONE ENCOUNTER
PCP: Rosa Murillo MD    Last appt: 5/12/2022  Future Appointments   Date Time Provider Jazmyne Cristina   8/23/2022 11:15 AM Appa Jami Mason MD [facility] BS AMB   9/26/2022 11:15 AM Prakash Puentes MD [facility] BS AMB       Requested Prescriptions     Pending Prescriptions Disp Refills    ALPRAZolam (XANAX) 0.25 mg tablet 30 Tablet 0     Sig: TAKE ONE TABLET BY MOUTH ONE TIME DAILY AS NEEDED       Prior labs and Blood pressures:  BP Readings from Last 3 Encounters:   05/12/22 114/62   04/14/22 106/62   04/11/22 (!) 123/58     Lab Results   Component Value Date/Time    Sodium 142 04/14/2022 12:43 PM    Potassium 4.5 04/14/2022 12:43 PM    Chloride 110 (H) 04/14/2022 12:43 PM    CO2 27 04/14/2022 12:43 PM    Anion gap 5 04/14/2022 12:43 PM    Glucose 98 04/14/2022 12:43 PM    BUN 29 (H) 04/14/2022 12:43 PM    Creatinine 1.55 (H) 04/14/2022 12:43 PM    BUN/Creatinine ratio 19 04/14/2022 12:43 PM    GFR est AA 38 (L) 04/14/2022 12:43 PM    GFR est non-AA 32 (L) 04/14/2022 12:43 PM    Calcium 10.1 04/14/2022 12:43 PM     Lab Results   Component Value Date/Time    Hemoglobin A1c 5.7 05/07/2009 01:40 PM     Lab Results   Component Value Date/Time    Cholesterol, total 146 09/10/2021 12:40 PM    HDL Cholesterol 59 09/10/2021 12:40 PM    LDL, calculated 59.2 09/10/2021 12:40 PM    VLDL, calculated 27.8 09/10/2021 12:40 PM    Triglyceride 139 09/10/2021 12:40 PM    CHOL/HDL Ratio 2.5 09/10/2021 12:40 PM     Lab Results   Component Value Date/Time    Vitamin D 25-Hydroxy 49.1 09/10/2021 12:40 PM       Lab Results   Component Value Date/Time    TSH 1.01 04/14/2022 12:43 PM

## 2022-08-23 ENCOUNTER — OFFICE VISIT (OUTPATIENT)
Dept: INTERNAL MEDICINE CLINIC | Age: 87
End: 2022-08-23
Payer: MEDICARE

## 2022-08-23 VITALS
HEART RATE: 53 BPM | TEMPERATURE: 96.8 F | BODY MASS INDEX: 32.98 KG/M2 | HEIGHT: 60 IN | SYSTOLIC BLOOD PRESSURE: 133 MMHG | WEIGHT: 168 LBS | OXYGEN SATURATION: 95 % | DIASTOLIC BLOOD PRESSURE: 77 MMHG | RESPIRATION RATE: 18 BRPM

## 2022-08-23 DIAGNOSIS — D47.2 MGUS (MONOCLONAL GAMMOPATHY OF UNKNOWN SIGNIFICANCE): ICD-10-CM

## 2022-08-23 DIAGNOSIS — R80.3 BENCE JONES PROTEIN: ICD-10-CM

## 2022-08-23 DIAGNOSIS — E55.9 VITAMIN D DEFICIENCY: ICD-10-CM

## 2022-08-23 DIAGNOSIS — G89.29 CHRONIC MIDLINE LOW BACK PAIN WITH BILATERAL SCIATICA: ICD-10-CM

## 2022-08-23 DIAGNOSIS — M54.41 CHRONIC MIDLINE LOW BACK PAIN WITH BILATERAL SCIATICA: ICD-10-CM

## 2022-08-23 DIAGNOSIS — M54.42 CHRONIC MIDLINE LOW BACK PAIN WITH BILATERAL SCIATICA: ICD-10-CM

## 2022-08-23 DIAGNOSIS — M81.0 AGE-RELATED OSTEOPOROSIS WITHOUT CURRENT PATHOLOGICAL FRACTURE: ICD-10-CM

## 2022-08-23 DIAGNOSIS — Z00.00 MEDICARE ANNUAL WELLNESS VISIT, SUBSEQUENT: Primary | ICD-10-CM

## 2022-08-23 PROCEDURE — 1101F PT FALLS ASSESS-DOCD LE1/YR: CPT | Performed by: INTERNAL MEDICINE

## 2022-08-23 PROCEDURE — G8427 DOCREV CUR MEDS BY ELIG CLIN: HCPCS | Performed by: INTERNAL MEDICINE

## 2022-08-23 PROCEDURE — G0439 PPPS, SUBSEQ VISIT: HCPCS | Performed by: INTERNAL MEDICINE

## 2022-08-23 PROCEDURE — G8432 DEP SCR NOT DOC, RNG: HCPCS | Performed by: INTERNAL MEDICINE

## 2022-08-23 PROCEDURE — G8417 CALC BMI ABV UP PARAM F/U: HCPCS | Performed by: INTERNAL MEDICINE

## 2022-08-23 PROCEDURE — G8536 NO DOC ELDER MAL SCRN: HCPCS | Performed by: INTERNAL MEDICINE

## 2022-08-23 RX ORDER — NITROGLYCERIN 0.3 MG/1
0.3 TABLET SUBLINGUAL
Qty: 1 EACH | Refills: 1 | Status: SHIPPED | OUTPATIENT
Start: 2022-08-23

## 2022-08-23 NOTE — PROGRESS NOTES
This is the Subsequent Medicare Annual Wellness Exam, performed 12 months or more after the Initial AWV or the last Subsequent AWV    I have reviewed the patient's medical history in detail and updated the computerized patient record. Mrs Mary Jane Rodriguez is a 79 yo woman with a hx of post herpetic neuralgia, anxiety, HTN, spondylolysis of lumbar region, hypothyroidism, CKD stage III and anemia presenting for Medicare wellness visit    Patient reports after iron infusion on 04/05 /22 has felt generalized weakness, with increase facial pain ( hx of shingles)  She feels shaky and tired. Symptoms resolved  No fever no chills and no cough   Repeat CBC showed hemoglobin was 10.1 in May        Constipation: on linzess and working well      Anxiety  Takes 0.025 of Xanax at bedtime when having severe insomnia/ not daily   No confusion      Essential hypertension  Blood pressure is excellent. Previous visit was stopped lisinopril. She is on metoprolol 50mg long acting only   She feels fatigued     Spondylolysis of lumbar region  Patient has chronic back pain wears brace. She takes tramadol 50 mg twice a day with improvement in quality of life. 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 She previously saw oncologist and there was no evidence of systemic disease. She was diagnosed with MGUS will do labs to monitor this. She denies weight loss fever fatigue    Acquired hypothyroidism  Synthroid 75 mcg daily     High cholesterol: No history of stroke or heart attack she has been on Crestor for several years discussed given her age we will stop the medication. Osteoporosis : on fosamax for over 10 years will stop it. Assessment/Plan   Education and counseling provided:  1.  Medicare annual wellness visit, subsequent-discussed CODE STATUS and ACP documents patient wants to have a DNR form signed  - DO NOT RESUSCITATE    2. Bence Smiley protein  - CBC WITH AUTOMATED DIFF; Future  - MULTIPLE MYELOMA CASCADE W/REF TO SIFE AND SFLC; Future  - PROTEIN ELECTROPHORESIS + FREE LIGHT CHAINS; Future  - CBC WITH AUTOMATED DIFF  - PROTEIN ELECTROPHORESIS + FREE LIGHT CHAINS  - MULTIPLE MYELOMA CASCADE W/REF TO SIFE AND SFLC    3. MGUS (monoclonal gammopathy of unknown significance)  - CBC WITH AUTOMATED DIFF; Future  - MULTIPLE MYELOMA CASCADE W/REF TO SIFE AND SFLC; Future  - PROTEIN ELECTROPHORESIS + FREE LIGHT CHAINS; Future  - METABOLIC PANEL, BASIC; Future  - CBC WITH AUTOMATED DIFF  - METABOLIC PANEL, BASIC  - PROTEIN ELECTROPHORESIS + FREE LIGHT CHAINS  - MULTIPLE MYELOMA CASCADE W/REF TO SIFE AND SFLC    4. Age-related osteoporosis without current pathological fracture  Has been on Fosamax for over 10 years. Medication check DEXA  - DEXA BONE DENSITY STUDY AXIAL; Future  - VITAMIN D, 25 HYDROXY; Future  - VITAMIN D, 25 HYDROXY    5. Vitamin D deficiency  - VITAMIN D, 25 HYDROXY; Future  - VITAMIN D, 25 HYDROXY    6. Chronic midline low back pain with bilateral sciatica  She is wearing a back brace and takes tramadol for severe pain. - COMPLIANCE DRUG SCREEN/PRESCRIPTION MONITORING; Future  - COMPLIANCE DRUG SCREEN/PRESCRIPTION MONITORING         Depression Risk Factor Screening     3 most recent PHQ Screens 5/12/2022   Little interest or pleasure in doing things Several days   Feeling down, depressed, irritable, or hopeless Several days   Total Score PHQ 2 2       Alcohol & Drug Abuse Risk Screen    Do you average more than 1 drink per night or more than 7 drinks a week:  No    On any one occasion in the past three months have you have had more than 3 drinks containing alcohol:  No          Functional Ability and Level of Safety    Hearing: Hearing is good. Activities of Daily Living: The home contains: no safety equipment.   Patient does total self care      Ambulation: with no difficulty     Fall Risk:  Fall Risk Assessment, last 12 mths 8/23/2022   Able to walk? Yes   Fall in past 12 months? 1   Do you feel unsteady? 1   Are you worried about falling 1   Number of falls in past 12 months 2   Fall with injury? 0      Abuse Screen:  Patient is not abused       Cognitive Screening    Has your family/caregiver stated any concerns about your memory: no     Cognitive Screening: Normal - Verbal Fluency Test    Health Maintenance Due     Health Maintenance Due   Topic Date Due    Shingrix Vaccine Age 49> (1 of 2) Never done    COVID-19 Vaccine (4 - Booster for Pfizer series) 05/10/2022       Patient Care Team   Patient Care Team:  Migue Mchugh MD as PCP - General (Internal Medicine Physician)  Arnel Carvajal MD as PCP - DERMATOLOGY (Dermatology Physician)  Migue Mchugh MD as PCP - [facility]  Luci Ding MD as Physician (Cardiovascular Disease Physician)  Luci Ding MD as Physician (Cardiovascular Disease Physician)  Silvia King MD (Nephrology)  Bella Carlisle, JULIA as Nurse Practitioner (Cardiovascular Disease Physician)    History     Patient Active Problem List   Diagnosis Code    Unspecified vitamin D deficiency E55.9    Hypothyroidism E03.9    Hyperlipidemia E78.5    Hypertension I10    Coronary artery spasm (HCC) I20.1    S/P cardiac cath Z98.890    CKD (chronic kidney disease) N18.9    History of pulmonary embolus (PE) Z86.711    Scoliosis M41.9    Spondylolysis of lumbar region M43.06    Severe obesity (BMI 35.0-39. 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    Iron deficiency anemia due to chronic blood loss D50.0    Chronic renal disease, stage III N18.30     Past Medical History:   Diagnosis Date    Anemia     CAD (coronary artery disease)     stent placed on left 1 stent,in 2007    Cancer [facility])     BCCA    Chronic kidney disease     DVT (deep venous thrombosis) (HCC)     GERD (gastroesophageal reflux disease)     Hypercholesterolemia     Hypertension     Pulmonary embolism [address] [address] 9/15/2015    Thromboembolus ([facility] [address]     DVT after hernia operation, PE spontaneous 10 years later    Thyroid disease       Past Surgical History:   Procedure Laterality Date    HX CORONARY STENT PLACEMENT      HX GI      esophageal hiatal hernia - 2004    HX GYN      partial hysterectomy - 1970    HX HEART CATHETERIZATION      HX OTHER SURGICAL      BCCAs removed    HX UROLOGICAL      bladder repair - 1999     Current Outpatient Medications   Medication Sig Dispense Refill    ALPRAZolam (XANAX) 0.25 mg tablet TAKE ONE TABLET BY MOUTH ONE TIME DAILY AS NEEDED 30 Tablet 0    alendronate (FOSAMAX) 10 mg tablet TAKE ONE TABLET BY MOUTH EVERY MORNING BEFORE BREAKFAST 90 Tablet 2    rosuvastatin (CRESTOR) 40 mg tablet TAKE ONE TABLET BY MOUTH EVERY EVENING 90 Tablet 3    levothyroxine (SYNTHROID) 75 mcg tablet TAKE ONE TABLET BY MOUTH ONE TIME DAILY BEFORE BREAKFAST 90 Tablet 3    Foam Bandage (Tendra Mepilex Border) 6 X 8 \" bndg 1 Each by Apply Externally route daily. 30 Each 1    metoprolol tartrate (LOPRESSOR) 50 mg tablet TAKE ONE TABLET BY MOUTH TWICE A DAY (Patient taking differently: Take 50 mg by mouth two (2) times a day. TAKE ONE TABLET BY MOUTH TWICE A DAY) 180 Tablet 3    acetaminophen (TYLENOL) 325 mg tablet Take 2 Tablets by mouth every four (4) hours as needed for Pain. 20 Tablet 0    acetaminophen (TYLENOL) 650 mg TbER Take 650 mg by mouth every eight (8) hours. cholecalciferol, vitamin D3, (VITAMIN D3) 2,000 unit tab Take 1 Tab by mouth daily. 30 Tab 0    nitroglycerin (NITROSTAT) 0.3 mg SL tablet 1 tablet by SubLINGual route every five (5) minutes as needed for Chest Pain. 1 Bottle 1    aspirin 81 mg Tab Take 81 mg by mouth daily.       pregabalin (LYRICA) 50 mg capsule Take 1 Capsule by mouth nightly. Max Daily Amount: 50 mg. (Patient not taking: Reported on 8/23/2022) 90 Capsule 3    Linzess 145 mcg cap capsule TAKE ONE CAPSULE BY MOUTH EVERY MORNING BEFORE BREAKFAST (Patient not taking: Reported on 8/23/2022) 30 Capsule 5     Allergies   Allergen Reactions    Codeine Unknown (comments)     Unsure of reaction.     Gabapentin Other (comments)     Hallucinations even at lowest dose    Morphine Hives and Itching       Family History   Problem Relation Age of Onset    Heart Disease Mother     Dementia Father     Diabetes Son     Kidney Disease Son         autoimmune     Social History     Tobacco Use    Smoking status: Never    Smokeless tobacco: Never   Substance Use Topics    Alcohol use: No         Gutierrez Rouse MD

## 2022-08-23 NOTE — PROGRESS NOTES
1. \"Have you been to the ER, urgent care clinic since your last visit? Hospitalized since your last visit? \" No    2. \"Have you seen or consulted any other health care providers outside of the [address] [place] [address] since your last visit? \" Yes     3. For patients aged 39-70: Has the patient had a colonoscopy / FIT/ Cologuard? NA - based on age or sex    If the patient is female:    3. For patients aged 41-77: Has the patient had a mammogram within the past 2 years? NA - based on age or sex      11. For patients aged 21-65: Has the patient had a pap smear?  NA - based on age or sex

## 2022-08-23 NOTE — PATIENT INSTRUCTIONS
Stop the cholesterol crestor medication     Medicare Wellness Visit, Female     The best way to live healthy is to have a lifestyle where you eat a well-balanced diet, exercise regularly, limit alcohol use, and quit all forms of tobacco/nicotine, if applicable. Regular preventive services are another way to keep healthy. Preventive services (vaccines, screening tests, monitoring & exams) can help personalize your care plan, which helps you manage your own care. Screening tests can find health problems at the earliest stages, when they are easiest to treat. [facility] follows the current, evidence-based guidelines published by the [facility] Massimo Valentine ([facility]) when recommending preventive services for our patients. Because we follow these guidelines, sometimes recommendations change over time as research supports it. (For example, mammograms used to be recommended annually. Even though Medicare will still pay for an annual mammogram, the newer guidelines recommend a mammogram every two years for women of average risk). Of course, you and your doctor may decide to screen more often for some diseases, based on your risk and your co-morbidities (chronic disease you are already diagnosed with). Preventive services for you include:  - Medicare offers their members a free annual wellness visit, which is time for you and your primary care provider to discuss and plan for your preventive service needs. Take advantage of this benefit every year!  -All adults over the age of 72 should receive the recommended pneumonia vaccines. Current USPSTF guidelines recommend a series of two vaccines for the best pneumonia protection.   -All adults should have a flu vaccine yearly and a tetanus vaccine every 10 years.   -All adults age 48 and older should receive the shingles vaccines (series of two vaccines).       -All adults age 38-68 who are overweight should have a diabetes screening test once every three years.   -All adults born between [birth year] and 1965 should be screened once for Hepatitis C.  -Other screening tests and preventive services for persons with diabetes include: an eye exam to screen for diabetic retinopathy, a kidney function test, a foot exam, and stricter control over your cholesterol.   -Cardiovascular screening for adults with routine risk involves an electrocardiogram (ECG) at intervals determined by your doctor.   -Colorectal cancer screenings should be done for adults age 54-65 with no increased risk factors for colorectal cancer. There are a number of acceptable methods of screening for this type of cancer. Each test has its own benefits and drawbacks. Discuss with your doctor what is most appropriate for you during your annual wellness visit. The different tests include: colonoscopy (considered the best screening method), a fecal occult blood test, a fecal DNA test, and sigmoidoscopy.    -A bone mass density test is recommended when a woman turns 65 to screen for osteoporosis. This test is only recommended one time, as a screening. Some providers will use this same test as a disease monitoring tool if you already have osteoporosis. -Breast cancer screenings are recommended every other year for women of normal risk, age 54-69.  -Cervical cancer screenings for women over age 72 are only recommended with certain risk factors.      Here is a list of your current Health Maintenance items (your personalized list of preventive services) with a due date:  Health Maintenance Due   Topic Date Due    Shingles Vaccine (1 of 2) Never done    COVID-19 Vaccine (4 - Booster for Bashir Peter series) 05/10/2022

## 2022-08-24 LAB
25(OH)D3 SERPL-MCNC: 47.6 NG/ML (ref 30–100)
ANION GAP SERPL CALC-SCNC: 5 MMOL/L (ref 5–15)
BASOPHILS # BLD: 0 K/UL (ref 0–0.1)
BASOPHILS NFR BLD: 1 % (ref 0–1)
BUN SERPL-MCNC: 24 MG/DL (ref 6–20)
BUN/CREAT SERPL: 16 (ref 12–20)
CALCIUM SERPL-MCNC: 10.2 MG/DL (ref 8.5–10.1)
CHLORIDE SERPL-SCNC: 110 MMOL/L (ref 97–108)
CO2 SERPL-SCNC: 27 MMOL/L (ref 21–32)
CREAT SERPL-MCNC: 1.54 MG/DL (ref 0.55–1.02)
DIFFERENTIAL METHOD BLD: ABNORMAL
EOSINOPHIL # BLD: 0.1 K/UL (ref 0–0.4)
EOSINOPHIL NFR BLD: 2 % (ref 0–7)
ERYTHROCYTE [DISTWIDTH] IN BLOOD BY AUTOMATED COUNT: 12.8 % (ref 11.5–14.5)
GLUCOSE SERPL-MCNC: 91 MG/DL (ref 65–100)
HCT VFR BLD AUTO: 38.9 % (ref 35–47)
HGB BLD-MCNC: 12.2 G/DL (ref 11.5–16)
IMM GRANULOCYTES # BLD AUTO: 0 K/UL (ref 0–0.04)
IMM GRANULOCYTES NFR BLD AUTO: 0 % (ref 0–0.5)
LYMPHOCYTES # BLD: 0.8 K/UL (ref 0.8–3.5)
LYMPHOCYTES NFR BLD: 17 % (ref 12–49)
MCH RBC QN AUTO: 33.3 PG (ref 26–34)
MCHC RBC AUTO-ENTMCNC: 31.4 G/DL (ref 30–36.5)
MCV RBC AUTO: 106.3 FL (ref 80–99)
MONOCYTES # BLD: 0.5 K/UL (ref 0–1)
MONOCYTES NFR BLD: 10 % (ref 5–13)
NEUTS SEG # BLD: 3.1 K/UL (ref 1.8–8)
NEUTS SEG NFR BLD: 70 % (ref 32–75)
NRBC # BLD: 0 K/UL (ref 0–0.01)
NRBC BLD-RTO: 0 PER 100 WBC
PLATELET # BLD AUTO: 130 K/UL (ref 150–400)
PLATELET COMMENTS,PCOM: ABNORMAL
PMV BLD AUTO: 12 FL (ref 8.9–12.9)
POTASSIUM SERPL-SCNC: 4.3 MMOL/L (ref 3.5–5.1)
RBC # BLD AUTO: 3.66 M/UL (ref 3.8–5.2)
RBC MORPH BLD: ABNORMAL
SODIUM SERPL-SCNC: 142 MMOL/L (ref 136–145)
WBC # BLD AUTO: 4.5 K/UL (ref 3.6–11)

## 2022-08-24 NOTE — PROGRESS NOTES
Great news normal hemoglobin anemia resolved  Kidney function is stable - CKD stage III   Vitamin D is excellent

## 2022-08-25 LAB
ALBUMIN SERPL ELPH-MCNC: 3.4 G/DL (ref 2.9–4.4)
ALBUMIN/GLOB SERPL: 1.3 {RATIO} (ref 0.7–1.7)
ALPHA1 GLOB SERPL ELPH-MCNC: 0.3 G/DL (ref 0–0.4)
ALPHA2 GLOB SERPL ELPH-MCNC: 0.7 G/DL (ref 0.4–1)
B-GLOBULIN SERPL ELPH-MCNC: 1 G/DL (ref 0.7–1.3)
GAMMA GLOB SERPL ELPH-MCNC: 0.7 G/DL (ref 0.4–1.8)
GLOBULIN SER CALC-MCNC: 2.7 G/DL (ref 2.2–3.9)
KAPPA LC FREE SER-MCNC: 38.5 MG/L (ref 3.3–19.4)
KAPPA LC FREE/LAMBDA FREE SER: 0.2 {RATIO} (ref 0.26–1.65)
LAMBDA LC FREE SERPL-MCNC: 187.9 MG/L (ref 5.7–26.3)
M PROTEIN SERPL ELPH-MCNC: ABNORMAL G/DL
PLEASE NOTE, 011150: ABNORMAL
PROT SERPL-MCNC: 6.1 G/DL (ref 6–8.5)

## 2022-08-27 LAB — DRUGS UR: NORMAL

## 2022-09-01 LAB
ALBUMIN SERPL ELPH-MCNC: 3.5 G/DL (ref 2.9–4.4)
ALBUMIN/GLOB SERPL: 1.3 {RATIO} (ref 0.7–1.7)
ALPHA1 GLOB SERPL ELPH-MCNC: 0.2 G/DL (ref 0–0.4)
ALPHA2 GLOB SERPL ELPH-MCNC: 0.7 G/DL (ref 0.4–1)
B-GLOBULIN SERPL ELPH-MCNC: 1 G/DL (ref 0.7–1.3)
GAMMA GLOB SERPL ELPH-MCNC: 0.7 G/DL (ref 0.4–1.8)
GLOBULIN SER CALC-MCNC: 2.6 G/DL (ref 2.2–3.9)
IGA SERPL-MCNC: 198 MG/DL (ref 64–422)
IGG SERPL-MCNC: 754 MG/DL (ref 586–1602)
IGM SERPL-MCNC: 68 MG/DL (ref 26–217)
INTERPRETATION SERPL IEP-IMP: ABNORMAL
KAPPA LC FREE SER-MCNC: 42.5 MG/L (ref 3.3–19.4)
KAPPA LC FREE/LAMBDA FREE SER: 0.23 {RATIO} (ref 0.26–1.65)
LAMBDA LC FREE SERPL-MCNC: 186.5 MG/L (ref 5.7–26.3)
M PROTEIN SERPL ELPH-MCNC: NORMAL G/DL
PLEASE NOTE, 011150: NORMAL
PROT SERPL-MCNC: 6.1 G/DL (ref 6–8.5)
REFLEX TESTING: NORMAL

## 2022-09-02 DIAGNOSIS — F41.9 ANXIETY: ICD-10-CM

## 2022-09-06 RX ORDER — ALPRAZOLAM 0.25 MG/1
TABLET ORAL
Qty: 30 TABLET | Refills: 0 | Status: SHIPPED | OUTPATIENT
Start: 2022-09-06 | End: 2022-10-02 | Stop reason: SDUPTHER

## 2022-09-06 NOTE — TELEPHONE ENCOUNTER
Future Appointments:  Future Appointments   Date Time Provider Jazmyne Daveyi   9/26/2022 11:15 AM Yogesh Saldivar MD [facility] BS AMB   2/2/2023 11:45 AM Nick Salas MD [facility] BS AMB        Last Appointment With Me:  8/23/2022     Requested Prescriptions     Pending Prescriptions Disp Refills    ALPRAZolam (XANAX) 0.25 mg tablet 30 Tablet 0     Sig: TAKE ONE TABLET BY MOUTH ONE TIME DAILY AS NEEDED

## 2022-09-12 DIAGNOSIS — G89.29 CHRONIC MIDLINE LOW BACK PAIN WITH BILATERAL SCIATICA: Primary | ICD-10-CM

## 2022-09-12 DIAGNOSIS — M54.42 CHRONIC MIDLINE LOW BACK PAIN WITH BILATERAL SCIATICA: Primary | ICD-10-CM

## 2022-09-12 DIAGNOSIS — M54.41 CHRONIC MIDLINE LOW BACK PAIN WITH BILATERAL SCIATICA: Primary | ICD-10-CM

## 2022-09-12 RX ORDER — TRAMADOL HYDROCHLORIDE 50 MG/1
TABLET ORAL
COMMUNITY
Start: 2022-08-14 | End: 2022-09-12 | Stop reason: SDUPTHER

## 2022-09-12 RX ORDER — TRAMADOL HYDROCHLORIDE 50 MG/1
50 TABLET ORAL
Qty: 30 TABLET | Refills: 0 | Status: SHIPPED | OUTPATIENT
Start: 2022-09-12 | End: 2022-09-26 | Stop reason: SDUPTHER

## 2022-09-12 NOTE — TELEPHONE ENCOUNTER
----- Message from Rowena Jeffery sent at 9/10/2022 12:24 PM EDT -----  Regarding: Tramadol needs refill  Please refill Tramadol prescription (last filled 8/14/22. It wasnt  on Med list for me to use Request Refill.  Thank you, Jay Adams

## 2022-09-12 NOTE — TELEPHONE ENCOUNTER
Future Appointments:  Future Appointments   Date Time Provider Jazmyne Cristina   9/26/2022 11:15 AM Janelle Duncan MD [facility] BS AMB   2/2/2023 11:45 AM Nick Tabor MD [facility] BS AMB        Last Appointment With Me:  8/23/2022     Requested Prescriptions     Pending Prescriptions Disp Refills    traMADoL (ULTRAM) 50 mg tablet 30 Tablet 0     Sig: Take 1 Tablet by mouth every eight (8) hours as needed for Pain for up to 30 days. Max Daily Amount: 150 mg.

## 2022-10-02 DIAGNOSIS — F41.9 ANXIETY: ICD-10-CM

## 2022-10-03 RX ORDER — ALPRAZOLAM 0.25 MG/1
TABLET ORAL
Qty: 30 TABLET | Refills: 0 | Status: SHIPPED | OUTPATIENT
Start: 2022-10-03 | End: 2022-11-03 | Stop reason: SDUPTHER

## 2022-10-03 NOTE — TELEPHONE ENCOUNTER
Future Appointments:  Future Appointments   Date Time Provider Jazmyne Ibeth   2/2/2023 11:45 AM Appa Jason Powell MD [facility] BS AMB        Last Appointment With Me:  8/23/2022     Requested Prescriptions     Pending Prescriptions Disp Refills    ALPRAZolam (XANAX) 0.25 mg tablet 30 Tablet 0     Sig: TAKE ONE TABLET BY MOUTH ONE TIME DAILY AS NEEDED

## 2022-10-04 DIAGNOSIS — I10 ESSENTIAL HYPERTENSION: ICD-10-CM

## 2022-10-04 RX ORDER — METOPROLOL TARTRATE 50 MG/1
50 TABLET ORAL 2 TIMES DAILY
Qty: 180 TABLET | Refills: 3 | Status: SHIPPED | OUTPATIENT
Start: 2022-10-04

## 2022-10-04 NOTE — TELEPHONE ENCOUNTER
Future Appointments:  Future Appointments   Date Time Provider Jazmyne Daveyi   2/2/2023 11:45 AM Appa Zeke Khoury MD [facility] BS AMB        Last Appointment With Me:  Visit date not found     Requested Prescriptions     Pending Prescriptions Disp Refills    metoprolol tartrate (LOPRESSOR) 50 mg tablet 180 Tablet 3     Sig: Take 1 Tablet by mouth two (2) times a day.

## 2022-11-03 DIAGNOSIS — F41.9 ANXIETY: ICD-10-CM

## 2022-11-03 RX ORDER — CHOLECALCIFEROL (VITAMIN D3) 125 MCG
2000 CAPSULE ORAL DAILY
Qty: 30 TABLET | Refills: 0 | Status: CANCELLED | OUTPATIENT
Start: 2022-11-03

## 2022-11-03 NOTE — TELEPHONE ENCOUNTER
Refills have been requested for the following medications:         cholecalciferol, vitamin D3, (VITAMIN D3) 2,000 unit tab [Susan Escobar]     Preferred pharmacy: [address] [place] [address]       Medication renewals requested in this message routed separately:         ALPRAZolam (XANAX) 0.25 mg tablet [Suzanna Houston]       Patient Comment: Last refill 10/5/22 thank you

## 2022-11-04 RX ORDER — ALPRAZOLAM 0.25 MG/1
TABLET ORAL
Qty: 30 TABLET | Refills: 0 | Status: SHIPPED | OUTPATIENT
Start: 2022-11-04 | End: 2022-12-01 | Stop reason: SDUPTHER

## 2022-11-11 DIAGNOSIS — M54.42 CHRONIC MIDLINE LOW BACK PAIN WITH BILATERAL SCIATICA: ICD-10-CM

## 2022-11-11 DIAGNOSIS — G89.29 CHRONIC MIDLINE LOW BACK PAIN WITH BILATERAL SCIATICA: ICD-10-CM

## 2022-11-11 DIAGNOSIS — M54.41 CHRONIC MIDLINE LOW BACK PAIN WITH BILATERAL SCIATICA: ICD-10-CM

## 2022-11-14 RX ORDER — TRAMADOL HYDROCHLORIDE 50 MG/1
50 TABLET ORAL
Qty: 90 TABLET | Refills: 0 | Status: SHIPPED | OUTPATIENT
Start: 2022-11-14 | End: 2023-01-13

## 2022-11-14 NOTE — TELEPHONE ENCOUNTER
Future Appointments:  Future Appointments   Date Time Provider Jazmyne Cristina   2/2/2023 11:45 AM Appa Alejandro Tapia MD [facility] BS AMB        Last Appointment With Me:  8/23/2022     Requested Prescriptions     Pending Prescriptions Disp Refills    traMADoL (ULTRAM) 50 mg tablet 90 Tablet 0     Sig: Take 1 Tablet by mouth every eight (8) hours as needed for Pain for up to 60 days. Max Daily Amount: 150 mg.

## 2022-12-01 DIAGNOSIS — F41.9 ANXIETY: ICD-10-CM

## 2022-12-01 RX ORDER — ALPRAZOLAM 0.25 MG/1
TABLET ORAL
Qty: 30 TABLET | Refills: 0 | Status: SHIPPED | OUTPATIENT
Start: 2022-12-01

## 2022-12-01 NOTE — TELEPHONE ENCOUNTER
Future Appointments:  Future Appointments   Date Time Provider Jazmyne Daveyi   2/2/2023 11:45 AM Appa Krista Rodriguez MD [facility] BS AMB        Last Appointment With Me:  8/23/2022     Requested Prescriptions     Pending Prescriptions Disp Refills    ALPRAZolam (XANAX) 0.25 mg tablet 30 Tablet 0     Sig: TAKE ONE TABLET BY MOUTH ONE TIME DAILY AS NEEDED

## 2022-12-30 DIAGNOSIS — G89.29 CHRONIC MIDLINE LOW BACK PAIN WITH BILATERAL SCIATICA: ICD-10-CM

## 2022-12-30 DIAGNOSIS — M54.42 CHRONIC MIDLINE LOW BACK PAIN WITH BILATERAL SCIATICA: ICD-10-CM

## 2022-12-30 DIAGNOSIS — M54.41 CHRONIC MIDLINE LOW BACK PAIN WITH BILATERAL SCIATICA: ICD-10-CM

## 2022-12-30 RX ORDER — TRAMADOL HYDROCHLORIDE 50 MG/1
50 TABLET ORAL
Qty: 90 TABLET | Refills: 0 | Status: SHIPPED | OUTPATIENT
Start: 2022-12-30 | End: 2023-02-28

## 2022-12-30 NOTE — TELEPHONE ENCOUNTER
PCP: Sandy Noe MD    Last appt: 8/23/2022  Future Appointments   Date Time Provider Jazmyne Cristina   2/2/2023 11:45 AM Appa Danny Duffy MD [facility] BS AMB       Requested Prescriptions     Pending Prescriptions Disp Refills    traMADoL (ULTRAM) 50 mg tablet 90 Tablet 0     Sig: Take 1 Tablet by mouth every eight (8) hours as needed for Pain for up to 60 days. Max Daily Amount: 150 mg.

## 2023-01-02 DIAGNOSIS — F41.9 ANXIETY: ICD-10-CM

## 2023-01-03 RX ORDER — ALPRAZOLAM 0.25 MG/1
TABLET ORAL
Qty: 30 TABLET | Refills: 0 | Status: SHIPPED | OUTPATIENT
Start: 2023-01-03

## 2023-01-03 NOTE — TELEPHONE ENCOUNTER
Future Appointments:  Future Appointments   Date Time Provider Jazmyne Ibeth   2/2/2023 11:45 AM Appa Theron Craig MD [facility] BS AMB        Last Appointment With Me:  8/23/2022     Requested Prescriptions     Pending Prescriptions Disp Refills    ALPRAZolam (XANAX) 0.25 mg tablet 30 Tablet 0     Sig: TAKE ONE TABLET BY MOUTH ONE TIME DAILY AS NEEDED

## 2023-01-30 DIAGNOSIS — F41.9 ANXIETY: ICD-10-CM

## 2023-01-30 RX ORDER — ALPRAZOLAM 0.25 MG/1
TABLET ORAL
Qty: 30 TABLET | Refills: 0 | Status: SHIPPED | OUTPATIENT
Start: 2023-01-30

## 2023-01-30 NOTE — TELEPHONE ENCOUNTER
Future Appointments:  Future Appointments   Date Time Provider Jazmyne Daveyi   2/2/2023 11:45 AM Appa Angelica Man MD [facility] BS AMB        Last Appointment With Me:  8/23/2022     Requested Prescriptions     Pending Prescriptions Disp Refills    ALPRAZolam (XANAX) 0.25 mg tablet 30 Tablet 0     Sig: TAKE ONE TABLET BY MOUTH ONE TIME DAILY AS NEEDED

## 2023-02-02 ENCOUNTER — OFFICE VISIT (OUTPATIENT)
Dept: INTERNAL MEDICINE CLINIC | Age: 88
End: 2023-02-02
Payer: MEDICARE

## 2023-02-02 VITALS
HEART RATE: 58 BPM | BODY MASS INDEX: 32.75 KG/M2 | SYSTOLIC BLOOD PRESSURE: 138 MMHG | TEMPERATURE: 97.6 F | HEIGHT: 60 IN | DIASTOLIC BLOOD PRESSURE: 74 MMHG | RESPIRATION RATE: 16 BRPM | WEIGHT: 166.8 LBS | OXYGEN SATURATION: 94 %

## 2023-02-02 DIAGNOSIS — D47.2 MGUS (MONOCLONAL GAMMOPATHY OF UNKNOWN SIGNIFICANCE): Primary | ICD-10-CM

## 2023-02-02 DIAGNOSIS — N18.31 STAGE 3A CHRONIC KIDNEY DISEASE (HCC): ICD-10-CM

## 2023-02-02 DIAGNOSIS — R44.3 HALLUCINATIONS: ICD-10-CM

## 2023-02-02 DIAGNOSIS — Z23 NEEDS FLU SHOT: ICD-10-CM

## 2023-02-02 DIAGNOSIS — F11.99 OPIOID USE, UNSPECIFIED WITH UNSPECIFIED OPIOID-INDUCED DISORDER (HCC): ICD-10-CM

## 2023-02-02 DIAGNOSIS — E03.9 ACQUIRED HYPOTHYROIDISM: ICD-10-CM

## 2023-02-02 DIAGNOSIS — I20.1 CORONARY ARTERY SPASM (HCC): ICD-10-CM

## 2023-02-02 PROCEDURE — G0463 HOSPITAL OUTPT CLINIC VISIT: HCPCS | Performed by: INTERNAL MEDICINE

## 2023-02-02 PROCEDURE — 90694 VACC AIIV4 NO PRSRV 0.5ML IM: CPT | Performed by: INTERNAL MEDICINE

## 2023-02-02 RX ORDER — ROSUVASTATIN CALCIUM 20 MG/1
TABLET, COATED ORAL
COMMUNITY
Start: 2023-01-06

## 2023-02-02 NOTE — PROGRESS NOTES
Ms. Mario De Guzman is presenting to follow up     CC:  Hypertension       HPI:    Mrs Tami Gomez is an 81 yo woman with a hx of post herpetic neuralgia, anxiety, HTN, spondylolysis of lumbar region, hypothyroidism, CKD stage III and anemia presenting for follow up on chronic medical issues     Patient had  iron infusion on 04/05//22  and since the more energetic   Symptoms resolved  No fever no chills and no cough   Repeat CBC 12    She had a serious cases of shingles last year and pain improved ( in face) but since then has occasional hallucinations at night of seeing people in the room   Denies memory changes   She takes tramadol for severe back pain twice a day     Constipation: on linzess and working well      Anxiety  Takes 0.025 of Xanax at bedtime when she has insomnia     Essential hypertension  Blood pressure is excellent. She is on metoprolol 50mg long acting only     Spondylolysis of lumbar region  Patient has chronic back pain wears brace. She takes tramadol 50 mg twice a day with improvement in quality of life. 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 She previously saw oncologist and there was no evidence of systemic disease. She was diagnosed with MGUS will do labs to monitor this. She denies weight loss fever fatigue    Acquired hypothyroidism  Synthroid 75 mcg daily     High cholesterol: No history of stroke or heart attack she has been on Crestor for several years discussed given her age we will stop the medication.     Osteoporosis : on fosamax for over 10 years therefore stopped in 2022     ROS  10 systems reviewed and negative other then HPI       Past Medical History:   Diagnosis Date    Anemia     CAD (coronary artery disease)     stent placed on left 1 stent,in 2007    Cancer [facility])     BCCA    Chronic kidney disease     DVT (deep venous thrombosis) (HCC)     GERD (gastroesophageal reflux disease)     Hypercholesterolemia     Hypertension     Pulmonary embolism ([address] 9/15/2015    Thromboembolus ([address]     DVT after hernia operation, PE spontaneous 10 years later    Thyroid disease         Past Surgical History:   Procedure Laterality Date    HX CORONARY STENT PLACEMENT      HX GI      esophageal hiatal hernia - 2004    HX GYN      partial hysterectomy - 1970    HX HEART CATHETERIZATION      HX OTHER SURGICAL      BCCAs removed    HX UROLOGICAL      bladder repair - 1999       Allergies   Allergen Reactions    Codeine Unknown (comments)     Unsure of reaction. Gabapentin Other (comments)     Hallucinations even at lowest dose    Morphine Hives and Itching       Current Outpatient Medications on File Prior to Visit   Medication Sig Dispense Refill    rosuvastatin (CRESTOR) 20 mg tablet       ALPRAZolam (XANAX) 0.25 mg tablet TAKE ONE TABLET BY MOUTH ONE TIME DAILY AS NEEDED 30 Tablet 0    traMADoL (ULTRAM) 50 mg tablet Take 1 Tablet by mouth every eight (8) hours as needed for Pain for up to 60 days. Max Daily Amount: 150 mg. 90 Tablet 0    metoprolol tartrate (LOPRESSOR) 50 mg tablet Take 1 Tablet by mouth two (2) times a day. 180 Tablet 3    nitroglycerin (Nitrostat) 0.3 mg SL tablet 1 Tablet by SubLINGual route every five (5) minutes as needed for Chest Pain. 1 Each 1    levothyroxine (SYNTHROID) 75 mcg tablet TAKE ONE TABLET BY MOUTH ONE TIME DAILY BEFORE BREAKFAST 90 Tablet 3    Foam Bandage (Tendra Mepilex Border) 6 X 8 \" bndg 1 Each by Apply Externally route daily. 30 Each 1    acetaminophen (TYLENOL) 325 mg tablet Take 2 Tablets by mouth every four (4) hours as needed for Pain. 20 Tablet 0    acetaminophen (TYLENOL) 650 mg TbER Take 650 mg by mouth every eight (8) hours. cholecalciferol, vitamin D3, (VITAMIN D3) 2,000 unit tab Take 1 Tab by mouth daily. 30 Tab 0    aspirin 81 mg Tab Take 81 mg by mouth daily.        No current facility-administered medications on file prior to visit. family history includes Dementia in her father; Diabetes in her son; Heart Disease in her mother; Kidney Disease in her son. Social History     Socioeconomic History    Marital status:      Spouse name: Not on file    Number of children: Not on file    Years of education: Not on file    Highest education level: Not on file   Occupational History    Not on file   Tobacco Use    Smoking status: Never    Smokeless tobacco: Never   Vaping Use    Vaping Use: Never used   Substance and Sexual Activity    Alcohol use: No    Drug use: No     Types: Sedatives/Hypnotics, Prescription    Sexual activity: Not Currently     Partners: Male   Other Topics Concern    Not on file   Social History Narrative    Not on file     Social Determinants of Health     Financial Resource Strain: Low Risk     Difficulty of Paying Living Expenses: Not hard at all   Food Insecurity: No Food Insecurity    Worried About Running Out of Food in the Last Year: Never true    Ran Out of Food in the Last Year: Never true   Transportation Needs: Not on file   Physical Activity: Not on file   Stress: Not on file   Social Connections: Not on file   Intimate Partner Violence: Not on file   Housing Stability: Not on file       Visit Vitals  /74 (BP 1 Location: Right upper arm, BP Patient Position: Sitting)   Pulse (!) 58   Temp 97.6 °F (36.4 °C) (Temporal)   Resp 16   Ht 5' (1.524 m)   Wt 166 lb 12.8 oz (75.7 kg)   SpO2 94%   BMI 32.58 kg/m²     General:  Well appearing female no acute distress  HEENT:   PERRL,normal conjunctiva. External ear and canals normal, TMs normal.  Hearing normal to voice. Nose without edema or discharge, normal septum. Lips, teeth, gums normal.  Oropharynx: no erythema, no exudates, no lesions, normal tongue. Neck:  Supple. Thyroid normal size, nontender, without nodules. Respiratory: no respiratory distress,  no wheezing, no rhonchi, no rales.  No chest wall tenderness. Cardiovascular:  RRR, normal S1S2, no murmur. Gastrointestinal: normal bowel sounds, soft, nontender, without masses. No hepatosplenomegaly. Extremities +2 pulses, no edema, normal sensation   Musculoskeletal:  severe kyphosis of back   Wearing brace  Wide based gate  Skin:  No rash, no lesions, no ulcers. Neuro:  A and OX4, fluent speech, cranial nerves normal 2-12. Sensation normal to light touch. DTR symmetrical  No rigidity  Psych:  Normal affect      Lab Results   Component Value Date/Time    WBC 4.5 08/23/2022 12:49 PM    HGB 12.2 08/23/2022 12:49 PM    HCT 38.9 08/23/2022 12:49 PM    PLATELET 167 (L) 21/71/8182 12:49 PM    .3 (H) 08/23/2022 12:49 PM     Lab Results   Component Value Date/Time    Sodium 142 08/23/2022 12:49 PM    Potassium 4.3 08/23/2022 12:49 PM    Chloride 110 (H) 08/23/2022 12:49 PM    CO2 27 08/23/2022 12:49 PM    Anion gap 5 08/23/2022 12:49 PM    Glucose 91 08/23/2022 12:49 PM    BUN 24 (H) 08/23/2022 12:49 PM    Creatinine 1.54 (H) 08/23/2022 12:49 PM    BUN/Creatinine ratio 16 08/23/2022 12:49 PM    GFR est AA 38 (L) 08/23/2022 12:49 PM    GFR est non-AA 32 (L) 08/23/2022 12:49 PM    Calcium 10.2 (H) 08/23/2022 12:49 PM     Lab Results   Component Value Date/Time    Cholesterol, total 146 09/10/2021 12:40 PM    HDL Cholesterol 59 09/10/2021 12:40 PM    LDL, calculated 59.2 09/10/2021 12:40 PM    VLDL, calculated 27.8 09/10/2021 12:40 PM    Triglyceride 139 09/10/2021 12:40 PM    CHOL/HDL Ratio 2.5 09/10/2021 12:40 PM     Lab Results   Component Value Date/Time    TSH 1.01 04/14/2022 12:43 PM     Lab Results   Component Value Date/Time    Hemoglobin A1c 5.7 05/07/2009 01:40 PM     Lab Results   Component Value Date/Time    Vitamin D 25-Hydroxy 47.6 08/23/2022 12:49 PM                   Assessment and Plan:     1. Tramadol use, unspecified with unspecified opioid-induced disorder ([facility]  Takes twice a day for severe back pain     2.  [facility]  - no symptoms now   - on metroprolol and sees cards and recently s tarted on crestor by cards     3. Stage 3a chronic kidney disease (HCC)  - METABOLIC PANEL, COMPREHENSIVE; Future  - PHOSPHORUS; Future  - CBC WITH AUTOMATED DIFF; Future  - CBC WITH AUTOMATED DIFF  - PHOSPHORUS  - METABOLIC PANEL, COMPREHENSIVE    4. MGUS (monoclonal gammopathy of unknown significance)  - CBC WITH AUTOMATED DIFF; Future  - PROTEIN ELECTROPHORESIS + FREE LIGHT CHAINS; Future  - MULTIPLE MYELOMA CASCADE W/REF TO SIFE AND SFLC; Future  - MULTIPLE MYELOMA CASCADE W/REF TO SIFE AND SFLC  - PROTEIN ELECTROPHORESIS + FREE LIGHT CHAINS  - CBC WITH AUTOMATED DIFF    5. Acquired hypothyroidism on levothyroxine   - TSH 3RD GENERATION; Future  - T4, FREE; Future  - T4, FREE  - TSH 3RD GENERATION    6. Needs flu shot  - INFLUENZA, FLUAD, (AGE 65 Y+), IM, PF, 0.5 ML    7. Hallucinations  Onset after shingles. Happens at night only. None during the day. At times scary for patient  Worst when she was on gabapentin and stopped  Advised to try cutting back on tramadol and see if they subside  Doing labs as above today  Referral to neuro for possible EEG   - REFERRAL TO NEUROLOGY      Follow-up and Dispositions    Return in about 6 months (around 8/2/2023) for HTN .           Ethelda Bence, MD

## 2023-02-02 NOTE — PROGRESS NOTES
1. \"Have you been to the ER, urgent care clinic since your last visit? Hospitalized since your last visit? \" No    2. \"Have you seen or consulted any other health care providers outside of the [address] [place] [address] since your last visit? \" No     3. For patients aged 39-70: Has the patient had a colonoscopy / FIT/ Cologuard? Yes - Care Gap present. Most recent result on file      If the patient is female:    4. For patients aged 41-77: Has the patient had a mammogram within the past 2 years? NA - based on age or sex      11. For patients aged 21-65: Has the patient had a pap smear?  NA - based on age or sex

## 2023-02-02 NOTE — PATIENT INSTRUCTIONS
Labs today   FLU shot high dose today   Recommend covid booster           Cannot take NSAIDs ibuprofen, aleve  Back off on tramadol and see if hallucinations improve

## 2023-02-03 LAB
ALBUMIN SERPL-MCNC: 3.3 G/DL (ref 3.5–5)
ALBUMIN/GLOB SERPL: 1.1 (ref 1.1–2.2)
ALP SERPL-CCNC: 94 U/L (ref 45–117)
ALT SERPL-CCNC: 11 U/L (ref 12–78)
ANION GAP SERPL CALC-SCNC: 1 MMOL/L (ref 5–15)
AST SERPL-CCNC: 13 U/L (ref 15–37)
BASOPHILS # BLD: 0.1 K/UL (ref 0–0.1)
BASOPHILS NFR BLD: 1 % (ref 0–1)
BILIRUB SERPL-MCNC: 0.5 MG/DL (ref 0.2–1)
BUN SERPL-MCNC: 22 MG/DL (ref 6–20)
BUN/CREAT SERPL: 16 (ref 12–20)
CALCIUM SERPL-MCNC: 10 MG/DL (ref 8.5–10.1)
CHLORIDE SERPL-SCNC: 109 MMOL/L (ref 97–108)
CO2 SERPL-SCNC: 32 MMOL/L (ref 21–32)
CREAT SERPL-MCNC: 1.36 MG/DL (ref 0.55–1.02)
DIFFERENTIAL METHOD BLD: ABNORMAL
EOSINOPHIL # BLD: 0.1 K/UL (ref 0–0.4)
EOSINOPHIL NFR BLD: 1 % (ref 0–7)
ERYTHROCYTE [DISTWIDTH] IN BLOOD BY AUTOMATED COUNT: 11.5 % (ref 11.5–14.5)
GLOBULIN SER CALC-MCNC: 3 G/DL (ref 2–4)
GLUCOSE SERPL-MCNC: 103 MG/DL (ref 65–100)
HCT VFR BLD AUTO: 40.1 % (ref 35–47)
HGB BLD-MCNC: 12.4 G/DL (ref 11.5–16)
IMM GRANULOCYTES # BLD AUTO: 0 K/UL (ref 0–0.04)
IMM GRANULOCYTES NFR BLD AUTO: 0 % (ref 0–0.5)
LYMPHOCYTES # BLD: 0.6 K/UL (ref 0.8–3.5)
LYMPHOCYTES NFR BLD: 12 % (ref 12–49)
MCH RBC QN AUTO: 32.9 PG (ref 26–34)
MCHC RBC AUTO-ENTMCNC: 30.9 G/DL (ref 30–36.5)
MCV RBC AUTO: 106.4 FL (ref 80–99)
MONOCYTES # BLD: 0.5 K/UL (ref 0–1)
MONOCYTES NFR BLD: 9 % (ref 5–13)
NEUTS SEG # BLD: 3.8 K/UL (ref 1.8–8)
NEUTS SEG NFR BLD: 77 % (ref 32–75)
NRBC # BLD: 0 K/UL (ref 0–0.01)
NRBC BLD-RTO: 0 PER 100 WBC
PHOSPHATE SERPL-MCNC: 2.3 MG/DL (ref 2.6–4.7)
PLATELET # BLD AUTO: 160 K/UL (ref 150–400)
PMV BLD AUTO: 12.2 FL (ref 8.9–12.9)
POTASSIUM SERPL-SCNC: 4.4 MMOL/L (ref 3.5–5.1)
PROT SERPL-MCNC: 6.3 G/DL (ref 6.4–8.2)
RBC # BLD AUTO: 3.77 M/UL (ref 3.8–5.2)
RBC MORPH BLD: ABNORMAL
SODIUM SERPL-SCNC: 142 MMOL/L (ref 136–145)
T4 FREE SERPL-MCNC: 1.2 NG/DL (ref 0.8–1.5)
TSH SERPL DL<=0.05 MIU/L-ACNC: 1.5 UIU/ML (ref 0.36–3.74)
WBC # BLD AUTO: 5.1 K/UL (ref 3.6–11)

## 2023-02-04 LAB
LABORATORY COMMENT REPORT: ABNORMAL
PROT SERPL-MCNC: 5.8 G/DL (ref 6–8.5)

## 2023-02-06 RX ORDER — LEVOTHYROXINE SODIUM 75 UG/1
TABLET ORAL
Qty: 90 TABLET | Refills: 3 | Status: SHIPPED | OUTPATIENT
Start: 2023-02-06

## 2023-02-06 NOTE — PROGRESS NOTES
Great news blood count has remained normal, meaning anemia resolved   Chronic kidney disease is stable ( slightly better)   Thyroid at goal

## 2023-02-16 DIAGNOSIS — M54.42 CHRONIC MIDLINE LOW BACK PAIN WITH BILATERAL SCIATICA: ICD-10-CM

## 2023-02-16 DIAGNOSIS — G89.29 CHRONIC MIDLINE LOW BACK PAIN WITH BILATERAL SCIATICA: ICD-10-CM

## 2023-02-16 DIAGNOSIS — M54.41 CHRONIC MIDLINE LOW BACK PAIN WITH BILATERAL SCIATICA: ICD-10-CM

## 2023-02-16 RX ORDER — LEVOTHYROXINE SODIUM 75 UG/1
75 TABLET ORAL
Qty: 90 TABLET | Refills: 0 | Status: SHIPPED | OUTPATIENT
Start: 2023-02-16

## 2023-02-16 RX ORDER — TRAMADOL HYDROCHLORIDE 50 MG/1
50 TABLET ORAL
Qty: 90 TABLET | Refills: 0 | Status: SHIPPED | OUTPATIENT
Start: 2023-02-16 | End: 2023-04-17

## 2023-02-16 NOTE — TELEPHONE ENCOUNTER
Future Appointments:  Future Appointments   Date Time Provider Jazmyne Cristina   8/1/2023 11:30 AM Appa Triny Espana MD [facility] BS AMB        Last Appointment With Me:  2/2/2023     Requested Prescriptions     Pending Prescriptions Disp Refills    levothyroxine (SYNTHROID) 75 mcg tablet 90 Tablet 3

## 2023-02-16 NOTE — TELEPHONE ENCOUNTER
Future Appointments:  Future Appointments   Date Time Provider Jazmyne Cristina   8/1/2023 11:30 AM Appa Ursula Pearl MD [facility] BS AMB        Last Appointment With Me:  2/2/2023     Requested Prescriptions     Pending Prescriptions Disp Refills    traMADoL (ULTRAM) 50 mg tablet 90 Tablet 0     Sig: Take 1 Tablet by mouth every eight (8) hours as needed for Pain for up to 60 days. Max Daily Amount: 150 mg.

## 2023-02-26 DIAGNOSIS — F41.9 ANXIETY: ICD-10-CM

## 2023-02-27 RX ORDER — ALPRAZOLAM 0.25 MG/1
TABLET ORAL
Qty: 30 TABLET | Refills: 0 | Status: SHIPPED | OUTPATIENT
Start: 2023-02-27

## 2023-02-27 NOTE — TELEPHONE ENCOUNTER
Future Appointments:  Future Appointments   Date Time Provider Jazmyne Daveyi   8/1/2023 11:30 AM Nick Zapata MD [facility] BS AMB        Last Appointment With Me:  2/2/2023     Requested Prescriptions     Pending Prescriptions Disp Refills    ALPRAZolam (XANAX) 0.25 mg tablet 30 Tablet 0     Sig: TAKE ONE TABLET BY MOUTH ONE TIME DAILY AS NEEDED

## 2023-04-01 DIAGNOSIS — F41.9 ANXIETY: ICD-10-CM

## 2023-04-03 DIAGNOSIS — G89.29 CHRONIC MIDLINE LOW BACK PAIN WITH BILATERAL SCIATICA: ICD-10-CM

## 2023-04-03 DIAGNOSIS — M54.41 CHRONIC MIDLINE LOW BACK PAIN WITH BILATERAL SCIATICA: ICD-10-CM

## 2023-04-03 DIAGNOSIS — M54.42 CHRONIC MIDLINE LOW BACK PAIN WITH BILATERAL SCIATICA: ICD-10-CM

## 2023-04-03 RX ORDER — ALPRAZOLAM 0.25 MG/1
TABLET ORAL
Qty: 30 TABLET | Refills: 0 | Status: SHIPPED | OUTPATIENT
Start: 2023-04-03

## 2023-04-03 RX ORDER — TRAMADOL HYDROCHLORIDE 50 MG/1
50 TABLET ORAL
Qty: 90 TABLET | Refills: 0 | Status: SHIPPED | OUTPATIENT
Start: 2023-04-03 | End: 2023-06-02

## 2023-04-03 NOTE — TELEPHONE ENCOUNTER
Future Appointments:  Future Appointments   Date Time Provider Jazmyne Cristina   8/1/2023 11:30 AM Appa Renee Ventura MD [facility] BS AMB        Last Appointment With Me:  2/2/2023     Requested Prescriptions     Pending Prescriptions Disp Refills    traMADoL (ULTRAM) 50 mg tablet 90 Tablet 0     Sig: Take 1 Tablet by mouth every eight (8) hours as needed for Pain for up to 60 days. Max Daily Amount: 150 mg.

## 2023-04-03 NOTE — TELEPHONE ENCOUNTER
Future Appointments:  Future Appointments   Date Time Provider Jazmyne Ibeth   8/1/2023 11:30 AM Appa Vivian Mcclure MD [facility] BS AMB        Last Appointment With Me:  2/2/2023     Requested Prescriptions     Pending Prescriptions Disp Refills    ALPRAZolam (XANAX) 0.25 mg tablet 30 Tablet 0     Sig: TAKE ONE TABLET BY MOUTH ONE TIME DAILY AS NEEDED

## 2023-04-14 ENCOUNTER — PATIENT MESSAGE (OUTPATIENT)
Dept: INTERNAL MEDICINE CLINIC | Age: 88
End: 2023-04-14

## 2023-04-14 DIAGNOSIS — K13.79 MOUTH PAIN: Primary | ICD-10-CM

## 2023-04-18 NOTE — TELEPHONE ENCOUNTER
Isabela Denise 4/18/2023 11:58 AM EDT      ----- Message -----  From: Rome Abebe  Sent: 4/18/2023 11:37 AM EDT  To: Latrice Onofre Pool  Subject: Problem with inside of throat     I thank you Dr. Mathew Chris, I`m not sure if I need a referral. It would be good to have one. Would you please advise me anENT doctor. I appreciate you taking the time for me.   David Geronimo

## 2023-04-30 DIAGNOSIS — F41.9 ANXIETY: ICD-10-CM

## 2023-05-01 RX ORDER — ALPRAZOLAM 0.25 MG/1
TABLET ORAL
Qty: 30 TABLET | Refills: 0 | Status: SHIPPED | OUTPATIENT
Start: 2023-05-01

## 2023-05-01 NOTE — TELEPHONE ENCOUNTER
Future Appointments:  Future Appointments   Date Time Provider Jazmyne Ibeth   8/1/2023 11:30 AM Appa Lawson Cushing, MD [facility] BS AMB        Last Appointment With Me:  2/2/2023     Requested Prescriptions     Pending Prescriptions Disp Refills    ALPRAZolam (XANAX) 0.25 mg tablet 30 Tablet 0     Sig: TAKE ONE TABLET BY MOUTH ONE TIME DAILY AS NEEDED

## 2023-05-08 DIAGNOSIS — F11.99 OPIOID USE, UNSPECIFIED WITH UNSPECIFIED OPIOID-INDUCED DISORDER (HCC): Primary | ICD-10-CM

## 2023-05-08 DIAGNOSIS — G89.29 OTHER CHRONIC PAIN: ICD-10-CM

## 2023-05-08 RX ORDER — TRAMADOL HYDROCHLORIDE 50 MG/1
50 TABLET ORAL EVERY 4 HOURS PRN
Qty: 30 TABLET | Refills: 0 | OUTPATIENT
Start: 2023-05-08 | End: 2023-05-13

## 2023-05-08 RX ORDER — TRAMADOL HYDROCHLORIDE 50 MG/1
TABLET ORAL
Qty: 90 TABLET | Refills: 0 | Status: SHIPPED | OUTPATIENT
Start: 2023-05-08 | End: 2023-06-07

## 2023-05-31 DIAGNOSIS — F41.9 ANXIETY: Primary | ICD-10-CM

## 2023-05-31 RX ORDER — ALPRAZOLAM 0.25 MG/1
TABLET ORAL
Qty: 90 TABLET | Refills: 0 | Status: SHIPPED | OUTPATIENT
Start: 2023-05-31 | End: 2023-08-31

## 2023-05-31 NOTE — TELEPHONE ENCOUNTER
PCP: Kulwant Rocha MD    Last appt:   2/2/2023    Future Appointments   Date Time Provider Dariusz Gregorio   8/1/2023 11:30 AM Kulwant Rocha MD [facility] BS AMB       Requested Prescriptions     Pending Prescriptions Disp Refills    ALPRAZolam (XANAX) 0.25 MG tablet 90 tablet 0     Sig: TAKE ONE TABLET BY MOUTH ONE TIME DAILY AS NEEDED

## 2023-05-31 NOTE — TELEPHONE ENCOUNTER
----- Message from Kar Choi sent at 5/31/2023 11:49 AM EDT -----  Regarding: Alprrazolam  Contact: [phone]  ,  Alprazolam cannot be renewed through my chart. Please send a request for refill for me to [email or web address]. Last renewal was 5/1/23. Thank you.

## 2023-06-21 ENCOUNTER — PATIENT MESSAGE (OUTPATIENT)
Age: 88
End: 2023-06-21

## 2023-06-21 DIAGNOSIS — F51.01 PRIMARY INSOMNIA: Primary | ICD-10-CM

## 2023-06-21 RX ORDER — SUVOREXANT 10 MG/1
10 TABLET, FILM COATED ORAL DAILY
Qty: 30 TABLET | Refills: 0 | Status: SHIPPED | OUTPATIENT
Start: 2023-06-21 | End: 2023-07-21

## 2023-06-21 NOTE — TELEPHONE ENCOUNTER
Nima Marcusrichard Lyon 6/21/2023 3:08 PM EDT      ----- Message -----  From: Edna Yadav  Sent: 6/21/2023 3:02 PM EDT  To: , *  Subject: Johnson Pacheco, This is Mrs. Luna daughter, Grant Zaragoza. Mom contacted neurologist you referred her to just recently. They cannot see her until March. She did not make an appointment at that time. Mom has been having continued problems with anxiety and sleeping. We placed a monitor in her room so I can respond in case she needs me. It has been determined that she experiences vivid dreams that most always has people, animals, objects that causes her to wake up very anxious. In the past few days, she has experienced someone shaking her whole body or shaking her arm. She reports some mornings she will wake with her pajama top unbuttoned or removed, or pajama bottoms removed. I noticed that she will have irregular breathing patterns throughout the night/morning as well. All of the above leave her exhausted, frightened, and anxious. Can you provide any guidance on how best to manage these sleep disturbances? She takes Xanax 2 hours before bedtime and trialing 2 Tylenol PM right before she goes to bed. Its just not working for her anymore.     Thanks so much for your help, Juhi Tran

## 2023-07-03 DIAGNOSIS — G89.29 OTHER CHRONIC PAIN: ICD-10-CM

## 2023-07-03 DIAGNOSIS — F11.99 OPIOID USE, UNSPECIFIED WITH UNSPECIFIED OPIOID-INDUCED DISORDER (HCC): Primary | ICD-10-CM

## 2023-07-03 NOTE — TELEPHONE ENCOUNTER
----- Message from Sloane Pelayo sent at 7/2/2023 11:36 AM EDT -----  Regarding: Tramadol  Contact: [phone]  Dr. Triston Spangler,  I sent a message for Tramadol refill last week and it was on a different screen. After I sent it, I sent you a message confirming the request. Liset Cabello may not have seen them so I am sending this note. I am out of Tramadol and hope you can prescribe it for me.   Thank you very much,  Marjorie Sevilla

## 2023-07-04 RX ORDER — TRAMADOL HYDROCHLORIDE 50 MG/1
50 TABLET ORAL EVERY 8 HOURS PRN
Qty: 90 TABLET | Refills: 0 | Status: SHIPPED | OUTPATIENT
Start: 2023-07-04 | End: 2023-08-03

## 2023-07-05 ENCOUNTER — TELEPHONE (OUTPATIENT)
Age: 88
End: 2023-07-05

## 2023-07-17 DIAGNOSIS — F51.01 PRIMARY INSOMNIA: ICD-10-CM

## 2023-07-17 RX ORDER — SUVOREXANT 10 MG/1
10 TABLET, FILM COATED ORAL DAILY
Qty: 30 TABLET | Refills: 0 | Status: SHIPPED | OUTPATIENT
Start: 2023-07-17 | End: 2023-08-16

## 2023-07-26 ENCOUNTER — TELEPHONE (OUTPATIENT)
Age: 88
End: 2023-07-26

## 2023-07-26 NOTE — TELEPHONE ENCOUNTER
----- Message from Dea Guillen sent at 7/25/2023  4:55 PM EDT -----  Subject: Message to Provider    QUESTIONS  Information for Provider?  Roro received a missed call and pt is asking for   someone to reach out to her in this regards call was about her appointment     ---------------------------------------------------------------------------  --------------  [address] [place] [address] Naya  [phone]; OK to leave message on voicemail  ---------------------------------------------------------------------------  --------------  SCRIPT ANSWERS  undefined

## 2023-08-01 ENCOUNTER — HOME HEALTH ADMISSION (OUTPATIENT)
Dept: HOME HEALTH SERVICES | Facility: HOME HEALTH | Age: 88
End: 2023-08-01
Payer: MEDICARE

## 2023-08-01 ENCOUNTER — OFFICE VISIT (OUTPATIENT)
Age: 88
End: 2023-08-01
Payer: MEDICARE

## 2023-08-01 VITALS
BODY MASS INDEX: 32.59 KG/M2 | SYSTOLIC BLOOD PRESSURE: 130 MMHG | WEIGHT: 166 LBS | HEIGHT: 60 IN | HEART RATE: 58 BPM | RESPIRATION RATE: 17 BRPM | OXYGEN SATURATION: 95 % | TEMPERATURE: 97.6 F | DIASTOLIC BLOOD PRESSURE: 77 MMHG

## 2023-08-01 DIAGNOSIS — F51.01 PRIMARY INSOMNIA: Primary | ICD-10-CM

## 2023-08-01 DIAGNOSIS — M51.16 INTERVERTEBRAL DISC DISORDERS WITH RADICULOPATHY, LUMBAR REGION: ICD-10-CM

## 2023-08-01 DIAGNOSIS — M81.0 AGE-RELATED OSTEOPOROSIS WITHOUT CURRENT PATHOLOGICAL FRACTURE: ICD-10-CM

## 2023-08-01 DIAGNOSIS — E06.3 HYPOTHYROIDISM DUE TO HASHIMOTO'S THYROIDITIS: ICD-10-CM

## 2023-08-01 DIAGNOSIS — M48.05 SPINAL STENOSIS OF THORACOLUMBAR REGION: ICD-10-CM

## 2023-08-01 DIAGNOSIS — F41.9 ANXIETY: ICD-10-CM

## 2023-08-01 DIAGNOSIS — D47.2 MONOCLONAL GAMMOPATHY: ICD-10-CM

## 2023-08-01 DIAGNOSIS — E03.8 HYPOTHYROIDISM DUE TO HASHIMOTO'S THYROIDITIS: ICD-10-CM

## 2023-08-01 DIAGNOSIS — E78.00 HIGH CHOLESTEROL: ICD-10-CM

## 2023-08-01 DIAGNOSIS — G89.4 CHRONIC PAIN SYNDROME: ICD-10-CM

## 2023-08-01 DIAGNOSIS — F11.99 OPIOID USE, UNSPECIFIED WITH UNSPECIFIED OPIOID-INDUCED DISORDER (HCC): ICD-10-CM

## 2023-08-01 PROCEDURE — 1123F ACP DISCUSS/DSCN MKR DOCD: CPT | Performed by: INTERNAL MEDICINE

## 2023-08-01 PROCEDURE — 1036F TOBACCO NON-USER: CPT | Performed by: INTERNAL MEDICINE

## 2023-08-01 PROCEDURE — 99214 OFFICE O/P EST MOD 30 MIN: CPT | Performed by: INTERNAL MEDICINE

## 2023-08-01 PROCEDURE — 1090F PRES/ABSN URINE INCON ASSESS: CPT | Performed by: INTERNAL MEDICINE

## 2023-08-01 PROCEDURE — G8427 DOCREV CUR MEDS BY ELIG CLIN: HCPCS | Performed by: INTERNAL MEDICINE

## 2023-08-01 PROCEDURE — G8417 CALC BMI ABV UP PARAM F/U: HCPCS | Performed by: INTERNAL MEDICINE

## 2023-08-01 SDOH — ECONOMIC STABILITY: FOOD INSECURITY: WITHIN THE PAST 12 MONTHS, YOU WORRIED THAT YOUR FOOD WOULD RUN OUT BEFORE YOU GOT MONEY TO BUY MORE.: NEVER TRUE

## 2023-08-01 SDOH — ECONOMIC STABILITY: FOOD INSECURITY: WITHIN THE PAST 12 MONTHS, THE FOOD YOU BOUGHT JUST DIDN'T LAST AND YOU DIDN'T HAVE MONEY TO GET MORE.: NEVER TRUE

## 2023-08-01 SDOH — ECONOMIC STABILITY: HOUSING INSECURITY
IN THE LAST 12 MONTHS, WAS THERE A TIME WHEN YOU DID NOT HAVE A STEADY PLACE TO SLEEP OR SLEPT IN A SHELTER (INCLUDING NOW)?: NO

## 2023-08-01 SDOH — ECONOMIC STABILITY: INCOME INSECURITY: HOW HARD IS IT FOR YOU TO PAY FOR THE VERY BASICS LIKE FOOD, HOUSING, MEDICAL CARE, AND HEATING?: NOT HARD AT ALL

## 2023-08-01 NOTE — PROGRESS NOTES
Ms. Logan David is presenting to follow up     CC:  Follow-up       HPI:    Ms. Logan David   is a 80 y.o. female with a hx of   post herpetic neuralgia, anxiety, HTN, spondylolysis of lumbar region, hypothyroidism, CKD stage III and anemia presenting for follow up on chronic medical issues   Last seen by me on Feb 2023     Patient had  iron infusion on 04/05//22  and since the more energetic   Symptoms resolved  No fever no chills and no cough   Repeat CBC 12     She had a serious cases of shingles in 22 affecting face and pain improved  Denies memory changes   She takes tramadol for severe back pain twice a day     Constipation: resolved stopped linzess     Anxiety  Takes 0.025 of Xanax as needed  Sleeping well on belsomra         Essential hypertension  Blood pressure is excellent. She is on metoprolol 50mg BID only      Spondylolysis of lumbar region  Patient has chronic back pain wears brace. She takes tramadol 50 mg twice a day with improvement in quality of life. 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 She previously saw oncologist and there was no evidence of systemic disease. She was diagnosed with MGUS will do labs to monitor this.   She denies weight loss fever fatigue     Acquired hypothyroidism  Synthroid 75 mcg daily      High cholesterol: No history of stroke or heart attack she has been on Crestor for several years discussed stopping medication     Osteoporosis : on fosamax for over 10 years therefore stopped in 2022      ROS  10 systems reviewed and negative other then HPI       Past Medical History:   Diagnosis Date    Anemia     CAD (coronary artery disease)     stent placed on left 1 stent,in 2007    Cancer [facility])     BCCA    Chronic kidney disease     DVT (deep venous thrombosis) (HCC)     GERD (gastroesophageal reflux disease)     Hypercholesterolemia     Hypertension

## 2023-08-02 LAB
ALBUMIN SERPL-MCNC: 3.4 G/DL (ref 3.5–5)
ALBUMIN/GLOB SERPL: 1.2 (ref 1.1–2.2)
ALP SERPL-CCNC: 99 U/L (ref 45–117)
ALT SERPL-CCNC: 11 U/L (ref 12–78)
ANION GAP SERPL CALC-SCNC: 5 MMOL/L (ref 5–15)
AST SERPL-CCNC: 18 U/L (ref 15–37)
BASOPHILS # BLD: 0.1 K/UL (ref 0–0.1)
BASOPHILS NFR BLD: 1 % (ref 0–1)
BILIRUB SERPL-MCNC: 0.4 MG/DL (ref 0.2–1)
BUN SERPL-MCNC: 26 MG/DL (ref 6–20)
BUN/CREAT SERPL: 18 (ref 12–20)
CALCIUM SERPL-MCNC: 11 MG/DL (ref 8.5–10.1)
CHLORIDE SERPL-SCNC: 105 MMOL/L (ref 97–108)
CHOLEST SERPL-MCNC: 166 MG/DL
CO2 SERPL-SCNC: 32 MMOL/L (ref 21–32)
CREAT SERPL-MCNC: 1.47 MG/DL (ref 0.55–1.02)
DIFFERENTIAL METHOD BLD: ABNORMAL
EOSINOPHIL # BLD: 0.1 K/UL (ref 0–0.4)
EOSINOPHIL NFR BLD: 1 % (ref 0–7)
ERYTHROCYTE [DISTWIDTH] IN BLOOD BY AUTOMATED COUNT: 12.2 % (ref 11.5–14.5)
GLOBULIN SER CALC-MCNC: 2.9 G/DL (ref 2–4)
GLUCOSE SERPL-MCNC: 84 MG/DL (ref 65–100)
HCT VFR BLD AUTO: 40.3 % (ref 35–47)
HDLC SERPL-MCNC: 69 MG/DL
HDLC SERPL: 2.4 (ref 0–5)
HGB BLD-MCNC: 12.3 G/DL (ref 11.5–16)
IMM GRANULOCYTES # BLD AUTO: 0 K/UL (ref 0–0.04)
IMM GRANULOCYTES NFR BLD AUTO: 0 % (ref 0–0.5)
LDLC SERPL CALC-MCNC: 69.8 MG/DL (ref 0–100)
LYMPHOCYTES # BLD: 0.7 K/UL (ref 0.8–3.5)
LYMPHOCYTES NFR BLD: 13 % (ref 12–49)
MCH RBC QN AUTO: 33 PG (ref 26–34)
MCHC RBC AUTO-ENTMCNC: 30.5 G/DL (ref 30–36.5)
MCV RBC AUTO: 108 FL (ref 80–99)
MONOCYTES # BLD: 0.6 K/UL (ref 0–1)
MONOCYTES NFR BLD: 10 % (ref 5–13)
NEUTS SEG # BLD: 4.2 K/UL (ref 1.8–8)
NEUTS SEG NFR BLD: 75 % (ref 32–75)
NRBC # BLD: 0 K/UL (ref 0–0.01)
NRBC BLD-RTO: 0 PER 100 WBC
PLATELET # BLD AUTO: 166 K/UL (ref 150–400)
PMV BLD AUTO: 11.6 FL (ref 8.9–12.9)
POTASSIUM SERPL-SCNC: 4.4 MMOL/L (ref 3.5–5.1)
PROT SERPL-MCNC: 6.3 G/DL (ref 6.4–8.2)
RBC # BLD AUTO: 3.73 M/UL (ref 3.8–5.2)
RBC MORPH BLD: ABNORMAL
SODIUM SERPL-SCNC: 142 MMOL/L (ref 136–145)
T4 FREE SERPL-MCNC: 1.1 NG/DL (ref 0.8–1.5)
TRIGL SERPL-MCNC: 136 MG/DL
TSH SERPL DL<=0.05 MIU/L-ACNC: 0.99 UIU/ML (ref 0.36–3.74)
VLDLC SERPL CALC-MCNC: 27.2 MG/DL
WBC # BLD AUTO: 5.7 K/UL (ref 3.6–11)

## 2023-08-04 ENCOUNTER — TELEPHONE (OUTPATIENT)
Age: 88
End: 2023-08-04

## 2023-08-04 ENCOUNTER — HOME CARE VISIT (OUTPATIENT)
Facility: HOME HEALTH | Age: 88
End: 2023-08-04

## 2023-08-04 VITALS
TEMPERATURE: 97 F | DIASTOLIC BLOOD PRESSURE: 70 MMHG | SYSTOLIC BLOOD PRESSURE: 120 MMHG | HEART RATE: 68 BPM | OXYGEN SATURATION: 95 % | RESPIRATION RATE: 16 BRPM

## 2023-08-04 PROCEDURE — 0221000100 HH NO PAY CLAIM PROCEDURE

## 2023-08-04 PROCEDURE — G0151 HHCP-SERV OF PT,EA 15 MIN: HCPCS

## 2023-08-04 ASSESSMENT — ENCOUNTER SYMPTOMS: DYSPNEA ACTIVITY LEVEL: AFTER AMBULATING MORE THAN 20 FT

## 2023-08-04 NOTE — HOME HEALTH
Skilled reason for admission/summary of clinical condition: Ms. Socorro Baldwin is a 81 yo female who is referred for [facility] PT/OT due to gradual functional decline in part due to chronic back pain which limits activity. PMHX includes lumbar spondylolysis, HTN, CKD3, hypothyroidism, anxiety, osteoporosis, hypercholesterolemia, post-herpetic neuralgia, CAD, PE. She lives in a two story home with her dtr and stays on the first floor. There are 4 SANJIV with railing and at baseline level she is independent inside without AD and uses rollator outdoors, hasn't had any falls. She presents today with difficulty rising from chair, impaired balance upon first standing, wide based, hesitant gait and she is at an increased risk for falls based on these factors and BOO of 21/56. Her activity tolerance and standing are very limited due to back pain and she fatigues easily. Skilled PT is recommended 1w1 2w4 to address the above impairments to reduce fall risk and enable the patient to safely go to her [facility] and MD appts as needed with her dtr. OT to [facility].   Subjective: well I've had my workout now for the day  Caregiver: relative. Caregiver assists with: Meals and Transportation Caregiver unable to assist with: none. Caregiver is available Regularly Caregiver is not present at this visit and did not participate with clinician. Medications reconciled and all medications are available in the home this visit. The following education was provided regarding medications: medication interactions and look alike medications: High alert medication teaching on aspirin, antiplatelet therapy education;signs and symptoms of abnormal bleeding such as unexplained bruising, dizziness/lightheadedness, red or tarry looking stool, blood in urine, blood in vomit. Patient/CG able to demonstrate knowledge through teach back with 80 percent accuracy. Medications are effective at this time. MD notified of any discrepancies/medication interactions n/a.  A

## 2023-08-04 NOTE — TELEPHONE ENCOUNTER
Patient states she's returning your call from yesterday, 8/3/23 for results. Please call.  Thank you

## 2023-08-08 ENCOUNTER — HOME CARE VISIT (OUTPATIENT)
Facility: HOME HEALTH | Age: 88
End: 2023-08-08

## 2023-08-08 VITALS
TEMPERATURE: 97.6 F | DIASTOLIC BLOOD PRESSURE: 75 MMHG | RESPIRATION RATE: 17 BRPM | SYSTOLIC BLOOD PRESSURE: 118 MMHG | HEART RATE: 62 BPM | OXYGEN SATURATION: 96 %

## 2023-08-08 LAB — DRUGS UR: NORMAL

## 2023-08-08 PROCEDURE — G0157 HHC PT ASSISTANT EA 15: HCPCS

## 2023-08-08 PROCEDURE — G0152 HHCP-SERV OF OT,EA 15 MIN: HCPCS

## 2023-08-08 ASSESSMENT — ENCOUNTER SYMPTOMS: PAIN LOCATION - PAIN QUALITY: ACHING

## 2023-08-09 VITALS
HEART RATE: 63 BPM | TEMPERATURE: 97.5 F | OXYGEN SATURATION: 97 % | DIASTOLIC BLOOD PRESSURE: 69 MMHG | RESPIRATION RATE: 18 BRPM | SYSTOLIC BLOOD PRESSURE: 120 MMHG

## 2023-08-09 ASSESSMENT — ENCOUNTER SYMPTOMS: PAIN LOCATION - PAIN QUALITY: ACHING

## 2023-08-09 NOTE — HOME HEALTH
Skilled reason for admission/summary of clinical condition:   admitted to home care for skilled [facility] OT needed for assessment and intervention of home safety and implementation of home rehab plan along with ability to perform ADLs and instruction on adaptive techniques. Diagnosis: Lumbar Spondylolysis   Subjective: \"I'm doing alright I think. \"   Caregiver: Daughter. Caregiver assists with: transportation, meal prep, housekeeping tasks and picking up needed items. Caregiver unable to assist with: NA. Caregiver is available: when requested. Caregiver was present at this visit and did participate with clinician. Medications reconciled and all medications are available in the home this visit. Medications are effective at this time per patient report. Home health supplies by type and quantity ordered/delivered this visit include: NA  Patient/caregiver instructed on plan of care and are agreeable to plan of care at this time. Patient at risk for falls: Yes   Discharge planning discussed with patient and caregiver. Discharge planning as follows: discharge when goals are met or max potential achieved. Patient/caregiver did verbalize agreement with discharge planning. Clinical Assessment (What this means for the patient overall and need for ongoing skilled care): Patient lives with her daughter in two level home with full bathroom and bedroom on first floor of the home. She has a ramp at the front entrance if needed but uses back steps to exit/enter the home. She uses a rollator out in the community and no assistive device in the home requiring min A level for dressing, bathing, toileting, toilet transfers, shower transfers and laundry tasks. The Barthel index assessment administered and score at 70/100 indicating minimally dependent with self care tasks. MACH-10 assessment score was 7/10 indicating patient is at risk for falls as instruction was provided during this evaluation.  Patient would

## 2023-08-09 NOTE — HOME HEALTH
Subjective: Pt c/o LBP. Falls since last visit No(if yes complete the Fall Tracking Form and include bsrifallreport):   Caregiver involvement changes: none. Home health supplies by type and quantity ordered/delivered this visit include: none. Clinician asked if patient has had any physician contact since last home care visit and patient states: NO  Clinician asked if patient has any new or changed medications and patient states:  NO   If Yes, were medications reconciled? N/A   Was the certifying physician notified of changes in medications? N/A     Clinical assessment (what this visit means for the patient overall and need for ongoing skilled care) and progress or lack of progress towards SPECIFIC goals: Pt receiving gait training and balance training will decrease fall risk . Gait and strength training goals have not been met. Written Teaching Material Utilized: N/A    Interdisciplinary communication with: N/A     Discharge planning as follows: When goals are met    Specific plan for next visit: Instruct caregiver/patient in gait training.

## 2023-08-10 ENCOUNTER — HOME CARE VISIT (OUTPATIENT)
Facility: HOME HEALTH | Age: 88
End: 2023-08-10

## 2023-08-10 VITALS
RESPIRATION RATE: 17 BRPM | SYSTOLIC BLOOD PRESSURE: 125 MMHG | HEART RATE: 61 BPM | TEMPERATURE: 97.6 F | OXYGEN SATURATION: 97 % | DIASTOLIC BLOOD PRESSURE: 65 MMHG

## 2023-08-10 VITALS
DIASTOLIC BLOOD PRESSURE: 65 MMHG | SYSTOLIC BLOOD PRESSURE: 125 MMHG | OXYGEN SATURATION: 97 % | RESPIRATION RATE: 17 BRPM | HEART RATE: 61 BPM | TEMPERATURE: 97.6 F

## 2023-08-10 PROCEDURE — G0152 HHCP-SERV OF OT,EA 15 MIN: HCPCS

## 2023-08-10 PROCEDURE — G0157 HHC PT ASSISTANT EA 15: HCPCS

## 2023-08-10 ASSESSMENT — ENCOUNTER SYMPTOMS
PAIN LOCATION - PAIN QUALITY: ACHING
PAIN LOCATION - PAIN QUALITY: ACHING

## 2023-08-11 NOTE — HOME HEALTH
Subjective: \"I think I over did it yesterday going to the grocery store with my daughter. \"   Falls since last visit: (if yes complete the Fall Tracking Form and include bsrifallreport): No   Caregiver involvement changes: none   Home health supplies by type and quantity ordered/delivered this visit include: Theraband    Clinician asked if patient has had any physician contact since last home care visit and patient states: No   Clinician asked if patient has any new or changed medications and patient states:  No   If Yes, were medications reconciled? N/A  Was the certifying physician notified of changes in medications? N/A     Clinical assessment (what this visit means for the patient overall and need for ongoing skilled care) and progress or lack of progress towards SPECIFIC goals:  Patient demonstrated the ability to complete retrieval and transport with dressing tasks with therapist providing verbal cueing for fall prevention techniques and safety modifications implementing at a mod I level allowing therapist to make needed home modifications. She remains at continued risk of falls with bathroom transfers and bathing tasks requiring additional instruction from skilled [facility] OT to achieve higher level of functional independence and reduce falls. Patient met mod I level portion of goal for dressing tasks. Steady progress being made towards completing UE HEP with independence as program was established this visit. Written Teaching Material Utilized: UE HEP handout consisting of resistive exercises using theraband   Interdisciplinary communication with: Christine Murphy PTA for POC colloboration   Discharge planning as follows: Discharge from [facility] OT when goals are met or maximum level of function is achieved.      Specific plan for next visit:  Focus on increasing independence and reducing risk of falls with bathroom transfers and bathing tasks

## 2023-08-11 NOTE — HOME HEALTH
Subjective: Pt reported increased LBP. Falls since last visit No(if yes complete the Fall Tracking Form and include bsrifallreport):   Caregiver involvement changes: none. Home health supplies by type and quantity ordered/delivered this visit include: none. Clinician asked if patient has had any physician contact since last home care visit and patient states: NO  Clinician asked if patient has any new or changed medications and patient states:  NO   If Yes, were medications reconciled? N/A   Was the certifying physician notified of changes in medications? N/A     Clinical assessment (what this visit means for the patient overall and need for ongoing skilled care) and progress or lack of progress towards SPECIFIC goals: Pts increased back pain limited participation level but was able to be educated by PTA to perform abdom bracing and gait training with verbal cues to not twist at hip when turning. Written Teaching Material Utilized: N/A    Interdisciplinary communication with: N/A for the purpose of POC collaboration    Discharge planning as follows: When goals are met    Specific plan for next visit: Instruct caregiver/patient in B LE strength training.

## 2023-08-14 ENCOUNTER — HOME CARE VISIT (OUTPATIENT)
Facility: HOME HEALTH | Age: 88
End: 2023-08-14
Payer: MEDICARE

## 2023-08-14 VITALS
DIASTOLIC BLOOD PRESSURE: 71 MMHG | HEART RATE: 70 BPM | SYSTOLIC BLOOD PRESSURE: 126 MMHG | OXYGEN SATURATION: 98 % | TEMPERATURE: 98 F

## 2023-08-14 LAB
ALBUMIN SERPL ELPH-MCNC: 3.5 G/DL (ref 2.9–4.4)
ALBUMIN/GLOB SERPL: 1.2 (ref 0.7–1.7)
ALPHA1 GLOB SERPL ELPH-MCNC: 0.3 G/DL (ref 0–0.4)
ALPHA2 GLOB SERPL ELPH-MCNC: 0.8 G/DL (ref 0.4–1)
B-GLOBULIN SERPL ELPH-MCNC: 1.1 G/DL (ref 0.7–1.3)
GAMMA GLOB SERPL ELPH-MCNC: 0.7 G/DL (ref 0.4–1.8)
GLOBULIN SER CALC-MCNC: 2.9 G/DL (ref 2.2–3.9)
IGA SERPL-MCNC: 255 MG/DL (ref 64–422)
IGG SERPL-MCNC: 1014 MG/DL (ref 586–1602)
IGM SERPL-MCNC: 84 MG/DL (ref 26–217)
INTERPRETATION SERPL IEP-IMP: ABNORMAL
KAPPA LC FREE SER-MCNC: 49.1 MG/L (ref 3.3–19.4)
KAPPA LC FREE/LAMBDA FREE SER: 0.21 (ref 0.26–1.65)
LABORATORY COMMENT REPORT: NORMAL
LAMBDA LC FREE SERPL-MCNC: 232.3 MG/L (ref 5.7–26.3)
M PROTEIN SERPL ELPH-MCNC: NORMAL G/DL
PROT SERPL-MCNC: 6.4 G/DL (ref 6–8.5)
REFLEX TESTING: NORMAL

## 2023-08-14 PROCEDURE — G0152 HHCP-SERV OF OT,EA 15 MIN: HCPCS

## 2023-08-14 ASSESSMENT — ENCOUNTER SYMPTOMS: PAIN LOCATION - PAIN QUALITY: ACHING

## 2023-08-15 ENCOUNTER — HOME CARE VISIT (OUTPATIENT)
Facility: HOME HEALTH | Age: 88
End: 2023-08-15
Payer: MEDICARE

## 2023-08-15 DIAGNOSIS — F51.01 PRIMARY INSOMNIA: ICD-10-CM

## 2023-08-15 PROCEDURE — G0157 HHC PT ASSISTANT EA 15: HCPCS

## 2023-08-15 RX ORDER — SUVOREXANT 10 MG/1
10 TABLET, FILM COATED ORAL DAILY
Qty: 30 TABLET | Refills: 0 | Status: SHIPPED | OUTPATIENT
Start: 2023-08-15 | End: 2023-09-14

## 2023-08-15 NOTE — HOME HEALTH
Subjective: \"I'm doing well I think. I did my exercises you gave me. \"   Falls since last visit: (if yes complete the Fall Tracking Form and include bsrifallreport): No   Caregiver involvement changes: none   Home health supplies by type and quantity ordered/delivered this visit include: None     Clinician asked if patient has had any physician contact since last home care visit and patient states: No   Clinician asked if patient has any new or changed medications and patient states:  No   If Yes, were medications reconciled? N/A  Was the certifying physician notified of changes in medications? N/A     Clinical assessment (what this visit means for the patient overall and need for ongoing skilled care) and progress or lack of progress towards SPECIFIC goals:  Patient demonstrated the ability to complete laundry tasks with therapist providing verbal cueing for fall prevention techniques and safety modifications implementing at a mod I level. She remains at continued risk of falls with bathroom transfers and bathing tasks requiring additional instruction from skilled [facility] OT to achieve higher level of functional independence and reduce falls. Patient met mod I goal set for laundry tasks. Written Teaching Material Utilized: NA   Interdisciplinary communication with: Isael Carcamo PTA for POC colloboration   Discharge planning as follows: Discharge from [facility] OT when goals are met or maximum level of function is achieved.      Specific plan for next visit:  Focus on increasing independence and reducing risk of falls with bathroom transfers and bathing tasks

## 2023-08-16 ENCOUNTER — HOME CARE VISIT (OUTPATIENT)
Facility: HOME HEALTH | Age: 88
End: 2023-08-16
Payer: MEDICARE

## 2023-08-16 VITALS
TEMPERATURE: 97.9 F | OXYGEN SATURATION: 99 % | RESPIRATION RATE: 18 BRPM | DIASTOLIC BLOOD PRESSURE: 72 MMHG | HEART RATE: 68 BPM | SYSTOLIC BLOOD PRESSURE: 120 MMHG

## 2023-08-16 VITALS
SYSTOLIC BLOOD PRESSURE: 122 MMHG | OXYGEN SATURATION: 97 % | HEART RATE: 64 BPM | TEMPERATURE: 97.6 F | RESPIRATION RATE: 17 BRPM | DIASTOLIC BLOOD PRESSURE: 74 MMHG

## 2023-08-16 PROCEDURE — G0152 HHCP-SERV OF OT,EA 15 MIN: HCPCS

## 2023-08-16 ASSESSMENT — ENCOUNTER SYMPTOMS
PAIN LOCATION - PAIN QUALITY: ACHING
PAIN LOCATION - PAIN QUALITY: ACHING

## 2023-08-17 ENCOUNTER — HOME CARE VISIT (OUTPATIENT)
Facility: HOME HEALTH | Age: 88
End: 2023-08-17
Payer: MEDICARE

## 2023-08-17 PROCEDURE — G0157 HHC PT ASSISTANT EA 15: HCPCS

## 2023-08-17 NOTE — HOME HEALTH
Subjective: Pt reported decreased LBP since last [facility] visit. Falls since last visit No(if yes complete the Fall Tracking Form and include bsrifallreport):   Caregiver involvement changes: none. Home health supplies by type and quantity ordered/delivered this visit include: none. Clinician asked if patient has had any physician contact since last home care visit and patient states: NO  Clinician asked if patient has any new or changed medications and patient states:  NO   If Yes, were medications reconciled? N/A   Was the certifying physician notified of changes in medications? N/A     Clinical assessment (what this visit means for the patient overall and need for ongoing skilled care) and progress or lack of progress towards SPECIFIC goals: This visit will help pt to decrease fall risk during gait training and will increase abdom strength , decreasing pain in LB decreasing fall risk. Gait goal has not been met. Written Teaching Material Utilized: N/A    Interdisciplinary communication with: N/A     Discharge planning as follows:  When goals are met    Specific plan for next visit: Instruct caregiver/patient in balance and gait training

## 2023-08-18 VITALS
TEMPERATURE: 97.4 F | DIASTOLIC BLOOD PRESSURE: 65 MMHG | SYSTOLIC BLOOD PRESSURE: 110 MMHG | RESPIRATION RATE: 18 BRPM | OXYGEN SATURATION: 97 % | HEART RATE: 68 BPM

## 2023-08-18 DIAGNOSIS — G89.29 OTHER CHRONIC PAIN: Primary | ICD-10-CM

## 2023-08-18 ASSESSMENT — ENCOUNTER SYMPTOMS: PAIN LOCATION - PAIN QUALITY: ACHING

## 2023-08-18 NOTE — TELEPHONE ENCOUNTER
----- Message from Sloane Pelayo sent at 8/17/2023  9:44 PM EDT -----  Regarding: Tramadol 50 mg  Contact: [phone]  Dr. Triston Spangler,  I put a request for refill of Tramadol 50 mg on 8/15 and just was checking the request and saw a message that it cannot be refilled via my chart. I am completely out of it and hope you can help me in renewing the prescription. The last renewal was 90 pills and filled on 7/5. I take it twice a day. Thank you very much for helping me with this refill.   Marjorie Sevilla

## 2023-08-18 NOTE — TELEPHONE ENCOUNTER
PCP: Chelsie Dwyer MD    Last appt:   8/1/2023    Future Appointments   Date Time Provider [address]   8/22/2023  1:00 PM Maribell Perkins [id number] [place] [address]   8/24/2023  1:00 PM Maribell Perkins [id number] [place] [address]   8/30/2023 To Be Determined Aide Reardon, [address] [place] [address]   11/2/2023  1:30 PM Dayan Montenegro MD [facility] BS AMB       Requested Prescriptions     Pending Prescriptions Disp Refills    traMADol (ULTRAM) 50 MG tablet 90 tablet 0     Sig: Take 1 tablet by mouth every 8 hours as needed for Pain for up to 30 days.  Max Daily Amount: 150 mg

## 2023-08-19 RX ORDER — TRAMADOL HYDROCHLORIDE 50 MG/1
50 TABLET ORAL EVERY 8 HOURS PRN
Qty: 90 TABLET | Refills: 0 | Status: SHIPPED | OUTPATIENT
Start: 2023-08-19 | End: 2023-09-18

## 2023-08-22 ENCOUNTER — HOME CARE VISIT (OUTPATIENT)
Facility: HOME HEALTH | Age: 88
End: 2023-08-22
Payer: MEDICARE

## 2023-08-22 DIAGNOSIS — F41.9 ANXIETY: ICD-10-CM

## 2023-08-22 RX ORDER — ALPRAZOLAM 0.25 MG/1
0.25 TABLET ORAL NIGHTLY PRN
Qty: 90 TABLET | Refills: 0 | Status: SHIPPED | OUTPATIENT
Start: 2023-08-22 | End: 2023-08-25 | Stop reason: SDUPTHER

## 2023-08-24 ENCOUNTER — HOME CARE VISIT (OUTPATIENT)
Facility: HOME HEALTH | Age: 88
End: 2023-08-24
Payer: MEDICARE

## 2023-08-25 DIAGNOSIS — F41.9 ANXIETY: ICD-10-CM

## 2023-08-25 RX ORDER — ALPRAZOLAM 0.25 MG/1
0.25 TABLET ORAL NIGHTLY PRN
Qty: 90 TABLET | Refills: 0 | Status: SHIPPED | OUTPATIENT
Start: 2023-08-25 | End: 2023-11-23

## 2023-08-25 NOTE — TELEPHONE ENCOUNTER
PCP: Daryl Oneil MD    Last appt:   8/1/2023    Future Appointments   Date Time Provider [address]   8/28/2023 To Be Determined Amy Saul Merlin, [address] [place] [address]   8/30/2023 To Be Determined Amy Saul Merlin, [address] [place] [address]   11/2/2023  1:30 PM Dayan Conti MD [facility] BS AMB       Requested Prescriptions     Pending Prescriptions Disp Refills    ALPRAZolam (XANAX) 0.25 MG tablet 90 tablet 0     Sig: Take 1 tablet by mouth nightly as needed for Anxiety or Sleep for up to 90 days.  TAKE ONE TABLET BY MOUTH ONE TIME DAILY AS NEEDED Max Daily Amount: 0.25 mg

## 2023-08-28 ENCOUNTER — HOME CARE VISIT (OUTPATIENT)
Facility: HOME HEALTH | Age: 88
End: 2023-08-28
Payer: MEDICARE

## 2023-08-28 VITALS
RESPIRATION RATE: 16 BRPM | HEART RATE: 61 BPM | OXYGEN SATURATION: 96 % | SYSTOLIC BLOOD PRESSURE: 120 MMHG | TEMPERATURE: 97.2 F | DIASTOLIC BLOOD PRESSURE: 60 MMHG

## 2023-08-28 PROCEDURE — G0151 HHCP-SERV OF PT,EA 15 MIN: HCPCS

## 2023-08-28 ASSESSMENT — ENCOUNTER SYMPTOMS: PAIN LOCATION - PAIN QUALITY: ACHE

## 2023-08-28 NOTE — HOME HEALTH
Subjective: I'm not having a very good day  Falls since last visit No(if yes complete the Fall Tracking Form and include bsrifallreport):   Caregiver involvement changes: none  Home health supplies by type and quantity ordered/delivered this visit include: n/a    Clinician asked if patient has had any physician contact since last home care visit and patient states: NO  Clinician asked if patient has any new or changed medications and patient states:  NO   If Yes, were medications reconciled? YES   Was the certifying physician notified of changes in medications? N/A     Clinical assessment (what this visit means for the patient overall and need for ongoing skilled care) and progress or lack of progress towards SPECIFIC goals: Mrs. Igor Milton has completed 6 HH PT and 4 HH OT visits and wishes to be d/c'd, stating she doesn't think she needs any more therapy. She states she is independent with her HEP but does not wish to demonstrate today as she is not having a good day. Her function continues to be limited due to chronic back pain which is worse in standing and therefore she is mostly sedentary but still completes daily tasks and ADLs slowly but independently. Meds were reconciled, she is waiting for a refill of her xanax and is in communication with her PCP. She has been d/c'd from the agency today.

## 2023-08-29 ENCOUNTER — PATIENT MESSAGE (OUTPATIENT)
Age: 88
End: 2023-08-29

## 2023-08-29 DIAGNOSIS — R79.89 HIGH SERUM CALCIUM: Primary | ICD-10-CM

## 2023-08-31 LAB
BUN SERPL-MCNC: 22 MG/DL (ref 10–36)
BUN/CREAT SERPL: 15 (ref 12–28)
CALCIUM SERPL-MCNC: 10.4 MG/DL (ref 8.7–10.3)
CHLORIDE SERPL-SCNC: 104 MMOL/L (ref 96–106)
CO2 SERPL-SCNC: 25 MMOL/L (ref 20–29)
CREAT SERPL-MCNC: 1.43 MG/DL (ref 0.57–1)
EGFRCR SERPLBLD CKD-EPI 2021: 35 ML/MIN/1.73
GLUCOSE SERPL-MCNC: 104 MG/DL (ref 70–99)
POTASSIUM SERPL-SCNC: 4.3 MMOL/L (ref 3.5–5.2)
SODIUM SERPL-SCNC: 143 MMOL/L (ref 134–144)

## 2023-09-14 DIAGNOSIS — F51.01 PRIMARY INSOMNIA: ICD-10-CM

## 2023-09-14 RX ORDER — SUVOREXANT 10 MG/1
10 TABLET, FILM COATED ORAL DAILY
Qty: 30 TABLET | Refills: 2 | Status: SHIPPED | OUTPATIENT
Start: 2023-09-14 | End: 2023-12-13

## 2023-09-14 NOTE — TELEPHONE ENCOUNTER
----- Message from Juliet Jeong sent at 9/14/2023  9:48 AM EDT -----  Regarding: Belsomra 10 mg  Contact: [phone]  I am requesting a refill prescription for Belsomra 10 mg by message because it could not be refilled through My Chart. Thank you Dr. Carlene Pinzon.    Shady Tesfaye

## 2023-09-14 NOTE — TELEPHONE ENCOUNTER
PCP: Halle Esteban MD    Last appt:   8/1/2023    Future Appointments   Date Time Provider [address]   11/2/2023  1:30 PM Dayan Tirado MD [facility] BS AMB       Requested Prescriptions     Pending Prescriptions Disp Refills    Suvorexant (BELSOMRA) 10 MG TABS 30 tablet 0     Sig: Take 10 mg by mouth daily for 30 days.  Max Daily Amount: 10 mg

## 2023-09-26 RX ORDER — METOPROLOL TARTRATE 50 MG/1
50 TABLET, FILM COATED ORAL 2 TIMES DAILY
Qty: 180 TABLET | Refills: 3 | Status: SHIPPED | OUTPATIENT
Start: 2023-09-26

## 2023-10-02 DIAGNOSIS — F41.9 ANXIETY: Primary | ICD-10-CM

## 2023-10-02 NOTE — TELEPHONE ENCOUNTER
----- Message from Layla Blunt sent at 10/2/2023 10:48 AM EDT -----  Regarding: Tramadol   Contact: [phone]  I am requesting a refill for Tramadol in this message because it is not listed with my medications. Also listed are 3 listings for aspirin which I never saw before. Dr. Baker Other would you send a prescription for Tramadol.   Thank you, Blanca Trivedi

## 2023-10-02 NOTE — TELEPHONE ENCOUNTER
PCP: Mildred Bland MD    Last appt:   8/1/2023    Future Appointments   Date Time Provider [address]   11/2/2023  1:30 PM Dayan Valadez MD [facility] BS AMB       Requested Prescriptions     Pending Prescriptions Disp Refills    traMADol (ULTRAM) 50 MG tablet 20 tablet 0     Sig: Take 1 tablet by mouth every 6 hours as needed for Pain for up to 5 days. Intended supply: 5 days.  Take lowest dose possible to manage pain Max Daily Amount: 200 mg

## 2023-10-03 RX ORDER — TRAMADOL HYDROCHLORIDE 50 MG/1
50 TABLET ORAL EVERY 6 HOURS PRN
Qty: 60 TABLET | Refills: 0 | Status: SHIPPED | OUTPATIENT
Start: 2023-10-03 | End: 2023-11-02

## 2023-10-04 RX ORDER — ROSUVASTATIN CALCIUM 20 MG/1
20 TABLET, COATED ORAL DAILY
Qty: 90 TABLET | Refills: 1 | Status: SHIPPED | OUTPATIENT
Start: 2023-10-04

## 2023-10-04 NOTE — TELEPHONE ENCOUNTER
----- Message from Dayton Russell sent at 10/3/2023  8:52 PM EDT -----  Regarding: Rosuvastatin calcium tablets 20 mg  Contact: [phone]  Dr. Lissett Lemons,  This medication is  running out and should I continue taking it. Dr. Annmarie De Luna had prescribed it in July. Thank you, Ntio Meier. Advance Care Planning (ACP) Provider Conversation Snapshot    Date of ACP Conversation: 04/11/17  Persons included in Conversation:  patient  Length of ACP Conversation in minutes:  <16 minutes (Non-Billable)    Authorized Decision Maker (if patient is incapable of making informed decisions):    This person is:   his sister, Matra Manriquez          For Patients with Decision Making Capacity:   Values/Goals: Exploration of values, goals, and preferences if recovery is not expected, even with continued medical treatment in the event of:  Imminent death  Severe, permanent brain injury    Conversation Outcomes / Follow-Up Plan:   Recommended completion of Advance Directive form after review of ACP materials and conversation with prospective healthcare agent

## 2023-10-16 RX ORDER — METOPROLOL TARTRATE 50 MG/1
50 TABLET, FILM COATED ORAL 2 TIMES DAILY
Qty: 180 TABLET | Refills: 3 | Status: SHIPPED | OUTPATIENT
Start: 2023-10-16

## 2023-10-30 DIAGNOSIS — F41.9 ANXIETY: ICD-10-CM

## 2023-10-30 RX ORDER — TRAMADOL HYDROCHLORIDE 50 MG/1
50 TABLET ORAL EVERY 6 HOURS PRN
Qty: 60 TABLET | Refills: 0 | Status: SHIPPED | OUTPATIENT
Start: 2023-10-30 | End: 2023-11-29

## 2023-11-02 ENCOUNTER — TELEMEDICINE (OUTPATIENT)
Age: 88
End: 2023-11-02
Payer: MEDICARE

## 2023-11-02 DIAGNOSIS — R79.89 HIGH SERUM CALCIUM: ICD-10-CM

## 2023-11-02 DIAGNOSIS — F51.01 PRIMARY INSOMNIA: ICD-10-CM

## 2023-11-02 DIAGNOSIS — D47.2 MONOCLONAL GAMMOPATHY: ICD-10-CM

## 2023-11-02 DIAGNOSIS — R80.3 BENCE JONES PROTEINURIA: Primary | ICD-10-CM

## 2023-11-02 PROCEDURE — 99443 PR PHYS/QHP TELEPHONE EVALUATION 21-30 MIN: CPT | Performed by: INTERNAL MEDICINE

## 2023-11-02 NOTE — PROGRESS NOTES
1. \"Have you been to the ER, urgent care clinic since your last visit? Hospitalized since your last visit? \" No    2. \"Have you seen or consulted any other health care providers outside of the [address] [place] [address] since your last visit? \" No     3. For patients aged 43-73: Has the patient had a colonoscopy / FIT/ Cologuard? NA - based on age      If the patient is female:    4. For patients aged 43-66: Has the patient had a mammogram within the past 2 years? NA - based on age or sex      11. For patients aged 21-65: Has the patient had a pap smear?  NA - based on age or sex

## 2023-11-02 NOTE — PROGRESS NOTES
Logan David, was evaluated through a synchronous (real-time)phone Estonian video was not working . The patient (or guardian if applicable) is aware that this is a billable service, which includes applicable co-pays. This Virtual Visit was conducted with patient's (and/or legal guardian's) consent. Patient identification was verified, and a caregiver was present when appropriate. The patient was located at Home: [address] [place] [address],[phone]  Provider was located at Home ([address] [place] [address]): [address] [place] [address] (:  7/3/1933) is a Established patient, presenting virtually for evaluation of the following:    Assessment & Plan    Bence Sandoval proteinuria  Monoclonal gammopathy  This has been stable     High serum calcium  Due to CKD and saw Dr Akins and stopped vitamin D     Primary insomnia  - on belsomra and doing well     HTN: well controlled     High cholesterol: on crestor        Below is the assessment and plan developed based on review of pertinent history, physical exam, labs, studies, and medications. No follow-ups on file. Subjective   HPI    79 yo presenting to follow up     Taking belsomra 10mg and helping with sleep     Saw Dr Akins renal on  for hypercalcemia and felt to be secondary to CKD  possibly MGUS  Labs were repeated for renal function and PTH   PTH is high   She was advised to stop the vitamin D and will follow up        Patient had  iron infusion on 22  and since the more energetic   Symptoms resolved  No fever no chills and no cough   Repeat CBC 12     She had a serious cases of shingles in 22 affecting face and pain improved  Denies memory changes   She takes tramadol for severe back pain twice a day      Anxiety  Takes 0.025 of Xanax as needed  Sleeping well on belsomra         Essential hypertension  Blood pressure is excellent. She is on metoprolol 50mg BID only      Spondylolysis of lumbar region  Patient has chronic back pain wears brace.   She takes tramadol

## 2023-11-11 ENCOUNTER — PATIENT MESSAGE (OUTPATIENT)
Age: 88
End: 2023-11-11

## 2023-11-12 RX ORDER — CYCLOBENZAPRINE HCL 5 MG
5 TABLET ORAL NIGHTLY PRN
Qty: 10 TABLET | Refills: 0 | Status: SHIPPED | OUTPATIENT
Start: 2023-11-12 | End: 2023-11-22

## 2023-11-20 ENCOUNTER — TELEPHONE (OUTPATIENT)
Age: 88
End: 2023-11-20

## 2023-11-20 DIAGNOSIS — F41.9 ANXIETY: ICD-10-CM

## 2023-11-20 DIAGNOSIS — F51.01 PRIMARY INSOMNIA: ICD-10-CM

## 2023-11-20 NOTE — TELEPHONE ENCOUNTER
----- Message from Libra Solomon sent at 11/20/2023  3:20 PM EST -----  Subject: Appointment Request    Reason for Call: New Patient/New to Provider Appointment needed: Routine   Existing Condition Follow Up    QUESTIONS    Reason for appointment request? No appointments available during search     Additional Information for Provider? Tacoss daughter Bernie Paredes called in   to make Rafa Yi an appointment. Rafa Yi had been attempted to be seen with   through a virtual appointment and it was not successful, so Bernie Paredes is   requesting an in person appointment. Rafa Yi has been experiencing muscle   spasms.    ---------------------------------------------------------------------------  --------------  Adri SIEGEL  [phone]; OK to leave message on voicemail  ---------------------------------------------------------------------------  --------------  SCRIPT ANSWERS

## 2023-11-20 NOTE — TELEPHONE ENCOUNTER
----- Message from Marilin Kovacs sent at 11/20/2023  3:20 PM EST -----  Subject: Appointment Request    Reason for Call: New Patient/New to Provider Appointment needed: Routine   Existing Condition Follow Up    QUESTIONS    Reason for appointment request? No appointments available during search     Additional Information for Provider? Leatha's daughter Bonnie Churchill called in   to make Annamaria Dillon an appointment. Annamaria Dillon had been attempted to be seen with   through a virtual appointment and it was not successful, so Bonnie Churchill is   requesting an in person appointment. Annamaria Dillon has been experiencing muscle   spasms.    ---------------------------------------------------------------------------  --------------  Colleen Leslie INFO  [phone]; OK to leave message on voicemail  ---------------------------------------------------------------------------  --------------  SCRIPT ANSWERS

## 2023-11-20 NOTE — TELEPHONE ENCOUNTER
Pt states the med for the spasms is not working and now pt has a loose cough and is very weak. No fever. Please call to advise.

## 2023-11-21 RX ORDER — ALPRAZOLAM 0.25 MG/1
0.25 TABLET ORAL NIGHTLY PRN
Qty: 90 TABLET | Refills: 0 | Status: SHIPPED | OUTPATIENT
Start: 2023-11-21 | End: 2024-02-19

## 2023-11-21 RX ORDER — SUVOREXANT 10 MG/1
10 TABLET, FILM COATED ORAL DAILY
Qty: 30 TABLET | Refills: 2 | Status: SHIPPED | OUTPATIENT
Start: 2023-11-21 | End: 2024-02-19

## 2023-11-27 DIAGNOSIS — F41.9 ANXIETY: ICD-10-CM

## 2023-11-28 RX ORDER — TRAMADOL HYDROCHLORIDE 50 MG/1
50 TABLET ORAL EVERY 6 HOURS PRN
Qty: 60 TABLET | Refills: 0 | Status: SHIPPED | OUTPATIENT
Start: 2023-11-28 | End: 2023-12-28

## 2023-12-01 ENCOUNTER — OFFICE VISIT (OUTPATIENT)
Age: 88
End: 2023-12-01
Payer: MEDICARE

## 2023-12-01 VITALS
OXYGEN SATURATION: 96 % | HEART RATE: 60 BPM | RESPIRATION RATE: 16 BRPM | DIASTOLIC BLOOD PRESSURE: 66 MMHG | BODY MASS INDEX: 33.1 KG/M2 | TEMPERATURE: 97 F | WEIGHT: 168.6 LBS | SYSTOLIC BLOOD PRESSURE: 110 MMHG | HEIGHT: 60 IN

## 2023-12-01 DIAGNOSIS — L89.301 PRESSURE INJURY OF BUTTOCK, STAGE 1, UNSPECIFIED LATERALITY: ICD-10-CM

## 2023-12-01 DIAGNOSIS — Z00.00 MEDICARE ANNUAL WELLNESS VISIT, SUBSEQUENT: Primary | ICD-10-CM

## 2023-12-01 PROCEDURE — G8484 FLU IMMUNIZE NO ADMIN: HCPCS | Performed by: INTERNAL MEDICINE

## 2023-12-01 PROCEDURE — G0439 PPPS, SUBSEQ VISIT: HCPCS | Performed by: INTERNAL MEDICINE

## 2023-12-01 PROCEDURE — 1123F ACP DISCUSS/DSCN MKR DOCD: CPT | Performed by: INTERNAL MEDICINE

## 2023-12-01 ASSESSMENT — PATIENT HEALTH QUESTIONNAIRE - PHQ9
SUM OF ALL RESPONSES TO PHQ QUESTIONS 1-9: 2
SUM OF ALL RESPONSES TO PHQ QUESTIONS 1-9: 2
2. FEELING DOWN, DEPRESSED OR HOPELESS: 1
SUM OF ALL RESPONSES TO PHQ QUESTIONS 1-9: 2
SUM OF ALL RESPONSES TO PHQ9 QUESTIONS 1 & 2: 2
SUM OF ALL RESPONSES TO PHQ QUESTIONS 1-9: 2
1. LITTLE INTEREST OR PLEASURE IN DOING THINGS: 1

## 2023-12-01 ASSESSMENT — LIFESTYLE VARIABLES
HOW OFTEN DO YOU HAVE A DRINK CONTAINING ALCOHOL: NEVER
HOW MANY STANDARD DRINKS CONTAINING ALCOHOL DO YOU HAVE ON A TYPICAL DAY: PATIENT DOES NOT DRINK

## 2023-12-01 NOTE — PROGRESS NOTES
1. \"Have you been to the ER, urgent care clinic since your last visit? Hospitalized since your last visit? \" No    2. \"Have you seen or consulted any other health care providers outside of the [address] [place] [address] since your last visit? \" No     3. For patients aged 43-73: Has the patient had a colonoscopy / FIT/ Cologuard? NA - based on age      If the patient is female:    4. For patients aged 43-66: Has the patient had a mammogram within the past 2 years? NA - based on age or sex      11. For patients aged 21-65: Has the patient had a pap smear?  NA - based on age or sex
Lakisha Kappa, MD   Aspirin Buf,CaCarb-MgCarb-MgO, 81 MG TABS Take 1 tablet by mouth daily Yes Provider, MD Aakash   acetaminophen (TYLENOL) 650 MG extended release tablet Take 1 tablet by mouth in the morning and 1 tablet at noon and 1 tablet in the evening.  Yes Automatic Reconciliation, Ar   levothyroxine (SYNTHROID) 75 MCG tablet Take 1 tablet by mouth every morning (before breakfast) Yes Automatic Reconciliation, Ar   nitroGLYCERIN (NITROSTAT) 0.3 MG SL tablet Place 1 tablet under the tongue every 5 minutes as needed for Chest pain Yes Automatic Reconciliation, Ar   acetaminophen (TYLENOL) 325 MG tablet Take by mouth every 4 hours as needed  Patient not taking: Reported on 12/1/2023  Automatic Reconciliation, Ar   Cholecalciferol 50 MCG (2000 UT) TABS Take 1 tablet by mouth daily  Patient not taking: Reported on 12/1/2023  Automatic Reconciliation, Ar       CareTeam (Including outside providers/suppliers regularly involved in providing care):   Patient Care Team:  Unique Washburn MD as PCP - Cori Claros MD as PCP - Dermatology  Unique Washburn MD as PCP - Empaneled Provider  Kang David MD as Physician  DONY Sharma - NP as Nurse Practitioner     Reviewed and updated this visit:  Tobacco  Allergies  Meds  Med Hx  Surg Hx  Soc Hx  Fam Hx

## 2023-12-01 NOTE — PATIENT INSTRUCTIONS
Stage I pressure-induced injury does not have necrotic tissue and therefore will not require debridement. A foam dressing can be placed overlying the area to protect pressure points . If moisture is detected, a no-sting skin barrier protectant can be applied. Pressure redistribution and supportive interventions are recommended to prevent wound progression      Preventing Falls: Care Instructions    Talk to your doctor about the medicines you take. Ask if any of them increase the risk of falls and whether they can be changed or stopped. Try to exercise regularly. It can help improve your strength and balance. This can help lower your risk of falling. Practice fall safety and prevention. Wear low-heeled shoes that fit well and give your feet good support. Talk to your doctor if you have foot problems that make this hard. Carry a cellphone or wear a medical alert device that you can use to call for help. Use stepladders instead of chairs to reach high objects. Don't climb if you're at risk for falls. Ask for help, if needed. Wear the correct eyeglasses, if you need them. Make your home safer. Remove rugs, cords, clutter, and furniture from walkways. Keep your house well lit. Use night-lights in hallways and bathrooms. Install and use sturdy handrails on stairways. Wear nonskid footwear, even inside. Don't walk barefoot or in socks without shoes. Be safe outside. Use handrails, curb cuts, and ramps whenever possible. Keep your hands free by using a shoulder bag or backpack. Try to walk in well-lit areas. Watch out for uneven ground, changes in pavement, and debris. Be careful in the winter. Walk on the grass or gravel when sidewalks are slippery. Use de-icer on steps and walkways. Add non-slip devices to shoes. Put grab bars and nonskid mats in your shower or tub and near the toilet. Try to use a shower chair or bath bench when bathing.    Get into a tub or shower by putting in your

## 2023-12-04 ENCOUNTER — PATIENT MESSAGE (OUTPATIENT)
Age: 88
End: 2023-12-04

## 2023-12-04 DIAGNOSIS — J40 BRONCHITIS: Primary | ICD-10-CM

## 2023-12-04 RX ORDER — AZITHROMYCIN 250 MG/1
250 TABLET, FILM COATED ORAL SEE ADMIN INSTRUCTIONS
Qty: 6 TABLET | Refills: 0 | Status: SHIPPED | OUTPATIENT
Start: 2023-12-04 | End: 2023-12-09

## 2023-12-04 RX ORDER — PREDNISONE 20 MG/1
40 TABLET ORAL DAILY
Qty: 10 TABLET | Refills: 0 | Status: SHIPPED | OUTPATIENT
Start: 2023-12-04 | End: 2023-12-09

## 2023-12-28 DIAGNOSIS — F41.9 ANXIETY: ICD-10-CM

## 2023-12-28 RX ORDER — TRAMADOL HYDROCHLORIDE 50 MG/1
50 TABLET ORAL EVERY 6 HOURS PRN
Qty: 60 TABLET | Refills: 0 | Status: SHIPPED | OUTPATIENT
Start: 2023-12-28 | End: 2024-01-27

## 2024-01-02 RX ORDER — ROSUVASTATIN CALCIUM 20 MG/1
20 TABLET, COATED ORAL DAILY
Qty: 90 TABLET | Refills: 1 | Status: SHIPPED | OUTPATIENT
Start: 2024-01-02

## 2024-01-29 DIAGNOSIS — F11.99 OPIOID USE, UNSPECIFIED WITH UNSPECIFIED OPIOID-INDUCED DISORDER (HCC): Primary | ICD-10-CM

## 2024-01-29 RX ORDER — TRAMADOL HYDROCHLORIDE 50 MG/1
TABLET ORAL
COMMUNITY
Start: 2021-10-26 | End: 2024-01-29 | Stop reason: SDUPTHER

## 2024-01-29 RX ORDER — TRAMADOL HYDROCHLORIDE 50 MG/1
50 TABLET ORAL EVERY 6 HOURS PRN
Qty: 60 TABLET | Refills: 0 | Status: SHIPPED | OUTPATIENT
Start: 2024-01-29 | End: 2024-02-28

## 2024-01-29 NOTE — TELEPHONE ENCOUNTER
----- Message from Leatha Singh sent at 1/29/2024  4:24 PM EST -----  Regarding: Tramadol refill  Contact: [phone]  Dr. Lex Sullivan,  I am requesting a refill of Tramadol 50 mg tablets as I took my last pill this morning. Thank you for responding to my request.  Leatha Singh

## 2024-02-22 ENCOUNTER — PATIENT MESSAGE (OUTPATIENT)
Age: 89
End: 2024-02-22

## 2024-02-22 DIAGNOSIS — F41.9 ANXIETY: Primary | ICD-10-CM

## 2024-02-22 RX ORDER — ALPRAZOLAM 0.25 MG/1
0.25 TABLET ORAL NIGHTLY PRN
COMMUNITY
Start: 2021-10-26 | End: 2024-02-22 | Stop reason: SDUPTHER

## 2024-02-22 NOTE — TELEPHONE ENCOUNTER
PCP: Dayan Perry MD    Last appt:   12/1/2023    Future Appointments   Date Time Provider Department Center   3/4/2024 11:15 AM Dayan Perry MD [facility]       Requested Prescriptions     Pending Prescriptions Disp Refills    ALPRAZolam (XANAX) 0.25 MG tablet       Sig: Take 1 tablet by mouth nightly as needed for Anxiety. Max Daily Amount: 0.25 mg

## 2024-02-22 NOTE — TELEPHONE ENCOUNTER
From: Leatha Singh  To: Dr. Dayan Cunningham  Sent: 2/22/2024 1:06 PM EST  Subject: Refill presciption    Dr. Lex Sullivan,  I am requesting a refill on prescription, Alprazolam .25 mg. Which was last filled 11/21/23.  Thank you very much,  Leatha Singh

## 2024-02-23 RX ORDER — ALPRAZOLAM 0.25 MG/1
0.25 TABLET ORAL NIGHTLY PRN
Qty: 30 TABLET | Refills: 2 | Status: SHIPPED | OUTPATIENT
Start: 2024-02-23 | End: 2024-05-23

## 2024-03-01 DIAGNOSIS — F11.99 OPIOID USE, UNSPECIFIED WITH UNSPECIFIED OPIOID-INDUCED DISORDER (HCC): Primary | ICD-10-CM

## 2024-03-01 DIAGNOSIS — G89.4 CHRONIC PAIN SYNDROME: ICD-10-CM

## 2024-03-01 RX ORDER — TRAMADOL HYDROCHLORIDE 50 MG/1
50 TABLET ORAL EVERY 6 HOURS PRN
Qty: 60 TABLET | Refills: 0 | Status: SHIPPED | OUTPATIENT
Start: 2024-03-01 | End: 2024-03-16

## 2024-03-01 NOTE — TELEPHONE ENCOUNTER
PCP: Dayan Perry MD    Last appt:   12/1/2023    Future Appointments   Date Time Provider Department Center   3/4/2024 11:15 AM Dayan Perry MD [facility]       Requested Prescriptions     Pending Prescriptions Disp Refills    traMADol (ULTRAM) 50 MG tablet 60 tablet 0     Sig: Take 1 tablet by mouth every 6 hours as needed for Pain for up to 15 days. Intended supply: 7 days. Take lowest dose possible to manage pain Max Daily Amount: 200 mg

## 2024-03-01 NOTE — TELEPHONE ENCOUNTER
----- Message from Leatha Singh sent at 2/29/2024  9:08 PM EST -----  Regarding: Out of Tramadol   Contact: [phone]  Please send request to the pharmacy for Tramadol refill as soon as possible. Mom ran out Wednesday morning and thought she sent message to you on Monday. Thank you so much.  Leatha Waite’s daughter

## 2024-03-02 DIAGNOSIS — F11.99 OPIOID USE, UNSPECIFIED WITH UNSPECIFIED OPIOID-INDUCED DISORDER (HCC): ICD-10-CM

## 2024-03-02 DIAGNOSIS — G89.4 CHRONIC PAIN SYNDROME: ICD-10-CM

## 2024-03-04 ENCOUNTER — OFFICE VISIT (OUTPATIENT)
Age: 89
End: 2024-03-04
Payer: MEDICARE

## 2024-03-04 VITALS
RESPIRATION RATE: 18 BRPM | DIASTOLIC BLOOD PRESSURE: 81 MMHG | WEIGHT: 166 LBS | OXYGEN SATURATION: 96 % | BODY MASS INDEX: 32.59 KG/M2 | SYSTOLIC BLOOD PRESSURE: 131 MMHG | TEMPERATURE: 97.8 F | HEART RATE: 65 BPM | HEIGHT: 60 IN

## 2024-03-04 DIAGNOSIS — R80.3 BENCE JONES PROTEINURIA: ICD-10-CM

## 2024-03-04 DIAGNOSIS — F11.99 OPIOID USE, UNSPECIFIED WITH UNSPECIFIED OPIOID-INDUCED DISORDER (HCC): ICD-10-CM

## 2024-03-04 DIAGNOSIS — N18.31 CHRONIC KIDNEY DISEASE, STAGE 3A (HCC): ICD-10-CM

## 2024-03-04 DIAGNOSIS — E55.9 VITAMIN D DEFICIENCY: ICD-10-CM

## 2024-03-04 DIAGNOSIS — G89.4 CHRONIC PAIN SYNDROME: Primary | ICD-10-CM

## 2024-03-04 DIAGNOSIS — F51.01 PRIMARY INSOMNIA: ICD-10-CM

## 2024-03-04 DIAGNOSIS — F41.9 ANXIETY: ICD-10-CM

## 2024-03-04 PROCEDURE — 1123F ACP DISCUSS/DSCN MKR DOCD: CPT | Performed by: INTERNAL MEDICINE

## 2024-03-04 PROCEDURE — G8417 CALC BMI ABV UP PARAM F/U: HCPCS | Performed by: INTERNAL MEDICINE

## 2024-03-04 PROCEDURE — 99214 OFFICE O/P EST MOD 30 MIN: CPT | Performed by: INTERNAL MEDICINE

## 2024-03-04 PROCEDURE — G8427 DOCREV CUR MEDS BY ELIG CLIN: HCPCS | Performed by: INTERNAL MEDICINE

## 2024-03-04 PROCEDURE — 1090F PRES/ABSN URINE INCON ASSESS: CPT | Performed by: INTERNAL MEDICINE

## 2024-03-04 PROCEDURE — G8484 FLU IMMUNIZE NO ADMIN: HCPCS | Performed by: INTERNAL MEDICINE

## 2024-03-04 PROCEDURE — 1036F TOBACCO NON-USER: CPT | Performed by: INTERNAL MEDICINE

## 2024-03-04 RX ORDER — MULTIVIT-MIN/IRON/FOLIC ACID/K 18-600-40
2000 CAPSULE ORAL DAILY
Qty: 30 CAPSULE | Refills: 2 | Status: SHIPPED | OUTPATIENT
Start: 2024-03-04

## 2024-03-04 ASSESSMENT — PATIENT HEALTH QUESTIONNAIRE - PHQ9
SUM OF ALL RESPONSES TO PHQ QUESTIONS 1-9: 2
SUM OF ALL RESPONSES TO PHQ9 QUESTIONS 1 & 2: 2
SUM OF ALL RESPONSES TO PHQ QUESTIONS 1-9: 2
1. LITTLE INTEREST OR PLEASURE IN DOING THINGS: 1
2. FEELING DOWN, DEPRESSED OR HOPELESS: 1

## 2024-03-04 NOTE — PROGRESS NOTES
Chief Complaint   Patient presents with    Follow-up     4 month f/up     \"Have you been to the ER, urgent care clinic since your last visit?  Hospitalized since your last visit?\"    NO    “Have you seen or consulted any other health care providers outside of [facility] since your last visit?”    NO               
 Oropharynx: no erythema, no exudates, no lesions, normal tongue.  Neck:  Supple. Thyroid normal size, nontender, without nodules.  No carotid bruit. No masses or lymphadenopathy  Respiratory: no respiratory distress,  no wheezing, no rhonchi, no rales. No chest wall tenderness.  Cardiovascular:  RRR, normal S1S2, no murmur.    Gastrointestinal: normal bowel sounds, soft, nontender, without masses.  No hepatosplenomegaly.  Extremities +2 pulses, no edema, normal sensation   Musculoskeletal:  slow gait  Unsteady   Walks well with walker   Wearing back brace   Skin:  No rash, no lesions, no ulcers.  Skin warm, normal turgor, without induration or nodules.  Neuro:  A and OX4, fluent speech, cranial nerves normal 2-12.  Sensation normal to light touch.  DTR symmetrical  Psych:  Normal affect      Lab Results   Component Value Date    WBC 5.7 08/01/2023    HGB 12.3 08/01/2023    HCT 40.3 08/01/2023     08/01/2023    CHOL 166 08/01/2023    TRIG 136 08/01/2023    HDL 69 08/01/2023    ALT 11 (L) 08/01/2023    AST 18 08/01/2023     08/30/2023    K 4.3 08/30/2023     08/30/2023    CREATININE 1.43 (H) 08/30/2023    BUN 22 08/30/2023    CO2 25 08/30/2023    TSH 1.50 02/02/2023     Lab Results   Component Value Date    LDLCALC 69.8 08/01/2023     No results found for: \"PSA\", \"PSADIA\"     Assessment and Plan:     1. Chronic pain syndrome  2. Opioid use, unspecified with unspecified opioid-induced disorder (HCC)  Chronic back pain     3. Anxiety  PRN use of xanax   - CBC with Auto Differential; Future  - Comprehensive Metabolic Panel; Future    4. 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 Vitamin D deficiency  Stopped D advised to resume  - Vitamin D, Cholecalciferol, 50 MCG (2000 UT) CAPS; Take 2,000 Units by mouth daily  Dispense: 30 capsule; Refill: 2  - Vitamin D 25 Hydroxy; Future    7. Chronic kidney disease,

## 2024-03-05 LAB
25(OH)D3 SERPL-MCNC: 43.1 NG/ML (ref 30–100)
ALBUMIN SERPL-MCNC: 3.2 G/DL (ref 3.5–5)
ALBUMIN/GLOB SERPL: 1.2 (ref 1.1–2.2)
ALP SERPL-CCNC: 98 U/L (ref 45–117)
ALT SERPL-CCNC: 7 U/L (ref 12–78)
ANION GAP SERPL CALC-SCNC: 3 MMOL/L (ref 5–15)
AST SERPL-CCNC: 13 U/L (ref 15–37)
BASOPHILS # BLD: 0.1 K/UL (ref 0–0.1)
BASOPHILS NFR BLD: 1 % (ref 0–1)
BILIRUB SERPL-MCNC: 0.5 MG/DL (ref 0.2–1)
BUN SERPL-MCNC: 20 MG/DL (ref 6–20)
BUN/CREAT SERPL: 14 (ref 12–20)
CALCIUM SERPL-MCNC: 10.1 MG/DL (ref 8.5–10.1)
CALCIUM SERPL-MCNC: 9.6 MG/DL (ref 8.5–10.1)
CHLORIDE SERPL-SCNC: 111 MMOL/L (ref 97–108)
CO2 SERPL-SCNC: 28 MMOL/L (ref 21–32)
CREAT SERPL-MCNC: 1.42 MG/DL (ref 0.55–1.02)
DIFFERENTIAL METHOD BLD: ABNORMAL
EOSINOPHIL # BLD: 0.1 K/UL (ref 0–0.4)
EOSINOPHIL NFR BLD: 2 % (ref 0–7)
ERYTHROCYTE [DISTWIDTH] IN BLOOD BY AUTOMATED COUNT: 13 % (ref 11.5–14.5)
GLOBULIN SER CALC-MCNC: 2.6 G/DL (ref 2–4)
GLUCOSE SERPL-MCNC: 103 MG/DL (ref 65–100)
HCT VFR BLD AUTO: 32.1 % (ref 35–47)
HGB BLD-MCNC: 10 G/DL (ref 11.5–16)
IMM GRANULOCYTES # BLD AUTO: 0 K/UL (ref 0–0.04)
IMM GRANULOCYTES NFR BLD AUTO: 0 % (ref 0–0.5)
LYMPHOCYTES # BLD: 0.7 K/UL (ref 0.8–3.5)
LYMPHOCYTES NFR BLD: 14 % (ref 12–49)
MCH RBC QN AUTO: 33.3 PG (ref 26–34)
MCHC RBC AUTO-ENTMCNC: 31.2 G/DL (ref 30–36.5)
MCV RBC AUTO: 107 FL (ref 80–99)
MONOCYTES # BLD: 0.6 K/UL (ref 0–1)
MONOCYTES NFR BLD: 12 % (ref 5–13)
NEUTS SEG # BLD: 3.6 K/UL (ref 1.8–8)
NEUTS SEG NFR BLD: 71 % (ref 32–75)
NRBC # BLD: 0 K/UL (ref 0–0.01)
NRBC BLD-RTO: 0 PER 100 WBC
PHOSPHATE SERPL-MCNC: 2.2 MG/DL (ref 2.6–4.7)
PLATELET # BLD AUTO: 153 K/UL (ref 150–400)
PMV BLD AUTO: 11.6 FL (ref 8.9–12.9)
POTASSIUM SERPL-SCNC: 3.9 MMOL/L (ref 3.5–5.1)
PROT SERPL-MCNC: 5.8 G/DL (ref 6.4–8.2)
PTH-INTACT SERPL-MCNC: 111.6 PG/ML (ref 18.4–88)
RBC # BLD AUTO: 3 M/UL (ref 3.8–5.2)
RBC MORPH BLD: ABNORMAL
SODIUM SERPL-SCNC: 142 MMOL/L (ref 136–145)
WBC # BLD AUTO: 5.1 K/UL (ref 3.6–11)

## 2024-03-06 RX ORDER — TRAMADOL HYDROCHLORIDE 50 MG/1
TABLET ORAL
Qty: 60 TABLET | Refills: 0 | OUTPATIENT
Start: 2024-03-06

## 2024-03-11 LAB
ALBUMIN SERPL ELPH-MCNC: 3.2 G/DL (ref 2.9–4.4)
ALBUMIN/GLOB SERPL: 1.4 (ref 0.7–1.7)
ALPHA1 GLOB SERPL ELPH-MCNC: 0.3 G/DL (ref 0–0.4)
ALPHA2 GLOB SERPL ELPH-MCNC: 0.6 G/DL (ref 0.4–1)
B-GLOBULIN SERPL ELPH-MCNC: 0.9 G/DL (ref 0.7–1.3)
GAMMA GLOB SERPL ELPH-MCNC: 0.5 G/DL (ref 0.4–1.8)
GLOBULIN SER-MCNC: 2.3 G/DL (ref 2.2–3.9)
IGA SERPL-MCNC: 170 MG/DL (ref 64–422)
IGG SERPL-MCNC: 662 MG/DL (ref 586–1602)
IGM SERPL-MCNC: 50 MG/DL (ref 26–217)
INTERPRETATION SERPL IEP-IMP: ABNORMAL
KAPPA LC FREE SER-MCNC: 40.3 MG/L (ref 3.3–19.4)
KAPPA LC FREE/LAMBDA FREE SER: 0.22 (ref 0.26–1.65)
LAMBDA LC FREE SERPL-MCNC: 182.3 MG/L (ref 5.7–26.3)
M PROTEIN SERPL ELPH-MCNC: 0.1 G/DL
PROT SERPL-MCNC: 5.5 G/DL (ref 6–8.5)

## 2024-03-20 ENCOUNTER — PATIENT MESSAGE (OUTPATIENT)
Age: 89
End: 2024-03-20

## 2024-03-20 DIAGNOSIS — F51.01 PRIMARY INSOMNIA: Primary | ICD-10-CM

## 2024-03-20 RX ORDER — SUVOREXANT 10 MG/1
10 TABLET, FILM COATED ORAL DAILY
Qty: 90 TABLET | Refills: 0 | Status: SHIPPED | OUTPATIENT
Start: 2024-03-20 | End: 2024-06-18

## 2024-03-20 NOTE — TELEPHONE ENCOUNTER
PCP: Dayan Perry MD    Last appt:   3/4/2024    Future Appointments   Date Time Provider Department Center   7/29/2024  1:45 PM Dayan Perry MD [facility]       Requested Prescriptions     Pending Prescriptions Disp Refills    Suvorexant (BELSOMRA) 10 MG TABS 90 tablet      Sig: Take 10 mg by mouth daily for 90 days. Max Daily Amount: 10 mg

## 2024-03-20 NOTE — TELEPHONE ENCOUNTER
From: Leatha Singh  To: Dr. Dayan Cunningham  Sent: 3/20/2024 8:24 AM EDT  Subject: Belsomra 10    Dr. Lex Sullivan,  I took the last Belsomra 10 and there are no refills. I am requesting a refill prescription to be filled at [facility].  Thank you very much.  Leatha Singh

## 2024-03-25 DIAGNOSIS — F41.9 ANXIETY: ICD-10-CM

## 2024-03-25 DIAGNOSIS — G89.4 CHRONIC PAIN SYNDROME: Primary | ICD-10-CM

## 2024-03-25 RX ORDER — TRAMADOL HYDROCHLORIDE 50 MG/1
50 TABLET ORAL EVERY 6 HOURS PRN
Qty: 60 TABLET | Refills: 0 | Status: SHIPPED | OUTPATIENT
Start: 2024-03-25 | End: 2024-04-24

## 2024-03-25 RX ORDER — ALPRAZOLAM 0.25 MG/1
0.25 TABLET ORAL NIGHTLY PRN
Qty: 30 TABLET | Refills: 2 | Status: SHIPPED | OUTPATIENT
Start: 2024-03-25 | End: 2024-06-23

## 2024-03-25 RX ORDER — ROSUVASTATIN CALCIUM 20 MG/1
20 TABLET, COATED ORAL DAILY
Qty: 90 TABLET | Refills: 1 | Status: SHIPPED | OUTPATIENT
Start: 2024-03-25

## 2024-03-25 NOTE — TELEPHONE ENCOUNTER
PCP: Dayan Perry MD    Last appt:   3/4/2024    Future Appointments   Date Time Provider Department Center   7/29/2024  1:45 PM Dayan Perry MD [facility]       Requested Prescriptions     Pending Prescriptions Disp Refills    ALPRAZolam (XANAX) 0.25 MG tablet 30 tablet 2     Sig: Take 1 tablet by mouth nightly as needed for Anxiety for up to 90 days. Max Daily Amount: 0.25 mg    rosuvastatin (CRESTOR) 20 MG tablet 90 tablet 1     Sig: Take 1 tablet by mouth daily    traMADol (ULTRAM) 50 MG tablet 60 tablet 0     Sig: Take 1 tablet by mouth every 6 hours as needed for Pain for up to 30 days. Intended supply: 7 days. Take lowest dose possible to manage pain Max Daily Amount: 200 mg

## 2024-03-25 NOTE — TELEPHONE ENCOUNTER
----- Message from Leatha Singh sent at 3/24/2024 11:24 AM EDT -----  Regarding: Need prescription refills  Contact: [phone]  Good morning Dr. Burnett.  Mom is out of Xanax and will be out of Tramadol and Rosuvastatin by Thursday.  Please authorize these medications so pharmacy can refill.    Thanks very much for all you do for Mom.  Sincerely, Francisca Rodriguez

## 2024-03-28 RX ORDER — LEVOTHYROXINE SODIUM 0.07 MG/1
TABLET ORAL
Qty: 90 TABLET | Refills: 3 | Status: SHIPPED | OUTPATIENT
Start: 2024-03-28

## 2024-04-21 DIAGNOSIS — G89.4 CHRONIC PAIN SYNDROME: ICD-10-CM

## 2024-04-22 RX ORDER — TRAMADOL HYDROCHLORIDE 50 MG/1
50 TABLET ORAL EVERY 6 HOURS PRN
Qty: 60 TABLET | Refills: 0 | Status: SHIPPED | OUTPATIENT
Start: 2024-04-22 | End: 2024-05-22

## 2024-05-21 DIAGNOSIS — G89.4 CHRONIC PAIN SYNDROME: ICD-10-CM

## 2024-05-21 RX ORDER — TRAMADOL HYDROCHLORIDE 50 MG/1
50 TABLET ORAL EVERY 6 HOURS PRN
Qty: 90 TABLET | Refills: 0 | Status: SHIPPED | OUTPATIENT
Start: 2024-05-21 | End: 2024-06-20

## 2024-05-21 NOTE — TELEPHONE ENCOUNTER
Caller requests Refill of:  traMADol (ULTRAM) 50 MG tablet      Please send to:    [id number] [place], [place] - [address] [facility] - P [phone] - F [phone] [address] [facility] [address]  Phone: [phone] Fax: [phone]         Visit / Appointment History:  Future Appointment at [facility]:  7/29/2024   Last Appointment With PCP:  3/4/2024       Caller confirmed instructions and dosages as correct.    Caller was advised that Meds will be refilled as soon as possible, however there can be a 48-72 business hour turn around on refill requests.

## 2024-05-21 NOTE — TELEPHONE ENCOUNTER
PCP: Dayan Perry MD    Last appt:   3/4/2024    Future Appointments   Date Time Provider Department Center   7/29/2024  1:45 PM Dayan Perry MD [facility]       Requested Prescriptions     Pending Prescriptions Disp Refills    traMADol (ULTRAM) 50 MG tablet 60 tablet 0     Sig: Take 1 tablet by mouth every 6 hours as needed for Pain for up to 30 days. Intended supply: 7 days. Take lowest dose possible to manage pain Max Daily Amount: 200 mg

## 2024-06-10 DIAGNOSIS — F51.01 PRIMARY INSOMNIA: ICD-10-CM

## 2024-06-10 DIAGNOSIS — E55.9 VITAMIN D DEFICIENCY: ICD-10-CM

## 2024-06-10 RX ORDER — SUVOREXANT 10 MG/1
10 TABLET, FILM COATED ORAL DAILY
Qty: 90 TABLET | Refills: 0 | Status: SHIPPED | OUTPATIENT
Start: 2024-06-10 | End: 2024-09-08

## 2024-06-10 RX ORDER — MULTIVIT-MIN/IRON/FOLIC ACID/K 18-600-40
2000 CAPSULE ORAL DAILY
Qty: 30 CAPSULE | Refills: 2 | Status: SHIPPED | OUTPATIENT
Start: 2024-06-10

## 2024-06-14 DIAGNOSIS — F41.9 ANXIETY: ICD-10-CM

## 2024-06-14 RX ORDER — ALPRAZOLAM 0.25 MG/1
0.25 TABLET ORAL NIGHTLY PRN
Qty: 30 TABLET | Refills: 2 | Status: SHIPPED | OUTPATIENT
Start: 2024-06-14 | End: 2024-09-12

## 2024-06-26 RX ORDER — ROSUVASTATIN CALCIUM 20 MG/1
20 TABLET, COATED ORAL DAILY
Qty: 90 TABLET | Refills: 1 | Status: SHIPPED | OUTPATIENT
Start: 2024-06-26

## 2024-07-05 ENCOUNTER — PATIENT MESSAGE (OUTPATIENT)
Age: 89
End: 2024-07-05

## 2024-07-05 DIAGNOSIS — F41.9 ANXIETY: ICD-10-CM

## 2024-07-05 DIAGNOSIS — G89.4 CHRONIC PAIN SYNDROME: Primary | ICD-10-CM

## 2024-07-05 RX ORDER — ALPRAZOLAM 0.25 MG/1
0.25 TABLET ORAL NIGHTLY PRN
Qty: 30 TABLET | Refills: 2 | Status: SHIPPED | OUTPATIENT
Start: 2024-07-05 | End: 2024-10-03

## 2024-07-05 RX ORDER — TRAMADOL HYDROCHLORIDE 50 MG/1
50 TABLET ORAL EVERY 6 HOURS PRN
Qty: 90 TABLET | Refills: 0 | Status: SHIPPED | OUTPATIENT
Start: 2024-07-05 | End: 2024-08-04

## 2024-07-05 NOTE — TELEPHONE ENCOUNTER
From: Leatha Singh  To: Dr. Dayan Cunningham  Sent: 7/5/2024 7:54 AM EDT  Subject: Tramadol refill request    Mom will be out of Tramadol by tomorrow morning. Please authorize a refill to the pharmacy as soon as possible so she doesn’t miss her Saturday evening dose. Thank you so much, Francisca Rodriguez

## 2024-07-17 DIAGNOSIS — F41.9 ANXIETY: ICD-10-CM

## 2024-07-17 RX ORDER — ALPRAZOLAM 0.25 MG/1
0.25 TABLET ORAL NIGHTLY PRN
Qty: 30 TABLET | Refills: 2 | Status: SHIPPED | OUTPATIENT
Start: 2024-07-17 | End: 2024-10-15

## 2024-07-17 NOTE — TELEPHONE ENCOUNTER
Caller requests Refill of:  ALPRAZolam (XANAX) 0.25 MG tablet       Please send to:    [id number] [place], [place] - [address] [facility] - P [phone] - F [phone] [address] [facility] [address]  Phone: [phone] Fax: [phone]         Visit / Appointment History:  Future Appointment at [facility]:  7/29/2024   Last Appointment With PCP:  3/4/2024       Caller confirmed instructions and dosages as correct.    Caller was advised that Meds will be refilled as soon as possible, however there can be a 48-72 business hour turn around on refill requests.

## 2024-07-17 NOTE — TELEPHONE ENCOUNTER
PCP: Dayan Perry MD    Last appt:   3/4/2024    Future Appointments   Date Time Provider Department Center   7/29/2024  1:45 PM Dayan Perry MD [facility]       Requested Prescriptions     Pending Prescriptions Disp Refills    ALPRAZolam (XANAX) 0.25 MG tablet 30 tablet 2     Sig: Take 1 tablet by mouth nightly as needed for Anxiety for up to 90 days. Max Daily Amount: 0.25 mg

## 2024-07-29 ENCOUNTER — OFFICE VISIT (OUTPATIENT)
Age: 89
End: 2024-07-29
Payer: MEDICARE

## 2024-07-29 VITALS
WEIGHT: 166 LBS | HEART RATE: 54 BPM | TEMPERATURE: 97.2 F | DIASTOLIC BLOOD PRESSURE: 65 MMHG | SYSTOLIC BLOOD PRESSURE: 139 MMHG | HEIGHT: 60 IN | BODY MASS INDEX: 32.59 KG/M2 | OXYGEN SATURATION: 98 % | RESPIRATION RATE: 18 BRPM

## 2024-07-29 DIAGNOSIS — E06.3 HYPOTHYROIDISM DUE TO HASHIMOTO'S THYROIDITIS: ICD-10-CM

## 2024-07-29 DIAGNOSIS — E78.00 HIGH CHOLESTEROL: ICD-10-CM

## 2024-07-29 DIAGNOSIS — E03.8 HYPOTHYROIDISM DUE TO HASHIMOTO'S THYROIDITIS: ICD-10-CM

## 2024-07-29 DIAGNOSIS — G89.4 CHRONIC PAIN SYNDROME: ICD-10-CM

## 2024-07-29 DIAGNOSIS — F51.01 PRIMARY INSOMNIA: Primary | ICD-10-CM

## 2024-07-29 DIAGNOSIS — N18.31 CHRONIC KIDNEY DISEASE, STAGE 3A (HCC): ICD-10-CM

## 2024-07-29 PROCEDURE — G8417 CALC BMI ABV UP PARAM F/U: HCPCS | Performed by: INTERNAL MEDICINE

## 2024-07-29 PROCEDURE — 1036F TOBACCO NON-USER: CPT | Performed by: INTERNAL MEDICINE

## 2024-07-29 PROCEDURE — 1123F ACP DISCUSS/DSCN MKR DOCD: CPT | Performed by: INTERNAL MEDICINE

## 2024-07-29 PROCEDURE — G8427 DOCREV CUR MEDS BY ELIG CLIN: HCPCS | Performed by: INTERNAL MEDICINE

## 2024-07-29 PROCEDURE — 99214 OFFICE O/P EST MOD 30 MIN: CPT | Performed by: INTERNAL MEDICINE

## 2024-07-29 PROCEDURE — 1090F PRES/ABSN URINE INCON ASSESS: CPT | Performed by: INTERNAL MEDICINE

## 2024-07-29 RX ORDER — TRAMADOL HYDROCHLORIDE 50 MG/1
50 TABLET ORAL EVERY 6 HOURS PRN
Qty: 90 TABLET | Refills: 0 | Status: SHIPPED | OUTPATIENT
Start: 2024-07-29 | End: 2024-08-28

## 2024-07-29 RX ORDER — SUVOREXANT 20 MG/1
20 TABLET, FILM COATED ORAL DAILY
Qty: 30 TABLET | Refills: 2 | Status: SHIPPED | OUTPATIENT
Start: 2024-07-29 | End: 2024-10-29

## 2024-07-29 NOTE — PROGRESS NOTES
Ms. Leatha Singh is presenting to follow up     CC:  Hypertension       HPI:    Ms. Leatha Singh   is a 89 y.o. female presenting to followu p     Stage I pressure ulcer: resolved      Stage 3a chronic kidney disease  This is  was followed by [facility] Dr Flores -she was found to have Bence-Sandoval proteins.  She previously saw oncologist and there was no evidence of systemic disease.  She was diagnosed with MGUS will do labs to monitor this.  She denies weight loss fever fatigue  Has apt with with Dr Flores scheduled in a few weeks      Acquired hypothyroidism  Has been euthyroid on Synthroid 75 mcg daily      High cholesterol  CAD has stent to LAD   Denies chest pain  Should stay on crestor and aspirin lifelong     Osteoporosis : on fosamax for over 10 years therefore stopped in 2022   Stopped vitamin D but advised to resume    Spondylolysis of lumbar region  Patient has chronic back pain wears brace.  She takes tramadol 50 mg twice a day with improvement in quality of life.  Tramadol improves her quality of life  No signs of overuse or abuse  Ran out of tramadol for 3 days and it was rough - she did not send a message ( wrote message but forgot to send)     Anxiety: xanax 0.25mg daily PRN to not exceed once a day discussed decreasing use and weaning off     Insomnia: controlled on belsomra    Iron deficiency: on low release iron     Living with daughter for past 15 years     Review of systems:  Constitutional: negative for fever, chills, weight loss, night sweats     10 systems reviewed and negative other then HPI     Past Medical History:   Diagnosis Date    Anemia     CAD (coronary artery disease)     stent placed on left 1 stent,in 2007    Cancer (HCC)     BCCA    Chronic kidney disease     DVT (deep venous thrombosis) (HCC)     GERD (gastroesophageal reflux disease)     Hypercholesterolemia     Hypertension     Pulmonary embolism (HCC) 9/15/2015    Thromboembolus (HCC)     DVT after hernia operation, PE spontaneous 10

## 2024-08-03 LAB — DRUGS UR: NORMAL

## 2024-08-14 ENCOUNTER — HOSPITAL ENCOUNTER (EMERGENCY)
Facility: HOSPITAL | Age: 89
Discharge: HOME | End: 2024-08-15
Attending: STUDENT IN AN ORGANIZED HEALTH CARE EDUCATION/TRAINING PROGRAM
Payer: MEDICARE

## 2024-08-14 ENCOUNTER — APPOINTMENT (OUTPATIENT)
Facility: HOSPITAL | Age: 89
End: 2024-08-14
Payer: MEDICARE

## 2024-08-14 DIAGNOSIS — R13.10 DYSPHAGIA, UNSPECIFIED TYPE: Primary | ICD-10-CM

## 2024-08-14 LAB
ALBUMIN SERPL-MCNC: 3.3 G/DL (ref 3.5–5)
ALBUMIN/GLOB SERPL: 1 (ref 1.1–2.2)
ALP SERPL-CCNC: 96 U/L (ref 45–117)
ALT SERPL-CCNC: 14 U/L (ref 12–78)
ANION GAP SERPL CALC-SCNC: 6 MMOL/L (ref 5–15)
AST SERPL-CCNC: 20 U/L (ref 15–37)
BASOPHILS # BLD: 0 K/UL (ref 0–0.1)
BASOPHILS NFR BLD: 0 % (ref 0–1)
BILIRUB SERPL-MCNC: 0.8 MG/DL (ref 0.2–1)
BUN SERPL-MCNC: 27 MG/DL (ref 6–20)
BUN/CREAT SERPL: 20 (ref 12–20)
CALCIUM SERPL-MCNC: 10.1 MG/DL (ref 8.5–10.1)
CHLORIDE SERPL-SCNC: 109 MMOL/L (ref 97–108)
CO2 SERPL-SCNC: 27 MMOL/L (ref 21–32)
CREAT SERPL-MCNC: 1.32 MG/DL (ref 0.55–1.02)
DIFFERENTIAL METHOD BLD: ABNORMAL
EOSINOPHIL # BLD: 0 K/UL (ref 0–0.4)
EOSINOPHIL NFR BLD: 0 % (ref 0–7)
ERYTHROCYTE [DISTWIDTH] IN BLOOD BY AUTOMATED COUNT: 11.4 % (ref 11.5–14.5)
GLOBULIN SER CALC-MCNC: 3.3 G/DL (ref 2–4)
GLUCOSE SERPL-MCNC: 119 MG/DL (ref 65–100)
HCT VFR BLD AUTO: 38.1 % (ref 35–47)
HGB BLD-MCNC: 12.4 G/DL (ref 11.5–16)
IMM GRANULOCYTES # BLD AUTO: 0 K/UL (ref 0–0.04)
IMM GRANULOCYTES NFR BLD AUTO: 0 % (ref 0–0.5)
LYMPHOCYTES # BLD: 0.4 K/UL (ref 0.8–3.5)
LYMPHOCYTES NFR BLD: 6 % (ref 12–49)
MCH RBC QN AUTO: 33.9 PG (ref 26–34)
MCHC RBC AUTO-ENTMCNC: 32.5 G/DL (ref 30–36.5)
MCV RBC AUTO: 104.1 FL (ref 80–99)
MONOCYTES # BLD: 0.3 K/UL (ref 0–1)
MONOCYTES NFR BLD: 4 % (ref 5–13)
NEUTS SEG # BLD: 5.6 K/UL (ref 1.8–8)
NEUTS SEG NFR BLD: 90 % (ref 32–75)
NRBC # BLD: 0 K/UL (ref 0–0.01)
NRBC BLD-RTO: 0 PER 100 WBC
PLATELET # BLD AUTO: 132 K/UL (ref 150–400)
PMV BLD AUTO: 11 FL (ref 8.9–12.9)
POTASSIUM SERPL-SCNC: 4 MMOL/L (ref 3.5–5.1)
PROT SERPL-MCNC: 6.6 G/DL (ref 6.4–8.2)
RBC # BLD AUTO: 3.66 M/UL (ref 3.8–5.2)
RBC MORPH BLD: ABNORMAL
SODIUM SERPL-SCNC: 142 MMOL/L (ref 136–145)
WBC # BLD AUTO: 6.3 K/UL (ref 3.6–11)

## 2024-08-14 PROCEDURE — 85025 COMPLETE CBC W/AUTO DIFF WBC: CPT

## 2024-08-14 PROCEDURE — 70450 CT HEAD/BRAIN W/O DYE: CPT

## 2024-08-14 PROCEDURE — 6360000002 HC RX W HCPCS: Performed by: STUDENT IN AN ORGANIZED HEALTH CARE EDUCATION/TRAINING PROGRAM

## 2024-08-14 PROCEDURE — 2580000003 HC RX 258: Performed by: STUDENT IN AN ORGANIZED HEALTH CARE EDUCATION/TRAINING PROGRAM

## 2024-08-14 PROCEDURE — 96374 THER/PROPH/DIAG INJ IV PUSH: CPT

## 2024-08-14 PROCEDURE — 36415 COLL VENOUS BLD VENIPUNCTURE: CPT

## 2024-08-14 PROCEDURE — 71250 CT THORAX DX C-: CPT

## 2024-08-14 PROCEDURE — 96361 HYDRATE IV INFUSION ADD-ON: CPT

## 2024-08-14 PROCEDURE — 80053 COMPREHEN METABOLIC PANEL: CPT

## 2024-08-14 PROCEDURE — 99284 EMERGENCY DEPT VISIT MOD MDM: CPT

## 2024-08-14 RX ORDER — ACETAMINOPHEN 325 MG/1
650 TABLET ORAL
Status: DISCONTINUED | OUTPATIENT
Start: 2024-08-14 | End: 2024-08-15 | Stop reason: HOSPADM

## 2024-08-14 RX ORDER — 0.9 % SODIUM CHLORIDE 0.9 %
500 INTRAVENOUS SOLUTION INTRAVENOUS ONCE
Status: COMPLETED | OUTPATIENT
Start: 2024-08-14 | End: 2024-08-15

## 2024-08-14 RX ORDER — ONDANSETRON 2 MG/ML
4 INJECTION INTRAMUSCULAR; INTRAVENOUS ONCE
Status: COMPLETED | OUTPATIENT
Start: 2024-08-14 | End: 2024-08-14

## 2024-08-14 RX ADMIN — ONDANSETRON 4 MG: 2 INJECTION INTRAMUSCULAR; INTRAVENOUS at 22:05

## 2024-08-14 RX ADMIN — SODIUM CHLORIDE 500 ML: 9 INJECTION, SOLUTION INTRAVENOUS at 22:01

## 2024-08-15 ENCOUNTER — TELEPHONE (OUTPATIENT)
Age: 89
End: 2024-08-15

## 2024-08-15 VITALS
OXYGEN SATURATION: 97 % | SYSTOLIC BLOOD PRESSURE: 139 MMHG | DIASTOLIC BLOOD PRESSURE: 57 MMHG | WEIGHT: 161.82 LBS | BODY MASS INDEX: 31.77 KG/M2 | RESPIRATION RATE: 14 BRPM | TEMPERATURE: 97.9 F | HEART RATE: 74 BPM | HEIGHT: 60 IN

## 2024-08-15 NOTE — TELEPHONE ENCOUNTER
Spoke to Francisca and scheduled an ed f/up on 9/5. Sent a message to  due to ED stating needs to be seen within 3 days.

## 2024-08-15 NOTE — ED NOTES
Per MD Hawk to try thickened liquids and apple sauce PO. Patient able to tolerate. MD Hawk aware.

## 2024-08-15 NOTE — TELEPHONE ENCOUNTER
----- Message from Vishnu SCHULTE sent at 8/15/2024  8:27 AM EDT -----  Regarding: [facility] Appointment Request  [facility] Appointment Request    Patient needs appointment for [facility] Appointment Type: ED Follow-Up.    Patient Requested Dates(s): as soon as possible or with in 3 days   Patient Requested Time: Late afternoon  Provider Name:    Reason for Appointment Request: Established Patient - Available appointments did not meet patient need  Additional Information: Patient went to the ER because she is not able to swallow but right now she was getting better and want to book an appointment for ED follow up.      --------------------------------------------------------------------------------------------------------------------------    Relationship to Patient: Third Party Daughter Francisca      Call Back Information: OK to leave message on voicemail  Preferred Call Back Number: Phone [phone]   RN

## 2024-08-15 NOTE — ED PROVIDER NOTES
\[facility]\"" EMERGENCY DEPT  EMERGENCY DEPARTMENT ENCOUNTER       Pt Name: Leatha Singh  MRN: [id number]  Birthdate 7/3/1933  Date of evaluation: 8/14/2024  Provider: Kofi Hakw DO   PCP: Dayan Perry MD  Note Started: 12:50 PM 8/15/24     CHIEF COMPLAINT       Chief Complaint   Patient presents with    Dysphagia     Ambulatory using rollator c/o dysphagia, gaging and unable to keep food down since last night. Already had issues with swallowing, would take her time to eat previously, but this is worse. Also states she has a headache that started behind L eye today.    Headache        HISTORY OF PRESENT ILLNESS: 1 or more elements      History From: Patient and Patient's Daughter  None     Leatha Singh is a 89 y.o. female who presents With chief complaint of dysphagia.  Patient reports history of similar symptoms in the past.  States she had been unable to keep food down since last night.  She reports this is worse than she typically experiences.  Also complaining of a headache which she feels is related to her not eating anything for the last day.  She denies any vomiting, sore throat, sensation of food bolus/obstruction, fever, chills.     Nursing Notes were all reviewed and agreed with or any disagreements were addressed in the HPI.       PAST HISTORY     Past Medical History:  Past Medical History:   Diagnosis Date    Anemia     CAD (coronary artery disease)     stent placed on left 1 stent,in 2007    Cancer (HCC)     BCCA    Chronic kidney disease     DVT (deep venous thrombosis) (HCC)     GERD (gastroesophageal reflux disease)     Hypercholesterolemia     Hypertension     Pulmonary embolism (HCC) 9/15/2015    Thromboembolus (HCC)     DVT after hernia operation, PE spontaneous 10 years later    Thyroid disease          Past Surgical History:  Past Surgical History:   Procedure Laterality Date    CARDIAC CATHETERIZATION      CORONARY ANGIOPLASTY WITH STENT PLACEMENT      GI      esophageal hiatal hernia -

## 2024-08-16 ENCOUNTER — TELEPHONE (OUTPATIENT)
Age: 89
End: 2024-08-16

## 2024-08-23 ENCOUNTER — OFFICE VISIT (OUTPATIENT)
Age: 89
End: 2024-08-23
Payer: MEDICARE

## 2024-08-23 VITALS
WEIGHT: 160.8 LBS | HEART RATE: 48 BPM | BODY MASS INDEX: 31.57 KG/M2 | DIASTOLIC BLOOD PRESSURE: 70 MMHG | TEMPERATURE: 96.8 F | HEIGHT: 60 IN | RESPIRATION RATE: 18 BRPM | SYSTOLIC BLOOD PRESSURE: 125 MMHG | OXYGEN SATURATION: 96 %

## 2024-08-23 DIAGNOSIS — R13.12 OROPHARYNGEAL DYSPHAGIA: ICD-10-CM

## 2024-08-23 DIAGNOSIS — K21.9 GASTROESOPHAGEAL REFLUX DISEASE WITHOUT ESOPHAGITIS: ICD-10-CM

## 2024-08-23 DIAGNOSIS — D69.6 THROMBOCYTOPENIA, UNSPECIFIED (HCC): ICD-10-CM

## 2024-08-23 DIAGNOSIS — R13.10 SWALLOWING PAINFUL: Primary | ICD-10-CM

## 2024-08-23 PROCEDURE — 99214 OFFICE O/P EST MOD 30 MIN: CPT | Performed by: INTERNAL MEDICINE

## 2024-08-23 RX ORDER — OMEPRAZOLE 40 MG/1
40 CAPSULE, DELAYED RELEASE ORAL
Qty: 90 EACH | Refills: 0 | Status: SHIPPED | OUTPATIENT
Start: 2024-08-23

## 2024-08-23 ASSESSMENT — PATIENT HEALTH QUESTIONNAIRE - PHQ9
1. LITTLE INTEREST OR PLEASURE IN DOING THINGS: NOT AT ALL
SUM OF ALL RESPONSES TO PHQ QUESTIONS 1-9: 0
SUM OF ALL RESPONSES TO PHQ9 QUESTIONS 1 & 2: 0
SUM OF ALL RESPONSES TO PHQ QUESTIONS 1-9: 0
2. FEELING DOWN, DEPRESSED OR HOPELESS: NOT AT ALL
SUM OF ALL RESPONSES TO PHQ QUESTIONS 1-9: 0
SUM OF ALL RESPONSES TO PHQ QUESTIONS 1-9: 0

## 2024-08-23 NOTE — PROGRESS NOTES
\"Have you been to the ER, urgent care clinic since your last visit?  Hospitalized since your last visit?\"    ER last week due to not being able to keep anything down    “Have you seen or consulted any other health care providers outside of [facility] since your last visit?”    NO            Click Here for Release of Records Request

## 2024-08-23 NOTE — PATIENT INSTRUCTIONS
Schedule with Dr Lira let me know if too far out  Start omeprazole 30 minutes before breakfast  Continue dysphagia diet ( protein shake) ensure

## 2024-08-24 NOTE — PROGRESS NOTES
Leatha Singh (:  7/3/1933) is a 89 y.o. female, Established patient, here for evaluation of the following chief complaint(s):  ER f/up (Drinking thicken liquids (nectar thick) after ER visit)         Assessment & Plan  1. Dysphagia.  She has been experiencing difficulty swallowing, with a sensation of something stuck in her throat. An esophagram will be ordered to assess the swallowing mechanism. An endoscopy with possible dilation is recommended for a more detailed examination of the esophagus. A consultation with Dr. Lira, a gastroenterologist, will be arranged. Omeprazole 20 mg will be initiated, to be taken 30 minutes before breakfast. She will continue the dysphagia diet and can use Ensure as a nutritional supplement.  Barium     2. Thoracic Aortic Aneurysm.  A thoracic aortic aneurysm was identified on the CT scan. The aneurysm measures 43 mm and will be monitored.given ruben and age no intervention needed  She is on crestor and metoprolol    3. Medication Management.  She will continue taking low-dose aspirin for heart disease prevention hx of CAD      Acquired hypothyroidism  Has been euthyroid on Synthroid 75 mcg daily      High cholesterol  CAD has stent to LAD   Denies chest pain  Should stay on crestor and aspirin lifelong     Osteoporosis : on fosamax for over 10 years therefore stopped in    On vitamin D    CKD stage III and MGUS  Followed by Dr Flores at [facility]    Results  Laboratory Studies  Creatinine was 1.32. Hemoglobin was 12.    Imaging  CT chest showed a thoracic aortic aneurysm.  1. Swallowing painful  -     [facility] - Andrew Lira MD, Gastroenterology, [place]  -     [place] ESOPHAGRAM-DOUBLE CONTRAST; Future  2. Thrombocytopenia, unspecified (HCC)  3. Gastroesophageal reflux disease without esophagitis  -     omeprazole (PRILOSEC) 40 MG delayed release capsule; Take 1 capsule by mouth every morning (before breakfast), Disp-90 each, R-0Normal  4. Oropharyngeal dysphagia  -     FL

## 2024-08-29 ENCOUNTER — HOSPITAL ENCOUNTER (OUTPATIENT)
Facility: HOSPITAL | Age: 89
Discharge: HOME | End: 2024-08-29
Attending: INTERNAL MEDICINE
Payer: MEDICARE

## 2024-08-29 DIAGNOSIS — R13.12 OROPHARYNGEAL DYSPHAGIA: ICD-10-CM

## 2024-08-29 DIAGNOSIS — R13.10 SWALLOWING PAINFUL: ICD-10-CM

## 2024-08-29 PROCEDURE — 74220 X-RAY XM ESOPHAGUS 1CNTRST: CPT

## 2024-09-19 DIAGNOSIS — E55.9 VITAMIN D DEFICIENCY: ICD-10-CM

## 2024-09-19 RX ORDER — MULTIVIT-MIN/IRON/FOLIC ACID/K 18-600-40
2000 CAPSULE ORAL DAILY
Qty: 30 CAPSULE | Refills: 2 | Status: SHIPPED | OUTPATIENT
Start: 2024-09-19

## 2024-09-23 RX ORDER — ROSUVASTATIN CALCIUM 20 MG/1
20 TABLET, COATED ORAL DAILY
Qty: 90 TABLET | Refills: 1 | Status: SHIPPED | OUTPATIENT
Start: 2024-09-23

## 2024-09-30 RX ORDER — METOPROLOL TARTRATE 50 MG
50 TABLET ORAL 2 TIMES DAILY
Qty: 180 TABLET | Refills: 3 | Status: SHIPPED | OUTPATIENT
Start: 2024-09-30 | End: 2024-10-02 | Stop reason: SDUPTHER

## 2024-10-02 DIAGNOSIS — G89.4 CHRONIC PAIN SYNDROME: Primary | ICD-10-CM

## 2024-10-02 DIAGNOSIS — M51.16 INTERVERTEBRAL DISC DISORDERS WITH RADICULOPATHY, LUMBAR REGION: ICD-10-CM

## 2024-10-02 RX ORDER — METOPROLOL TARTRATE 50 MG
50 TABLET ORAL 2 TIMES DAILY
Qty: 180 TABLET | Refills: 3 | Status: SHIPPED | OUTPATIENT
Start: 2024-10-02

## 2024-10-02 RX ORDER — TRAMADOL HYDROCHLORIDE 50 MG/1
50 TABLET ORAL EVERY 6 HOURS PRN
Qty: 90 TABLET | Refills: 0 | Status: SHIPPED | OUTPATIENT
Start: 2024-10-02 | End: 2024-10-25

## 2024-10-02 NOTE — TELEPHONE ENCOUNTER
PCP: Dayan Perry MD    Last appt:   8/23/2024    Future Appointments   Date Time Provider Department Center   10/29/2024  2:45 PM Dayan Perry MD [facility] DEP       Requested Prescriptions     Pending Prescriptions Disp Refills    metoprolol tartrate (LOPRESSOR) 50 MG tablet 180 tablet 3     Sig: Take 1 tablet by mouth 2 times daily    traMADol (ULTRAM) 50 MG tablet 90 tablet 0     Sig: Take 1 tablet by mouth every 6 hours as needed for Pain for up to 23 days. Intended supply: 5 days. Take lowest dose possible to manage pain Max Daily Amount: 200 mg

## 2024-10-13 DIAGNOSIS — F41.9 ANXIETY: ICD-10-CM

## 2024-10-14 RX ORDER — ALPRAZOLAM 0.25 MG
0.25 TABLET ORAL NIGHTLY PRN
Qty: 30 TABLET | Refills: 2 | Status: SHIPPED | OUTPATIENT
Start: 2024-10-14 | End: 2025-01-12

## 2024-10-29 ENCOUNTER — OFFICE VISIT (OUTPATIENT)
Age: 89
End: 2024-10-29
Payer: MEDICARE

## 2024-10-29 VITALS
WEIGHT: 160 LBS | TEMPERATURE: 98.1 F | OXYGEN SATURATION: 95 % | HEART RATE: 58 BPM | HEIGHT: 60 IN | BODY MASS INDEX: 31.41 KG/M2 | DIASTOLIC BLOOD PRESSURE: 59 MMHG | SYSTOLIC BLOOD PRESSURE: 135 MMHG | RESPIRATION RATE: 18 BRPM

## 2024-10-29 DIAGNOSIS — K21.9 GASTROESOPHAGEAL REFLUX DISEASE WITHOUT ESOPHAGITIS: ICD-10-CM

## 2024-10-29 DIAGNOSIS — E06.3 HYPOTHYROIDISM DUE TO HASHIMOTO'S THYROIDITIS: ICD-10-CM

## 2024-10-29 DIAGNOSIS — L30.4 INTERTRIGO: ICD-10-CM

## 2024-10-29 DIAGNOSIS — Z23 NEEDS FLU SHOT: ICD-10-CM

## 2024-10-29 DIAGNOSIS — M51.16 INTERVERTEBRAL DISC DISORDERS WITH RADICULOPATHY, LUMBAR REGION: Primary | ICD-10-CM

## 2024-10-29 DIAGNOSIS — G89.4 CHRONIC PAIN SYNDROME: ICD-10-CM

## 2024-10-29 DIAGNOSIS — F51.01 PRIMARY INSOMNIA: ICD-10-CM

## 2024-10-29 DIAGNOSIS — N18.31 CHRONIC KIDNEY DISEASE, STAGE 3A (HCC): ICD-10-CM

## 2024-10-29 DIAGNOSIS — M51.16 INTERVERTEBRAL DISC DISORDERS WITH RADICULOPATHY, LUMBAR REGION: ICD-10-CM

## 2024-10-29 PROCEDURE — 90653 IIV ADJUVANT VACCINE IM: CPT | Performed by: INTERNAL MEDICINE

## 2024-10-29 PROCEDURE — 99214 OFFICE O/P EST MOD 30 MIN: CPT | Performed by: INTERNAL MEDICINE

## 2024-10-29 RX ORDER — TRAMADOL HYDROCHLORIDE 50 MG/1
50 TABLET ORAL EVERY 12 HOURS PRN
Qty: 60 TABLET | Refills: 0 | Status: SHIPPED | OUTPATIENT
Start: 2024-10-29 | End: 2024-11-28

## 2024-10-29 RX ORDER — NYSTATIN 100000 [USP'U]/G
POWDER TOPICAL
Qty: 1 EACH | Refills: 1 | Status: SHIPPED | OUTPATIENT
Start: 2024-10-29

## 2024-10-29 ASSESSMENT — PATIENT HEALTH QUESTIONNAIRE - PHQ9
SUM OF ALL RESPONSES TO PHQ QUESTIONS 1-9: 0
2. FEELING DOWN, DEPRESSED OR HOPELESS: NOT AT ALL
SUM OF ALL RESPONSES TO PHQ QUESTIONS 1-9: 0
SUM OF ALL RESPONSES TO PHQ QUESTIONS 1-9: 0
1. LITTLE INTEREST OR PLEASURE IN DOING THINGS: NOT AT ALL
SUM OF ALL RESPONSES TO PHQ9 QUESTIONS 1 & 2: 0
SUM OF ALL RESPONSES TO PHQ QUESTIONS 1-9: 0

## 2024-10-29 NOTE — PROGRESS NOTES
Leatha Singh (:  7/3/1933) is a 89 y.o. female, Established patient, here for evaluation of the following chief complaint(s):  Hypertension         Assessment & Plan   Dysphagia.  Improved with omeprazole      Thoracic Aortic Aneurysm.  A thoracic aortic aneurysm was identified on the CT scan. The aneurysm measures 43 mm and will be monitored.given ruben and age no intervention needed  She is on crestor and metoprolol    Hx CAD   Sees cards once a year   She will continue taking low-dose aspirin for heart disease prevention hx of CAD      Acquired hypothyroidism  Has been euthyroid on Synthroid 75 mcg daily      High cholesterol  CAD has stent to LAD   Denies chest pain  Should stay on crestor and aspirin lifelong     Osteoporosis : on fosamax for over 10 years therefore stopped in    On vitamin D    CKD stage III and MGUS  [facility]    Insomnia: doing well on belsomra    Severe back pain: takes tramadol 2 a day which improves her quality of life.     Results  Laboratory Studies  Creatinine was 1.32. Hemoglobin was 12.    Imaging  CT chest showed a thoracic aortic aneurysm.  1. Intervertebral disc disorders with radiculopathy, lumbar region  2. Hypothyroidism due to Hashimoto's thyroiditis  3. Gastroesophageal reflux disease without esophagitis    No follow-ups on file.       Subjective   History of Present Illness  The patient is a 89-year-old female with a hx of CKD stage III, hypothyroidism, osteoporosis, MGUS p who presents for evaluation of dysphagia. She is accompanied by her daughter.  The dysphagia resolved with omeprazole    Stage 3a chronic kidney disease  This is  was followed by [facility] Flores -she was found to have Bence-Sandoval proteins.  She previously saw oncologist and there was no evidence of systemic disease.  She was diagnosed with MGUS will do labs to monitor this.  She denies weight loss fever fatigue  Has apt with with Dr Flores scheduled in a few weeks      Acquired

## 2024-10-29 NOTE — PROGRESS NOTES
After obtaining consent, and per verbal order from Dayan Perry MD, patient received influenza vaccine given in  Left deltoid  Influenza Vaccine 0.5 mL IM now. Patient was observed for 10 minutes post injection. Patient tolerated injection well.\"Have you been to the ER, urgent care clinic since your last visit?  Hospitalized since your last visit?\"    NO    “Have you seen or consulted any other health care providers outside our system since your last visit?”    NO

## 2024-11-02 LAB — DRUGS UR: NORMAL

## 2024-11-18 DIAGNOSIS — F51.01 PRIMARY INSOMNIA: Primary | ICD-10-CM

## 2024-11-18 RX ORDER — SUVOREXANT 10 MG/1
10 TABLET, FILM COATED ORAL NIGHTLY
Qty: 30 TABLET | Refills: 0 | Status: SHIPPED | OUTPATIENT
Start: 2024-11-18 | End: 2024-12-18

## 2024-11-18 NOTE — TELEPHONE ENCOUNTER
PCP: Dayan Perry MD    Last appt:   10/29/2024    Future Appointments   Date Time Provider Department Center   1/30/2025 11:15 AM Dayan Perry MD [facility] DEP       Requested Prescriptions     Pending Prescriptions Disp Refills    Suvorexant (BELSOMRA) 10 MG TABS 30 tablet 0     Sig: Take 10 mg by mouth nightly for 30 days. Max Daily Amount: 10 mg

## 2024-11-24 NOTE — TELEPHONE ENCOUNTER
PCP: Erika Mobley MD    Last appt: 8/23/2022  Future Appointments   Date Time Provider Jazmyne Cristina   2/2/2023 11:45 AM Appa Jose Angel Mims MD [facility] BS AMB       Requested Prescriptions     Pending Prescriptions Disp Refills    ALPRAZolam (XANAX) 0.25 mg tablet 30 Tablet 0     Sig: TAKE ONE TABLET BY MOUTH ONE TIME DAILY AS NEEDED
Statement Selected

## 2024-12-08 ENCOUNTER — PATIENT MESSAGE (OUTPATIENT)
Age: 88
End: 2024-12-08

## 2024-12-08 DIAGNOSIS — F41.9 ANXIETY: Primary | ICD-10-CM

## 2024-12-09 RX ORDER — ALPRAZOLAM 0.25 MG/1
0.25 TABLET ORAL 3 TIMES DAILY PRN
Qty: 60 TABLET | Refills: 0 | Status: SHIPPED | OUTPATIENT
Start: 2024-12-09 | End: 2025-01-08

## 2024-12-13 DIAGNOSIS — M51.16 INTERVERTEBRAL DISC DISORDERS WITH RADICULOPATHY, LUMBAR REGION: Primary | ICD-10-CM

## 2024-12-13 RX ORDER — TRAMADOL HYDROCHLORIDE 50 MG/1
50 TABLET ORAL EVERY 12 HOURS PRN
Qty: 60 TABLET | Refills: 0 | Status: SHIPPED | OUTPATIENT
Start: 2024-12-13 | End: 2025-01-12

## 2024-12-13 NOTE — TELEPHONE ENCOUNTER
PCP: Dayan Perry MD    Last appt:   10/29/2024    Future Appointments   Date Time Provider Department Center   1/30/2025 11:15 AM Dayan Perry MD [facility] DEP       Requested Prescriptions     Pending Prescriptions Disp Refills    traMADol (ULTRAM) 50 MG tablet 60 tablet 0     Sig: Take 1 tablet by mouth every 12 hours as needed for Pain for up to 30 days. Intended supply: 7 days. Take lowest dose possible to manage pain Max Daily Amount: 100 mg

## 2024-12-18 DIAGNOSIS — F51.01 PRIMARY INSOMNIA: ICD-10-CM

## 2024-12-19 RX ORDER — SUVOREXANT 10 MG/1
10 TABLET, FILM COATED ORAL NIGHTLY
Qty: 30 TABLET | Refills: 0 | Status: SHIPPED | OUTPATIENT
Start: 2024-12-19 | End: 2025-01-18

## 2025-01-14 ENCOUNTER — PATIENT MESSAGE (OUTPATIENT)
Age: 89
End: 2025-01-14

## 2025-01-14 DIAGNOSIS — G89.4 CHRONIC PAIN SYNDROME: Primary | ICD-10-CM

## 2025-01-14 DIAGNOSIS — E55.9 VITAMIN D DEFICIENCY: ICD-10-CM

## 2025-01-14 DIAGNOSIS — F51.01 PRIMARY INSOMNIA: ICD-10-CM

## 2025-01-14 RX ORDER — SUVOREXANT 10 MG/1
10 TABLET, FILM COATED ORAL NIGHTLY
Qty: 30 TABLET | Refills: 2 | Status: SHIPPED | OUTPATIENT
Start: 2025-01-14 | End: 2025-04-14

## 2025-01-14 RX ORDER — MULTIVIT-MIN/IRON/FOLIC ACID/K 18-600-40
2000 CAPSULE ORAL DAILY
Qty: 30 CAPSULE | Refills: 2 | Status: SHIPPED | OUTPATIENT
Start: 2025-01-14

## 2025-01-14 NOTE — TELEPHONE ENCOUNTER
PCP: Dayan Perry MD    Last appt: 10/29/2024  Future Appointments   Date Time Provider Department Center   1/30/2025 11:15 AM Dayan Perry MD [facility] DEP       Requested Prescriptions     Pending Prescriptions Disp Refills    traMADol (ULTRAM) 50 MG tablet 60 tablet 0     Sig: Take 1 tablet by mouth every 12 hours as needed for Pain for up to 30 days. Intended supply: 5 days. Take lowest dose possible to manage pain Max Daily Amount: 100 mg

## 2025-01-15 RX ORDER — TRAMADOL HYDROCHLORIDE 50 MG/1
50 TABLET ORAL EVERY 12 HOURS PRN
Qty: 60 TABLET | Refills: 0 | Status: SHIPPED | OUTPATIENT
Start: 2025-01-15 | End: 2025-02-14

## 2025-01-16 ENCOUNTER — TELEPHONE (OUTPATIENT)
Age: 89
End: 2025-01-16

## 2025-01-23 NOTE — TELEPHONE ENCOUNTER
Spoke to pt's daughter using two pt identifiers.  Pt's daughter is inquiring if xanax and belsomra will counteract each other.    Pt's daughter states her mom is sleeping A LOT and is having no motivation to do anything/ even eat.  Pt's daughter states pt has issues with memory and lack of focus.  Pt's daughter states she had to hid pt's pills due to pt not remembering taking her meds and almost taking too much or if pt is feeling too anxious pt is trying to take medication more than prescribed.    Pt's daughter states when pt comes in for appts then she will minimize her issues but pt's daughter can't speak up due to pt getting agitated.     Pt has an appt on 1/30.  Msg sent as a heads up.

## 2025-01-28 DIAGNOSIS — F41.9 ANXIETY: Primary | ICD-10-CM

## 2025-01-28 RX ORDER — ALPRAZOLAM 0.25 MG/1
0.25 TABLET ORAL 3 TIMES DAILY PRN
Qty: 60 TABLET | Refills: 0 | Status: SHIPPED | OUTPATIENT
Start: 2025-01-28 | End: 2025-01-30 | Stop reason: ALTCHOICE

## 2025-01-28 NOTE — TELEPHONE ENCOUNTER
PCP: Dayan Perry MD    Last appt:   10/29/2024    Future Appointments   Date Time Provider Department Center   1/30/2025 11:15 AM Dayan Perry MD [facility] DEP       Requested Prescriptions     Pending Prescriptions Disp Refills    ALPRAZolam (XANAX) 0.25 MG tablet 60 tablet 0     Sig: Take 1 tablet by mouth 3 times daily as needed for Anxiety for up to 30 days. Max Daily Amount: 0.75 mg

## 2025-01-30 ENCOUNTER — OFFICE VISIT (OUTPATIENT)
Age: 89
End: 2025-01-30
Payer: MEDICARE

## 2025-01-30 VITALS
WEIGHT: 160 LBS | OXYGEN SATURATION: 96 % | TEMPERATURE: 98.1 F | DIASTOLIC BLOOD PRESSURE: 80 MMHG | HEIGHT: 60 IN | HEART RATE: 56 BPM | BODY MASS INDEX: 31.41 KG/M2 | RESPIRATION RATE: 20 BRPM | SYSTOLIC BLOOD PRESSURE: 149 MMHG

## 2025-01-30 DIAGNOSIS — N18.31 CHRONIC KIDNEY DISEASE, STAGE 3A (HCC): ICD-10-CM

## 2025-01-30 DIAGNOSIS — F51.01 PRIMARY INSOMNIA: ICD-10-CM

## 2025-01-30 DIAGNOSIS — Z00.00 MEDICARE ANNUAL WELLNESS VISIT, SUBSEQUENT: Primary | ICD-10-CM

## 2025-01-30 DIAGNOSIS — E78.00 HIGH CHOLESTEROL: ICD-10-CM

## 2025-01-30 DIAGNOSIS — E55.9 VITAMIN D DEFICIENCY: ICD-10-CM

## 2025-01-30 DIAGNOSIS — E06.3 HYPOTHYROIDISM DUE TO HASHIMOTO THYROIDITIS: ICD-10-CM

## 2025-01-30 DIAGNOSIS — F41.9 ANXIETY: ICD-10-CM

## 2025-01-30 DIAGNOSIS — G89.4 CHRONIC PAIN SYNDROME: ICD-10-CM

## 2025-01-30 DIAGNOSIS — F11.99 OPIOID USE, UNSPECIFIED WITH UNSPECIFIED OPIOID-INDUCED DISORDER (HCC): ICD-10-CM

## 2025-01-30 LAB
ALBUMIN SERPL-MCNC: 3.1 G/DL (ref 3.5–5)
ALBUMIN/GLOB SERPL: 0.9 (ref 1.1–2.2)
ALP SERPL-CCNC: 115 U/L (ref 45–117)
ALT SERPL-CCNC: 16 U/L (ref 12–78)
ANION GAP SERPL CALC-SCNC: 5 MMOL/L (ref 2–12)
AST SERPL-CCNC: 19 U/L (ref 15–37)
BASOPHILS # BLD: 0.03 K/UL (ref 0–0.1)
BASOPHILS NFR BLD: 0.6 % (ref 0–1)
BILIRUB SERPL-MCNC: 0.4 MG/DL (ref 0.2–1)
BUN SERPL-MCNC: 25 MG/DL (ref 6–20)
BUN/CREAT SERPL: 15 (ref 12–20)
CALCIUM SERPL-MCNC: 10.3 MG/DL (ref 8.5–10.1)
CHLORIDE SERPL-SCNC: 107 MMOL/L (ref 97–108)
CO2 SERPL-SCNC: 30 MMOL/L (ref 21–32)
CREAT SERPL-MCNC: 1.68 MG/DL (ref 0.55–1.02)
DIFFERENTIAL METHOD BLD: ABNORMAL
EOSINOPHIL # BLD: 0.11 K/UL (ref 0–0.4)
EOSINOPHIL NFR BLD: 2.1 % (ref 0–7)
ERYTHROCYTE [DISTWIDTH] IN BLOOD BY AUTOMATED COUNT: 11.9 % (ref 11.5–14.5)
GLOBULIN SER CALC-MCNC: 3.4 G/DL (ref 2–4)
GLUCOSE SERPL-MCNC: 105 MG/DL (ref 65–100)
HCT VFR BLD AUTO: 39.2 % (ref 35–47)
HGB BLD-MCNC: 12.6 G/DL (ref 11.5–16)
IMM GRANULOCYTES # BLD AUTO: 0.02 K/UL (ref 0–0.04)
IMM GRANULOCYTES NFR BLD AUTO: 0.4 % (ref 0–0.5)
LYMPHOCYTES # BLD: 0.73 K/UL (ref 0.8–3.5)
LYMPHOCYTES NFR BLD: 13.6 % (ref 12–49)
MCH RBC QN AUTO: 34.4 PG (ref 26–34)
MCHC RBC AUTO-ENTMCNC: 32.1 G/DL (ref 30–36.5)
MCV RBC AUTO: 107.1 FL (ref 80–99)
MONOCYTES # BLD: 0.64 K/UL (ref 0–1)
MONOCYTES NFR BLD: 11.8 % (ref 5–13)
NEUTS SEG # BLD: 3.87 K/UL (ref 1.8–8)
NEUTS SEG NFR BLD: 71.5 % (ref 32–75)
NRBC # BLD: 0 K/UL (ref 0–0.01)
NRBC BLD-RTO: 0 PER 100 WBC
PLATELET # BLD AUTO: 149 K/UL (ref 150–400)
PMV BLD AUTO: 11 FL (ref 8.9–12.9)
POTASSIUM SERPL-SCNC: 4.3 MMOL/L (ref 3.5–5.1)
PROT SERPL-MCNC: 6.5 G/DL (ref 6.4–8.2)
RBC # BLD AUTO: 3.66 M/UL (ref 3.8–5.2)
RBC MORPH BLD: ABNORMAL
RBC MORPH BLD: ABNORMAL
SODIUM SERPL-SCNC: 142 MMOL/L (ref 136–145)
TSH SERPL DL<=0.05 MIU/L-ACNC: 1.43 UIU/ML (ref 0.36–3.74)
WBC # BLD AUTO: 5.4 K/UL (ref 3.6–11)

## 2025-01-30 PROCEDURE — G0439 PPPS, SUBSEQ VISIT: HCPCS | Performed by: INTERNAL MEDICINE

## 2025-01-30 PROCEDURE — 1159F MED LIST DOCD IN RCRD: CPT | Performed by: INTERNAL MEDICINE

## 2025-01-30 PROCEDURE — 1123F ACP DISCUSS/DSCN MKR DOCD: CPT | Performed by: INTERNAL MEDICINE

## 2025-01-30 RX ORDER — MIRTAZAPINE 7.5 MG/1
7.5 TABLET, FILM COATED ORAL NIGHTLY
Qty: 90 TABLET | Refills: 1 | Status: SHIPPED | OUTPATIENT
Start: 2025-01-30

## 2025-01-30 RX ORDER — SUVOREXANT 20 MG/1
20 TABLET, FILM COATED ORAL
Qty: 30 TABLET | Refills: 2 | Status: SHIPPED | OUTPATIENT
Start: 2025-01-30 | End: 2025-04-30

## 2025-01-30 SDOH — ECONOMIC STABILITY: FOOD INSECURITY: WITHIN THE PAST 12 MONTHS, THE FOOD YOU BOUGHT JUST DIDN'T LAST AND YOU DIDN'T HAVE MONEY TO GET MORE.: NEVER TRUE

## 2025-01-30 SDOH — ECONOMIC STABILITY: FOOD INSECURITY: WITHIN THE PAST 12 MONTHS, YOU WORRIED THAT YOUR FOOD WOULD RUN OUT BEFORE YOU GOT MONEY TO BUY MORE.: NEVER TRUE

## 2025-01-30 ASSESSMENT — PATIENT HEALTH QUESTIONNAIRE - PHQ9
SUM OF ALL RESPONSES TO PHQ9 QUESTIONS 1 & 2: 2
SUM OF ALL RESPONSES TO PHQ QUESTIONS 1-9: 2
2. FEELING DOWN, DEPRESSED OR HOPELESS: SEVERAL DAYS
SUM OF ALL RESPONSES TO PHQ QUESTIONS 1-9: 2
1. LITTLE INTEREST OR PLEASURE IN DOING THINGS: SEVERAL DAYS

## 2025-01-30 ASSESSMENT — LIFESTYLE VARIABLES
HOW MANY STANDARD DRINKS CONTAINING ALCOHOL DO YOU HAVE ON A TYPICAL DAY: PATIENT DOES NOT DRINK
HOW OFTEN DO YOU HAVE A DRINK CONTAINING ALCOHOL: NEVER

## 2025-01-30 NOTE — PROGRESS NOTES
\"Have you been to the ER, urgent care clinic since your last visit?  Hospitalized since your last visit?\"    NO    “Have you seen or consulted any other health care providers outside our system since your last visit?”    NO           
MD Dayan   mirtazapine (REMERON) 7.5 MG tablet Take 1 tablet by mouth nightly Yes Dayan Perry MD       [facility] (Including outside providers/suppliers regularly involved in providing care):   Patient Care Team:  Dayan Perry MD as PCP - General  MakiChidi MD as PCP - Dermatology  Dayan Perry MD as PCP - Empaneled Provider  Art Valentin MD as Physician  Marissa Machuca APRN - NP as Nurse Practitioner     Recommendations for Preventive Services Due: see orders and patient instructions/AVS.  Recommended screening schedule for the next 5-10 years is provided to the patient in written form: see Patient Instructions/AVS.     Reviewed and updated this visit:  Tobacco  Allergies  Meds  Med Hx  Surg Hx  Soc Hx  Fam Hx

## 2025-01-30 NOTE — PATIENT INSTRUCTIONS
is never too late to quit. Try to avoid secondhand smoke too.     Stay at a weight that's healthy for you. Talk to your doctor if you need help losing weight.     Try to get 7 to 9 hours of sleep each night.     Limit alcohol to 2 drinks a day for men and 1 drink a day for women. Too much alcohol can cause health problems.     Manage other health problems such as diabetes, high blood pressure, and high cholesterol. If you think you may have a problem with alcohol or drug use, talk to your doctor.   Medicines    Take your medicines exactly as prescribed. Call your doctor if you think you are having a problem with your medicine.     If your doctor recommends aspirin, take the amount directed each day. Make sure you take aspirin and not another kind of pain reliever, such as acetaminophen (Tylenol).   When should you call for help?   Call 911 if you have symptoms of a heart attack. These may include:    Chest pain or pressure, or a strange feeling in the chest.     Sweating.     Shortness of breath.     Pain, pressure, or a strange feeling in the back, neck, jaw, or upper belly or in one or both shoulders or arms.     Lightheadedness or sudden weakness.     A fast or irregular heartbeat.   After you call 911, the  may tell you to chew 1 adult-strength or 2 to 4 low-dose aspirin. Wait for an ambulance. Do not try to drive yourself.  Watch closely for changes in your health, and be sure to contact your doctor if you have any problems.  Where can you learn more?  Go to https://www.[employer].net/patientEd and enter F075 to learn more about \"A Healthy Heart: Care Instructions.\"  Current as of: July 31, 2024  Content Version: 14.3  © 2024 [employer].   Care instructions adapted under license by [employer]. If you have questions about a medical condition or this instruction, always ask your healthcare professional. [employer] disclaims any warranty or liability for your use of this

## 2025-02-10 DIAGNOSIS — G89.4 CHRONIC PAIN SYNDROME: ICD-10-CM

## 2025-02-10 DIAGNOSIS — E55.9 VITAMIN D DEFICIENCY: ICD-10-CM

## 2025-02-10 RX ORDER — MULTIVIT-MIN/IRON/FOLIC ACID/K 18-600-40
2000 CAPSULE ORAL DAILY
Qty: 30 CAPSULE | Refills: 2 | Status: SHIPPED | OUTPATIENT
Start: 2025-02-10

## 2025-02-10 RX ORDER — TRAMADOL HYDROCHLORIDE 50 MG/1
50 TABLET ORAL EVERY 12 HOURS PRN
Qty: 60 TABLET | Refills: 0 | Status: SHIPPED | OUTPATIENT
Start: 2025-02-10 | End: 2025-03-12

## 2025-03-14 DIAGNOSIS — M51.16 INTERVERTEBRAL DISC DISORDERS WITH RADICULOPATHY, LUMBAR REGION: ICD-10-CM

## 2025-03-14 DIAGNOSIS — G89.4 CHRONIC PAIN SYNDROME: Primary | ICD-10-CM

## 2025-03-14 NOTE — TELEPHONE ENCOUNTER
PCP: Dayan Perry MD    Last appt:   1/30/2025    Future Appointments   Date Time Provider Department Center   5/29/2025 11:15 AM Dayan Perry MD [facility] DEP       Requested Prescriptions     Pending Prescriptions Disp Refills    traMADol (ULTRAM) 50 MG tablet 60 tablet 0     Sig: Take 1 tablet by mouth every 12 hours as needed for Pain for up to 30 days. Intended supply: 7 days. Take lowest dose possible to manage pain Max Daily Amount: 100 mg

## 2025-03-16 RX ORDER — TRAMADOL HYDROCHLORIDE 50 MG/1
50 TABLET ORAL EVERY 12 HOURS PRN
Qty: 60 TABLET | Refills: 0 | Status: SHIPPED | OUTPATIENT
Start: 2025-03-16 | End: 2025-04-15

## 2025-03-27 RX ORDER — ROSUVASTATIN CALCIUM 20 MG/1
20 TABLET, COATED ORAL DAILY
Qty: 90 TABLET | Refills: 1 | Status: SHIPPED | OUTPATIENT
Start: 2025-03-27

## 2025-04-09 RX ORDER — LEVOTHYROXINE SODIUM 75 UG/1
TABLET ORAL
Qty: 90 TABLET | Refills: 3 | Status: SHIPPED | OUTPATIENT
Start: 2025-04-09

## 2025-04-10 DIAGNOSIS — M51.16 INTERVERTEBRAL DISC DISORDERS WITH RADICULOPATHY, LUMBAR REGION: ICD-10-CM

## 2025-04-10 DIAGNOSIS — G89.4 CHRONIC PAIN SYNDROME: ICD-10-CM

## 2025-04-11 RX ORDER — TRAMADOL HYDROCHLORIDE 50 MG/1
50 TABLET ORAL EVERY 12 HOURS PRN
Qty: 60 TABLET | Refills: 0 | Status: SHIPPED | OUTPATIENT
Start: 2025-04-11 | End: 2025-05-11

## 2025-04-14 DIAGNOSIS — M51.16 INTERVERTEBRAL DISC DISORDERS WITH RADICULOPATHY, LUMBAR REGION: ICD-10-CM

## 2025-04-14 DIAGNOSIS — G89.4 CHRONIC PAIN SYNDROME: ICD-10-CM

## 2025-04-15 RX ORDER — TRAMADOL HYDROCHLORIDE 50 MG/1
50 TABLET ORAL EVERY 12 HOURS PRN
Qty: 60 TABLET | Refills: 0 | OUTPATIENT
Start: 2025-04-15 | End: 2025-05-15

## 2025-04-21 DIAGNOSIS — F51.01 PRIMARY INSOMNIA: ICD-10-CM

## 2025-04-22 RX ORDER — SUVOREXANT 20 MG/1
TABLET, FILM COATED ORAL
Qty: 30 TABLET | Refills: 2 | Status: SHIPPED | OUTPATIENT
Start: 2025-04-22 | End: 2025-05-22

## 2025-05-12 DIAGNOSIS — G89.4 CHRONIC PAIN SYNDROME: Primary | ICD-10-CM

## 2025-05-12 DIAGNOSIS — E55.9 VITAMIN D DEFICIENCY: ICD-10-CM

## 2025-05-12 RX ORDER — TRAMADOL HYDROCHLORIDE 50 MG/1
50 TABLET ORAL EVERY 12 HOURS PRN
Qty: 60 TABLET | Refills: 0 | Status: SHIPPED | OUTPATIENT
Start: 2025-05-12 | End: 2025-06-11

## 2025-05-12 RX ORDER — MULTIVIT-MIN/IRON/FOLIC ACID/K 18-600-40
2000 CAPSULE ORAL DAILY
Qty: 30 CAPSULE | Refills: 2 | Status: SHIPPED | OUTPATIENT
Start: 2025-05-12

## 2025-05-12 NOTE — TELEPHONE ENCOUNTER
PCP: Dayan Perry MD    Last appt:   1/30/2025    Future Appointments   Date Time Provider Department Center   5/29/2025 11:15 AM Dayan Perry MD [facility] DEP       Requested Prescriptions     Pending Prescriptions Disp Refills    vitamin D (VITAMIN D, CHOLECALCIFEROL,) 50 MCG (2000 UT) CAPS capsule 30 capsule 2     Sig: Take 1 capsule by mouth daily    traMADol (ULTRAM) 50 MG tablet 60 tablet 0     Sig: Take 1 tablet by mouth every 12 hours as needed for Pain for up to 30 days. Intended supply: 7 days. Take lowest dose possible to manage pain Max Daily Amount: 100 mg

## 2025-05-29 ENCOUNTER — OFFICE VISIT (OUTPATIENT)
Age: 89
End: 2025-05-29
Payer: MEDICARE

## 2025-05-29 VITALS
BODY MASS INDEX: 33.3 KG/M2 | WEIGHT: 169.6 LBS | SYSTOLIC BLOOD PRESSURE: 128 MMHG | TEMPERATURE: 97.8 F | OXYGEN SATURATION: 99 % | RESPIRATION RATE: 18 BRPM | HEART RATE: 57 BPM | DIASTOLIC BLOOD PRESSURE: 81 MMHG | HEIGHT: 60 IN

## 2025-05-29 DIAGNOSIS — F41.9 ANXIETY: ICD-10-CM

## 2025-05-29 DIAGNOSIS — E55.9 VITAMIN D DEFICIENCY: Primary | ICD-10-CM

## 2025-05-29 DIAGNOSIS — N18.31 CHRONIC KIDNEY DISEASE, STAGE 3A (HCC): ICD-10-CM

## 2025-05-29 DIAGNOSIS — F51.01 PRIMARY INSOMNIA: ICD-10-CM

## 2025-05-29 DIAGNOSIS — G89.4 CHRONIC PAIN SYNDROME: ICD-10-CM

## 2025-05-29 DIAGNOSIS — F11.99 OPIOID USE, UNSPECIFIED WITH UNSPECIFIED OPIOID-INDUCED DISORDER (HCC): ICD-10-CM

## 2025-05-29 DIAGNOSIS — M48.05 SPINAL STENOSIS OF THORACOLUMBAR REGION: ICD-10-CM

## 2025-05-29 DIAGNOSIS — E06.3 HYPOTHYROIDISM DUE TO HASHIMOTO'S THYROIDITIS: ICD-10-CM

## 2025-05-29 DIAGNOSIS — E78.00 HIGH CHOLESTEROL: ICD-10-CM

## 2025-05-29 PROCEDURE — 1036F TOBACCO NON-USER: CPT | Performed by: INTERNAL MEDICINE

## 2025-05-29 PROCEDURE — 99214 OFFICE O/P EST MOD 30 MIN: CPT | Performed by: INTERNAL MEDICINE

## 2025-05-29 PROCEDURE — 1090F PRES/ABSN URINE INCON ASSESS: CPT | Performed by: INTERNAL MEDICINE

## 2025-05-29 PROCEDURE — 1123F ACP DISCUSS/DSCN MKR DOCD: CPT | Performed by: INTERNAL MEDICINE

## 2025-05-29 PROCEDURE — 1159F MED LIST DOCD IN RCRD: CPT | Performed by: INTERNAL MEDICINE

## 2025-05-29 PROCEDURE — G8417 CALC BMI ABV UP PARAM F/U: HCPCS | Performed by: INTERNAL MEDICINE

## 2025-05-29 PROCEDURE — G8427 DOCREV CUR MEDS BY ELIG CLIN: HCPCS | Performed by: INTERNAL MEDICINE

## 2025-05-29 ASSESSMENT — PATIENT HEALTH QUESTIONNAIRE - PHQ9
SUM OF ALL RESPONSES TO PHQ QUESTIONS 1-9: 0
2. FEELING DOWN, DEPRESSED OR HOPELESS: NOT AT ALL
SUM OF ALL RESPONSES TO PHQ QUESTIONS 1-9: 0
1. LITTLE INTEREST OR PLEASURE IN DOING THINGS: NOT AT ALL

## 2025-05-29 NOTE — PROGRESS NOTES
Chief Complaint   Patient presents with    Hypertension    Insomnia     \"Have you been to the ER, urgent care clinic since your last visit?  Hospitalized since your last visit?\"    NO    “Have you seen or consulted any other health care providers outside of [facility] since your last visit?”    NO

## 2025-05-29 NOTE — PROGRESS NOTES
Leatha Singh (:  7/3/1933) is a 89 y.o. female, Established patient, here for evaluation of the following chief complaint(s):  Hypertension and Insomnia         Assessment & Plan  1. Insomnia.  Sleeping better on mirtazapine 7.5mg and belsomra 20mg    2. Anxiety.  Weaned off xanax and on mirtazapine 7.5mg daily     3. Mild cognitive impairment versus dementia.  She is 89 years old and exhibits difficulty with word recall and visual-spatial tasks. Her cognitive score MOCA is 23 out of 30, indicating mild dementia. She is advised to engage in exercises, puzzles, and small math problems to help improve cognitive function.     4. Chronic back pain.Spina stenosis   She has severe chronic back pain and is currently taking tramadol BID. She will continue her current regimen of tramadol  UDS consistent with meds  Reviewed prescription drug monitoring and ok    5. Hypertension.  She is on metoprolol 50 mg twice a day for hypertension. She will continue this medication.    6. Hypercholesterolemia.  She is on Crestor 20 mg for high cholesterol. She will continue this medication.    7. Hypothyroidism.  She is on Synthroid 75 mcg for hypothyroidism. A thyroid function test will be conducted today to monitor her condition.    8. Chronic kidney disease (CKD) stage 3.  Her creatinine level is 1.3. She will continue her current management plan.  Follows with Dr Flores    9. Monoclonal gammopathy of undetermined significance (MGUS).  Stable will repeat next visit     10. Thoracic aortic aneurysm.  She is on low-dose aspirin for heart disease prevention and has a history of coronary artery disease with a stent in her LAD. She will continue her current medications, including aspirin, Crestor, and metoprolol.    11. Dysphagia.  Her dysphagia has improved with omeprazole. She will continue this medication.        Results  Labs   - Sodium level: Normal   - Potassium level: Normal   - Calcium level: Normal  Creatinine 1.4  1. Vitamin

## 2025-05-30 ENCOUNTER — PATIENT MESSAGE (OUTPATIENT)
Age: 89
End: 2025-05-30

## 2025-05-30 DIAGNOSIS — F51.01 PRIMARY INSOMNIA: Primary | ICD-10-CM

## 2025-05-30 RX ORDER — ALPRAZOLAM 0.25 MG
TABLET ORAL
COMMUNITY

## 2025-05-30 RX ORDER — SUVOREXANT 20 MG/1
1 TABLET, FILM COATED ORAL DAILY
COMMUNITY
End: 2025-05-30 | Stop reason: SDUPTHER

## 2025-05-30 RX ORDER — SUVOREXANT 20 MG/1
1 TABLET, FILM COATED ORAL DAILY
Qty: 30 TABLET | Refills: 2 | Status: SHIPPED | OUTPATIENT
Start: 2025-05-30 | End: 2025-08-28

## 2025-05-30 NOTE — TELEPHONE ENCOUNTER
PCP: Dayan Perry MD    Last appt:   5/29/2025    Future Appointments   Date Time Provider Department Center   9/5/2025 11:30 AM Dayan Perry MD [facility] DEP       Requested Prescriptions     Pending Prescriptions Disp Refills    Suvorexant (BELSOMRA) 20 MG TABS 30 tablet      Sig: Take 1 tablet by mouth daily. Max Daily Amount: 20 mg

## 2025-06-03 LAB — DRUGS UR: NORMAL

## 2025-06-04 ENCOUNTER — RESULTS FOLLOW-UP (OUTPATIENT)
Age: 89
End: 2025-06-04

## 2025-06-13 DIAGNOSIS — M48.05 SPINAL STENOSIS OF THORACOLUMBAR REGION: ICD-10-CM

## 2025-06-13 DIAGNOSIS — F11.99 OPIOID USE, UNSPECIFIED WITH UNSPECIFIED OPIOID-INDUCED DISORDER (HCC): Primary | ICD-10-CM

## 2025-06-13 RX ORDER — TRAMADOL HYDROCHLORIDE 50 MG/1
50 TABLET ORAL EVERY 12 HOURS PRN
Qty: 60 TABLET | Refills: 0 | Status: SHIPPED | OUTPATIENT
Start: 2025-06-13 | End: 2025-07-13

## 2025-06-13 NOTE — TELEPHONE ENCOUNTER
PCP: Dayan Perry MD    Last appt:   5/29/2025    Future Appointments   Date Time Provider Department Center   9/5/2025 11:30 AM Dayan Perry MD [facility] DEP       Requested Prescriptions     Pending Prescriptions Disp Refills    traMADol (ULTRAM) 50 MG tablet 60 tablet 0     Sig: Take 1 tablet by mouth every 12 hours as needed for Pain for up to 30 days. Intended supply: 7 days. Take lowest dose possible to manage pain Max Daily Amount: 100 mg

## 2025-07-11 DIAGNOSIS — F11.99 OPIOID USE, UNSPECIFIED WITH UNSPECIFIED OPIOID-INDUCED DISORDER (HCC): ICD-10-CM

## 2025-07-11 DIAGNOSIS — M48.05 SPINAL STENOSIS OF THORACOLUMBAR REGION: ICD-10-CM

## 2025-07-11 RX ORDER — TRAMADOL HYDROCHLORIDE 50 MG/1
50 TABLET ORAL EVERY 12 HOURS PRN
Qty: 60 TABLET | Refills: 0 | Status: SHIPPED | OUTPATIENT
Start: 2025-07-11 | End: 2025-08-10

## 2025-07-11 NOTE — TELEPHONE ENCOUNTER
PCP: Dayan Perry MD    Last appt:   5/29/2025    Future Appointments   Date Time Provider Department Center   9/5/2025 11:30 AM Dayan Perry MD [facility] DEP       Requested Prescriptions     Pending Prescriptions Disp Refills    traMADol (ULTRAM) 50 MG tablet 60 tablet 0     Sig: Take 1 tablet by mouth every 12 hours as needed for Pain for up to 30 days. Intended supply: 7 days. Take lowest dose possible to manage pain Max Daily Amount: 100 mg

## 2025-07-27 DIAGNOSIS — F41.9 ANXIETY: ICD-10-CM

## 2025-07-28 RX ORDER — MIRTAZAPINE 7.5 MG/1
7.5 TABLET, FILM COATED ORAL NIGHTLY
Qty: 90 TABLET | Refills: 1 | Status: SHIPPED | OUTPATIENT
Start: 2025-07-28 | End: 2025-07-31 | Stop reason: SDUPTHER

## 2025-07-31 DIAGNOSIS — F51.01 PRIMARY INSOMNIA: ICD-10-CM

## 2025-07-31 DIAGNOSIS — F41.9 ANXIETY: ICD-10-CM

## 2025-07-31 RX ORDER — MIRTAZAPINE 7.5 MG/1
7.5 TABLET, FILM COATED ORAL NIGHTLY
Qty: 90 TABLET | Refills: 3 | Status: SHIPPED | OUTPATIENT
Start: 2025-07-31

## 2025-07-31 RX ORDER — SUVOREXANT 20 MG/1
1 TABLET, FILM COATED ORAL DAILY
Qty: 30 TABLET | Refills: 5 | Status: SHIPPED | OUTPATIENT
Start: 2025-07-31 | End: 2026-01-27

## 2025-08-13 DIAGNOSIS — M51.16 INTERVERTEBRAL DISC DISORDERS WITH RADICULOPATHY, LUMBAR REGION: ICD-10-CM

## 2025-08-13 DIAGNOSIS — G89.4 CHRONIC PAIN SYNDROME: ICD-10-CM

## 2025-08-14 RX ORDER — TRAMADOL HYDROCHLORIDE 50 MG/1
50 TABLET ORAL EVERY 8 HOURS PRN
Qty: 60 TABLET | Refills: 0 | Status: SHIPPED | OUTPATIENT
Start: 2025-08-14 | End: 2025-09-13

## 2025-08-21 DIAGNOSIS — E55.9 VITAMIN D DEFICIENCY: ICD-10-CM

## 2025-08-22 RX ORDER — ACETAMINOPHEN 160 MG
2000 TABLET,DISINTEGRATING ORAL DAILY
Qty: 30 CAPSULE | Refills: 2 | Status: SHIPPED | OUTPATIENT
Start: 2025-08-22

## 2025-08-29 DIAGNOSIS — F51.01 PRIMARY INSOMNIA: ICD-10-CM

## 2025-08-31 RX ORDER — SUVOREXANT 20 MG/1
1 TABLET, FILM COATED ORAL DAILY
Qty: 30 TABLET | Refills: 5 | Status: SHIPPED | OUTPATIENT
Start: 2025-08-31 | End: 2026-02-27

## 2025-09-05 ENCOUNTER — OFFICE VISIT (OUTPATIENT)
Age: 89
End: 2025-09-05
Payer: MEDICARE

## 2025-09-05 VITALS
WEIGHT: 169 LBS | SYSTOLIC BLOOD PRESSURE: 128 MMHG | OXYGEN SATURATION: 97 % | BODY MASS INDEX: 33.18 KG/M2 | RESPIRATION RATE: 18 BRPM | HEIGHT: 60 IN | HEART RATE: 69 BPM | TEMPERATURE: 97.8 F | DIASTOLIC BLOOD PRESSURE: 74 MMHG

## 2025-09-05 DIAGNOSIS — F51.01 PRIMARY INSOMNIA: ICD-10-CM

## 2025-09-05 DIAGNOSIS — F41.9 ANXIETY: ICD-10-CM

## 2025-09-05 DIAGNOSIS — M51.16 INTERVERTEBRAL DISC DISORDERS WITH RADICULOPATHY, LUMBAR REGION: ICD-10-CM

## 2025-09-05 DIAGNOSIS — L30.4 INTERTRIGO: ICD-10-CM

## 2025-09-05 DIAGNOSIS — E55.9 VITAMIN D DEFICIENCY: ICD-10-CM

## 2025-09-05 DIAGNOSIS — E06.3 HYPOTHYROIDISM DUE TO HASHIMOTO'S THYROIDITIS: ICD-10-CM

## 2025-09-05 DIAGNOSIS — D47.2 MGUS (MONOCLONAL GAMMOPATHY OF UNKNOWN SIGNIFICANCE): ICD-10-CM

## 2025-09-05 DIAGNOSIS — G89.4 CHRONIC PAIN SYNDROME: ICD-10-CM

## 2025-09-05 DIAGNOSIS — N18.31 CHRONIC KIDNEY DISEASE, STAGE 3A (HCC): ICD-10-CM

## 2025-09-05 DIAGNOSIS — G31.84 MILD COGNITIVE IMPAIRMENT: Primary | ICD-10-CM

## 2025-09-05 DIAGNOSIS — K21.9 GASTROESOPHAGEAL REFLUX DISEASE WITHOUT ESOPHAGITIS: ICD-10-CM

## 2025-09-05 PROCEDURE — 1090F PRES/ABSN URINE INCON ASSESS: CPT | Performed by: INTERNAL MEDICINE

## 2025-09-05 PROCEDURE — 99214 OFFICE O/P EST MOD 30 MIN: CPT | Performed by: INTERNAL MEDICINE

## 2025-09-05 PROCEDURE — G8427 DOCREV CUR MEDS BY ELIG CLIN: HCPCS | Performed by: INTERNAL MEDICINE

## 2025-09-05 PROCEDURE — 1159F MED LIST DOCD IN RCRD: CPT | Performed by: INTERNAL MEDICINE

## 2025-09-05 PROCEDURE — G8417 CALC BMI ABV UP PARAM F/U: HCPCS | Performed by: INTERNAL MEDICINE

## 2025-09-05 PROCEDURE — 1123F ACP DISCUSS/DSCN MKR DOCD: CPT | Performed by: INTERNAL MEDICINE

## 2025-09-05 PROCEDURE — 1036F TOBACCO NON-USER: CPT | Performed by: INTERNAL MEDICINE

## 2025-09-05 RX ORDER — ROSUVASTATIN CALCIUM 20 MG/1
20 TABLET, COATED ORAL DAILY
Qty: 90 TABLET | Refills: 2 | Status: SHIPPED | OUTPATIENT
Start: 2025-09-05

## 2025-09-05 RX ORDER — METOPROLOL TARTRATE 50 MG
50 TABLET ORAL 2 TIMES DAILY
Qty: 180 TABLET | Refills: 3 | Status: SHIPPED | OUTPATIENT
Start: 2025-09-05

## 2025-09-05 RX ORDER — MIRTAZAPINE 7.5 MG/1
7.5 TABLET, FILM COATED ORAL NIGHTLY
Qty: 90 TABLET | Refills: 3 | Status: SHIPPED | OUTPATIENT
Start: 2025-09-05

## 2025-09-05 RX ORDER — SUVOREXANT 20 MG/1
1 TABLET, FILM COATED ORAL DAILY
Qty: 30 TABLET | Refills: 5 | Status: SHIPPED | OUTPATIENT
Start: 2025-09-05 | End: 2026-03-04

## 2025-09-05 RX ORDER — TRAMADOL HYDROCHLORIDE 50 MG/1
50 TABLET ORAL EVERY 8 HOURS PRN
Qty: 60 TABLET | Refills: 0 | Status: SHIPPED | OUTPATIENT
Start: 2025-09-05 | End: 2025-10-05

## 2025-09-05 RX ORDER — NYSTATIN 100000 [USP'U]/G
POWDER TOPICAL
Qty: 1 EACH | Refills: 1 | Status: SHIPPED | OUTPATIENT
Start: 2025-09-05

## 2025-09-05 RX ORDER — LEVOTHYROXINE SODIUM 75 UG/1
75 TABLET ORAL DAILY
Qty: 90 TABLET | Refills: 3 | Status: SHIPPED | OUTPATIENT
Start: 2025-09-05

## 2025-09-05 RX ORDER — ACETAMINOPHEN 160 MG
2000 TABLET,DISINTEGRATING ORAL DAILY
Qty: 30 CAPSULE | Refills: 2 | Status: SHIPPED | OUTPATIENT
Start: 2025-09-05

## 2025-09-05 RX ORDER — OMEPRAZOLE 40 MG/1
40 CAPSULE, DELAYED RELEASE ORAL
Qty: 90 CAPSULE | Refills: 3 | Status: SHIPPED | OUTPATIENT
Start: 2025-09-05

## 2025-09-06 LAB
25(OH)D3 SERPL-MCNC: 47.8 NG/ML (ref 30–100)
ALBUMIN SERPL-MCNC: 3.2 G/DL (ref 3.5–5.2)
ALBUMIN SERPL-MCNC: 3.2 G/DL (ref 3.5–5.2)
ALBUMIN/GLOB SERPL: 1.3 (ref 1.1–2.2)
ALP SERPL-CCNC: 82 U/L (ref 35–104)
ALT SERPL-CCNC: 6 U/L (ref 10–35)
ANION GAP SERPL CALC-SCNC: 8 MMOL/L (ref 2–14)
ANION GAP SERPL CALC-SCNC: 8 MMOL/L (ref 2–14)
AST SERPL-CCNC: 19 U/L (ref 10–35)
BASOPHILS # BLD: 0.04 K/UL (ref 0–0.1)
BASOPHILS NFR BLD: 0.9 % (ref 0–1)
BILIRUB SERPL-MCNC: <0.2 MG/DL (ref 0–1.2)
BUN SERPL-MCNC: 19 MG/DL (ref 8–23)
BUN SERPL-MCNC: 19 MG/DL (ref 8–23)
BUN/CREAT SERPL: 13 (ref 12–20)
BUN/CREAT SERPL: 14 (ref 12–20)
CALCIUM SERPL-MCNC: 10.5 MG/DL (ref 8.2–9.6)
CALCIUM SERPL-MCNC: 10.5 MG/DL (ref 8.2–9.6)
CHLORIDE SERPL-SCNC: 105 MMOL/L (ref 98–107)
CHLORIDE SERPL-SCNC: 106 MMOL/L (ref 98–107)
CO2 SERPL-SCNC: 30 MMOL/L (ref 20–29)
CO2 SERPL-SCNC: 30 MMOL/L (ref 20–29)
CREAT SERPL-MCNC: 1.41 MG/DL (ref 0.6–1)
CREAT SERPL-MCNC: 1.45 MG/DL (ref 0.6–1)
DIFFERENTIAL METHOD BLD: ABNORMAL
EOSINOPHIL # BLD: 0.07 K/UL (ref 0–0.4)
EOSINOPHIL NFR BLD: 1.5 % (ref 0–7)
ERYTHROCYTE [DISTWIDTH] IN BLOOD BY AUTOMATED COUNT: 12.1 % (ref 11.5–14.5)
GLOBULIN SER CALC-MCNC: 2.5 G/DL (ref 2–4)
GLUCOSE SERPL-MCNC: 121 MG/DL (ref 65–100)
GLUCOSE SERPL-MCNC: 123 MG/DL (ref 65–100)
HCT VFR BLD AUTO: 38.1 % (ref 35–47)
HGB BLD-MCNC: 11.8 G/DL (ref 11.5–16)
IMM GRANULOCYTES # BLD AUTO: 0.01 K/UL (ref 0–0.04)
IMM GRANULOCYTES NFR BLD AUTO: 0.2 % (ref 0–0.5)
LYMPHOCYTES # BLD: 0.55 K/UL (ref 0.8–3.5)
LYMPHOCYTES NFR BLD: 12 % (ref 12–49)
MCH RBC QN AUTO: 34.6 PG (ref 26–34)
MCHC RBC AUTO-ENTMCNC: 31 G/DL (ref 30–36.5)
MCV RBC AUTO: 111.7 FL (ref 80–99)
MONOCYTES # BLD: 0.42 K/UL (ref 0–1)
MONOCYTES NFR BLD: 9.2 % (ref 5–13)
NEUTS SEG # BLD: 3.51 K/UL (ref 1.8–8)
NEUTS SEG NFR BLD: 76.2 % (ref 32–75)
NRBC # BLD: 0 K/UL (ref 0–0.01)
NRBC BLD-RTO: 0 PER 100 WBC
PHOSPHATE SERPL-MCNC: 2.1 MG/DL (ref 2.5–4.5)
PLATELET # BLD AUTO: 141 K/UL (ref 150–400)
PMV BLD AUTO: 11.1 FL (ref 8.9–12.9)
POTASSIUM SERPL-SCNC: 4 MMOL/L (ref 3.5–5.1)
POTASSIUM SERPL-SCNC: 4.1 MMOL/L (ref 3.5–5.1)
PROT SERPL-MCNC: 5.7 G/DL (ref 6.4–8.3)
RBC # BLD AUTO: 3.41 M/UL (ref 3.8–5.2)
RBC MORPH BLD: ABNORMAL
SODIUM SERPL-SCNC: 143 MMOL/L (ref 136–145)
SODIUM SERPL-SCNC: 144 MMOL/L (ref 136–145)
T4 FREE SERPL-MCNC: 1.4 NG/DL (ref 0.9–1.6)
TSH, 3RD GENERATION: 1.2 UIU/ML (ref 0.27–4.2)
WBC # BLD AUTO: 4.6 K/UL (ref 3.6–11)

## (undated) DEVICE — TR BAND RADIAL ARTERY COMPRESSION DEVICE: Brand: TR BAND

## (undated) DEVICE — ANGIOGRAPHY KIT CUST [K0910930B] [MERIT MEDICAL SYSTEMS INC]

## (undated) DEVICE — SPLINT WR POS F/ARTERIAL ACC -- BX/10

## (undated) DEVICE — CATHETER ANGIO JR4 AD 5 FRX100 CM 25 CM PERFORMA

## (undated) DEVICE — HI-TORQUE VERSACORE FLOPPY GUIDE WIRE SYSTEM 145 CM: Brand: HI-TORQUE VERSACORE

## (undated) DEVICE — CUSTOM KT PTCA INFL DEV K05 00053H

## (undated) DEVICE — TORCON NB, ADVANTAGE CATHETER: Brand: TORCON NB

## (undated) DEVICE — PACK PROCEDURE SURG HRT CATH

## (undated) DEVICE — ROSEN CURVED WIRE GUIDE: Brand: ROSEN

## (undated) DEVICE — CATHETER GUID 6FR L100CM DIA0.071IN NYL SHFT 3DRC W/O SIDE

## (undated) DEVICE — TUBING PRSS MON L6IN PVC M FEM CONN

## (undated) DEVICE — KIT ACCS INTRO 4FR L10CM NDL 21GA L7CM GWIRE L40CM

## (undated) DEVICE — RADIFOCUS OPTITORQUE ANGIOGRAPHIC CATHETER: Brand: OPTITORQUE

## (undated) DEVICE — 3M™ TEGADERM™ TRANSPARENT FILM DRESSING FRAME STYLE, 1626W, 4 IN X 4-3/4 IN (10 CM X 12 CM), 50/CT 4CT/CASE: Brand: 3M™ TEGADERM™

## (undated) DEVICE — PRESSURE GUIDEWIRE: Brand: COMET

## (undated) DEVICE — SYR ART 700 CLEAR MARK 7 -- ARTERION

## (undated) DEVICE — GLIDESHEATH SLENDER ACCESS KIT: Brand: GLIDESHEATH SLENDER

## (undated) DEVICE — SYRINGE ANGIO 10 CC BRL STD PRNT POLYCARB LT BLU MEDALLION

## (undated) DEVICE — CATHETER ETER ANGIO L110CM OD5FR ID046IN L75CM 038IN 145DEG CARD